# Patient Record
Sex: MALE | Race: WHITE | NOT HISPANIC OR LATINO | Employment: OTHER | URBAN - METROPOLITAN AREA
[De-identification: names, ages, dates, MRNs, and addresses within clinical notes are randomized per-mention and may not be internally consistent; named-entity substitution may affect disease eponyms.]

---

## 2016-12-13 LAB
ALT SERPL-CCNC: 34 U/L (ref 16–63)
AST SERPL-CCNC: 23 U/L (ref 15–37)
CREAT SERPL-MCNC: 1 MG/DL (ref 0.55–1.3)
GFR SERPL CREATININE-BSD FRML MDRD: 74 ML/MIN/1.73M2
GLUCOSE SERPL-MCNC: 120 MG/DL (ref 70–99)
POTASSIUM SERPL-SCNC: 4 MMOL/L (ref 3.5–5.1)

## 2017-01-06 LAB
ALT SERPL-CCNC: 28 U/L (ref 16–63)
AST SERPL-CCNC: 18 U/L (ref 15–37)
CHOLEST SERPL-MCNC: 158 MG/DL (ref 0–200)
CREAT SERPL-MCNC: 0.93 MG/DL (ref 0.55–1.3)
GFR SERPL CREATININE-BSD FRML MDRD: 98 ML/MIN/1.73M2
GLUCOSE SERPL-MCNC: 99 MG/DL (ref 70–99)
HDLC SERPL-MCNC: 52 MG/DL (ref 40–60)
LDLC SERPL CALC-MCNC: 66 MG/DL
POTASSIUM SERPL-SCNC: 4.4 MMOL/L (ref 3.5–5.1)
TRIGL SERPL-MCNC: 202 MG/DL (ref 30–200)

## 2017-01-27 ENCOUNTER — TRANSFERRED RECORDS (OUTPATIENT)
Dept: HEALTH INFORMATION MANAGEMENT | Facility: CLINIC | Age: 67
End: 2017-01-27

## 2017-01-27 LAB — INR PPP: 1.7 (ref 0.83–1.19)

## 2018-03-27 ENCOUNTER — TRANSFERRED RECORDS (OUTPATIENT)
Dept: HEALTH INFORMATION MANAGEMENT | Facility: CLINIC | Age: 68
End: 2018-03-27

## 2018-04-02 ENCOUNTER — TRANSFERRED RECORDS (OUTPATIENT)
Dept: HEALTH INFORMATION MANAGEMENT | Facility: CLINIC | Age: 68
End: 2018-04-02

## 2018-04-18 ENCOUNTER — TRANSFERRED RECORDS (OUTPATIENT)
Dept: HEALTH INFORMATION MANAGEMENT | Facility: CLINIC | Age: 68
End: 2018-04-18

## 2018-04-23 ENCOUNTER — TRANSFERRED RECORDS (OUTPATIENT)
Dept: HEALTH INFORMATION MANAGEMENT | Facility: CLINIC | Age: 68
End: 2018-04-23

## 2018-05-10 ENCOUNTER — TRANSFERRED RECORDS (OUTPATIENT)
Dept: HEALTH INFORMATION MANAGEMENT | Facility: CLINIC | Age: 68
End: 2018-05-10

## 2018-08-09 ENCOUNTER — TRANSFERRED RECORDS (OUTPATIENT)
Dept: HEALTH INFORMATION MANAGEMENT | Facility: CLINIC | Age: 68
End: 2018-08-09

## 2018-08-31 DIAGNOSIS — D68.51 FACTOR V LEIDEN MUTATION (H): Primary | ICD-10-CM

## 2018-09-10 ENCOUNTER — ALLIED HEALTH/NURSE VISIT (OUTPATIENT)
Dept: FAMILY MEDICINE | Facility: CLINIC | Age: 68
End: 2018-09-10
Payer: COMMERCIAL

## 2018-09-10 VITALS — HEART RATE: 80 BPM | RESPIRATION RATE: 18 BRPM | SYSTOLIC BLOOD PRESSURE: 138 MMHG | DIASTOLIC BLOOD PRESSURE: 70 MMHG

## 2018-09-10 DIAGNOSIS — Z01.30 BP CHECK: Primary | ICD-10-CM

## 2018-09-10 DIAGNOSIS — D68.51 FACTOR V LEIDEN MUTATION (H): ICD-10-CM

## 2018-09-10 LAB — INR PPP: 2.37 (ref 0.86–1.14)

## 2018-09-10 PROCEDURE — 85610 PROTHROMBIN TIME: CPT | Performed by: FAMILY MEDICINE

## 2018-09-10 PROCEDURE — 36415 COLL VENOUS BLD VENIPUNCTURE: CPT | Performed by: FAMILY MEDICINE

## 2018-09-10 PROCEDURE — 99207 ZZC NO CHARGE NURSE ONLY: CPT

## 2018-09-10 NOTE — PROGRESS NOTES
S-(situation): patient is new to our clinic and is requesting a BP check.  He is from [place].    B-(background): does not check BP at home.  Has BP checked when gets INR done.    States takes simvasts DVT after arm surgery    A-(assessment): BP at goal    R-(recommendations): advised to continue to monitor BP as needed.  Octavia Ashley RN

## 2018-09-10 NOTE — MR AVS SNAPSHOT
"              After Visit Summary   9/10/2018    Jose M Rea    MRN: [id number]           Patient Information     Date Of Birth          1950        Visit Information        Provider Department      9/10/2018 9:15 AM [place] [facility]        Today's Diagnoses     BP check    -  1       Follow-ups after your visit        Your next 10 appointments already scheduled     Sep 10, 2018  9:15 AM CDT   [facility] with [place] [facility]    [address] [place] [address] [phone]              Who to contact     If you have questions or need follow up information about today's clinic visit or your schedule please contact [facility] directly at [phone].  Normal or non-critical lab and imaging results will be communicated to you by MyChart, letter or phone within 4 business days after the clinic has received the results. If you do not hear from us within 7 days, please contact the clinic through MyChart or phone. If you have a critical or abnormal lab result, we will notify you by phone as soon as possible.  Submit refill requests through [employer] or call your pharmacy and they will forward the refill request to us. Please allow 3 business days for your refill to be completed.          Additional Information About Your Visit        MyChart Information     [employer] lets you send messages to your doctor, view your test results, renew your prescriptions, schedule appointments and more. To sign up, go to [email or web address] . Click on \"Log in\" on the left side of the screen, which will take you to the Welcome page. Then click on \"Sign up Now\" on the right side of the page.     You will be asked to enter the access code listed below, as well as some personal information. Please follow the directions to create your username and password.     Your access code is: [email or web address]  Expires: 12/9/2018  9:11 AM   "   Your access code will  in 90 days. If you need help or a new code, please call your [place] clinic or [phone].        Care EveryWhere ID     This is your Care EveryWhere ID. This could be used by other organizations to access your [place] medical records  [id number]         Blood Pressure from Last 3 Encounters:   No data found for BP    Weight from Last 3 Encounters:   No data found for Wt              Today, you had the following     No orders found for display       Primary Care Provider Fax #    Physician No Ref-Primary [phone]       No address on file        Equal Access to Services     [facility]: Hadii aad ku hadasho Soomaali, waaxda luqadaha, qaybta kaalmada adeegyacali, brian lainez . So [facility] [phone].    ATENCIÓN: Si habla español, tiene a loyd disposición servicios gratuitos de asistencia lingüística. Llame al [phone].    We comply with applicable federal civil rights laws and [place] laws. We do not discriminate on the basis of race, color, national origin, age, disability, sex, sexual orientation, or gender identity.            Thank you!     Thank you for choosing [facility]  for your care. Our goal is always to provide you with excellent care. Hearing back from our patients is one way we can continue to improve our services. Please take a few minutes to complete the written survey that you may receive in the mail after your visit with us. Thank you!             Your Updated Medication List - Protect others around you: Learn how to safely use, store and throw away your medicines at www.disposemymeds.org.      Notice  As of 9/10/2018  9:11 AM    You have not been prescribed any medications.

## 2018-09-12 ENCOUNTER — OFFICE VISIT (OUTPATIENT)
Dept: FAMILY MEDICINE | Facility: CLINIC | Age: 68
End: 2018-09-12
Payer: COMMERCIAL

## 2018-09-12 VITALS
SYSTOLIC BLOOD PRESSURE: 122 MMHG | TEMPERATURE: 98.5 F | RESPIRATION RATE: 20 BRPM | DIASTOLIC BLOOD PRESSURE: 68 MMHG | HEART RATE: 84 BPM | WEIGHT: 194.8 LBS

## 2018-09-12 DIAGNOSIS — J01.90 ACUTE SINUSITIS WITH SYMPTOMS > 10 DAYS: Primary | ICD-10-CM

## 2018-09-12 PROCEDURE — 99213 OFFICE O/P EST LOW 20 MIN: CPT | Performed by: NURSE PRACTITIONER

## 2018-09-12 RX ORDER — WARFARIN SODIUM 5 MG/1
5 TABLET ORAL DAILY
COMMUNITY
Start: 2018-07-19 | End: 2018-09-24

## 2018-09-12 RX ORDER — KETOCONAZOLE 20 MG/G
CREAM TOPICAL PRN
COMMUNITY
Start: 2018-05-18 | End: 2021-02-26

## 2018-09-12 RX ORDER — BETAMETHASONE DIPROPIONATE 0.5 MG/G
CREAM TOPICAL PRN
COMMUNITY
Start: 2018-08-16 | End: 2019-07-11

## 2018-09-12 RX ORDER — AMOXICILLIN 500 MG/1
500 CAPSULE ORAL 2 TIMES DAILY
Qty: 20 CAPSULE | Refills: 0 | Status: CANCELLED | OUTPATIENT
Start: 2018-09-12 | End: 2018-09-22

## 2018-09-12 RX ORDER — SIMVASTATIN 20 MG
20 TABLET ORAL DAILY
COMMUNITY
Start: 2018-08-12 | End: 2019-02-14

## 2018-09-12 RX ORDER — AMOXICILLIN 875 MG
875 TABLET ORAL 2 TIMES DAILY
Qty: 20 TABLET | Refills: 0 | Status: SHIPPED | OUTPATIENT
Start: 2018-09-12 | End: 2018-09-24

## 2018-09-12 RX ORDER — CLOTRIMAZOLE 1 %
CREAM (GRAM) TOPICAL PRN
COMMUNITY
Start: 2018-03-26 | End: 2024-04-30

## 2018-09-12 RX ORDER — FLUTICASONE PROPIONATE 50 MCG
1-2 SPRAY, SUSPENSION (ML) NASAL DAILY
Qty: 16 G | Refills: 0 | Status: SHIPPED | OUTPATIENT
Start: 2018-09-12 | End: 2019-02-14

## 2018-09-12 NOTE — MR AVS SNAPSHOT
After Visit Summary   9/12/2018    Jose M Rea    MRN: [id number]           Patient Information     Date Of Birth          1950        Visit Information        Provider Department      9/12/2018 1:40 PM Dottie Palma APRN [facility]        Today's Diagnoses     Acute sinusitis with symptoms > 10 days    -  1      Care Instructions    Medications as directed   Symptom Relief: Afdrin do not use greater then 3 days  Intranasal corticosteroid   Flonase has  been prescribed.     Tylenol or Ibuprofen if able to take for fevers and discomfort.  Do not exceed 4 grams of Tylenol in a 24 hour period.  Take Ibuprofen with food.  Hydrate with fluids, rest, cool humidifier.  May use acetaminophen, ibuprofen prn.    For your Cough   The Buckwheat Honey Dose: Given   hour Prior to Bedtime  For children age under 1 year -Do not use due to botulism risk     2-5 years -  tsp (2.5 mL)    6-11 years -1 tsp (5 mL)    12-18 years -2 tsp (10 mL)     Guaifenesin     Adult dose -Not to exceed 2.4 g (2400mg) per day    Child age 6-12 years -100 mg every 4 hr, not to exceed 1.2 g (1200mg) per day     Pediatric, 2-6 years -50 mg every 4 hr as needed, not to exceed 600 mg per day    Cough medications is not recommended in children under 2 years.  With use of cough medications have combination medications be aware of products in the cough medication you are using to avoid overdose    Follow up with PCP in 2 weeks  .    Go to Emergency Room if sx worsen or change, Shortness of breath, chest pain, persistent fevers, or painful breathing occur.     Patient voiced understanding of instructions given.      Follow-up with your primary care provider next week and as needed.    Indications for emergent return to emergency department discussed with patient, who verbalized good understanding and agreement.  Patient understands the limitations of today's evaluation.         Sinusitis (Antibiotic  Treatment)    The sinuses are air-filled spaces within the bones of the face. They connect to the inside of the nose. Sinusitis is an inflammation of the tissue that lines the sinuses. Sinusitis can occur during a cold. It can also happen due to allergies to pollens and other particles in the air. Sinusitis can cause symptoms of sinus congestion and a feeling of fullness. A sinus infection causes fever, headache, and facial pain. There is often green or yellow fluid draining from the nose or into the back of the throat (post-nasal drip). You have been given antibiotics to treat this condition.  Home care    Take the full course of antibiotics as instructed. Do not stop taking them, even when you feel better.    Drink plenty of water, hot tea, and other liquids. This may help thin nasal mucus. It also may help your sinuses drain fluids.    Heat may help soothe painful areas of your face. Use a towel soaked in hot water. Or,  the shower and direct the warm spray onto your face. Using a vaporizer along with a menthol rub at night may also help soothe symptoms.     An expectorant with guaifenesin may help thin nasal mucus and help your sinuses drain fluids.    You can use an over-the-counter decongestant, unless a similar medicine was prescribed to you. Nasal sprays work the fastest. Use one that contains phenylephrine or oxymetazoline. First blow your nose gently. Then use the spray. Do not use these medicines more often than directed on the label. If you do, your symptoms may get worse. You may also take pills that contain pseudoephedrine. Don t use products that combine multiple medicines. This is because side effects may be increased. Read labels. You can also ask the pharmacist for help. (People with high blood pressure should not use decongestants. They can raise blood pressure.)    Over-the-counter antihistamines may help if allergies contributed to your sinusitis.      Do not use nasal rinses or  irrigation during an acute sinus infection, unless your healthcare provider tells you to. Rinsing may spread the infection to other areas in your sinuses.    Use acetaminophen or ibuprofen to control pain, unless another pain medicine was prescribed to you. If you have chronic liver or kidney disease or ever had a stomach ulcer, talk with your healthcare provider before using these medicines. (Aspirin should never be taken by anyone under age 18 who is ill with a fever. It may cause severe liver damage.)    Don't smoke. This can make symptoms worse.  Follow-up care  Follow up with your healthcare provider or our staff if you are better in 1 week.  When to seek medical advice  Call your healthcare provider if any of these occur:    Facial pain or headache that gets worse    Stiff neck    Unusual drowsiness or confusion    Swelling of your forehead or eyelids    Vision problems, such as blurred or double vision    Fever of 100.4 F (38 C) or higher, or as directed by your healthcare provider    Seizure    Breathing problems    Symptoms don't go away in 10 days  Prevention  Here are steps you can take to help prevent an infection:    Keep good hand washing habits.    Don t have close contact with people who have sore throats, colds, or other upper respiratory infections.    Don t smoke, and stay away from secondhand smoke.    Stay up to date with of your vaccines.  Date Last Reviewed: 11/1/2017 2000-2017 [employer]. [address] [place] [address], [place] [address]. All rights reserved. This information is not intended as a substitute for professional medical care. Always follow your healthcare professional's instructions.                Follow-ups after your visit        Who to contact     If you have questions or need follow up information about today's clinic visit or your schedule please contact [facility] directly at [phone].  Normal or non-critical lab and imaging results will be  "communicated to you by [employer], letter or phone within 4 business days after the clinic has received the results. If you do not hear from us within 7 days, please contact the clinic through EQ[employer] or phone. If you have a critical or abnormal lab result, we will notify you by phone as soon as possible.  Submit refill requests through [employer] or call your pharmacy and they will forward the refill request to us. Please allow 3 business days for your refill to be completed.          Additional Information About Your Visit        EQO Information     EQO lets you send messages to your doctor, view your test results, renew your prescriptions, schedule appointments and more. To sign up, go to [email or web address] . Click on \"Log in\" on the left side of the screen, which will take you to the Welcome page. Then click on \"Sign up Now\" on the right side of the page.     You will be asked to enter the access code listed below, as well as some personal information. Please follow the directions to create your username and password.     Your access code is: [email or web address]  Expires: 2018  9:11 AM     Your access code will  in 90 days. If you need help or a new code, please call your [place] clinic or [phone].        Care EveryWhere ID     This is your Care EveryWhere ID. This could be used by other organizations to access your [place] medical records  [id number]        Your Vitals Were     Pulse Temperature Respirations             84 98.5  F (36.9  C) (Tympanic) 20          Blood Pressure from Last 3 Encounters:   18 122/68   09/10/18 138/70    Weight from Last 3 Encounters:   18 194 lb 12.8 oz (88.4 kg)              We Performed the Following     Colonoscopy - HIM Scan          Today's Medication Changes          These changes are accurate as of 18  2:14 PM.  If you have any questions, ask your nurse or doctor.               Start taking these medicines.        Dose/Directions    " amoxicillin 875 MG tablet   Commonly known as:  AMOXIL   Used for:  Acute sinusitis with symptoms > 10 days   Started by:  Dottie Palma APRN CNP        Dose:  875 mg   Take 1 tablet (875 mg) by mouth 2 times daily   Quantity:  20 tablet   Refills:  0       fluticasone 50 MCG/ACT spray   Commonly known as:  FLONASE   Used for:  Acute sinusitis with symptoms > 10 days   Started by:  Dottie Palma APRN CNP        Dose:  1-2 spray   Spray 1-2 sprays into both nostrils daily   Quantity:  16 g   Refills:  0            Where to get your medicines      These medications were sent to [id number] - [facility] [address]  [address] [address]    Hours:  Closed 10-16-08 business to [facility] Phone:  [phone]     amoxicillin 875 MG tablet    fluticasone 50 MCG/ACT spray                Primary Care Provider Office Phone # Fax #    Pj Quijano [phone] [address] [facility] [address]        Equal Access to Services     [facility]: Hadii aad ku hadasho Socarter, waaxda luqadaha, qaybta kaalmada adesandra, brian lainez . So [facility] [phone].    ATENCIÓN: Si habla español, tiene a loyd disposición servicios gratuitos de asistencia lingüística. [facility] [phone].    We comply with applicable federal civil rights laws and [place] laws. We do not discriminate on the basis of race, color, national origin, age, disability, sex, sexual orientation, or gender identity.            Thank you!     Thank you for choosing [facility]  for your care. Our goal is always to provide you with excellent care. Hearing back from our patients is one way we can continue to improve our services. Please take a few minutes to complete the written survey that you may receive in the mail after your visit with us. Thank you!             Your Updated Medication List - Protect others around you: Learn how to  safely use, store and throw away your medicines at www.disposemymeds.org.          This list is accurate as of 9/12/18  2:14 PM.  Always use your most recent med list.                   Brand Name Dispense Instructions for use Diagnosis    ACETAMINOPHEN PO      Take 650 mg by mouth        amoxicillin 875 MG tablet    AMOXIL    20 tablet    Take 1 tablet (875 mg) by mouth 2 times daily    Acute sinusitis with symptoms > 10 days       betamethasone dipropionate 0.05 % cream    DIPROSONE     daily        clotrimazole 1 % cream    LOTRIMIN     daily        fluticasone 50 MCG/ACT spray    FLONASE    16 g    Spray 1-2 sprays into both nostrils daily    Acute sinusitis with symptoms > 10 days       ketoconazole 2 % cream    NIZORAL     daily        MENS ONE DAILY PO           simvastatin 20 MG tablet    ZOCOR     20 mg daily        warfarin 5 MG tablet    COUMADIN     5 mg daily

## 2018-09-12 NOTE — PROGRESS NOTES
SUBJECTIVE:   Jose M Rea is a 68 year old male who presents to clinic today for the following health issues:    ENT Symptoms             Symptoms: cc Present Absent Comment   Fever/Chills       Fatigue  x     Muscle Aches       Eye Irritation       Sneezing       Nasal Adriano/Drg  x  Congestion, not much drainage.    Sinus Pressure/Pain       Loss of smell       Dental pain       Sore Throat       Swollen Glands       Ear Pain/Fullness       Cough  x  Non productive unless works at it then can be    Wheeze       Chest Pain       Shortness of breath       Rash       Other         Symptom duration:  6 days    Symptom severity:  same    Treatments tried:  Dymatap and Advil alternating    Contacts:  none          Problem list and histories reviewed & adjusted, as indicated.  Additional history: as documented    There is no problem list on file for this patient.    History reviewed. No pertinent surgical history.    Social History   Substance Use Topics     Smoking status: Current Every Day Smoker     Smokeless tobacco: Never Used     Alcohol use No     History reviewed. No pertinent family history.      Current Outpatient Prescriptions   Medication Sig Dispense Refill     ACETAMINOPHEN PO Take 650 mg by mouth       betamethasone dipropionate (DIPROSONE) 0.05 % cream daily       clotrimazole (LOTRIMIN) 1 % cream daily       ketoconazole (NIZORAL) 2 % cream daily       Multiple Vitamins-Minerals (MENS ONE DAILY PO)        simvastatin (ZOCOR) 20 MG tablet 20 mg daily       warfarin (COUMADIN) 5 MG tablet 5 mg daily       Allergies   Allergen Reactions     Cephalexin Rash     Doxycycline Rash     Metal [Staples] Rash and Blisters       Reviewed and updated as needed this visit by clinical staff       Reviewed and updated as needed this visit by Provider         ROS:  Constitutional, HEENT, cardiovascular, pulmonary, gi and gu systems are negative, except as otherwise noted.    OBJECTIVE:     /68 (BP Location:  Right arm, Cuff Size: Adult Large)  Pulse 84  Temp 98.5  F (36.9  C) (Tympanic)  Resp 20  Wt 194 lb 12.8 oz (88.4 kg)  There is no height or weight on file to calculate BMI.   GENERAL: healthy, alert and no distress  EYES: Eyes grossly normal to inspection, PERRL and conjunctivae and sclerae normal  HENT: ear canals and TM's normal, nose and mouth without ulcers or lesions  HENT: Maxillary sinus tenderness, bilateral turbinates inflamed and edematous    NECK: no adenopathy, no asymmetry, masses, or scars and thyroid normal to palpation  RESP: lungs clear to auscultation - no rales, rhonchi or wheezes  CV: regular rate and rhythm, normal S1 S2, no S3 or S4, no murmur, click or rub, no peripheral edema and peripheral pulses strong  MS: no gross musculoskeletal defects noted, no edema  SKIN: no suspicious lesions or rashes  NEURO: Normal strength and tone, mentation intact and speech normal  PSYCH: mentation appears normal, affect normal/bright        ASSESSMENT:       ICD-10-CM    1. Acute sinusitis with symptoms > 10 days J01.90 fluticasone (FLONASE) 50 MCG/ACT spray     amoxicillin (AMOXIL) 875 MG tablet         PLAN:     Patient Instructions   Medications as directed   Symptom Relief: Afdrin do not use greater then 3 days  Intranasal corticosteroid   Flonase has  been prescribed.     Tylenol or Ibuprofen if able to take for fevers and discomfort.  Do not exceed 4 grams of Tylenol in a 24 hour period.  Take Ibuprofen with food.  Hydrate with fluids, rest, cool humidifier.  May use acetaminophen, ibuprofen prn.    For your Cough   The Buckwheat Honey Dose: Given   hour Prior to Bedtime  For children age under 1 year -Do not use due to botulism risk     2-5 years -  tsp (2.5 mL)    6-11 years -1 tsp (5 mL)    12-18 years -2 tsp (10 mL)     Guaifenesin     Adult dose -Not to exceed 2.4 g (2400mg) per day    Child age 6-12 years -100 mg every 4 hr, not to exceed 1.2 g (1200mg) per day     Pediatric, 2-6 years -50 mg  every 4 hr as needed, not to exceed 600 mg per day    Cough medications is not recommended in children under 2 years.  With use of cough medications have combination medications be aware of products in the cough medication you are using to avoid overdose    Follow up with PCP in 2 weeks  .    Go to Emergency Room if sx worsen or change, Shortness of breath, chest pain, persistent fevers, or painful breathing occur.     Patient voiced understanding of instructions given.      Follow-up with your primary care provider next week and as needed.    Indications for emergent return to emergency department discussed with patient, who verbalized good understanding and agreement.  Patient understands the limitations of today's evaluation.         Sinusitis (Antibiotic Treatment)    The sinuses are air-filled spaces within the bones of the face. They connect to the inside of the nose. Sinusitis is an inflammation of the tissue that lines the sinuses. Sinusitis can occur during a cold. It can also happen due to allergies to pollens and other particles in the air. Sinusitis can cause symptoms of sinus congestion and a feeling of fullness. A sinus infection causes fever, headache, and facial pain. There is often green or yellow fluid draining from the nose or into the back of the throat (post-nasal drip). You have been given antibiotics to treat this condition.  Home care    Take the full course of antibiotics as instructed. Do not stop taking them, even when you feel better.    Drink plenty of water, hot tea, and other liquids. This may help thin nasal mucus. It also may help your sinuses drain fluids.    Heat may help soothe painful areas of your face. Use a towel soaked in hot water. Or,  the shower and direct the warm spray onto your face. Using a vaporizer along with a menthol rub at night may also help soothe symptoms.     An expectorant with guaifenesin may help thin nasal mucus and help your sinuses drain  fluids.    You can use an over-the-counter decongestant, unless a similar medicine was prescribed to you. Nasal sprays work the fastest. Use one that contains phenylephrine or oxymetazoline. First blow your nose gently. Then use the spray. Do not use these medicines more often than directed on the label. If you do, your symptoms may get worse. You may also take pills that contain pseudoephedrine. Don t use products that combine multiple medicines. This is because side effects may be increased. Read labels. You can also ask the pharmacist for help. (People with high blood pressure should not use decongestants. They can raise blood pressure.)    Over-the-counter antihistamines may help if allergies contributed to your sinusitis.      Do not use nasal rinses or irrigation during an acute sinus infection, unless your healthcare provider tells you to. Rinsing may spread the infection to other areas in your sinuses.    Use acetaminophen or ibuprofen to control pain, unless another pain medicine was prescribed to you. If you have chronic liver or kidney disease or ever had a stomach ulcer, talk with your healthcare provider before using these medicines. (Aspirin should never be taken by anyone under age 18 who is ill with a fever. It may cause severe liver damage.)    Don't smoke. This can make symptoms worse.  Follow-up care  Follow up with your healthcare provider or our staff if you are better in 1 week.  When to seek medical advice  Call your healthcare provider if any of these occur:    Facial pain or headache that gets worse    Stiff neck    Unusual drowsiness or confusion    Swelling of your forehead or eyelids    Vision problems, such as blurred or double vision    Fever of 100.4 F (38 C) or higher, or as directed by your healthcare provider    Seizure    Breathing problems    Symptoms don't go away in 10 days  Prevention  Here are steps you can take to help prevent an infection:    Keep good hand washing  habits.    Don t have close contact with people who have sore throats, colds, or other upper respiratory infections.    Don t smoke, and stay away from secondhand smoke.    Stay up to date with of your vaccines.  Date Last Reviewed: 11/1/2017 2000-2017 [employer]. [address] [place] [address]. All rights reserved. This information is not intended as a substitute for professional medical care. Always follow your healthcare professional's instructions.              NICHELLE Wilder [facility]

## 2018-09-12 NOTE — PATIENT INSTRUCTIONS
Medications as directed   Symptom Relief: Afdrin do not use greater then 3 days  Intranasal corticosteroid   Flonase has  been prescribed.     Tylenol or Ibuprofen if able to take for fevers and discomfort.  Do not exceed 4 grams of Tylenol in a 24 hour period.  Take Ibuprofen with food.  Hydrate with fluids, rest, cool humidifier.  May use acetaminophen, ibuprofen prn.    For your Cough   The Buckwheat Honey Dose: Given   hour Prior to Bedtime  For children age under 1 year -Do not use due to botulism risk     2-5 years -  tsp (2.5 mL)    6-11 years -1 tsp (5 mL)    12-18 years -2 tsp (10 mL)     Guaifenesin     Adult dose -Not to exceed 2.4 g (2400mg) per day    Child age 6-12 years -100 mg every 4 hr, not to exceed 1.2 g (1200mg) per day     Pediatric, 2-6 years -50 mg every 4 hr as needed, not to exceed 600 mg per day    Cough medications is not recommended in children under 2 years.  With use of cough medications have combination medications be aware of products in the cough medication you are using to avoid overdose    Follow up with PCP in 2 weeks  .    Go to Emergency Room if sx worsen or change, Shortness of breath, chest pain, persistent fevers, or painful breathing occur.     Patient voiced understanding of instructions given.      Follow-up with your primary care provider next week and as needed.    Indications for emergent return to emergency department discussed with patient, who verbalized good understanding and agreement.  Patient understands the limitations of today's evaluation.         Sinusitis (Antibiotic Treatment)    The sinuses are air-filled spaces within the bones of the face. They connect to the inside of the nose. Sinusitis is an inflammation of the tissue that lines the sinuses. Sinusitis can occur during a cold. It can also happen due to allergies to pollens and other particles in the air. Sinusitis can cause symptoms of sinus congestion and a feeling of fullness. A sinus infection  causes fever, headache, and facial pain. There is often green or yellow fluid draining from the nose or into the back of the throat (post-nasal drip). You have been given antibiotics to treat this condition.  Home care    Take the full course of antibiotics as instructed. Do not stop taking them, even when you feel better.    Drink plenty of water, hot tea, and other liquids. This may help thin nasal mucus. It also may help your sinuses drain fluids.    Heat may help soothe painful areas of your face. Use a towel soaked in hot water. Or,  the shower and direct the warm spray onto your face. Using a vaporizer along with a menthol rub at night may also help soothe symptoms.     An expectorant with guaifenesin may help thin nasal mucus and help your sinuses drain fluids.    You can use an over-the-counter decongestant, unless a similar medicine was prescribed to you. Nasal sprays work the fastest. Use one that contains phenylephrine or oxymetazoline. First blow your nose gently. Then use the spray. Do not use these medicines more often than directed on the label. If you do, your symptoms may get worse. You may also take pills that contain pseudoephedrine. Don t use products that combine multiple medicines. This is because side effects may be increased. Read labels. You can also ask the pharmacist for help. (People with high blood pressure should not use decongestants. They can raise blood pressure.)    Over-the-counter antihistamines may help if allergies contributed to your sinusitis.      Do not use nasal rinses or irrigation during an acute sinus infection, unless your healthcare provider tells you to. Rinsing may spread the infection to other areas in your sinuses.    Use acetaminophen or ibuprofen to control pain, unless another pain medicine was prescribed to you. If you have chronic liver or kidney disease or ever had a stomach ulcer, talk with your healthcare provider before using these medicines.  (Aspirin should never be taken by anyone under age 18 who is ill with a fever. It may cause severe liver damage.)    Don't smoke. This can make symptoms worse.  Follow-up care  Follow up with your healthcare provider or our staff if you are better in 1 week.  When to seek medical advice  Call your healthcare provider if any of these occur:    Facial pain or headache that gets worse    Stiff neck    Unusual drowsiness or confusion    Swelling of your forehead or eyelids    Vision problems, such as blurred or double vision    Fever of 100.4 F (38 C) or higher, or as directed by your healthcare provider    Seizure    Breathing problems    Symptoms don't go away in 10 days  Prevention  Here are steps you can take to help prevent an infection:    Keep good hand washing habits.    Don t have close contact with people who have sore throats, colds, or other upper respiratory infections.    Don t smoke, and stay away from secondhand smoke.    Stay up to date with of your vaccines.  Date Last Reviewed: 11/1/2017 2000-2017 [employer]. [address] [place] [address], [place] [address]. All rights reserved. This information is not intended as a substitute for professional medical care. Always follow your healthcare professional's instructions.

## 2018-09-12 NOTE — NURSING NOTE
Chief Complaint   Patient presents with     Nose Problem       Initial /68 (BP Location: Right arm, Cuff Size: Adult Large)  Pulse 84  Temp 98.5  F (36.9  C) (Tympanic)  Resp 20  Wt 194 lb 12.8 oz (88.4 kg) There is no height or weight on file to calculate BMI.    Patient presents to the clinic using No DME    Health Maintenance that is potentially due pending provider review:  NONE    n/a    Is there anyone who you would like to be able to receive your results? Not Applicable  If yes have patient fill out JUSTIN  Juan Miguel Stephenson M.A.

## 2018-09-17 ENCOUNTER — ANTICOAGULATION THERAPY VISIT (OUTPATIENT)
Dept: ANTICOAGULATION | Facility: CLINIC | Age: 68
End: 2018-09-17
Payer: COMMERCIAL

## 2018-09-17 DIAGNOSIS — I82.4Y2 ACUTE DEEP VEIN THROMBOSIS (DVT) OF PROXIMAL VEIN OF LEFT LOWER EXTREMITY (H): ICD-10-CM

## 2018-09-17 DIAGNOSIS — Z79.01 LONG-TERM (CURRENT) USE OF ANTICOAGULANTS: ICD-10-CM

## 2018-09-17 DIAGNOSIS — Z79.01 CHRONIC ANTICOAGULATION: ICD-10-CM

## 2018-09-17 DIAGNOSIS — D68.51 FACTOR V LEIDEN MUTATION (H): ICD-10-CM

## 2018-09-17 LAB — INR PPP: 2.31 (ref 0.86–1.14)

## 2018-09-17 PROCEDURE — 36415 COLL VENOUS BLD VENIPUNCTURE: CPT | Performed by: FAMILY MEDICINE

## 2018-09-17 PROCEDURE — 99207 ZZC NO CHARGE NURSE ONLY: CPT

## 2018-09-17 PROCEDURE — 85610 PROTHROMBIN TIME: CPT | Performed by: FAMILY MEDICINE

## 2018-09-17 NOTE — MR AVS SNAPSHOT
Jose M Rea   9/17/2018 9:15 AM   Anticoagulation Therapy Visit    Description:  68 year old male   Provider:  NB ANTI COAG   Department:  [facility]

## 2018-09-24 ENCOUNTER — OFFICE VISIT (OUTPATIENT)
Dept: FAMILY MEDICINE | Facility: CLINIC | Age: 68
End: 2018-09-24
Payer: COMMERCIAL

## 2018-09-24 VITALS
HEIGHT: 70 IN | RESPIRATION RATE: 16 BRPM | BODY MASS INDEX: 28.35 KG/M2 | WEIGHT: 198 LBS | TEMPERATURE: 98.4 F | DIASTOLIC BLOOD PRESSURE: 72 MMHG | HEART RATE: 88 BPM | SYSTOLIC BLOOD PRESSURE: 126 MMHG

## 2018-09-24 DIAGNOSIS — E78.5 HYPERLIPIDEMIA LDL GOAL <100: ICD-10-CM

## 2018-09-24 DIAGNOSIS — Z79.01 CHRONIC ANTICOAGULATION: Primary | ICD-10-CM

## 2018-09-24 DIAGNOSIS — I82.4Y2 ACUTE DEEP VEIN THROMBOSIS (DVT) OF PROXIMAL VEIN OF LEFT LOWER EXTREMITY (H): ICD-10-CM

## 2018-09-24 PROBLEM — L30.9 ECZEMA: Status: ACTIVE | Noted: 2018-09-24

## 2018-09-24 PROBLEM — I82.4Y9 ACUTE DEEP VEIN THROMBOSIS (DVT) OF PROXIMAL VEIN OF LOWER EXTREMITY (H): Status: ACTIVE | Noted: 2018-09-24

## 2018-09-24 PROCEDURE — 99213 OFFICE O/P EST LOW 20 MIN: CPT | Performed by: FAMILY MEDICINE

## 2018-09-24 RX ORDER — WARFARIN SODIUM 5 MG/1
7.5 TABLET ORAL DAILY
Qty: 135 TABLET | Refills: 3 | Status: SHIPPED | OUTPATIENT
Start: 2018-09-24 | End: 2019-05-30

## 2018-09-24 NOTE — PROGRESS NOTES
SUBJECTIVE:   Jose M Rea is a 68 year old male who presents to clinic today for the following health issues:  Chief Complaint   Patient presents with     Establish Care     [facility].      Moved here a month ago from [place].  From this area originally.     Medication Followup of Warfarin    Taking Medication as prescribed: yes    Side Effects:  None    Medication Helping Symptoms:  Yes     Pt. Has been taking this medication for a little over 2 years     Needing to establish care here.     Chronically on warfarin.  He has had DVT left leg.  He was off the warfarin for 30 days then had a phone call recommending he continue on the warfarin.   Shoulder surgery May, 2016.  DVT first noted about a month after his surgery.  He had not been immobilized.   Current dose has been 7.5mg daily.  Last INR within the past 2 weeks.  He has been checking q 6 weeks.     Problem 2: He has hyperlipidemia.  Takes simvastatin.    Last labs in February for annual physical.     He has had skin creams for Seborrheic dermatitis on face, antifungal for penis infection and steroid for eczema.     Patient Active Problem List   Diagnosis     Hyperlipidemia LDL goal <100     Acute deep vein thrombosis (DVT) of proximal vein of lower extremity (H)     Eczema      He has had low back pain with radiculopathy within the past year.  Saw spine surgeon. Treated conservatively.  Still some intermittent numbness in leg.    Has rotator cuff tear left arm treated conservatively.     ROS:General: POSITIVE for:, weight loss, when back causing pain.  Some decreased energy.   Resp: No coughing, wheezing or shortness of breath  CV: No chest pains or palpitations  GI: No nausea, vomiting,  heartburn, abdominal pain, diarrhea, constipation or change in bowel habits  : No urinary frequency or dysuria, bladder or kidney problems  Psychiatric: POSITIVE for:, anxiety, in the past year with back problems.  Feels he has been doing OK.    OBJECTIVE:Blood  "pressure 126/72, pulse 88, temperature 98.4  F (36.9  C), temperature source Tympanic, resp. rate 16, height 5' 9.75\" (1.772 m), weight 198 lb (89.8 kg). BMI=Body mass index is 28.61 kg/(m^2).  GENERAL APPEARANCE ADULT: Alert, no acute distress  NECK: No adenopathy,masses or thyromegaly  RESP: lungs clear to auscultation   CV: normal rate, regular rhythm, no murmur or gallop  ABDOMEN: soft, no organomegaly, masses or tenderness  MS: extremities normal, no peripheral edema     ASSESSMENT:   (Z79.01) Chronic anticoagulation  (primary encounter diagnosis)  Comment:   Plan: warfarin (COUMADIN) 5 MG tablet, INR CLINIC         REFERRAL          Schedule an appointment with anticoagulation clinic for the next time you are due for a reading.     (I82.4Y2) Acute deep vein thrombosis (DVT) of proximal vein of left lower extremity (H)  Comment:   Plan: INR CLINIC REFERRAL          (E78.5) Hyperlipidemia LDL goal <100  Comment:   Plan: We can refill simvastatin when needed.      "

## 2018-09-24 NOTE — MR AVS SNAPSHOT
After Visit Summary   9/24/2018    Jose M Rea    MRN: [id number]           Patient Information     Date Of Birth          1950        Visit Information        Provider Department      9/24/2018 9:00 AM Pj Quijano MD [facility]        Today's Diagnoses     Chronic anticoagulation    -  1    Acute deep vein thrombosis (DVT) of proximal vein of left lower extremity (H)        Hyperlipidemia LDL goal <100          Care Instructions    ASSESSMENT:   (Z79.01) Chronic anticoagulation  (primary encounter diagnosis)  Comment:   Plan: warfarin (COUMADIN) 5 MG tablet, INR CLINIC         REFERRAL          Schedule an appointment with anticoagulation clinic for the next time you are due for a reading.     (I82.4Y2) Acute deep vein thrombosis (DVT) of proximal vein of left lower extremity (H)  Comment:   Plan: INR CLINIC REFERRAL          (E78.5) Hyperlipidemia LDL goal <100  Comment:   Plan: We can refill simvastatin when needed.           Follow-ups after your visit        Additional Services     INR CLINIC REFERRAL       Your provider has referred you to INR Services.    Please be aware that coverage of these services is subject to the terms and limitations of your health insurance plan.  Call member services at your health plan with any benefit or coverage questions.    Indication for Anticoagulation: Other: DVT-he has been advised long term anticoagulation.  If nonstandard INR is desired, indicate goal range and explanation:   Expected Duration of Therapy: Lifetime                  Who to contact     If you have questions or need follow up information about today's clinic visit or your schedule please contact [facility] directly at [phone].  Normal or non-critical lab and imaging results will be communicated to you by MyChart, letter or phone within 4 business days after the clinic has received the results. If you do not hear from us within 7 days,  "please contact the clinic through [employer] or phone. If you have a critical or abnormal lab result, we will notify you by phone as soon as possible.  Submit refill requests through [employer] or call your pharmacy and they will forward the refill request to us. Please allow 3 business days for your refill to be completed.          Additional Information About Your Visit        [employer] Information     [employer] lets you send messages to your doctor, view your test results, renew your prescriptions, schedule appointments and more. To sign up, go to [email or web address] . Click on \"Log in\" on the left side of the screen, which will take you to the Welcome page. Then click on \"Sign up Now\" on the right side of the page.     You will be asked to enter the access code listed below, as well as some personal information. Please follow the directions to create your username and password.     Your access code is: [email or web address]  Expires: 2018  9:11 AM     Your access code will  in 90 days. If you need help or a new code, please call your [place] clinic or [phone].        Care EveryWhere ID     This is your Care EveryWhere ID. This could be used by other organizations to access your [place] medical records  [id number]        Your Vitals Were     Pulse Temperature Respirations Height BMI (Body Mass Index)       88 98.4  F (36.9  C) (Tympanic) 16 5' 9.75\" (1.772 m) 28.61 kg/m2        Blood Pressure from Last 3 Encounters:   18 126/72   18 122/68   09/10/18 138/70    Weight from Last 3 Encounters:   18 198 lb (89.8 kg)   18 194 lb 12.8 oz (88.4 kg)              We Performed the Following     INR CLINIC REFERRAL          Today's Medication Changes          These changes are accurate as of 18  9:48 AM.  If you have any questions, ask your nurse or doctor.               These medicines have changed or have updated prescriptions.        Dose/Directions    warfarin 5 MG tablet   Commonly " known as:  COUMADIN   This may have changed:    - how much to take  - how to take this   Used for:  Chronic anticoagulation   Changed by:  Pj Quijano MD        Dose:  7.5 mg   Take 1.5 tablets (7.5 mg) by mouth daily   Quantity:  135 tablet   Refills:  3            Where to get your medicines      These medications were sent to [id number] - [place], [place] - [address]  [address] [address]    Hours:  Closed 10-16-08 business to [facility] Phone:  [phone]     warfarin 5 MG tablet                Primary Care Provider Office Phone # Fax #    Pj Quijano [phone] [facility] [address]        Equal Access to Services     SAMMY STEVE : Hadii aad ku hadasho Soomaali, waaxda luqadaha, qaybta kaalmada adeegyada, brian lainez . So [facility] [phone].    ATENCIÓN: Si habla español, tiene a loyd disposición servicios gratuitos de asistencia lingüística. [facility] [phone].    We comply with applicable federal civil rights laws and [place] laws. We do not discriminate on the basis of race, color, national origin, age, disability, sex, sexual orientation, or gender identity.            Thank you!     Thank you for choosing [facility]  for your care. Our goal is always to provide you with excellent care. Hearing back from our patients is one way we can continue to improve our services. Please take a few minutes to complete the written survey that you may receive in the mail after your visit with us. Thank you!             Your Updated Medication List - Protect others around you: Learn how to safely use, store and throw away your medicines at www.disposemymeds.org.          This list is accurate as of 9/24/18  9:48 AM.  Always use your most recent med list.                   Brand Name Dispense Instructions for use Diagnosis    ACETAMINOPHEN PO      Take 650 mg by mouth         betamethasone dipropionate 0.05 % cream    DIPROSONE     daily        clotrimazole 1 % cream    LOTRIMIN     daily        fluticasone 50 MCG/ACT spray    FLONASE    16 g    Spray 1-2 sprays into both nostrils daily    Acute sinusitis with symptoms > 10 days       ketoconazole 2 % cream    NIZORAL     daily        MENS ONE DAILY PO           simvastatin 20 MG tablet    ZOCOR     20 mg daily        warfarin 5 MG tablet    COUMADIN    135 tablet    Take 1.5 tablets (7.5 mg) by mouth daily    Chronic anticoagulation

## 2018-09-24 NOTE — PATIENT INSTRUCTIONS
ASSESSMENT:   (Z79.01) Chronic anticoagulation  (primary encounter diagnosis)  Comment:   Plan: warfarin (COUMADIN) 5 MG tablet, INR CLINIC         REFERRAL          Schedule an appointment with anticoagulation clinic for the next time you are due for a reading.     (I82.4Y2) Acute deep vein thrombosis (DVT) of proximal vein of left lower extremity (H)  Comment:   Plan: INR CLINIC REFERRAL          (E78.5) Hyperlipidemia LDL goal <100  Comment:   Plan: We can refill simvastatin when needed.

## 2018-09-24 NOTE — NURSING NOTE
"Chief Complaint   Patient presents with     Establish Care       Initial /72 (BP Location: Right arm, Patient Position: Chair, Cuff Size: Adult Large)  Pulse 88  Temp 98.4  F (36.9  C) (Tympanic)  Resp 16  Ht 5' 9.75\" (1.772 m)  Wt 198 lb (89.8 kg)  BMI 28.61 kg/m2 Estimated body mass index is 28.61 kg/(m^2) as calculated from the following:    Height as of this encounter: 5' 9.75\" (1.772 m).    Weight as of this encounter: 198 lb (89.8 kg).    Patient presents to the clinic using No DME    Health Maintenance that is potentially due pending provider review:  NONE    n/a    Josefina Robledo MA Student    "

## 2018-09-24 NOTE — PROGRESS NOTES
ANTICOAGULATION FOLLOW-UP CLINIC VISIT    Patient Name:  Jose M Rea  Date:  9/24/2018  Contact Type:  Chart Review    SUBJECTIVE:     Patient Findings     Comments Chronically on warfarin.  He has had DVT left leg.  He was off the warfarin for 30 days then had a phone call recommending he continue on the warfarin.   Shoulder surgery May, 2016.  DVT first noted about a month after his surgery.  He had not been immobilized.   Current dose has been 7.5mg daily.  Last INR within the past 2 weeks.  He has been checking q 6 weeks.   Schedule an appointment with anticoagulation clinic for the next time you are due for a reading.            OBJECTIVE    INR   Date Value Ref Range Status   09/17/2018 2.31 (H) 0.86 - 1.14 Final       ASSESSMENT / PLAN  No question data found.  Anticoagulation Summary as of 9/17/2018     INR goal 2.0-3.0   Today's INR 2.31   Warfarin maintenance plan 7.5 mg (5 mg x 1.5) every day   Full warfarin instructions 7.5 mg every day   Weekly warfarin total 52.5 mg   Plan last modified Kb Braun RN (9/24/2018)   Next INR check 10/29/2018   Target end date Indefinite    Indications   Acute deep vein thrombosis (DVT) of proximal vein of lower extremity (H) [I82.4Y9]  Chronic anticoagulation [Z79.01]  Long-term (current) use of anticoagulants [Z79.01] [Z79.01]         Anticoagulation Episode Summary     INR check location     Preferred lab     Send INR reminders to [facility]    Comments * Tx from [place]      Anticoagulation Care Providers     Provider Role Specialty Phone number    Pj Quijano MD  Family Practice [phone]            See the Encounter Report to view Anticoagulation Flowsheet and Dosing Calendar (Go to Encounters tab in chart review, and find the Anticoagulation Therapy Visit)        Kb Braun RN

## 2018-10-03 ENCOUNTER — OFFICE VISIT (OUTPATIENT)
Dept: FAMILY MEDICINE | Facility: CLINIC | Age: 68
End: 2018-10-03
Payer: COMMERCIAL

## 2018-10-03 VITALS
RESPIRATION RATE: 16 BRPM | HEART RATE: 95 BPM | SYSTOLIC BLOOD PRESSURE: 128 MMHG | DIASTOLIC BLOOD PRESSURE: 78 MMHG | BODY MASS INDEX: 28.76 KG/M2 | WEIGHT: 199 LBS | OXYGEN SATURATION: 98 % | TEMPERATURE: 98.4 F

## 2018-10-03 DIAGNOSIS — M54.41 ACUTE LEFT-SIDED LOW BACK PAIN WITH RIGHT-SIDED SCIATICA: Primary | ICD-10-CM

## 2018-10-03 PROCEDURE — 99213 OFFICE O/P EST LOW 20 MIN: CPT | Performed by: FAMILY MEDICINE

## 2018-10-03 NOTE — PROGRESS NOTES
SUBJECTIVE:   Jose M Rea is a 68 year old male who presents to clinic today for the following health issues:      Musculoskeletal problem/pain this man was evaluated by the people at the [facility] last year when he had an acute episode of low back pain that radiated down into his left leg and made his left leg weak.  At that time he had an MRI done that showed significant extruded disc at L2 and L3.  He had what the physical therapy saw spine physician got much better and his decision for surgery was abandoned.  Since then he has been doing quite well until recently when the pain came back and is now currently in the same location.  He denies any bowel or bladder problems but he thinks that the left leg is somewhat weaker than the right.      Duration: Jan 13th    Description  Location: left leg    Intensity:  moderate    Accompanying signs and symptoms: tingling and numbness    History  Previous similar problem: YES- had pinched nerve in lower back  Previous evaluation:  PT    Precipitating or alleviating factors:  Trauma or overuse: YES- in Jan - lower back  Aggravating factors include: climbing stairs    Therapies tried and outcome: Dr. Arguello and acettariaphin          Problem list and histories reviewed & adjusted, as indicated.  Additional history: as documented    Patient Active Problem List   Diagnosis     Hyperlipidemia LDL goal <100     Acute deep vein thrombosis (DVT) of proximal vein of lower extremity (H)     Eczema     Chronic anticoagulation     Long-term (current) use of anticoagulants [Z79.01]     History reviewed. No pertinent surgical history.    Social History   Substance Use Topics     Smoking status: Current Every Day Smoker     Packs/day: 0.50     Years: 50.00     Smokeless tobacco: Never Used      Comment: started at age 18     Alcohol use No     Family History   Problem Relation Age of Onset     Sleep Apnea Son      Thrombosis Son          Current Outpatient Prescriptions    Medication Sig Dispense Refill     ACETAMINOPHEN PO Take 650 mg by mouth       betamethasone dipropionate (DIPROSONE) 0.05 % cream daily       clotrimazole (LOTRIMIN) 1 % cream daily       ketoconazole (NIZORAL) 2 % cream daily       Multiple Vitamins-Minerals (MENS ONE DAILY PO)        simvastatin (ZOCOR) 20 MG tablet 20 mg daily       warfarin (COUMADIN) 5 MG tablet Take 1.5 tablets (7.5 mg) by mouth daily 135 tablet 3     fluticasone (FLONASE) 50 MCG/ACT spray Spray 1-2 sprays into both nostrils daily (Patient not taking: Reported on 10/3/2018) 16 g 0     Allergies   Allergen Reactions     Clindamycin      Cephalexin Rash     Doxycycline Rash     Metal [Staples] Rash and Blisters       Reviewed and updated as needed this visit by clinical staff  Tobacco  Allergies  Meds  Med Hx  Surg Hx  Fam Hx  Soc Hx      Reviewed and updated as needed this visit by Provider         ROS:  CONSTITUTIONAL: NEGATIVE for fever, chills, change in weight  ENT/MOUTH: NEGATIVE for ear, mouth and throat problems  RESP: NEGATIVE for significant cough or SOB  CV: NEGATIVE for chest pain, palpitations or peripheral edema    OBJECTIVE:     /78 (BP Location: Right arm)  Pulse 95  Temp 98.4  F (36.9  C) (Tympanic)  Resp 16  Wt 199 lb (90.3 kg)  SpO2 98%  BMI 28.76 kg/m2  Body mass index is 28.76 kg/(m^2).  GENERAL: healthy, alert and no distress  NECK: no adenopathy, no asymmetry, masses, or scars and thyroid normal to palpation  RESP: lungs clear to auscultation - no rales, rhonchi or wheezes  CV: regular rate and rhythm, normal S1 S2, no S3 or S4, no murmur, click or rub, no peripheral edema and peripheral pulses strong  ABDOMEN: soft, nontender, no hepatosplenomegaly, no masses and bowel sounds normal  MS: no gross musculoskeletal defects noted, no edema        ASSESSMENT/PLAN:             1. Acute left-sided low back pain with right-sided sciatica  Extruded disc at L2-L3  - PHYSICAL THERAPY REFERRAL;  Future    ASSESSMENT/PLAN:      ICD-10-CM    1. Acute left-sided low back pain with right-sided sciatica M54.41 PHYSICAL THERAPY REFERRAL       Patient Instructions   1. You should go ahead and start the PT.    2. Come back in  Month or before worse.     3. Call if any new symptoms           Pj Jackson MD  [facility]

## 2018-10-03 NOTE — PATIENT INSTRUCTIONS
1. You should go ahead and start the PT.    2. Come back in  Month or before worse.     3. Call if any new symptoms

## 2018-10-03 NOTE — MR AVS SNAPSHOT
After Visit Summary   10/3/2018    Jose M Rea    MRN: [id number]           Patient Information     Date Of Birth          1950        Visit Information        Provider Department      10/3/2018 12:40 PM Pj Jackson MD [facility]        Today's Diagnoses     Acute left-sided low back pain with right-sided sciatica    -  1      Care Instructions    1. You should go ahead and start the PT.    2. Come back in  Month or before worse.     3. Call if any new symptoms           Follow-ups after your visit        Additional Services     PHYSICAL THERAPY REFERRAL       If you have not heard from the scheduling office within 2 business days, please call [phone] for all locations, with the exception of [place], please call [phone] and [facility] [place], please call [phone].    Please be aware that coverage of these services is subject to the terms and limitations of your health insurance plan.  Call member services at your health plan with any benefit or coverage questions.                  Future tests that were ordered for you today     Open Future Orders        Priority Expected Expires Ordered    PHYSICAL THERAPY REFERRAL Routine  10/3/2019 10/3/2018            Who to contact     If you have questions or need follow up information about today's clinic visit or your schedule please contact [facility] directly at [phone].  Normal or non-critical lab and imaging results will be communicated to you by MyChart, letter or phone within 4 business days after the clinic has received the results. If you do not hear from us within 7 days, please contact the clinic through MyChart or phone. If you have a critical or abnormal lab result, we will notify you by phone as soon as possible.  Submit refill requests through [email or web address] or call your pharmacy and they will forward the refill request to us. Please allow 3 business days for your refill to be  "completed.          Additional Information About Your Visit        [employer] Information     [employer] lets you send messages to your doctor, view your test results, renew your prescriptions, schedule appointments and more. To sign up, go to [email or web address] . Click on \"Log in\" on the left side of the screen, which will take you to the Welcome page. Then click on \"Sign up Now\" on the right side of the page.     You will be asked to enter the access code listed below, as well as some personal information. Please follow the directions to create your username and password.     Your access code is: [email or web address]  Expires: 2018  9:11 AM     Your access code will  in 90 days. If you need help or a new code, please call your [facility] clinic or [phone].        Care EveryWhere ID     This is your Care EveryWhere ID. This could be used by other organizations to access your [facility] medical records  [id number]        Your Vitals Were     Pulse Temperature Respirations Pulse Oximetry BMI (Body Mass Index)       95 98.4  F (36.9  C) (Tympanic) 16 98% 28.76 kg/m2        Blood Pressure from Last 3 Encounters:   10/03/18 128/78   18 126/72   18 122/68    Weight from Last 3 Encounters:   10/03/18 199 lb (90.3 kg)   18 198 lb (89.8 kg)   18 194 lb 12.8 oz (88.4 kg)               Primary Care Provider Office Phone # Fax #    Pj Quijano [phone] [address] [place] [address]        Equal Access to Services     SAMMY STEVE : Haddeborah Rios, mikael hull, brian encarnacion. So [facility] [phone].    ATENCIÓN: Si habla español, tiene a loyd disposición servicios gratuitos de asistencia lingüística. Llame al [phone].    We comply with applicable federal civil rights laws and [place] laws. We do not discriminate on the basis of race, color, national origin, age, disability, sex, sexual " orientation, or gender identity.            Thank you!     Thank you for choosing [facility]  for your care. Our goal is always to provide you with excellent care. Hearing back from our patients is one way we can continue to improve our services. Please take a few minutes to complete the written survey that you may receive in the mail after your visit with us. Thank you!             Your Updated Medication List - Protect others around you: Learn how to safely use, store and throw away your medicines at www.disposemymeds.org.          This list is accurate as of 10/3/18  1:04 PM.  Always use your most recent med list.                   Brand Name Dispense Instructions for use Diagnosis    ACETAMINOPHEN PO      Take 650 mg by mouth        betamethasone dipropionate 0.05 % cream    DIPROSONE     daily        clotrimazole 1 % cream    LOTRIMIN     daily        fluticasone 50 MCG/ACT spray    FLONASE    16 g    Spray 1-2 sprays into both nostrils daily    Acute sinusitis with symptoms > 10 days       ketoconazole 2 % cream    NIZORAL     daily        MENS ONE DAILY PO           simvastatin 20 MG tablet    ZOCOR     20 mg daily        warfarin 5 MG tablet    COUMADIN    135 tablet    Take 1.5 tablets (7.5 mg) by mouth daily    Chronic anticoagulation

## 2018-10-04 ENCOUNTER — HOSPITAL ENCOUNTER (OUTPATIENT)
Dept: PHYSICAL THERAPY | Facility: CLINIC | Age: 68
Setting detail: THERAPIES SERIES
End: 2018-10-04
Attending: FAMILY MEDICINE
Payer: MEDICARE

## 2018-10-04 DIAGNOSIS — M54.41 ACUTE LEFT-SIDED LOW BACK PAIN WITH RIGHT-SIDED SCIATICA: ICD-10-CM

## 2018-10-04 PROCEDURE — 40000718 ZZHC STATISTIC PT DEPARTMENT ORTHO VISIT: Performed by: PHYSICAL THERAPIST

## 2018-10-04 PROCEDURE — 97161 PT EVAL LOW COMPLEX 20 MIN: CPT | Mod: GP | Performed by: PHYSICAL THERAPIST

## 2018-10-04 PROCEDURE — G8979 MOBILITY GOAL STATUS: HCPCS | Mod: GP,CI | Performed by: PHYSICAL THERAPIST

## 2018-10-04 PROCEDURE — 97110 THERAPEUTIC EXERCISES: CPT | Mod: GP | Performed by: PHYSICAL THERAPIST

## 2018-10-04 PROCEDURE — G8978 MOBILITY CURRENT STATUS: HCPCS | Mod: GP,CI | Performed by: PHYSICAL THERAPIST

## 2018-10-04 NOTE — PROGRESS NOTES
10/04/18 0700   General Information   Type of Visit Initial OP Ortho PT Evaluation   Start of Care Date 10/04/18   Referring Physician Renetta   Patient/Family Goals Statement stairs, home program   Orders Evaluate and Treat   Date of Order 10/03/18   Insurance Type Medicare;Blue Cross   Insurance Comments/Visits Authorized $599 remaining in  cap   Medical Diagnosis acute left side LBP with R side sciatica   Surgical/Medical history reviewed Yes   Precautions/Limitations no known precautions/limitations   General Information Comments Presents with wife Viri   Body Part(s)   Body Part(s) Lumbar Spine/SI   Presentation and Etiology   Pertinent history of current problem (include personal factors and/or comorbidities that impact the POC) Pt reports January 12 got up in the night to go to the bathroom and left leg gave out and was numb. Had MRI done noting 2 disc protrusions per patient report. Had PT to regain strength and mobility in leg. Has continued to faithfully perform exercises. Notes that the past week in new house has stairs that needs to perform and notes that needs strength still in left leg.    Impairments F. Decreased strength and endurance   Functional Limitations perform activities of daily living;perform desired leisure / sports activities   Symptom Location L leg, anterior thigh   How/Where did it occur From insidious onset   Onset date of current episode/exacerbation 09/27/18   Chronicity Recurrent   Pain rating (0-10 point scale) (2)   Pain quality C. Aching   Frequency of pain/symptoms C. With activity   Pain/symptoms are: The same all the time   Pain/symptoms exacerbated by M. Other   Pain exacerbation comment Stairs   Progression of symptoms since onset: Unchanged   Current Level of Function   Patient role/employment history F. Retired   Living environment House/townhome   Home/community accessibility Stairs to basement- all living areas on the main level   Fall Risk Screen   Have you  "fallen 2 or more times in the past year? Yes   Have you fallen and had an injury in the past year? No   Timed Up and Go score (seconds) 9   Is patient a fall risk? No   System Outcome Measures   Outcome Measures Low Back Pain (see Oswestry and Alyce)   Lumbar Spine/SI Objective Findings   Balance/Proprioception (Single Leg Stance) B 10 seconds   Flexion ROM Hands to inferior pole patella   Extension ROM extension limited 25%   Right Side Bending ROM 6\" from patella   Left Side Bending ROM 6\" from patella, mild discomfort   Hip Screen hip ROM wnl; - scour   Hip Flexion (L2) Strength L 4/5, R 4+/5   Hip Abduction Strength 3/5 L , 4/5 R   Knee Extension (L3) Strength 4/5 L, 4+/5 R   Ankle Dorsiflexion (L4) Strength 4-/5 L, 4+/5 R   Ankle Plantar Flexion (S1) Strength able toe walk B   Hamstring Flexibility Limited 30* in 90/90 L, lImited 20* in 90/90 R   SLR L 60, R 70   Slump Test B tight hamstings, mild + neural tension L    Palpation no tenderness to palpation   Planned Therapy Interventions   Planned Therapy Interventions balance training;gait training;joint mobilization;manual therapy;neuromuscular re-education;ROM;strengthening;stretching   Clinical Impression   Criteria for Skilled Therapeutic Interventions Met yes, treatment indicated   PT Diagnosis LLE weakness secondary to lumbar radiculopathy   Influenced by the following impairments pain, decreased ROM, decreased strength   Functional limitations due to impairments difficulty standing, stairs, lifting, carrying   Clinical Presentation Stable/Uncomplicated   Clinical Presentation Rationale motivated patient, compliant and eager to participate in PT, history LBP   Clinical Decision Making (Complexity) Low complexity   Therapy Frequency 1 time/week   Predicted Duration of Therapy Intervention (days/wks) 6 weeks   Risk & Benefits of therapy have been explained Yes   Patient, Family & other staff in agreement with plan of care Yes   Education Assessment "   Preferred Learning Style Listening;Reading;Demonstration;Pictures/video   Barriers to Learning No barriers   ORTHO GOALS   PT Ortho Eval Goals 1;2;3   Ortho Goal 1   Goal Identifier 1   Goal Description Patient will be able to ascend and descend stairs reciprical pattern without increased pain   Target Date 11/15/18   Ortho Goal 2   Goal Identifier 2   Goal Description Patient will be independant with HEP For long term self management   Target Date 11/15/18   Ortho Goal 3   Goal Identifier 3   Goal Description Patient will demonstrate 4+/5 hip abductor strength B for support lumbar spine in frontal plane   Target Date 11/15/18   Total Evaluation Time   Total Evaluation Time 25   Therapy Certification   Certification date from 10/04/18   Certification date to 11/15/18   Medical Diagnosis acute L sided low back pain with R sided sciatica

## 2018-10-04 NOTE — PROGRESS NOTES
[facility]          OUTPATIENT PHYSICAL THERAPY ORTHOPEDIC EVALUATION  PLAN OF TREATMENT FOR OUTPATIENT REHABILITATION  (COMPLETE FOR INITIAL CLAIMS ONLY)  Patient's Last Name, First Name, M.I.  YOB: 1950  Jose M Rea    Provider s Name:  [facility]   Medical Record No.  [id number]   Start of Care Date:  10/04/18   Onset Date:  09/27/18   Type:     _X__PT   ___OT   ___SLP Medical Diagnosis:  acute L sided low back pain with R sided sciatica     PT Diagnosis:  LLE weakness secondary to lumbar radiculopathy   Visits from SOC:  1      _________________________________________________________________________________  Plan of Treatment/Functional Goals:  balance training, gait training, joint mobilization, manual therapy, neuromuscular re-education, ROM, strengthening, stretching           Goals  Goal Identifier: 1  Goal Description: Patient will be able to ascend and descend stairs reciprical pattern without increased pain  Target Date: 11/15/18    Goal Identifier: 2  Goal Description: Patient will be independant with HEP For long term self management  Target Date: 11/15/18    Goal Identifier: 3  Goal Description: Patient will demonstrate 4+/5 hip abductor strength B for support lumbar spine in frontal plane  Target Date: 11/15/18           Therapy Frequency:  1 time/week  Predicted Duration of Therapy Intervention:  6 weeks    Patti Ruano PT                 I CERTIFY THE NEED FOR THESE SERVICES FURNISHED UNDER        THIS PLAN OF TREATMENT AND WHILE UNDER MY CARE     (Physician co-signature of this document indicates review and certification of the therapy plan).                       Certification Date From:  10/04/18   Certification Date To:  11/15/18    Referring Provider:  Renetta    Initial Assessment        See Epic Evaluation Start of Care Date: 10/04/18

## 2018-10-12 ENCOUNTER — HOSPITAL ENCOUNTER (OUTPATIENT)
Dept: PHYSICAL THERAPY | Facility: CLINIC | Age: 68
Setting detail: THERAPIES SERIES
End: 2018-10-12
Attending: FAMILY MEDICINE
Payer: MEDICARE

## 2018-10-12 PROCEDURE — 40000718 ZZHC STATISTIC PT DEPARTMENT ORTHO VISIT: Performed by: PHYSICAL THERAPIST

## 2018-10-12 PROCEDURE — 97110 THERAPEUTIC EXERCISES: CPT | Mod: GP | Performed by: PHYSICAL THERAPIST

## 2018-10-18 ENCOUNTER — HOSPITAL ENCOUNTER (OUTPATIENT)
Dept: PHYSICAL THERAPY | Facility: CLINIC | Age: 68
Setting detail: THERAPIES SERIES
End: 2018-10-18
Attending: FAMILY MEDICINE
Payer: MEDICARE

## 2018-10-18 PROCEDURE — 40000718 ZZHC STATISTIC PT DEPARTMENT ORTHO VISIT: Performed by: PHYSICAL THERAPIST

## 2018-10-18 PROCEDURE — 97110 THERAPEUTIC EXERCISES: CPT | Mod: GP | Performed by: PHYSICAL THERAPIST

## 2018-10-25 ENCOUNTER — HOSPITAL ENCOUNTER (OUTPATIENT)
Dept: PHYSICAL THERAPY | Facility: CLINIC | Age: 68
Setting detail: THERAPIES SERIES
End: 2018-10-25
Attending: FAMILY MEDICINE
Payer: MEDICARE

## 2018-10-25 PROCEDURE — 40000718 ZZHC STATISTIC PT DEPARTMENT ORTHO VISIT: Performed by: PHYSICAL THERAPIST

## 2018-10-25 PROCEDURE — 97110 THERAPEUTIC EXERCISES: CPT | Mod: GP | Performed by: PHYSICAL THERAPIST

## 2018-10-31 ENCOUNTER — ALLIED HEALTH/NURSE VISIT (OUTPATIENT)
Dept: FAMILY MEDICINE | Facility: CLINIC | Age: 68
End: 2018-10-31
Payer: COMMERCIAL

## 2018-10-31 DIAGNOSIS — Z23 NEED FOR PROPHYLACTIC VACCINATION AND INOCULATION AGAINST INFLUENZA: Primary | ICD-10-CM

## 2018-10-31 PROCEDURE — G0008 ADMIN INFLUENZA VIRUS VAC: HCPCS

## 2018-10-31 PROCEDURE — 90662 IIV NO PRSV INCREASED AG IM: CPT

## 2018-10-31 NOTE — PROGRESS NOTES

## 2018-10-31 NOTE — MR AVS SNAPSHOT
After Visit Summary   10/31/2018    Jose M Rea    MRN: [id number]           Patient Information     Date Of Birth          1950        Visit Information        Provider Department      10/31/2018 9:30 AM FL BRYCE SCHMITZ/LPN [facility]        Today's Diagnoses     Need for prophylactic vaccination and inoculation against influenza    -  1       Follow-ups after your visit        Who to contact     If you have questions or need follow up information about today's clinic visit or your schedule please contact [facility] directly at [phone].  Normal or non-critical lab and imaging results will be communicated to you by MyChart, letter or phone within 4 business days after the clinic has received the results. If you do not hear from us within 7 days, please contact the clinic through MyChart or phone. If you have a critical or abnormal lab result, we will notify you by phone as soon as possible.  Submit refill requests through [facility] [employer] or call your pharmacy and they will forward the refill request to us. Please allow 3 business days for your refill to be completed.          Additional Information About Your Visit        Care EveryWhere ID     This is your Care EveryWhere ID. This could be used by other organizations to access your [facility] medical records  [id number]         Blood Pressure from Last 3 Encounters:   10/03/18 128/78   09/24/18 126/72   09/12/18 122/68    Weight from Last 3 Encounters:   10/03/18 199 lb (90.3 kg)   09/24/18 198 lb (89.8 kg)   09/12/18 194 lb 12.8 oz (88.4 kg)              We Performed the Following     ADMIN INFLUENZA (For MEDICARE Patients ONLY) []     FLU VACCINE, INCREASED ANTIGEN, PRESV FREE, AGE 65+ [49286]        Primary Care Provider Office Phone # Fax #    Pj Quijano [phone] [address] [place] [address]        Equal Access to Services     OUMAR STEVE : Rut lynn  Gabriel, mikael kangricelha, ivan kasergey yoo, brian erikin hayaan maximoregis ramontoni laFelixjim thania. So [facility] [phone].    ATENCIÓN: Si parris yu, tiene a loyd disposición servicios gratuitos de asistencia lingüística. Emmie al [phone].    We comply with applicable federal civil rights laws and [place] laws. We do not discriminate on the basis of race, color, national origin, age, disability, sex, sexual orientation, or gender identity.            Thank you!     Thank you for choosing [facility]  for your care. Our goal is always to provide you with excellent care. Hearing back from our patients is one way we can continue to improve our services. Please take a few minutes to complete the written survey that you may receive in the mail after your visit with us. Thank you!             Your Updated Medication List - Protect others around you: Learn how to safely use, store and throw away your medicines at www.disposemymeds.org.          This list is accurate as of 10/31/18  9:56 AM.  Always use your most recent med list.                   Brand Name Dispense Instructions for use Diagnosis    ACETAMINOPHEN PO      Take 650 mg by mouth        betamethasone dipropionate 0.05 % cream    DIPROSONE     daily        clotrimazole 1 % cream    LOTRIMIN     daily        fluticasone 50 MCG/ACT spray    FLONASE    16 g    Spray 1-2 sprays into both nostrils daily    Acute sinusitis with symptoms > 10 days       ketoconazole 2 % cream    NIZORAL     daily        MENS ONE DAILY PO           simvastatin 20 MG tablet    ZOCOR     20 mg daily        warfarin 5 MG tablet    COUMADIN    135 tablet    Take 1.5 tablets (7.5 mg) by mouth daily    Chronic anticoagulation

## 2018-10-31 NOTE — NURSING NOTE
Prior to injection verified patient identity using patient's name and date of birth.  Due to injection administration, patient instructed to remain in clinic for 15 minutes  afterwards, and to report any adverse reaction to me immediately.

## 2018-11-08 ENCOUNTER — ANTICOAGULATION THERAPY VISIT (OUTPATIENT)
Dept: ANTICOAGULATION | Facility: CLINIC | Age: 68
End: 2018-11-08
Payer: COMMERCIAL

## 2018-11-08 VITALS — DIASTOLIC BLOOD PRESSURE: 78 MMHG | SYSTOLIC BLOOD PRESSURE: 150 MMHG | HEART RATE: 73 BPM

## 2018-11-08 DIAGNOSIS — Z79.01 CHRONIC ANTICOAGULATION: ICD-10-CM

## 2018-11-08 LAB — INR POINT OF CARE: 2.7 (ref 0.86–1.14)

## 2018-11-08 PROCEDURE — 36416 COLLJ CAPILLARY BLOOD SPEC: CPT

## 2018-11-08 PROCEDURE — 99207 ZZC NO CHARGE NURSE ONLY: CPT

## 2018-11-08 PROCEDURE — 85610 PROTHROMBIN TIME: CPT | Mod: QW

## 2018-11-08 NOTE — PROGRESS NOTES
ANTICOAGULATION FOLLOW-UP CLINIC VISIT    Patient Name:  Jose M Rea  Date:  11/8/2018  Contact Type:  Face to Face    SUBJECTIVE:     Patient Findings     Positives No Problem Findings    Comments No changes in diet, activity level, medications (including over the counter), or health. No missed doses of warfarin. Patient took dosing as prescribed. No signs of clots or bleeding concerns. Patient will continue maintenance warfarin dosing.             OBJECTIVE    INR Protime   Date Value Ref Range Status   11/08/2018 2.7 (A) 0.86 - 1.14 Final       ASSESSMENT / PLAN  INR assessment THER    Recheck INR In: 6 WEEKS    INR Location Clinic      Anticoagulation Summary as of 11/8/2018     INR goal 2.0-3.0   Today's INR 2.7   Warfarin maintenance plan 7.5 mg (5 mg x 1.5) every day   Full warfarin instructions 7.5 mg every day   Weekly warfarin total 52.5 mg   No change documented Joanie Yin, RN   Plan last modified Kb Braun RN (9/24/2018)   Next INR check 12/20/2018   Target end date Indefinite    Indications   Acute deep vein thrombosis (DVT) of proximal vein of lower extremity (H) [I82.4Y9]  Chronic anticoagulation [Z79.01]  Long-term (current) use of anticoagulants [Z79.01] [Z79.01]         Anticoagulation Episode Summary     INR check location     Preferred lab     Send INR reminders to [facility]    Comments * Tx from [place]      Anticoagulation Care Providers     Provider Role Specialty Phone number    Pj Quijano MD  Family Practice [phone]            See the Encounter Report to view Anticoagulation Flowsheet and Dosing Calendar (Go to Encounters tab in chart review, and find the Anticoagulation Therapy Visit)        Joanie Yin RN

## 2018-11-08 NOTE — MR AVS SNAPSHOT
Jose M KALPESH Rea   11/8/2018 1:45 PM   Anticoagulation Therapy Visit    Description:  68 year old male   Provider:  NB ANTI COAG   Department:  Nb Anticoag           INR as of 11/8/2018     Today's INR 2.7      Anticoagulation Summary as of 11/8/2018     INR goal 2.0-3.0   Today's INR 2.7   Full warfarin instructions 7.5 mg every day   Next INR check 12/20/2018    Indications   Acute deep vein thrombosis (DVT) of proximal vein of lower extremity (H) [I82.4Y9]  Chronic anticoagulation [Z79.01]  Long-term (current) use of anticoagulants [Z79.01] [Z79.01]         Your next Anticoagulation Clinic appointment(s)     Dec 20, 2018  8:45 AM CST   Anticoagulation Visit with NB ANTI COAG   [facility] ([facility])    [address] [place] [address] [phone]              Contact Numbers     Please call [phone] with any problems or questions regarding your therapy.    If you need to cancel and/or reschedule your appointment please call one of the following numbers:  [facility] [phone]  [facility] [phone]  [facility] [phone]  [facility] [phone]  [place] - [phone]            November 2018 Details    Sun Mon Tue Wed Thu Fri Sat         1               2               3                 4               5               6               7               8      7.5 mg   See details      9      7.5 mg         10      7.5 mg           11      7.5 mg         12      7.5 mg         13      7.5 mg         14      7.5 mg         15      7.5 mg         16      7.5 mg         17      7.5 mg           18      7.5 mg         19      7.5 mg         20      7.5 mg         21      7.5 mg         22      7.5 mg         23      7.5 mg         24      7.5 mg           25      7.5 mg         26      7.5 mg         27      7.5 mg         28      7.5 mg         29      7.5 mg         30      7.5 mg           Date Details   11/08 This INR check               How  to take your warfarin dose     To take:  7.5 mg Take 1.5 of the 5 mg tablets.           December 2018 Details    Sun Mon Tue Wed Thu Fri Sat           1      7.5 mg           2      7.5 mg         3      7.5 mg         4      7.5 mg         5      7.5 mg         6      7.5 mg         7      7.5 mg         8      7.5 mg           9      7.5 mg         10      7.5 mg         11      7.5 mg         12      7.5 mg         13      7.5 mg         14      7.5 mg         15      7.5 mg           16      7.5 mg         17      7.5 mg         18      7.5 mg         19      7.5 mg         20            21               22                 23               24               25               26               27               28               29                 30               31                     Date Details   No additional details    Date of next INR:  12/20/2018         How to take your warfarin dose     To take:  7.5 mg Take 1.5 of the 5 mg tablets.

## 2018-11-29 NOTE — PROGRESS NOTES
Outpatient Physical Therapy Discharge Note     Patient: Jose M Rea  : 1950    Beginning/End Dates of Reporting Period:  10/4/18 to 2018    Referring Provider: Dr Jackson    Therapy Diagnosis: LLE weakness secondary to lumbar radiculopathy     Client Self Report: Pt reports doing very well at home, consistant with performance of exercises and feels that LLE is getting stronger.     Objective Measurements:  Objective Measure: LE strength MMT  Details: Hip abduction L 4/5, R 4+/5; knee extensionB 4+/5. Knee flexion 5/5. dorsiflexion 4+/5 L, 5/5 R        Goals:  Goal Identifier 1   Goal Description Patient will be able to ascend and descend stairs reciprical pattern without increased pain   Target Date 11/15/18   Date Met  10/25/18   Progress:     Goal Identifier 2   Goal Description Patient will be independant with HEP For long term self management   Target Date 11/15/18   Date Met  10/25/18   Progress:     Goal Identifier 3   Goal Description Patient will demonstrate 4+/5 hip abductor strength B for support lumbar spine in frontal plane   Target Date 11/15/18   Date Met      Progress:         Progress Toward Goals:   Progress this reporting period: Pt completed 4 PT sessions to address LLE weakness, demonstrating good gains in strength. Pt meeting 2/3 goals and anticipate with HEP compliance will achieve final goal.       Plan:  Discharge from therapy.    Discharge:    Reason for Discharge: Patient chooses to discontinue therapy. Met 2/3 goals     Equipment Issued: na    Discharge Plan: Patient to continue home program.

## 2018-12-07 ENCOUNTER — OFFICE VISIT (OUTPATIENT)
Dept: FAMILY MEDICINE | Facility: CLINIC | Age: 68
End: 2018-12-07
Payer: COMMERCIAL

## 2018-12-07 VITALS
DIASTOLIC BLOOD PRESSURE: 68 MMHG | SYSTOLIC BLOOD PRESSURE: 124 MMHG | TEMPERATURE: 98.5 F | WEIGHT: 201.4 LBS | HEART RATE: 72 BPM | BODY MASS INDEX: 29.11 KG/M2

## 2018-12-07 DIAGNOSIS — L21.9 SEBORRHEIC DERMATITIS: ICD-10-CM

## 2018-12-07 DIAGNOSIS — M79.662 PAIN OF LEFT LOWER LEG: ICD-10-CM

## 2018-12-07 DIAGNOSIS — M79.671 RIGHT FOOT PAIN: ICD-10-CM

## 2018-12-07 DIAGNOSIS — L30.9 ECZEMA, UNSPECIFIED TYPE: Primary | ICD-10-CM

## 2018-12-07 PROCEDURE — 99214 OFFICE O/P EST MOD 30 MIN: CPT | Performed by: FAMILY MEDICINE

## 2018-12-07 RX ORDER — TRIAMCINOLONE ACETONIDE 1 MG/G
CREAM TOPICAL 2 TIMES DAILY
Qty: 60 G | Refills: 1 | Status: SHIPPED | OUTPATIENT
Start: 2018-12-07 | End: 2020-02-18

## 2018-12-07 ASSESSMENT — PAIN SCALES - GENERAL: PAINLEVEL: NO PAIN (1)

## 2018-12-07 NOTE — MR AVS SNAPSHOT
After Visit Summary   12/7/2018    Jose M Rea    MRN: [id number]           Patient Information     Date Of Birth          1950        Visit Information        Provider Department      12/7/2018 12:40 PM Pj Quijano MD [facility]        Today's Diagnoses     Eczema, unspecified type    -  1    Right foot pain        Pain of left lower leg        Seborrheic dermatitis          Care Instructions    ASSESSMENT:   (L30.9) Eczema, unspecified type  (primary encounter diagnosis)  Comment: You have been using betamethasone cream.  You could try triamcinolone 0.1% cream which is not as strong but should be very inexpensive.    Plan: triamcinolone (KENALOG) 0.1 % external cream        Apply twice daily to eczema areas.     (M79.671) Right foot pain  Comment: I think this is from soft tissues-muscles tendons and ligaments in foot.   Plan: Try over the counter arch supports.  IF not helpful, we can do podiatry referral .    (M79.662) Pain of left lower leg  Comment: This is related to nerve injury from back problems.  It may still improve.    Plan: If not improving, there are medications we can use for nerve related pain.     (L21.9) Seborrheic dermatitis  Comment: I think you have Seborrheic dermatitis and not rosacea.    Plan: Continue the ketoconazole as you have been twice daily as needed.       OK for clotrimazole for the penis rash.  It is inexpensive and over the counter.  You can use twice daily.          Follow-ups after your visit        Your next 10 appointments already scheduled     Dec 20, 2018  8:45 AM CST   Anticoagulation Visit with [facility] AN[facility] COAG   [facility]    [address] [place] [id number] [phone]            Jan 03, 2019  2:45 PM CST   New Visit with Rupa Arenas PA-C   [facility]    [address] [facility] [address] [phone]               Who to contact     If you have questions or need follow up information about today's clinic visit or your schedule please contact [facility] directly at [phone].  Normal or non-critical lab and imaging results will be communicated to you by MyChart, letter or phone within 4 business days after the clinic has received the results. If you do not hear from us within 7 days, please contact the clinic through MyChart or phone. If you have a critical or abnormal lab result, we will notify you by phone as soon as possible.  Submit refill requests through [employer] or call your pharmacy and they will forward the refill request to us. Please allow 3 business days for your refill to be completed.          Additional Information About Your Visit        Care EveryWhere ID     This is your Care EveryWhere ID. This could be used by other organizations to access your [facility] medical records  [id number]        Your Vitals Were     Pulse Temperature BMI (Body Mass Index)             72 98.5  F (36.9  C) (Tympanic) 29.11 kg/m2          Blood Pressure from Last 3 Encounters:   12/07/18 124/68   11/08/18 150/78   10/03/18 128/78    Weight from Last 3 Encounters:   12/07/18 201 lb 6.4 oz (91.4 kg)   10/03/18 199 lb (90.3 kg)   09/24/18 198 lb (89.8 kg)              Today, you had the following     No orders found for display         Today's Medication Changes          These changes are accurate as of 12/7/18  1:34 PM.  If you have any questions, ask your nurse or doctor.               Start taking these medicines.        Dose/Directions    triamcinolone 0.1 % external cream   Commonly known as:  KENALOG   Used for:  Eczema, unspecified type   Started by:  Pj Quijano MD        Apply topically 2 times daily For eczema   Quantity:  60 g   Refills:  1            Where to get your medicines      Some of these will need a paper prescription and others can be bought over the counter.  Ask your  nurse if you have questions.     Bring a paper prescription for each of these medications     triamcinolone 0.1 % external cream                Primary Care Provider Office Phone # Fax #    Pj Quijano [phone] [address] [place] [id number]        Equal Access to Services     OUMAR STEVE : Rut florentino hadasho Soomaali, waaxda luqadaha, qaybta kaalmada adeegyada, waxjd navarrozendeandra monteiro. So [facility] [phone].    ATENCIÓN: Si habla español, tiene a loyd disposición servicios gratuitos de asistencia lingüística. Llame al [phone].    We comply with applicable federal civil rights laws and [place] laws. We do not discriminate on the basis of race, color, national origin, age, disability, sex, sexual orientation, or gender identity.            Thank you!     Thank you for choosing [facility]  for your care. Our goal is always to provide you with excellent care. Hearing back from our patients is one way we can continue to improve our services. Please take a few minutes to complete the written survey that you may receive in the mail after your visit with us. Thank you!             Your Updated Medication List - Protect others around you: Learn how to safely use, store and throw away your medicines at www.disposemymeds.org.          This list is accurate as of 12/7/18  1:34 PM.  Always use your most recent med list.                   Brand Name Dispense Instructions for use Diagnosis    ACETAMINOPHEN PO      Take 650 mg by mouth        betamethasone dipropionate 0.05 % external cream    DIPROSONE     daily        clotrimazole 1 % external cream    LOTRIMIN     daily        fluticasone 50 MCG/ACT nasal spray    FLONASE    16 g    Spray 1-2 sprays into both nostrils daily    Acute sinusitis with symptoms > 10 days       ketoconazole 2 % external cream    NIZORAL     daily        MENS ONE DAILY PO           simvastatin 20 MG tablet    ZOCOR      20 mg daily        triamcinolone 0.1 % external cream    KENALOG    60 g    Apply topically 2 times daily For eczema    Eczema, unspecified type       warfarin 5 MG tablet    COUMADIN    135 tablet    Take 1.5 tablets (7.5 mg) by mouth daily    Chronic anticoagulation

## 2018-12-07 NOTE — PATIENT INSTRUCTIONS
ASSESSMENT:   (L30.9) Eczema, unspecified type  (primary encounter diagnosis)  Comment: You have been using betamethasone cream.  You could try triamcinolone 0.1% cream which is not as strong but should be very inexpensive.    Plan: triamcinolone (KENALOG) 0.1 % external cream        Apply twice daily to eczema areas.     (M79.671) Right foot pain  Comment: I think this is from soft tissues-muscles tendons and ligaments in foot.   Plan: Try over the counter arch supports.  IF not helpful, we can do podiatry referral .    (M79.662) Pain of left lower leg  Comment: This is related to nerve injury from back problems.  It may still improve.    Plan: If not improving, there are medications we can use for nerve related pain.     (L21.9) Seborrheic dermatitis  Comment: I think you have Seborrheic dermatitis and not rosacea.    Plan: Continue the ketoconazole as you have been twice daily as needed.       OK for clotrimazole for the penis rash.  It is inexpensive and over the counter.  You can use twice daily.

## 2018-12-07 NOTE — PROGRESS NOTES
"  SUBJECTIVE:   Jose M Rea is a 68 year old male who presents to clinic today for the following health issues:  Chief Complaint   Patient presents with     Discuss medication      Medication Discussion  Betamethsone-wonders if there is something that works as well that might cost less.  Uses for Eczema mostly on legs.  Does use \"Gold Bond\" a lot.      Ketoconazole- Says that he has a friend that uses Metronidazole for basically the same issues so wonders if that is worth the change.  He uses that for rosacea.  No pimples.  He has scaly skin.  He has been using prn for 2-3 days.      Clotrimazole-Says that refills require authorization. Basically looking for refills.  Uses for foreskin rash.    Feet      Duration: 3-5 months     Description (location/character/radiation): Patient states that they seem cold often. Also thinks he may have an arch issue in the right foot     Intensity:  moderate    Accompanying signs and symptoms: none    History (similar episodes/previous evaluation): None    Precipitating or alleviating factors: None    Therapies tried and outcome: None     Often painful right medial arch.  He has tried shoe inserts.  Has shoes with \"memory foam\".  Hurts once in a while.  Left leg feels cold at times.  Not numb.  Seems to be the best when walking.   Some recent knee and hip pain.  That has improved.   He has a history of pinched nerve in back.  He had sciatica a year ago.  Treated with physical therapy. Still doing exercises at home.  Minimal weakness in left leg.  No numbness.     Patient Active Problem List   Diagnosis     Hyperlipidemia LDL goal <100     Acute deep vein thrombosis (DVT) of proximal vein of lower extremity (H)     Eczema     Chronic anticoagulation     Long-term (current) use of anticoagulants [Z79.01]      OBJECTIVE:Blood pressure 124/68, pulse 72, temperature 98.5  F (36.9  C), temperature source Tympanic, weight 201 lb 6.4 oz (91.4 kg). BMI=Body mass index is 29.11 kg/(m^2). "  GENERAL APPEARANCE ADULT: Alert, no acute distress  MS: He has normal foot posture and appearance.  Non-tender right foot today.   Normal strength in both legs and feet.    Monofilament normal in feet BID.   He can heel and toe walk.   SKIN: No facial rash today.      ASSESSMENT:   (L30.9) Eczema, unspecified type  (primary encounter diagnosis)  Comment: You have been using betamethasone cream.  You could try triamcinolone 0.1% cream which is not as strong but should be very inexpensive.    Plan: triamcinolone (KENALOG) 0.1 % external cream        Apply twice daily to eczema areas.     (M79.671) Right foot pain  Comment: I think this is from soft tissues-muscles tendons and ligaments in foot.   Plan: Try over the counter arch supports.  IF not helpful, we can do podiatry referral .    (M79.662) Pain of left lower leg  Comment: This is related to nerve injury from back problems.  It may still improve.    Plan: If not improving, there are medications we can use for nerve related pain.     (L21.9) Seborrheic dermatitis  Comment: I think you have Seborrheic dermatitis and not rosacea.    Plan: Continue the ketoconazole as you have been twice daily as needed.       OK for clotrimazole for the penis rash.  It is inexpensive and over the counter.  You can use twice daily.

## 2018-12-07 NOTE — NURSING NOTE
"Chief Complaint   Patient presents with     Discuss medication       Initial /68 (BP Location: Right arm, Patient Position: Sitting, Cuff Size: Adult Large)  Pulse 72  Temp 98.5  F (36.9  C) (Tympanic)  Wt 201 lb 6.4 oz (91.4 kg)  BMI 29.11 kg/m2 Estimated body mass index is 29.11 kg/(m^2) as calculated from the following:    Height as of 9/24/18: 5' 9.75\" (1.772 m).    Weight as of this encounter: 201 lb 6.4 oz (91.4 kg).    Patient presents to the clinic using No DME    Health Maintenance that is potentially due pending provider review:  NONE    n/a    Is there anyone who you would like to be able to receive your results? No  If yes have patient fill out JUSTIN    Patti Crowley CMA    "

## 2018-12-20 ENCOUNTER — ANTICOAGULATION THERAPY VISIT (OUTPATIENT)
Dept: ANTICOAGULATION | Facility: CLINIC | Age: 68
End: 2018-12-20
Payer: COMMERCIAL

## 2018-12-20 DIAGNOSIS — Z79.01 CHRONIC ANTICOAGULATION: ICD-10-CM

## 2018-12-20 LAB — INR POINT OF CARE: 1.8 (ref 0.86–1.14)

## 2018-12-20 PROCEDURE — 36416 COLLJ CAPILLARY BLOOD SPEC: CPT

## 2018-12-20 PROCEDURE — 99207 ZZC NO CHARGE NURSE ONLY: CPT

## 2018-12-20 PROCEDURE — 85610 PROTHROMBIN TIME: CPT | Mod: QW

## 2018-12-20 NOTE — PROGRESS NOTES
ANTICOAGULATION FOLLOW-UP CLINIC VISIT    Patient Name:  Jose M Rea  Date:  2018  Contact Type:  Face to Face    SUBJECTIVE:     Patient Findings     Positives:   Other complaints (nasal congestion), OTC meds (taking nyquil and dayquil for a cold), Unexplained INR or factor level change    Comments:   The patient denies missing any warfarin doses. He has been taking warfarin as directed. No other med changes besides taking dayquil/nyquil. No activity changes. Patient is congested with a cold right now. He has not had an increase in vit K in his diet. It is unclear why INR is sub therapeutic today. Patient denies any clot signs. He will recheck INR in 2 weeks at [facility] prior to derm appointment. Writer will advise a slight bump in today's warfarin dose to 10 mg from 7.5 mg.           OBJECTIVE    INR Protime   Date Value Ref Range Status   2018 1.8 (A) 0.86 - 1.14 Final       ASSESSMENT / PLAN  INR assessment SUB    Recheck INR In: 2 WEEKS    INR Location Clinic      Anticoagulation Summary  As of 2018    INR goal:   2.0-3.0   TTR:   90.1 % (3.1 mo)   INR used for dosin.8! (2018)   Warfarin maintenance plan:   7.5 mg (5 mg x 1.5) every day   Full warfarin instructions:   : 10 mg; Otherwise 7.5 mg every day   Weekly warfarin total:   52.5 mg   Plan last modified:   Kb Braun RN (2018)   Next INR check:   1/3/2019   Target end date:   Indefinite    Indications    Acute deep vein thrombosis (DVT) of proximal vein of lower extremity (H) [I82.4Y9]  Chronic anticoagulation [Z79.01]  Long-term (current) use of anticoagulants [Z79.01] [Z79.01]             Anticoagulation Episode Summary     INR check location:       Preferred lab:       Send INR reminders to:   [facility]    Comments:   * Tx from [place]      Anticoagulation Care Providers     Provider Role Specialty Phone number    Pj Quijano MD  Family Practice [phone]            See the  Encounter Report to view Anticoagulation Flowsheet and Dosing Calendar (Go to Encounters tab in chart review, and find the Anticoagulation Therapy Visit)        Joanie Yin RN

## 2018-12-23 ENCOUNTER — OFFICE VISIT (OUTPATIENT)
Dept: URGENT CARE | Facility: URGENT CARE | Age: 68
End: 2018-12-23
Payer: COMMERCIAL

## 2018-12-23 VITALS
DIASTOLIC BLOOD PRESSURE: 76 MMHG | HEART RATE: 80 BPM | SYSTOLIC BLOOD PRESSURE: 152 MMHG | RESPIRATION RATE: 20 BRPM | BODY MASS INDEX: 28.61 KG/M2 | TEMPERATURE: 97 F | WEIGHT: 198 LBS | OXYGEN SATURATION: 97 %

## 2018-12-23 DIAGNOSIS — Z79.01 LONG TERM CURRENT USE OF ANTICOAGULANT THERAPY: ICD-10-CM

## 2018-12-23 DIAGNOSIS — J01.00 ACUTE MAXILLARY SINUSITIS, RECURRENCE NOT SPECIFIED: Primary | ICD-10-CM

## 2018-12-23 PROCEDURE — 99214 OFFICE O/P EST MOD 30 MIN: CPT | Performed by: PHYSICIAN ASSISTANT

## 2018-12-23 RX ORDER — AMOXICILLIN 875 MG
875 TABLET ORAL 2 TIMES DAILY
Qty: 20 TABLET | Refills: 0 | Status: SHIPPED | OUTPATIENT
Start: 2018-12-23 | End: 2019-01-03

## 2018-12-23 ASSESSMENT — ENCOUNTER SYMPTOMS
EYE ITCHING: 0
WHEEZING: 1
SORE THROAT: 0
RHINORRHEA: 0
EYE DISCHARGE: 0
NAUSEA: 0
MYALGIAS: 0
PALPITATIONS: 0
CONSTIPATION: 0
FEVER: 0
ABDOMINAL PAIN: 0
SINUS PAIN: 0
COUGH: 1
ARTHRALGIAS: 0
CHILLS: 0
SINUS PRESSURE: 0
UNEXPECTED WEIGHT CHANGE: 0
DIARRHEA: 0
FATIGUE: 0
VOMITING: 0
SHORTNESS OF BREATH: 0
EYE REDNESS: 0
EYE PAIN: 0

## 2018-12-23 NOTE — PROGRESS NOTES
SUBJECTIVE:   Jose M Rea is a 68 year old male presenting with a chief complaint of   Chief Complaint   Patient presents with     Sinus Problem     1 week, congestion, wheezing, coughing, sinus pressure, no headaches, occasional ear pain, no throat pain       He is an established patient of [facility].    UR Adult    Onset of symptoms was 1 week(s) ago.  Course of illness is same.    Severity moderate  Current and Associated symptoms: cough - non-productive, wheezing and facial pain/pressure  Treatment measures tried include Tylenol/Ibuprofen.  Predisposing factors include None.      Review of Systems   Constitutional: Negative for chills, fatigue, fever and unexpected weight change.   HENT: Positive for congestion. Negative for ear pain, rhinorrhea, sinus pressure, sinus pain and sore throat.    Eyes: Negative for pain, discharge, redness and itching.   Respiratory: Positive for cough and wheezing. Negative for shortness of breath.    Cardiovascular: Negative for chest pain, palpitations and leg swelling.   Gastrointestinal: Negative for abdominal pain, constipation, diarrhea, nausea and vomiting.   Musculoskeletal: Negative for arthralgias and myalgias.   Skin: Negative for rash.       History reviewed. No pertinent past medical history.  Family History   Problem Relation Age of Onset     Sleep Apnea Son      Thrombosis Son      Current Outpatient Medications   Medication Sig Dispense Refill     ACETAMINOPHEN PO Take 650 mg by mouth       amoxicillin (AMOXIL) 875 MG tablet Take 1 tablet (875 mg) by mouth 2 times daily for 10 days 20 tablet 0     betamethasone dipropionate (DIPROSONE) 0.05 % cream as needed        clotrimazole (LOTRIMIN) 1 % cream as needed        ketoconazole (NIZORAL) 2 % cream as needed        Multiple Vitamins-Minerals (MENS ONE DAILY PO)        simvastatin (ZOCOR) 20 MG tablet 20 mg daily       triamcinolone (KENALOG) 0.1 % external cream Apply topically 2 times daily For eczema 60 g 1      warfarin (COUMADIN) 5 MG tablet Take 1.5 tablets (7.5 mg) by mouth daily 135 tablet 3     fluticasone (FLONASE) 50 MCG/ACT spray Spray 1-2 sprays into both nostrils daily (Patient not taking: Reported on 12/23/2018) 16 g 0     Social History     Tobacco Use     Smoking status: Current Every Day Smoker     Packs/day: 0.50     Years: 50.00     Pack years: 25.00     Smokeless tobacco: Never Used     Tobacco comment: started at age 18   Substance Use Topics     Alcohol use: No       OBJECTIVE  /76   Pulse 80   Temp 97  F (36.1  C) (Tympanic)   Resp 20   Wt 89.8 kg (198 lb)   SpO2 97%   BMI 28.61 kg/m      Physical Exam   Constitutional: He appears well-developed and well-nourished. No distress.   HENT:   Head: Normocephalic and atraumatic.   Right Ear: Tympanic membrane and ear canal normal.   Left Ear: Tympanic membrane and ear canal normal.   Nose: Mucosal edema present.   Mouth/Throat: Posterior oropharyngeal erythema present. No oropharyngeal exudate.   Eyes: Conjunctivae are normal. Pupils are equal, round, and reactive to light.   Cardiovascular: Normal rate and regular rhythm.   Pulmonary/Chest: Effort normal and breath sounds normal.   Skin: Skin is warm and dry. No rash noted.   Psychiatric: He has a normal mood and affect. His behavior is normal.       Labs:  No results found for this or any previous visit (from the past 24 hour(s)).    X-Ray was not done.    ASSESSMENT:      ICD-10-CM    1. Acute maxillary sinusitis, recurrence not specified J01.00 amoxicillin (AMOXIL) 875 MG tablet   2. Long term current use of anticoagulant therapy Z79.01         Medical Decision Making:    Differential Diagnosis:  URI Adult/Peds:  Allergic rhinitis, Sinusitis, Viral syndrome and Viral upper respiratory illness    Serious Comorbid Conditions:  Adult:  Anticoagulation    PLAN:    URI Adult:  Likely still viral. Continue with supportive care,Tylenol, Fluids, Rest and OTC cough suppressant/expectorant. Gave written  Rx for amoxicillin x 10 days that he can fill if sinus pain/pressure worsens.     Anticoagulation: antibiotic can increase INR, would recommend he recheck INR in 3-4 days if he starts antibiotic.     Followup:    If not improving or if condition worsens, follow up with your Primary Care Provider    Patient Instructions   Continue to monitor symptoms for a few more days    If worsening facial pain/pressure or if teeth hurt then start amoxicillin twice daily x 10 days    If you start amoxicillin then you need to check INR in 3-4 days

## 2018-12-23 NOTE — PATIENT INSTRUCTIONS
Continue to monitor symptoms for a few more days    If worsening facial pain/pressure or if teeth hurt then start amoxicillin twice daily x 10 days    If you start amoxicillin then you need to check INR in 3-4 days

## 2019-01-03 ENCOUNTER — OFFICE VISIT (OUTPATIENT)
Dept: DERMATOLOGY | Facility: CLINIC | Age: 69
End: 2019-01-03
Payer: MEDICARE

## 2019-01-03 ENCOUNTER — ANTICOAGULATION THERAPY VISIT (OUTPATIENT)
Dept: ANTICOAGULATION | Facility: CLINIC | Age: 69
End: 2019-01-03
Payer: MEDICARE

## 2019-01-03 VITALS — SYSTOLIC BLOOD PRESSURE: 158 MMHG | OXYGEN SATURATION: 98 % | HEART RATE: 82 BPM | DIASTOLIC BLOOD PRESSURE: 79 MMHG

## 2019-01-03 DIAGNOSIS — L20.89 OTHER ATOPIC DERMATITIS: ICD-10-CM

## 2019-01-03 DIAGNOSIS — Z79.01 LONG TERM CURRENT USE OF ANTICOAGULANT THERAPY: ICD-10-CM

## 2019-01-03 DIAGNOSIS — L82.1 SEBORRHEIC KERATOSIS: ICD-10-CM

## 2019-01-03 DIAGNOSIS — L21.9 DERMATITIS, SEBORRHEIC: Primary | ICD-10-CM

## 2019-01-03 DIAGNOSIS — I82.4Y9 ACUTE DEEP VEIN THROMBOSIS (DVT) OF PROXIMAL VEIN OF LOWER EXTREMITY (H): ICD-10-CM

## 2019-01-03 DIAGNOSIS — Z79.01 CHRONIC ANTICOAGULATION: ICD-10-CM

## 2019-01-03 LAB — INR POINT OF CARE: 2.5 (ref 0.86–1.14)

## 2019-01-03 PROCEDURE — 99207 ZZC NO CHARGE NURSE ONLY: CPT

## 2019-01-03 PROCEDURE — 99214 OFFICE O/P EST MOD 30 MIN: CPT | Performed by: PHYSICIAN ASSISTANT

## 2019-01-03 PROCEDURE — 36416 COLLJ CAPILLARY BLOOD SPEC: CPT

## 2019-01-03 PROCEDURE — 85610 PROTHROMBIN TIME: CPT | Mod: QW

## 2019-01-03 NOTE — PROGRESS NOTES
ANTICOAGULATION FOLLOW-UP CLINIC VISIT    Patient Name:  Jose M Rea  Date:  1/3/2019  Contact Type:  Face to Face    SUBJECTIVE:     Patient Findings     Positives:   No Problem Findings    Comments:   No changes in medication, activity, or diet. Patient reports has taken warfarin as instructed, no missed doses. Patient reports no abnormal bruising or bleeding.   Will continue maintenance dose and recheck INR in 6 weeks. Patient to call [facility] with any changes or concerns. Patient verbalized understanding of all instructions, denies questions or concerns at this time.                OBJECTIVE    INR Protime   Date Value Ref Range Status   2019 2.5 (A) 0.86 - 1.14 Final       ASSESSMENT / PLAN  INR assessment THER    Recheck INR In: 6 WEEKS    INR Location Clinic      Anticoagulation Summary  As of 1/3/2019    INR goal:   2.0-3.0   TTR:   87.7 % (3.6 mo)   INR used for dosin.5 (1/3/2019)   Warfarin maintenance plan:   7.5 mg (5 mg x 1.5) every day   Full warfarin instructions:   7.5 mg every day   Weekly warfarin total:   52.5 mg   No change documented:   Justa Vaca RN   Plan last modified:   Kb Braun RN (2018)   Next INR check:   2019   Target end date:   Indefinite    Indications    Acute deep vein thrombosis (DVT) of proximal vein of lower extremity (H) [I82.4Y9]  Chronic anticoagulation [Z79.01]  Long term current use of anticoagulant therapy [Z79.01]             Anticoagulation Episode Summary     INR check location:       Preferred lab:       Send INR reminders to:   [facility]    Comments:   * Tx from [place]      Anticoagulation Care Providers     Provider Role Specialty Phone number    Pj Quijano MD  Family Practice [phone]            See the Encounter Report to view Anticoagulation Flowsheet and Dosing Calendar (Go to Encounters tab in chart review, and find the Anticoagulation Therapy Visit)        Justa Vaca RN

## 2019-01-03 NOTE — PROGRESS NOTES
HPI:   Jose M Rea is a 68 year old male who presents for evaluation of multiple areas on his face and upper body. Forehead and ala will get red flaky patches intermittently.   Additional Concern:  Patient reports eczema on the legs - using betamethasone for this which helps.    Condition present for:  On and off for years  Previous treatments include: ketoconazole cream with betamethasone and TAC    --moved from [facility] in [place]    Review Of Systems  Eyes: negative  Ears/Nose/Throat: negative  Respiratory: No shortness of breath, dyspnea on exertion, cough, or hemoptysis  Cardiovascular: negative  Gastrointestinal: negative  Genitourinary: negative  Musculoskeletal: negative  Neurologic: negative  Psychiatric: negative    This document serves as a record of the services and decisions personally performed and made by Rupa Arenas PA-C. It was created on her behalf by Oma Vasquez, a trained medical scribe. The creation of this document is based the provider's statements to the medical scribe.  Oma Vasquez January 3, 2019 2:45 PM    PHYSICAL EXAM:    /88   Pulse 82   SpO2 98%   Skin exam performed as follows: Type 2 skin. Mood appropriate  Alert and Oriented X 3. Well developed, well nourished in no distress.  General appearance: Normal  Head including face: Normal  Eyes: conjunctiva and lids: Normal  Mouth: Lips, teeth, gums: Normal  Neck: Normal  Chest-breast/axillae: Normal  Back: Normal  Spleen and liver: Normal  Cardiovascular: Exam of peripheral vascular system by observation for swelling, varicosities, edema: Normal  Genitalia: groin, buttocks: Normal  Extremities: digits/nails (clubbing): Normal  Eccrine and Apocrine glands: Normal  Right upper extremity: Normal  Left upper extremity: Normal  Right lower extremity: Normal  Left lower extremity: Normal  Skin: Scalp and body hair: See below    1. Flaking and erythema on the forehead and bilateral ala  2. Xerosis and  eczematous dermatitis on the face  3. Keratotic plaque on back      ASSESSMENT/PLAN:     1. Seborrheic dermatitis - advised on chronic, recurrent condition. Discussed that it is a reaction to the normal yeast on the skin. Has tried ketoconazole cream in the past.   --Continue ketoconazole cream daily as needed   --Mometasone BID x 1 week for stubborn patches    2. Atopic dermatitis - advised. Discussed diagnosis and chronic condition at length. Has tried betamethasone in the past.  Switching to TAC due to cost of betamethasone. Using Dove to bathe and Gold bond moisturizer after. Gentle skin care discussed in detail.    --Continue current regimen.  If it changes, return to clinic for re-evaluation.    3. Seborrheic keratosis on back. Advised benign, no treatment needed.  4. Patient to follow up with Primary Care provider regarding elevated blood pressure.          Follow-up: yearly if doing well/PRN sooner   CC:   Scribed By: Oma Vasquez, Medical Scribe    The information in this document, created by the medical scribe for me, accurately reflects the services I personally performed and the decisions made by me. I have reviewed and approved this document for accuracy prior to leaving the patient care area.  Rupa Arenas PA-C January 3, 2019 2:56 PM

## 2019-01-03 NOTE — NURSING NOTE
Chief Complaint   Patient presents with     Skin Check       Vitals:    01/03/19 1436   BP: 153/88   Pulse: 82   SpO2: 98%     Wt Readings from Last 1 Encounters:   12/23/18 89.8 kg (198 lb)       Ana Iqbal LPN.................1/3/2019

## 2019-01-03 NOTE — LETTER
1/3/2019         RE: Jose M Rea  [id number] [facility] [address]        Dear Colleague,    Thank you for referring your patient, Jose M Rea, to the [facility]. Please see a copy of my visit note below.    HPI:   Jose M Rea is a 68 year old male who presents for evaluation of multiple areas on his face and upper body. Forehead and ala will get red flaky patches intermittently.   Additional Concern:  Patient reports eczema on the legs - using betamethasone for this which helps.    Condition present for:  On and off for years  Previous treatments include: ketoconazole cream with betamethasone and TAC    --moved from [facility] in [place]    Review Of Systems  Eyes: negative  Ears/Nose/Throat: negative  Respiratory: No shortness of breath, dyspnea on exertion, cough, or hemoptysis  Cardiovascular: negative  Gastrointestinal: negative  Genitourinary: negative  Musculoskeletal: negative  Neurologic: negative  Psychiatric: negative    This document serves as a record of the services and decisions personally performed and made by Rupa Arenas PA-C. It was created on her behalf by Oma Vasquez, a trained medical scribe. The creation of this document is based the provider's statements to the medical scribe.  Oma Vasquez January 3, 2019 2:45 PM    PHYSICAL EXAM:    /88   Pulse 82   SpO2 98%   Skin exam performed as follows: Type 2 skin. Mood appropriate  Alert and Oriented X 3. Well developed, well nourished in no distress.  General appearance: Normal  Head including face: Normal  Eyes: conjunctiva and lids: Normal  Mouth: Lips, teeth, gums: Normal  Neck: Normal  Chest-breast/axillae: Normal  Back: Normal  Spleen and liver: Normal  Cardiovascular: Exam of peripheral vascular system by observation for swelling, varicosities, edema: Normal  Genitalia: groin, buttocks: Normal  Extremities: digits/nails (clubbing): Normal  Eccrine and Apocrine glands:  Normal  Right upper extremity: Normal  Left upper extremity: Normal  Right lower extremity: Normal  Left lower extremity: Normal  Skin: Scalp and body hair: See below    1. Flaking and erythema on the forehead and bilateral ala  2. Xerosis and eczematous dermatitis on the face  3. Keratotic plaque on back      ASSESSMENT/PLAN:     1. Seborrheic dermatitis - advised on chronic, recurrent condition. Discussed that it is a reaction to the normal yeast on the skin. Has tried ketoconazole cream in the past.   --Continue ketoconazole cream daily as needed   --Mometasone BID x 1 week for stubborn patches    2. Atopic dermatitis - advised. Discussed diagnosis and chronic condition at length. Has tried betamethasone in the past.  Switching to TAC due to cost of betamethasone. Using Dove to bathe and Gold bond moisturizer after. Gentle skin care discussed in detail.    --Continue current regimen.  If it changes, return to clinic for re-evaluation.    3. Seborrheic keratosis on back. Advised benign, no treatment needed.  4. Patient to follow up with Primary Care provider regarding elevated blood pressure.          Follow-up: yearly if doing well/PRN sooner   CC:   Scribed By: Oma Vasquez, Medical Scribe    The information in this document, created by the medical scribe for me, accurately reflects the services I personally performed and the decisions made by me. I have reviewed and approved this document for accuracy prior to leaving the patient care area.  Rupa Arenas PA-C January 3, 2019 2:56 PM     Again, thank you for allowing me to participate in the care of your patient.        Sincerely,        Rupa Arenas PA-C

## 2019-01-28 ENCOUNTER — TELEPHONE (OUTPATIENT)
Dept: FAMILY MEDICINE | Facility: CLINIC | Age: 69
End: 2019-01-28

## 2019-01-28 DIAGNOSIS — E78.5 HYPERLIPIDEMIA LDL GOAL <100: Primary | ICD-10-CM

## 2019-01-28 DIAGNOSIS — Z11.59 NEED FOR HEPATITIS C SCREENING TEST: ICD-10-CM

## 2019-01-28 NOTE — TELEPHONE ENCOUNTER
Reason for Call:  Other     Detailed comments: Jose M has Annual Wellness scheduled with Dr Quijano for 2/14. He has schedule lab appointment for 2/12. Please put in orders. He will be fasting. No need to call him back.     Phone Number Patient can be reached at: Home number on file [phone] (home)    Best Time: anytime    Can we leave a detailed message on this number? YES    Call taken on 1/28/2019 at 10:16 AM by Rubi Luz

## 2019-02-12 DIAGNOSIS — E78.5 HYPERLIPIDEMIA LDL GOAL <100: ICD-10-CM

## 2019-02-12 DIAGNOSIS — Z11.59 NEED FOR HEPATITIS C SCREENING TEST: ICD-10-CM

## 2019-02-12 LAB
CHOLEST SERPL-MCNC: 173 MG/DL
HCV AB SERPL QL IA: NONREACTIVE
HDLC SERPL-MCNC: 52 MG/DL
LDLC SERPL CALC-MCNC: 86 MG/DL
NONHDLC SERPL-MCNC: 121 MG/DL
TRIGL SERPL-MCNC: 174 MG/DL

## 2019-02-12 PROCEDURE — 36415 COLL VENOUS BLD VENIPUNCTURE: CPT | Performed by: FAMILY MEDICINE

## 2019-02-12 PROCEDURE — 80061 LIPID PANEL: CPT | Performed by: FAMILY MEDICINE

## 2019-02-12 PROCEDURE — G0472 HEP C SCREEN HIGH RISK/OTHER: HCPCS | Performed by: FAMILY MEDICINE

## 2019-02-14 ENCOUNTER — OFFICE VISIT (OUTPATIENT)
Dept: FAMILY MEDICINE | Facility: CLINIC | Age: 69
End: 2019-02-14
Payer: MEDICARE

## 2019-02-14 ENCOUNTER — TELEPHONE (OUTPATIENT)
Dept: FAMILY MEDICINE | Facility: CLINIC | Age: 69
End: 2019-02-14

## 2019-02-14 ENCOUNTER — ANTICOAGULATION THERAPY VISIT (OUTPATIENT)
Dept: ANTICOAGULATION | Facility: CLINIC | Age: 69
End: 2019-02-14
Payer: MEDICARE

## 2019-02-14 VITALS
DIASTOLIC BLOOD PRESSURE: 70 MMHG | SYSTOLIC BLOOD PRESSURE: 148 MMHG | BODY MASS INDEX: 27.77 KG/M2 | TEMPERATURE: 97.2 F | HEART RATE: 72 BPM | RESPIRATION RATE: 20 BRPM | WEIGHT: 194 LBS | HEIGHT: 70 IN

## 2019-02-14 DIAGNOSIS — Z79.01 CHRONIC ANTICOAGULATION: ICD-10-CM

## 2019-02-14 DIAGNOSIS — Z13.6 SCREENING FOR AAA (ABDOMINAL AORTIC ANEURYSM): ICD-10-CM

## 2019-02-14 DIAGNOSIS — Z72.0 TOBACCO ABUSE: ICD-10-CM

## 2019-02-14 DIAGNOSIS — M54.50 ACUTE RIGHT-SIDED LOW BACK PAIN WITHOUT SCIATICA: ICD-10-CM

## 2019-02-14 DIAGNOSIS — E78.5 HYPERLIPIDEMIA LDL GOAL <100: ICD-10-CM

## 2019-02-14 DIAGNOSIS — Z79.01 LONG TERM CURRENT USE OF ANTICOAGULANT THERAPY: ICD-10-CM

## 2019-02-14 DIAGNOSIS — Z00.00 MEDICARE ANNUAL WELLNESS VISIT, SUBSEQUENT: Primary | ICD-10-CM

## 2019-02-14 DIAGNOSIS — R29.898 WEAKNESS OF LEFT FOOT: ICD-10-CM

## 2019-02-14 LAB — INR POINT OF CARE: 2.3 (ref 0.86–1.14)

## 2019-02-14 PROCEDURE — G0438 PPPS, INITIAL VISIT: HCPCS | Performed by: FAMILY MEDICINE

## 2019-02-14 PROCEDURE — 85610 PROTHROMBIN TIME: CPT | Mod: QW

## 2019-02-14 PROCEDURE — 99207 ZZC NO CHARGE NURSE ONLY: CPT

## 2019-02-14 PROCEDURE — 36416 COLLJ CAPILLARY BLOOD SPEC: CPT

## 2019-02-14 RX ORDER — SIMVASTATIN 20 MG
20 TABLET ORAL DAILY
Qty: 90 TABLET | Refills: 3 | Status: SHIPPED | OUTPATIENT
Start: 2019-02-14 | End: 2020-02-18

## 2019-02-14 ASSESSMENT — ENCOUNTER SYMPTOMS
CHILLS: 1
PALPITATIONS: 0
HEADACHES: 0
DIARRHEA: 0
CONSTIPATION: 0
DIZZINESS: 0
PARESTHESIAS: 0
HEMATURIA: 0
FREQUENCY: 0
HEARTBURN: 0
ABDOMINAL PAIN: 1
ARTHRALGIAS: 1
NAUSEA: 0
COUGH: 0
HEMATOCHEZIA: 0
MYALGIAS: 0
JOINT SWELLING: 0
SORE THROAT: 0
DYSURIA: 0
WEAKNESS: 0
EYE PAIN: 0
SHORTNESS OF BREATH: 0

## 2019-02-14 ASSESSMENT — ANXIETY QUESTIONNAIRES
3. WORRYING TOO MUCH ABOUT DIFFERENT THINGS: NOT AT ALL
5. BEING SO RESTLESS THAT IT IS HARD TO SIT STILL: NOT AT ALL
7. FEELING AFRAID AS IF SOMETHING AWFUL MIGHT HAPPEN: SEVERAL DAYS
1. FEELING NERVOUS, ANXIOUS, OR ON EDGE: SEVERAL DAYS
GAD7 TOTAL SCORE: 4
2. NOT BEING ABLE TO STOP OR CONTROL WORRYING: SEVERAL DAYS
6. BECOMING EASILY ANNOYED OR IRRITABLE: SEVERAL DAYS

## 2019-02-14 ASSESSMENT — PATIENT HEALTH QUESTIONNAIRE - PHQ9
SUM OF ALL RESPONSES TO PHQ QUESTIONS 1-9: 4
5. POOR APPETITE OR OVEREATING: NOT AT ALL

## 2019-02-14 ASSESSMENT — ACTIVITIES OF DAILY LIVING (ADL): CURRENT_FUNCTION: NO ASSISTANCE NEEDED

## 2019-02-14 ASSESSMENT — MIFFLIN-ST. JEOR: SCORE: 1642.26

## 2019-02-14 NOTE — PATIENT INSTRUCTIONS
ASSESSMENT/PLAN:                                                    Lifestyle recommendations:stop smoking/tobacco use  regular exercise, at least 150 minutes per week (average 30 minutes 5 times a week)  weight loss recommended (ideal BMI-body mass index is <25)  The following exams/tests were recommended and discussed for health maintenance:  Colon cancer screening recommended 2-4  Prostate cancer screening is not recommended for older men, those age 70 and older or with anticipated lifespan <10 years.  The expert group USPSTF rcommmends against any PSA prostate cancer screening.      (Z00.00) Medicare annual wellness visit, subsequent  (primary encounter diagnosis)    (M54.5) Acute right-sided low back pain without sciatica  Comment:   Plan: Seeing chiropractor.   Continue your back exercises.    Heat may help.    Let me know if back problems are not improving over the next month or so.     (M21.42) Weakness of left foot  Comment: with pain.  I think this is residual from sciatica from last year.  There is pain and weakness.   Plan: ORTHO  REFERRAL        Schedule an appointment with podiatrist.     (E78.5) Hyperlipidemia LDL goal <100  Comment: Has been doing well.  Plan: simvastatin (ZOCOR) 20 MG tablet        Refills.     (Z79.01) Chronic anticoagulation  Comment:   Plan: No change in current treatment plan.     (Z13.6) Screening for AAA (abdominal aortic aneurysm)  Comment:   Plan: US Aorta Medicare AAA Screening        Schedule an appointment with diagnostics for abdominal aortic aneurysm screening ultrasound.   Call [phone] to schedule.     (Z72.0) Tobacco abuse  Comment:   Plan: I recommended quitting smoking.  Reviewed various medications available to help quit smoking including Chantix, Zyban or nicotine replacement products.  Offered assistance in stopping smoking when needed..     If increasing anxiety and feel you need some help, let me know.     Acetaminophen (Tylenol) may be taken as  needed for pain and fever.  The maximum doses are listed below, around 3000mg a day.  Regular strength pills are 325mg.  The maximum daily dose is two pills (650mg) every 4 to 6 hours up to 3250mg a day.   Extra strength pills are 500mg each.  The maximum dose is two pills (1000mg) every 6 hours as needed up to 3000mg a day.   Extended release pills are 650mg each.  The maximum dose is two pills (1300mg) every 8 hours as needed up to 3900mg a day.     Watch out for acetaminophen in other over the counter or prescription medications.      Too much acetaminophen can lead to serious liver damage.

## 2019-02-14 NOTE — TELEPHONE ENCOUNTER
Pt has  questions on:  1) Ortho - Weakness Left Foot- Pt was wondering what this Referral was for. Explained to pt it was regarding his foot. If the  staff did not contact him he has the number to call them.     2) Last Colonoscopy This is entered in Epic along with this Lab results. Pt said he received a letter Due to high volume of Polyps to  repeat 3 yrs. This is due in 2021 Pt can set this up at one of Dr. Quijano's next future appt.    3) Transferred to to schedule his US Aorta screening.    Evette [place]n Station Sec

## 2019-02-14 NOTE — PROGRESS NOTES
ANTICOAGULATION FOLLOW-UP CLINIC VISIT    Patient Name:  Jose M Rea  Date:  2019  Contact Type:  Face to Face    SUBJECTIVE:     Patient Findings     Positives:   No Problem Findings    Comments:   No changes in diet, activity level, medications (including over the counter), or health. No missed doses of warfarin. Patient took dosing as prescribed. No signs of clots or bleeding concerns. Patient will continue maintenance warfarin dosing.             OBJECTIVE    INR Protime   Date Value Ref Range Status   2019 2.3 (A) 0.86 - 1.14 Final       ASSESSMENT / PLAN  INR assessment THER    Recheck INR In: 6 WEEKS    INR Location Clinic      Anticoagulation Summary  As of 2019    INR goal:   2.0-3.0   TTR:   91.1 % (5 mo)   INR used for dosin.3 (2019)   Warfarin maintenance plan:   7.5 mg (5 mg x 1.5) every day   Full warfarin instructions:   7.5 mg every day   Weekly warfarin total:   52.5 mg   No change documented:   Joanie Yin RN   Plan last modified:   Kb Braun RN (2018)   Next INR check:   3/28/2019   Target end date:   Indefinite    Indications    Acute deep vein thrombosis (DVT) of proximal vein of lower extremity (H) [I82.4Y9]  Chronic anticoagulation [Z79.01]  Long term current use of anticoagulant therapy [Z79.01]             Anticoagulation Episode Summary     INR check location:       Preferred lab:       Send INR reminders to:   [facility]    Comments:   * Tx from [place]      Anticoagulation Care Providers     Provider Role Specialty Phone number    Pj Quijano MD  Family Practice [phone]            See the Encounter Report to view Anticoagulation Flowsheet and Dosing Calendar (Go to Encounters tab in chart review, and find the Anticoagulation Therapy Visit)        Joanie Yin RN

## 2019-02-14 NOTE — PROGRESS NOTES
"SUBJECTIVE:   Jose M Rea is a 68 year old male who presents for Preventive Visit.  Chief Complaint   Patient presents with     Medicare Visit      Some pain right hip.    He has seen chiropractor.   Onset of symptoms: 3 weeks.  No known injury.  Past history of similar problems: no but low back pain right side last year.   Location: right lower back.  Aggravating factors: getting up in the AM.    Alleviating factors: Has tried ice.     Sometimes pain left foot \"arch problem\".  He had pinched nerve in back 13 months ago. Was told it might not return to normal.  He has seen spine specialist.  He was in physical therapy which did help.  No current numbness or weakness.  More of a discomfort.  Present most of t he time.  Quantity/severity: 2-3/10.  Dull quality.  Location: arch.     Are you in the first 12 months of your Medicare coverage?  No    Annual Wellness Visit     In general, how would you rate your overall health?  Good    Frequency of exercise:  None    Do you usually eat at least 4 servings of fruit and vegetables a day, include whole grains    & fiber and avoid regularly eating high fat or \"junk\" foods?  No    Taking medications regularly:  Yes    Medication side effects:  None    Ability to successfully perform activities of daily living:  No assistance needed    Home Safety:  No safety concerns identified    Hearing Impairment:  Difficulty understanding soft or whispered speech    In the past 6 months, have you been bothered by leaking of urine?  No    In general, how would you rate your overall mental or emotional health?  Good    PHQ-2 Total Score: 1    Additional concerns today:  Yes  Hearing not much of an issue at this time.     Do you feel safe in your environment? yes    Do you have a Health Care Directive? Has at his home    Fall risk    Cognitive Screening   1) Repeat 3 items (Leader, Season, Table)    2) Clock draw: ABNORMAL   3) 3 item recall: Recalls 3 objects  Results: ABNORMAL clock, 1-2 " items recalled: PROBABLE COGNITIVE IMPAIRMENT, **INFORM PROVIDER**    Mini-CogTM Copyright GIANLUCA Salter. Licensed by the author for use in [facility]; reprinted with permission (ally@[facility]). All rights reserved.    He has not had any memory problems by his history.     Do you have sleep apnea, excessive snoring or daytime drowsiness?: no    Social History     Tobacco Use     Smoking status: Current Every Day Smoker     Packs/day: 0.50     Years: 50.00     Pack years: 25.00     Smokeless tobacco: Never Used     Tobacco comment: started at age 18   Substance Use Topics     Alcohol use: No   Smoking < 1/2 ppd. Thinks about quitting.  Anxiety and some boredom keep him from quitting.  Thinking about back a lot.   Has not been interfering much with his activities at this time. He did not play golf last summer due to his back.     He has not been treated for anxiety or depression in the past.     Alcohol Use 2/14/2019   If you drink alcohol do you typically have greater than 3 drinks per day OR greater than 7 drinks per week? No       Current providers sharing in care for this patient include:   Patient Care Team:  Pj Quijano MD as PCP - General (Family Practice)  Pj Quijano MD as PCP - Assigned PCP   No specialists.   Does plan to have eye exam.  Has cataracts.     The following health maintenance items are reviewed in Epic and correct as of today:  Health Maintenance   Topic Date Due     ADVANCE DIRECTIVE PLANNING Q5 YRS  03/19/2003     ZOSTER IMMUNIZATION (2 of 3) 08/21/2008     AORTIC ANEURYSM SCREENING (SYSTEM ASSIGNED)  03/19/2013     FALL RISK ASSESSMENT  09/12/2019     PHQ-2 Q1 YR  09/12/2019     LIPID SCREEN Q5 YR MALE (SYSTEM ASSIGNED)  02/12/2024     DTAP/TDAP/TD IMMUNIZATION (2 - Td) 02/13/2028     COLON CANCER SCREEN (SYSTEM ASSIGNED)  07/01/2028     INFLUENZA VACCINE  Completed     HEPATITIS C SCREENING  Completed     IPV IMMUNIZATION  Aged Out     MENINGITIS IMMUNIZATION  Aged Out  "    Patient Active Problem List    Diagnosis Date Noted     Hyperlipidemia LDL goal <100 2018     Priority: Medium     Acute deep vein thrombosis (DVT) of proximal vein of lower extremity (H) 2018     Priority: Medium     DVT May 2016.  He has been recommended permanent anticoagulation.        Eczema 2018     Priority: Medium     Chronic anticoagulation 2018     Priority: Medium     Long term current use of anticoagulant therapy 2018     Priority: Medium        Family history:  CV disease: PGF  Prostate cancer: no  Colon cancer: no    Multivitamin or Vit D use: MVI    Vaccines:current     Past Colon cancer screenin-due in 3-5 years.       Review of Systems   Constitutional: Positive for chills.   HENT: Positive for congestion and ear pain. Negative for hearing loss and sore throat.    Eyes: Positive for visual disturbance. Negative for pain.   Respiratory: Negative for cough and shortness of breath.    Cardiovascular: Negative for chest pain, palpitations and peripheral edema.   Gastrointestinal: Positive for abdominal pain. Negative for constipation, diarrhea, heartburn, hematochezia and nausea.   Genitourinary: Negative for discharge, dysuria, frequency, genital sores, hematuria, impotence and urgency.   Musculoskeletal: Positive for arthralgias. Negative for joint swelling and myalgias.   Skin: Negative for rash.   Neurological: Negative for dizziness, weakness, headaches and paresthesias.   Psychiatric/Behavioral: Positive for mood changes.   Not checking blood pressure at home.     PHQ9 score today= 4  generalized anxiety disorder scale score today= 4  Physical Exam    OBJECTIVE:                                                    OBJECTIVE:Blood pressure 148/70, pulse 72, temperature 97.2  F (36.2  C), temperature source Tympanic, resp. rate 20, height 1.772 m (5' 9.75\"), weight 88 kg (194 lb). BMI=Body mass index is 28.04 kg/m .  GENERAL APPEARANCE ADULT: Alert, no acute " distress  EYES: PERRL, EOM normal, conjunctiva and lids normal  HENT: Ears and TMs normal, oral mucosa and posterior oropharynx normal  NECK: No adenopathy,masses or thyromegaly  RESP: lungs clear to auscultation   CV: normal rate, regular rhythm, no murmur or gallop  ABDOMEN: soft, no organomegaly, masses or tenderness  MS: extremities normal, no peripheral edema  back exam: normal posture, shoulders, inferior scapular borders and hips even and symmetrical, moves about the exam room comfortably, ROM mildly limited in flexion and extension but not painful, tenderness to palpitation right SI joint area mildly.  reflexes at knees normal, reflexes at ankles normal, heel and toe walk Weakness left foot dorsiflexion.     ASSESSMENT/PLAN:                                                    Lifestyle recommendations:stop smoking/tobacco use  regular exercise, at least 150 minutes per week (average 30 minutes 5 times a week)  weight loss recommended (ideal BMI-body mass index is <25)  The following exams/tests were recommended and discussed for health maintenance:  Colon cancer screening recommended 2-4  Prostate cancer screening is not recommended for older men, those age 70 and older or with anticipated lifespan <10 years.  The expert group USPSTF rcommmends against any PSA prostate cancer screening.      (Z00.00) Medicare annual wellness visit, subsequent  (primary encounter diagnosis)    (M54.5) Acute right-sided low back pain without sciatica  Comment:   Plan: Seeing chiropractor.   Continue your back exercises.    Heat may help.    Let me know if back problems are not improving over the next month or so.     (M21.42) Weakness of left foot  Comment: with pain.  I think this is residual from sciatica from last year.  There is pain and weakness.   Plan: ORTHO  REFERRAL        Schedule an appointment with podiatrist.     (E78.5) Hyperlipidemia LDL goal <100  Comment: Has been doing well.  Plan: simvastatin (ZOCOR)  "20 MG tablet        Refills.     (Z79.01) Chronic anticoagulation  Comment:   Plan: No change in current treatment plan.     (Z13.6) Screening for AAA (abdominal aortic aneurysm)  Comment:   Plan: US Aorta Medicare AAA Screening        Schedule an appointment with diagnostics for abdominal aortic aneurysm screening ultrasound.   Call [phone] to schedule.     (Z72.0) Tobacco abuse  Comment:   Plan: I recommended quitting smoking.  Reviewed various medications available to help quit smoking including Chantix, Zyban or nicotine replacement products.  Offered assistance in stopping smoking when needed..     If increasing anxiety and feel you need some help, let me know.      End of Life Planning:  Patient currently has an advanced directive: Yes.  Practitioner is supportive of decision.    COUNSELING:  Reviewed preventive health counseling, as reflected in patient instructions  Special attention given to:       Consider AAA screening for ages 65-75 and smoking history    BP Readings from Last 1 Encounters:   02/14/19 148/70     Estimated body mass index is 28.04 kg/m  as calculated from the following:    Height as of this encounter: 1.772 m (5' 9.75\").    Weight as of this encounter: 88 kg (194 lb).    BP Screening:   Last 3 BP Readings:    BP Readings from Last 3 Encounters:   02/14/19 148/70   01/03/19 158/79   12/23/18 152/76       The following was recommended to the patient:         reports that he has been smoking.  He has a 25.00 pack-year smoking history. he has never used smokeless tobacco.      Appropriate preventive services were discussed with this patient, including applicable screening as appropriate for cardiovascular disease, diabetes, osteopenia/osteoporosis, and glaucoma.  As appropriate for age/gender, discussed screening for colorectal cancer, prostate cancer, breast cancer, and cervical cancer. Checklist reviewing preventive services available has been given to the patient.    Reviewed patients " plan of care and provided an AVS. The Basic Care Plan (routine screening as documented in Health Maintenance) for Jose M meets the Care Plan requirement. This Care Plan has been established and reviewed with the Patient.    Counseling Resources:  ATP IV Guidelines  Pooled Cohorts Equation Calculator  Breast Cancer Risk Calculator  FRAX Risk Assessment  ICSI Preventive Guidelines  Dietary Guidelines for Americans, 2010  [employer] MyPlate  ASA Prophylaxis  Lung CA Screening    Pj Quijano MD  [facility]

## 2019-02-15 ASSESSMENT — ANXIETY QUESTIONNAIRES: GAD7 TOTAL SCORE: 4

## 2019-02-20 ENCOUNTER — HOSPITAL ENCOUNTER (OUTPATIENT)
Dept: ULTRASOUND IMAGING | Facility: CLINIC | Age: 69
Discharge: HOME | End: 2019-02-20
Attending: FAMILY MEDICINE | Admitting: FAMILY MEDICINE
Payer: MEDICARE

## 2019-02-20 DIAGNOSIS — Z13.6 SCREENING FOR AAA (ABDOMINAL AORTIC ANEURYSM): ICD-10-CM

## 2019-02-20 PROCEDURE — 76706 US ABDL AORTA SCREEN AAA: CPT

## 2019-02-20 NOTE — RESULT ENCOUNTER NOTE
Please call.  Your ultrasound looked good without any sign of an abdominal aortic aneurysm.  This is a one time screening test recommended for males age 65-75 who smoked at least 100 cigarettes in their lifetime.  If normal, it does not have to be repeated.

## 2019-02-21 ENCOUNTER — OFFICE VISIT (OUTPATIENT)
Dept: PODIATRY | Facility: CLINIC | Age: 69
End: 2019-02-21
Payer: MEDICARE

## 2019-02-21 VITALS
SYSTOLIC BLOOD PRESSURE: 157 MMHG | HEART RATE: 76 BPM | DIASTOLIC BLOOD PRESSURE: 87 MMHG | BODY MASS INDEX: 27.77 KG/M2 | WEIGHT: 194 LBS | HEIGHT: 70 IN

## 2019-02-21 DIAGNOSIS — M72.2 PLANTAR FASCIITIS, RIGHT: Primary | ICD-10-CM

## 2019-02-21 PROCEDURE — 99203 OFFICE O/P NEW LOW 30 MIN: CPT | Performed by: PODIATRIST

## 2019-02-21 ASSESSMENT — MIFFLIN-ST. JEOR: SCORE: 1642.26

## 2019-02-21 NOTE — PROGRESS NOTES
PATIENT HISTORY:  Jose M Rea is a 68 year old male who presents to clinic for a painful right foot .  The patient describes the pain as sharp stabbing.  The patient relates the pain level is moderate.  The patient relates pain is located on the bottom of the right heel.  The patient relates the pain has been present for the past several weeks.  The patient relates pain with ambulation.  The patient has tried different shoes with little relief.       REVIEW OF SYSTEMS:  Constitutional, HEENT, cardiovascular, pulmonary, GI, , musculoskeletal, neuro, skin, endocrine and psych systems are negative, except as otherwise noted.     PAST MEDICAL HISTORY: History reviewed. No pertinent past medical history.     PAST SURGICAL HISTORY: History reviewed. No pertinent surgical history.     MEDICATIONS:   Current Outpatient Medications:      ACETAMINOPHEN PO, Take 650 mg by mouth, Disp: , Rfl:      betamethasone dipropionate (DIPROSONE) 0.05 % cream, as needed , Disp: , Rfl:      clotrimazole (LOTRIMIN) 1 % cream, as needed , Disp: , Rfl:      ketoconazole (NIZORAL) 2 % cream, as needed , Disp: , Rfl:      Multiple Vitamins-Minerals (MENS ONE DAILY PO), , Disp: , Rfl:      simvastatin (ZOCOR) 20 MG tablet, Take 1 tablet (20 mg) by mouth daily, Disp: 90 tablet, Rfl: 3     triamcinolone (KENALOG) 0.1 % external cream, Apply topically 2 times daily For eczema, Disp: 60 g, Rfl: 1     warfarin (COUMADIN) 5 MG tablet, Take 1.5 tablets (7.5 mg) by mouth daily, Disp: 135 tablet, Rfl: 3     ALLERGIES:    Allergies   Allergen Reactions     Clindamycin      Cephalexin Rash     Doxycycline Rash     Metal [Staples] Rash and Blisters        SOCIAL HISTORY:   Social History     Socioeconomic History     Marital status:      Spouse name: Not on file     Number of children: Not on file     Years of education: Not on file     Highest education level: Not on file   Occupational History     Not on file   Social Needs     Financial  "resource strain: Not on file     Food insecurity:     Worry: Not on file     Inability: Not on file     Transportation needs:     Medical: Not on file     Non-medical: Not on file   Tobacco Use     Smoking status: Current Every Day Smoker     Packs/day: 0.50     Years: 50.00     Pack years: 25.00     Smokeless tobacco: Never Used     Tobacco comment: started at age 18   Substance and Sexual Activity     Alcohol use: No     Drug use: No     Sexual activity: Yes     Partners: Female   Lifestyle     Physical activity:     Days per week: Not on file     Minutes per session: Not on file     Stress: Not on file   Relationships     Social connections:     Talks on phone: Not on file     Gets together: Not on file     Attends Denominational service: Not on file     Active member of club or organization: Not on file     Attends meetings of clubs or organizations: Not on file     Relationship status: Not on file     Intimate partner violence:     Fear of current or ex partner: Not on file     Emotionally abused: Not on file     Physically abused: Not on file     Forced sexual activity: Not on file   Other Topics Concern     Parent/sibling w/ CABG, MI or angioplasty before 65F 55M? Not Asked   Social History Narrative     Not on file        FAMILY HISTORY:   Family History   Problem Relation Age of Onset     Coronary Artery Disease Paternal Grandfather         MI in late 50s or early 60s     Sleep Apnea Son      Thrombosis Son      Prostate Cancer No family hx of      Colon Cancer No family hx of         EXAM:Vitals: /87 (BP Location: Left arm, Patient Position: Sitting, Cuff Size: Adult Regular)   Pulse 76   Ht 1.772 m (5' 9.75\")   Wt 88 kg (194 lb)   BMI 28.04 kg/m    BMI= Body mass index is 28.04 kg/m .        General appearance: Patient is alert and fully cooperative with history & exam.  No sign of distress is noted during the visit.     Psychiatric: Affect is pleasant & appropriate.  Patient appears motivated to " improve health.     Respiratory: Breathing is regular & unlabored while sitting.     HEENT: Hearing is intact to spoken word.  Speech is clear.  No gross evidence of visual impairment that would impact ambulation.     Dermatologic: Skin is intact to both lower extremities without significant lesions, rash or abrasion.  No paronychia or evidence of soft tissue infection is noted.     Vascular: DP & PT pulses are intact & regular bilaterally.  No significant edema or varicosities noted.  CFT and skin temperature is normal to both lower extremities.     Neurologic: Lower extremity sensation is intact to light touch.  No evidence of weakness or contracture in the lower extremities.  No evidence of neuropathy.     Musculoskeletal: Patient is ambulatory without assistive device or brace.  No gross ankle deformity noted.  No foot or ankle joint effusion is noted.  Noted pain on palpation of the plantar aspect of the right heel at the insertion point of the plantar fascia.  No surrounding erythema noted.  One notes a tight gastroc complex on the right lower extremity.     .    ASSESSMENT / PLAN:     ICD-10-CM    1. Plantar fasciitis, right M72.2 ORTHOTICS REFERRAL       I have explained to Jose M  about the conditions.  We discussed the nature of the condition as well as the treatment plan and expected length of recovery.  At this time, the patient was instructed on icing, stretching, tissue massage and support.  The patient was referred to [facility] for custom orthotics that will aid in offloading the tension forces to the soft tissues and prevent further inflammation.    The patient will return in four weeks for reevaluation if the symptoms do not resolve.          Disclaimer: This note consists of symbols derived from keyboarding, dictation and/or voice recognition software. As a result, there may be errors in the script that have gone undetected. Please consider this when interpreting information  found in this chart.       KAREEN Pace D.P.M., NIGHAT.F.LILIAS.

## 2019-02-21 NOTE — LETTER
2/21/2019         RE: Jose M Rea  [address] [facility] [address]        Dear Colleague,    Thank you for referring your patient, Jose M Rea, to the [facility]. Please see a copy of my visit note below.    PATIENT HISTORY:  Jose M Rea is a 68 year old male who presents to clinic for a painful right foot .  The patient describes the pain as sharp stabbing.  The patient relates the pain level is moderate.  The patient relates pain is located on the bottom of the right heel.  The patient relates the pain has been present for the past several weeks.  The patient relates pain with ambulation.  The patient has tried different shoes with little relief.       REVIEW OF SYSTEMS:  Constitutional, HEENT, cardiovascular, pulmonary, GI, , musculoskeletal, neuro, skin, endocrine and psych systems are negative, except as otherwise noted.     PAST MEDICAL HISTORY: History reviewed. No pertinent past medical history.     PAST SURGICAL HISTORY: History reviewed. No pertinent surgical history.     MEDICATIONS:   Current Outpatient Medications:      ACETAMINOPHEN PO, Take 650 mg by mouth, Disp: , Rfl:      betamethasone dipropionate (DIPROSONE) 0.05 % cream, as needed , Disp: , Rfl:      clotrimazole (LOTRIMIN) 1 % cream, as needed , Disp: , Rfl:      ketoconazole (NIZORAL) 2 % cream, as needed , Disp: , Rfl:      Multiple Vitamins-Minerals (MENS ONE DAILY PO), , Disp: , Rfl:      simvastatin (ZOCOR) 20 MG tablet, Take 1 tablet (20 mg) by mouth daily, Disp: 90 tablet, Rfl: 3     triamcinolone (KENALOG) 0.1 % external cream, Apply topically 2 times daily For eczema, Disp: 60 g, Rfl: 1     warfarin (COUMADIN) 5 MG tablet, Take 1.5 tablets (7.5 mg) by mouth daily, Disp: 135 tablet, Rfl: 3     ALLERGIES:    Allergies   Allergen Reactions     Clindamycin      Cephalexin Rash     Doxycycline Rash     Metal [Staples] Rash and Blisters        SOCIAL HISTORY:   Social History  "    Socioeconomic History     Marital status:      Spouse name: Not on file     Number of children: Not on file     Years of education: Not on file     Highest education level: Not on file   Occupational History     Not on file   Social Needs     Financial resource strain: Not on file     Food insecurity:     Worry: Not on file     Inability: Not on file     Transportation needs:     Medical: Not on file     Non-medical: Not on file   Tobacco Use     Smoking status: Current Every Day Smoker     Packs/day: 0.50     Years: 50.00     Pack years: 25.00     Smokeless tobacco: Never Used     Tobacco comment: started at age 18   Substance and Sexual Activity     Alcohol use: No     Drug use: No     Sexual activity: Yes     Partners: Female   Lifestyle     Physical activity:     Days per week: Not on file     Minutes per session: Not on file     Stress: Not on file   Relationships     Social connections:     Talks on phone: Not on file     Gets together: Not on file     Attends Mosque service: Not on file     Active member of club or organization: Not on file     Attends meetings of clubs or organizations: Not on file     Relationship status: Not on file     Intimate partner violence:     Fear of current or ex partner: Not on file     Emotionally abused: Not on file     Physically abused: Not on file     Forced sexual activity: Not on file   Other Topics Concern     Parent/sibling w/ CABG, MI or angioplasty before 65F 55M? Not Asked   Social History Narrative     Not on file        FAMILY HISTORY:   Family History   Problem Relation Age of Onset     Coronary Artery Disease Paternal Grandfather         MI in late 50s or early 60s     Sleep Apnea Son      Thrombosis Son      Prostate Cancer No family hx of      Colon Cancer No family hx of         EXAM:Vitals: /87 (BP Location: Left arm, Patient Position: Sitting, Cuff Size: Adult Regular)   Pulse 76   Ht 1.772 m (5' 9.75\")   Wt 88 kg (194 lb)   BMI 28.04 " kg/m     BMI= Body mass index is 28.04 kg/m .        General appearance: Patient is alert and fully cooperative with history & exam.  No sign of distress is noted during the visit.     Psychiatric: Affect is pleasant & appropriate.  Patient appears motivated to improve health.     Respiratory: Breathing is regular & unlabored while sitting.     HEENT: Hearing is intact to spoken word.  Speech is clear.  No gross evidence of visual impairment that would impact ambulation.     Dermatologic: Skin is intact to both lower extremities without significant lesions, rash or abrasion.  No paronychia or evidence of soft tissue infection is noted.     Vascular: DP & PT pulses are intact & regular bilaterally.  No significant edema or varicosities noted.  CFT and skin temperature is normal to both lower extremities.     Neurologic: Lower extremity sensation is intact to light touch.  No evidence of weakness or contracture in the lower extremities.  No evidence of neuropathy.     Musculoskeletal: Patient is ambulatory without assistive device or brace.  No gross ankle deformity noted.  No foot or ankle joint effusion is noted.  Noted pain on palpation of the plantar aspect of the right heel at the insertion point of the plantar fascia.  No surrounding erythema noted.  One notes a tight gastroc complex on the right lower extremity.     .    ASSESSMENT / PLAN:     ICD-10-CM    1. Plantar fasciitis, right M72.2 ORTHOTICS REFERRAL       I have explained to Jose M  about the conditions.  We discussed the nature of the condition as well as the treatment plan and expected length of recovery.  At this time, the patient was instructed on icing, stretching, tissue massage and support.  The patient was referred to [facility] for custom orthotics that will aid in offloading the tension forces to the soft tissues and prevent further inflammation.    The patient will return in four weeks for reevaluation if the symptoms  do not resolve.          Disclaimer: This note consists of symbols derived from keyboarding, dictation and/or voice recognition software. As a result, there may be errors in the script that have gone undetected. Please consider this when interpreting information found in this chart.       KAREEN Pace D.P.M., F.MCKENNA.C.F.A.S.        Again, thank you for allowing me to participate in the care of your patient.        Sincerely,        Asad Pace DPM

## 2019-02-21 NOTE — PATIENT INSTRUCTIONS
Initial musculoskeletal treatment recommendation:    1.  Wear supportive foot wear (stiff soles) and/or arch supports (rigid not cushion).  2.  Stretch the calf muscles as instructed once an hour.  3.  Massage the soft tissues around the injured area in the morning to loosen the tissue with an over the counter product like Icy Hot with Lidocaine  4.  Ice the injured area in the evening; 20 min on/off.  5. Take antiinflammatory medication as indicated.    If no improvement in symptoms within four to six weeks, return to clinic for reevaluation.      Jose M to follow up with Primary Care provider regarding elevated blood pressure.  SMOKING CESSATION  What's in cigarette smoke? - Cigarette smoke contains over 4,000 chemicals. Nicotine is one of the main ingredients which is an insecticide/herbicide. It is poisonous to our nervous system, increases blood clotting risk, and decreases the body's defenses to fight off infection. Another chemical is Carbon Monoxide is an asphyxiating gas that permanently binds to blood cells and blocks the transport of oxygen. This leads to tissue death and decreases your metabolism. Tar is a chemical that coats your lungs and trachea which impairs new oxygen coming in and carbon dioxide getting out of your body.   How does smoking impact surgery? - Smoking is particularly hazardous with regards to surgery. Surgery puts stress on the body and a smoker's body is already under strain from these chemicals. Putting the two together, especially for an elective surgery, could be a recipe for disaster. Smoking before and after surgery increases your risk of heart problems, slow wound healing, delayed bone healing, blood clots, wound infection and anesthesia complications.   What are the benefits of quitting? - In 20 minutes your blood pressure will drop back down to normal. In 8 hours the carbon monoxide (a toxic gas) levels in your blood stream will drop by half, and oxygen levels will return to  normal. In 48 hours your chance of having a heart attack will have decreased. All nicotine will have left your body. Your sense of taste and smell will return to a normal level. In 72 hours your bronchial tubes will relax, and your energy levels will increase. In 2 weeks your circulation will increase, and it will continue to improve for the next 10 weeks.    Recommendations for elective surgery - Ideally, patients should quit smoking 8 weeks before and at least 2 weeks after elective surgery in order to avoid complications. Simply cutting back on the amount of cigarettes smoked per day does not offer any benefit or decrease the risk of poor wound healing, heart problems, and infection. Smokers should also start taking Vitamin C and B for two weeks before surgery and two weeks after surgery.    Ways to Stop Smokin. Nicotine patches, lozenges, or gum  2. Support Groups  3. Medications (see below)    List of Medications:  1. Varenicline Tartrate (CHANTIX)   2. Bupropion HCL (WELLBUTRIN, ZYBAN) - note: make sure Wellbutrin is for smoking cessation and not other issues   3. Nicotine Patch (NICODERM)   4. Nicotine Inhaler (NICOTROL)   5. Nicotine Gum Nicotine Polacrilex   6. Nicotine Lozenge: Nicotine Polacrilex (COMMIT)   * [place] offers a smoking support group as well!  Please visit: [email or web address]  If you are interested in these, ask about getting a prescription or talk to your primary care doctor about what may be the best way for you to quit.

## 2019-02-21 NOTE — RESULT ENCOUNTER NOTE
Patient called back.  Advised of results and recommendations.  Patient understood.  Cherri SANCHEZ MA

## 2019-03-20 NOTE — H&P
[facility]  [facility]  [address]  [facility] [address]  [phone]  Dept: [phone]    OPHTHALMOLOGY PRE-OPERATIVE  HISTORY AND PHYSICAL    DATE OF H/P:  2019    DATE OF SURGERY:  April 3, 2019  PROCEDURE:  Procedure(s):  Cataract Removal with Implant, Left Eye  LENS IMPLANT:  [id number] +17.5  REFRACTIVE GOAL:  PL Sph  SURGEON:  Cristofer Price MD    ANESTHESIA:  TOPICAL / MAC    OR CASE REQUIREMENTS:    DEMOGRAPHICS:  Demographic Information on Jose M Rea:    Jose M Rea  Gender: male  : 1950  [address] [address] [phone] (home)     Medical Record: [id number]  Social Security Number: [id number]  Pharmacy:    [id number] [place], [place] - [address] [facility] [address]  Primary Care Provider: Pj Quijano    Parent's names are: Data Unavailable (mother) and Data Unavailable (father).    Insurance: Payor: MEDICARE / Plan: MEDICARE / Product Type: Medicare /     OCULAR HISTORY:  Cataracts, each eye.    HISTORIES:  H/p blood clot - on coumadin.  Elevated cholesterol.    No past medical history on file.    No past surgical history on file.    Family History   Problem Relation Age of Onset     Coronary Artery Disease Paternal Grandfather         MI in late 50s or early 60s     Sleep Apnea Son      Thrombosis Son      Prostate Cancer No family hx of      Colon Cancer No family hx of        Social History     Tobacco Use     Smoking status: Current Every Day Smoker     Packs/day: 0.50     Years: 50.00     Pack years: 25.00     Smokeless tobacco: Never Used     Tobacco comment: started at age 18   Substance Use Topics     Alcohol use: No       MEDICATIONS:  No current facility-administered medications for this encounter.      Current Outpatient Medications   Medication Sig     ACETAMINOPHEN PO Take 650 mg by mouth     betamethasone dipropionate (DIPROSONE) 0.05 % cream  as needed      clotrimazole (LOTRIMIN) 1 % cream as needed      ketoconazole (NIZORAL) 2 % cream as needed      Multiple Vitamins-Minerals (MENS ONE DAILY PO)      simvastatin (ZOCOR) 20 MG tablet Take 1 tablet (20 mg) by mouth daily     triamcinolone (KENALOG) 0.1 % external cream Apply topically 2 times daily For eczema     warfarin (COUMADIN) 5 MG tablet Take 1.5 tablets (7.5 mg) by mouth daily       ALLERGIES:     Allergies   Allergen Reactions     Clindamycin      Cephalexin Rash     Doxycycline Rash     Metal [Staples] Rash and Blisters       PERTINENT SYSTEMS REVIEW:    1. No - Do you have a history of heart attack, stroke, stent, bypass or surgery on an artery in the head, neck, heart or legs?  2. No - Do you ever have any pain or discomfort in your chest?  3. No - Do you have a history of  Heart Failure?  4. No - Are you troubled by shortness of breath when walking: On the level, up a slight hill or at night?  5. No - Do you currently have a cold, bronchitis or other respiratory infection?  6. No - Do you have a cough, shortness of breath or wheezing?  7. No - Do you sometimes get pains in the calves of your legs when you walk?  8. Yes: on coumadin for blood clot - Do you or anyone in your family have previous history of blood clots?  9. No - Do you or does anyone in your family have a serious bleeding problem such as prolonged bleeding following surgeries or cuts?  10. No - Have you ever had problems with anemia or been told to take iron pills?  11. No - Have you had any abnormal blood loss such as black, tarry or bloody stools, or abnormal vaginal bleeding?  12. No - Have you ever had a blood transfusion?  13. No - Have you or any of your relatives ever had problems with anesthesia?  14. No - Do you have sleep apnea, excessive snoring or daytime drowsiness?  15. No - Do you have any prosthetic heart valves?  16. No - Do you have prosthetic joints?    EXAMINATION:  Vitals were reviewed                      Vison:  Va, right - 20/25, left - 20/40;   BAT, right - 20/70, left - 20/80;  HEENT:  Cataract, otherwise unremarkable.  LUNGS:  Clear  CV:  Regular rate and rhythm without murmur  ABD:  Soft and nontender  NEURO:  Alert and nonfocal    IMPRESSION:  Patient cleared for ophthalmic surgery.  Low risk with monitored, light sedation.  I have assessed the patient's DVT risk, and no additional orders necessary.    PLAN:  Procedure(s):  Cataract Removal with Implant, Left Eye      Cristofer Price MD

## 2019-03-28 ENCOUNTER — ANTICOAGULATION THERAPY VISIT (OUTPATIENT)
Dept: ANTICOAGULATION | Facility: CLINIC | Age: 69
End: 2019-03-28
Payer: MEDICARE

## 2019-03-28 DIAGNOSIS — Z79.01 CHRONIC ANTICOAGULATION: ICD-10-CM

## 2019-03-28 DIAGNOSIS — Z79.01 LONG TERM CURRENT USE OF ANTICOAGULANT THERAPY: ICD-10-CM

## 2019-03-28 LAB — INR POINT OF CARE: 2.2 (ref 0.86–1.14)

## 2019-03-28 PROCEDURE — 85610 PROTHROMBIN TIME: CPT | Mod: QW

## 2019-03-28 PROCEDURE — 99207 ZZC NO CHARGE NURSE ONLY: CPT

## 2019-03-28 PROCEDURE — 36416 COLLJ CAPILLARY BLOOD SPEC: CPT

## 2019-03-28 NOTE — PROGRESS NOTES
ANTICOAGULATION FOLLOW-UP CLINIC VISIT    Patient Name:  Jose M Rea  Date:  3/28/2019  Contact Type:  Face to Face    SUBJECTIVE:     Patient Findings     Comments:   No changes in diet, activity level, medications (including over the counter), or health. No missed doses of warfarin. Patient took dosing as prescribed. No signs of clots or bleeding concerns. Patient will continue maintenance warfarin dosing.             OBJECTIVE    INR Protime   Date Value Ref Range Status   2019 2.2 (A) 0.86 - 1.14 Final       ASSESSMENT / PLAN  INR assessment THER    Recheck INR In: 6 WEEKS    INR Location Clinic      Anticoagulation Summary  As of 3/28/2019    INR goal:   2.0-3.0   TTR:   93.1 % (6.4 mo)   INR used for dosin.2 (3/28/2019)   Warfarin maintenance plan:   7.5 mg (5 mg x 1.5) every day   Full warfarin instructions:   7.5 mg every day   Weekly warfarin total:   52.5 mg   No change documented:   Joanie Yin RN   Plan last modified:   Kb Braun RN (2018)   Next INR check:   2019   Target end date:   Indefinite    Indications    Acute deep vein thrombosis (DVT) of proximal vein of lower extremity (H) [I82.4Y9]  Chronic anticoagulation [Z79.01]  Long term current use of anticoagulant therapy [Z79.01]             Anticoagulation Episode Summary     INR check location:       Preferred lab:       Send INR reminders to:   [facility]    Comments:   * Tx from [place]      Anticoagulation Care Providers     Provider Role Specialty Phone number    Pj Quijano MD  Family Practice [phone]            See the Encounter Report to view Anticoagulation Flowsheet and Dosing Calendar (Go to Encounters tab in chart review, and find the Anticoagulation Therapy Visit)        Joanie Yin RN

## 2019-03-29 RX ORDER — OFLOXACIN 3 MG/ML
SOLUTION/ DROPS OPHTHALMIC
Refills: 1 | COMMUNITY
Start: 2019-03-24 | End: 2019-07-11

## 2019-03-29 RX ORDER — PREDNISOLONE ACETATE 10 MG/ML
SUSPENSION/ DROPS OPHTHALMIC
Refills: 1 | COMMUNITY
Start: 2019-03-24 | End: 2019-07-11

## 2019-03-29 RX ORDER — KETOROLAC TROMETHAMINE 4 MG/ML
SOLUTION/ DROPS OPHTHALMIC
Refills: 1 | COMMUNITY
Start: 2019-03-24 | End: 2019-07-11

## 2019-04-01 ENCOUNTER — ANESTHESIA EVENT (OUTPATIENT)
Dept: SURGERY | Facility: CLINIC | Age: 69
End: 2019-04-01
Payer: MEDICARE

## 2019-04-01 ASSESSMENT — LIFESTYLE VARIABLES: TOBACCO_USE: 1

## 2019-04-01 NOTE — ANESTHESIA PREPROCEDURE EVALUATION
"Anesthesia Pre-Procedure Evaluation    Patient: Jose M Rea   MRN: [id number] : 1950          Preoperative Diagnosis: cataract    Procedure(s):  Cataract Removal with Implant    No past medical history on file.  History reviewed. No pertinent surgical history.    Anesthesia Evaluation     .             ROS/MED HX    ENT/Pulmonary:     (+)tobacco use, Current use , . .    Neurologic:       Cardiovascular:     (+) Dyslipidemia, ----. Taking blood thinners : . . . :. .       METS/Exercise Tolerance:     Hematologic:     (+) History of blood clots -      Musculoskeletal:         GI/Hepatic:         Renal/Genitourinary:         Endo:         Psychiatric:         Infectious Disease:         Malignancy:         Other:                                 Lab Results   Component Value Date    INR 2.2 (A) 2019       Preop Vitals  BP Readings from Last 3 Encounters:   19 157/87   19 148/70   19 158/79    Pulse Readings from Last 3 Encounters:   19 76   19 72   19 82      Resp Readings from Last 3 Encounters:   19 20   18 20   10/03/18 16    SpO2 Readings from Last 3 Encounters:   19 98%   18 97%   10/03/18 98%      Temp Readings from Last 1 Encounters:   19 36.2  C (97.2  F) (Tympanic)    Ht Readings from Last 1 Encounters:   19 1.772 m (5' 9.75\")      Wt Readings from Last 1 Encounters:   19 88 kg (194 lb)    Estimated body mass index is 28.04 kg/m  as calculated from the following:    Height as of 19: 1.772 m (5' 9.75\").    Weight as of 19: 88 kg (194 lb).       Anesthesia Plan      History & Physical Review  History and physical reviewed and following examination; no interval change.    ASA Status:  2 .    NPO Status:  > 6 hours    Plan for MAC Reason for MAC:  Procedure to face, neck, head or breast  PONV prophylaxis:  Ondansetron (or other 5HT-3) and Dexamethasone or Solumedrol       Postoperative Care  Postoperative pain " management:  Multi-modal analgesia.      Consents  Anesthetic plan, risks, benefits and alternatives discussed with:  Patient..                 NICHELLE Almeida CRNA

## 2019-04-03 ENCOUNTER — ANESTHESIA (OUTPATIENT)
Dept: SURGERY | Facility: CLINIC | Age: 69
End: 2019-04-03
Payer: MEDICARE

## 2019-04-03 ENCOUNTER — HOSPITAL ENCOUNTER (OUTPATIENT)
Facility: CLINIC | Age: 69
Discharge: HOME | End: 2019-04-03
Attending: OPHTHALMOLOGY | Admitting: OPHTHALMOLOGY
Payer: MEDICARE

## 2019-04-03 VITALS
RESPIRATION RATE: 16 BRPM | DIASTOLIC BLOOD PRESSURE: 73 MMHG | SYSTOLIC BLOOD PRESSURE: 126 MMHG | HEIGHT: 71 IN | BODY MASS INDEX: 28 KG/M2 | HEART RATE: 69 BPM | TEMPERATURE: 98.6 F | WEIGHT: 200 LBS | OXYGEN SATURATION: 97 %

## 2019-04-03 PROCEDURE — 36000025 ZZH CATARACT SURGICAL PACKAGE: Performed by: OPHTHALMOLOGY

## 2019-04-03 PROCEDURE — 25000128 H RX IP 250 OP 636: Performed by: OPHTHALMOLOGY

## 2019-04-03 PROCEDURE — 71000022 ZZH RECOVERY CATRACT PACKAGE: Performed by: OPHTHALMOLOGY

## 2019-04-03 PROCEDURE — 25000125 ZZHC RX 250: Performed by: NURSE ANESTHETIST, CERTIFIED REGISTERED

## 2019-04-03 PROCEDURE — 25800030 ZZH RX IP 258 OP 636: Performed by: NURSE ANESTHETIST, CERTIFIED REGISTERED

## 2019-04-03 PROCEDURE — 25000125 ZZHC RX 250: Performed by: OPHTHALMOLOGY

## 2019-04-03 PROCEDURE — V2632 POST CHMBR INTRAOCULAR LENS: HCPCS | Performed by: OPHTHALMOLOGY

## 2019-04-03 PROCEDURE — 25000128 H RX IP 250 OP 636: Performed by: NURSE ANESTHETIST, CERTIFIED REGISTERED

## 2019-04-03 PROCEDURE — 37000012 ZZH ANESTHESIA CATARACT PACKAGE: Performed by: OPHTHALMOLOGY

## 2019-04-03 DEVICE — EYE IMP IOL AMO PCL TECNIS ZCB00 17.5: Type: IMPLANTABLE DEVICE | Site: EYE | Status: FUNCTIONAL

## 2019-04-03 RX ORDER — PHENYLEPHRINE HYDROCHLORIDE 25 MG/ML
1 SOLUTION/ DROPS OPHTHALMIC
Status: COMPLETED | OUTPATIENT
Start: 2019-04-03 | End: 2019-04-03

## 2019-04-03 RX ORDER — TROPICAMIDE 10 MG/ML
1 SOLUTION/ DROPS OPHTHALMIC
Status: COMPLETED | OUTPATIENT
Start: 2019-04-03 | End: 2019-04-03

## 2019-04-03 RX ORDER — BALANCED SALT SOLUTION 6.4; .75; .48; .3; 3.9; 1.7 MG/ML; MG/ML; MG/ML; MG/ML; MG/ML; MG/ML
SOLUTION OPHTHALMIC PRN
Status: DISCONTINUED | OUTPATIENT
Start: 2019-04-03 | End: 2019-04-03 | Stop reason: HOSPADM

## 2019-04-03 RX ORDER — SODIUM CHLORIDE, SODIUM LACTATE, POTASSIUM CHLORIDE, CALCIUM CHLORIDE 600; 310; 30; 20 MG/100ML; MG/100ML; MG/100ML; MG/100ML
INJECTION, SOLUTION INTRAVENOUS CONTINUOUS
Status: DISCONTINUED | OUTPATIENT
Start: 2019-04-03 | End: 2019-04-03 | Stop reason: HOSPADM

## 2019-04-03 RX ORDER — TETRACAINE HYDROCHLORIDE 5 MG/ML
SOLUTION OPHTHALMIC PRN
Status: DISCONTINUED | OUTPATIENT
Start: 2019-04-03 | End: 2019-04-03 | Stop reason: HOSPADM

## 2019-04-03 RX ORDER — LIDOCAINE 40 MG/G
CREAM TOPICAL
Status: DISCONTINUED | OUTPATIENT
Start: 2019-04-03 | End: 2019-04-03 | Stop reason: HOSPADM

## 2019-04-03 RX ORDER — CYCLOPENTOLATE HYDROCHLORIDE 10 MG/ML
1 SOLUTION/ DROPS OPHTHALMIC
Status: COMPLETED | OUTPATIENT
Start: 2019-04-03 | End: 2019-04-03

## 2019-04-03 RX ADMIN — PHENYLEPHRINE HYDROCHLORIDE 1 DROP: 25 SOLUTION/ DROPS OPHTHALMIC at 10:52

## 2019-04-03 RX ADMIN — PHENYLEPHRINE HYDROCHLORIDE 1 DROP: 25 SOLUTION/ DROPS OPHTHALMIC at 11:06

## 2019-04-03 RX ADMIN — TROPICAMIDE 1 DROP: 10 SOLUTION/ DROPS OPHTHALMIC at 10:58

## 2019-04-03 RX ADMIN — CYCLOPENTOLATE HYDROCHLORIDE 1 DROP: 10 SOLUTION/ DROPS OPHTHALMIC at 10:58

## 2019-04-03 RX ADMIN — PHENYLEPHRINE HYDROCHLORIDE 1 DROP: 25 SOLUTION/ DROPS OPHTHALMIC at 10:58

## 2019-04-03 RX ADMIN — TROPICAMIDE 1 DROP: 10 SOLUTION/ DROPS OPHTHALMIC at 11:07

## 2019-04-03 RX ADMIN — LIDOCAINE HYDROCHLORIDE 1 ML: 10 INJECTION, SOLUTION EPIDURAL; INFILTRATION; INTRACAUDAL; PERINEURAL at 10:54

## 2019-04-03 RX ADMIN — SODIUM CHLORIDE, POTASSIUM CHLORIDE, SODIUM LACTATE AND CALCIUM CHLORIDE 1000 ML: 600; 310; 30; 20 INJECTION, SOLUTION INTRAVENOUS at 10:54

## 2019-04-03 RX ADMIN — CYCLOPENTOLATE HYDROCHLORIDE 1 DROP: 10 SOLUTION/ DROPS OPHTHALMIC at 10:52

## 2019-04-03 RX ADMIN — MIDAZOLAM 2 MG: 1 INJECTION INTRAMUSCULAR; INTRAVENOUS at 11:52

## 2019-04-03 RX ADMIN — TROPICAMIDE 1 DROP: 10 SOLUTION/ DROPS OPHTHALMIC at 10:52

## 2019-04-03 RX ADMIN — CYCLOPENTOLATE HYDROCHLORIDE 1 DROP: 10 SOLUTION/ DROPS OPHTHALMIC at 11:07

## 2019-04-03 ASSESSMENT — MIFFLIN-ST. JEOR: SCORE: 1684.32

## 2019-04-03 NOTE — OP NOTE
CATARACT OPERATIVE NOTE    PATIENT: Jose M Rea  DATE OF SURGERY: 4/3/2019  PREOPERATIVE DIAGNOSIS:  Senile Nuclear Cataract, Left eye  POSTOPERATIVE DIAGNOSIS:  Senile Nuclear Cataract, Left eye  OPERATIVE PROCEDURE:  Phacoemulsification and placement of intraocular lens  SURGEON:  Cristofer Price MD  ANESTHESIA:  Topical / MAC  EBL:  None  SPECIMENS:  None  COMPLICATIONS:  None    PROCEDURE:  The patient was brought to the operating room at [facility].  The left eye was prepped and draped in the usual fashion for cataract surgery.  A wire lid speculum was inserted.  A super sharp blade was used to make a paracentesis at the 5 O'clock position.  The super sharp blade was used to make a partial thickness temporal groove, which was 3 mm in length.  0.8 mL of non-preserved epi-Shugarcaine was injected into the anterior chamber.  Viscoelastic was used to inflate the anterior chamber through a cannula.  A 2.5 mm microkeratome was used to make a temporal clear corneal incision in a two-plane fashion.  A cystotome needle and forceps were used to make a capsulorrhexis.  Hydrodissection and hydrodelineation were performed with Balance Salt Solution.  The lens was then phacoemulsified and removed without complications.  The cortical material was removed with bimanual irrigation and aspiration.  The capsular bag was filled with viscoelastic.  A posterior chamber intraocular lens, preselected and recorded, was folded and inserted into the capsular bag.  The viscoelastic was removed with the irrigation and aspiration tip.  Balanced Salt Solution with Vigamox, 150mg/0.1mL, was used to refill the anterior chamber.  The wounds were checked for water tightness and required no suture.  The wire lid speculum was removed.  The patient's left eye was cleaned and a drop of each post-operative drop was placed, followed by a cary shield.  The patient tolerated the procedure well, and there were no  complications.      Cristofer Price MD

## 2019-04-03 NOTE — ANESTHESIA POSTPROCEDURE EVALUATION
Patient: Jose M Rea    Procedure(s):  Cataract Removal with Implant    Diagnosis:cataract  Diagnosis Additional Information: No value filed.    Anesthesia Type:  MAC    Note:  Anesthesia Post Evaluation    Patient location during evaluation: Phase 2 and Bedside  Patient participation: Able to fully participate in evaluation  Level of consciousness: awake and alert  Pain management: adequate  Airway patency: patent  Cardiovascular status: hemodynamically stable and acceptable  Respiratory status: acceptable and room air  Hydration status: acceptable  PONV: none     Anesthetic complications: None          Last vitals:  Vitals:    04/03/19 1031   BP: 140/78   Pulse: 68   Resp: 20   Temp: 37  C (98.6  F)   SpO2: 98%         Electronically Signed By: NICHELLE Paula CRNA  April 3, 2019  12:12 PM

## 2019-04-03 NOTE — ANESTHESIA CARE TRANSFER NOTE
Patient: Jose M Rea    Procedure(s):  Cataract Removal with Implant    Diagnosis: cataract  Diagnosis Additional Information: No value filed.    Anesthesia Type:   MAC     Note:  Airway :Room Air  Patient transferred to:Phase II  Handoff Report: Identifed the Patient, Identified the Reponsible Provider, Reviewed the pertinent medical history, Discussed the surgical course, Reviewed Intra-OP anesthesia mangement and issues during anesthesia, Set expectations for post-procedure period and Allowed opportunity for questions and acknowledgement of understanding      Vitals: (Last set prior to Anesthesia Care Transfer)    CRNA VITALS  4/3/2019 1141 - 4/3/2019 1212      4/3/2019             NIBP:  127/71    Pulse:  65    NIBP Mean:  90    SpO2:  96 %                Electronically Signed By: NICHELLE Paula CRNA  April 3, 2019  12:12 PM

## 2019-04-04 ENCOUNTER — OFFICE VISIT (OUTPATIENT)
Dept: FAMILY MEDICINE | Facility: CLINIC | Age: 69
End: 2019-04-04
Payer: MEDICARE

## 2019-04-04 ENCOUNTER — TELEPHONE (OUTPATIENT)
Dept: FAMILY MEDICINE | Facility: CLINIC | Age: 69
End: 2019-04-04

## 2019-04-04 VITALS
HEIGHT: 71 IN | DIASTOLIC BLOOD PRESSURE: 70 MMHG | RESPIRATION RATE: 16 BRPM | TEMPERATURE: 97.4 F | WEIGHT: 197 LBS | BODY MASS INDEX: 27.58 KG/M2 | SYSTOLIC BLOOD PRESSURE: 130 MMHG | HEART RATE: 72 BPM

## 2019-04-04 DIAGNOSIS — N52.9 ERECTILE DYSFUNCTION, UNSPECIFIED ERECTILE DYSFUNCTION TYPE: ICD-10-CM

## 2019-04-04 DIAGNOSIS — N52.9 ERECTILE DYSFUNCTION, UNSPECIFIED ERECTILE DYSFUNCTION TYPE: Primary | ICD-10-CM

## 2019-04-04 PROCEDURE — 99213 OFFICE O/P EST LOW 20 MIN: CPT | Performed by: FAMILY MEDICINE

## 2019-04-04 RX ORDER — SILDENAFIL 50 MG/1
50 TABLET, FILM COATED ORAL DAILY PRN
Qty: 6 TABLET | Refills: 11 | Status: SHIPPED | OUTPATIENT
Start: 2019-04-04 | End: 2019-04-04

## 2019-04-04 RX ORDER — SILDENAFIL CITRATE 20 MG/1
TABLET ORAL
Qty: 30 TABLET | Refills: 11 | Status: SHIPPED | OUTPATIENT
Start: 2019-04-04 | End: 2020-02-18

## 2019-04-04 ASSESSMENT — MIFFLIN-ST. JEOR: SCORE: 1670.72

## 2019-04-04 NOTE — NURSING NOTE
"Chief Complaint   Patient presents with     Erectile Dysfunction       Initial /70   Pulse 72   Temp 97.4  F (36.3  C) (Tympanic)   Resp 16   Ht 1.803 m (5' 11\")   Wt 89.4 kg (197 lb)   BMI 27.48 kg/m   Estimated body mass index is 27.48 kg/m  as calculated from the following:    Height as of this encounter: 1.803 m (5' 11\").    Weight as of this encounter: 89.4 kg (197 lb).    Patient presents to the clinic using No DME    Health Maintenance that is potentially due pending provider review:  NONE        Is there anyone who you would like to be able to receive your results?   If yes have patient fill out JUSTIN    "

## 2019-04-04 NOTE — PROGRESS NOTES
"  SUBJECTIVE:   Jose M Rea is a 69 year old male who presents to clinic today for the following health issues:  Chief Complaint   Patient presents with     Erectile Dysfunction      Erectile dysfunction      Duration: months    History (similar episodes/previous evaluation): None    Precipitating or alleviating factors: None    Therapies tried and outcome: None     Onset of symptoms: gradual.  Was getting erections but not very strong.   Past few weeks, he recalls one erection very weak.   Erections not firm enough or lasting long enough.     Libido OK.     Cataract surgery yesterday.   No other recent health or medication changes.     Patient Active Problem List   Diagnosis     Hyperlipidemia LDL goal <100     Acute deep vein thrombosis (DVT) of proximal vein of lower extremity (H)     Eczema     Chronic anticoagulation     Long term current use of anticoagulant therapy      ROS:General: No change in weight, sleep or appetite.  Normal energy.  No fever or chills  Resp: No coughing, wheezing or shortness of breath  CV: No chest pains or palpitations  GI: No nausea, vomiting,  heartburn, abdominal pain, diarrhea, constipation or change in bowel habits  : No urinary frequency or dysuria, bladder or kidney problems  Psychiatric: No problems with anxiety, depression or mental health    OBJECTIVE:Blood pressure 130/70, pulse 72, temperature 97.4  F (36.3  C), temperature source Tympanic, resp. rate 16, height 1.803 m (5' 11\"), weight 89.4 kg (197 lb). BMI=Body mass index is 27.48 kg/m .  GENERAL APPEARANCE ADULT: Alert, no acute distress  NECK: No adenopathy,masses or thyromegaly  CV: normal rate, regular rhythm, no murmur or gallop  ABDOMEN: soft, no organomegaly, masses or tenderness, femoral pulses: normal    (male): penis, scrotum, testes normal-some atrophy, no hernias     ASSESSMENT:   (N52.9) Erectile dysfunction, unspecified erectile dysfunction type  (primary encounter diagnosis)  Comment: new " problem  Plan: sildenafil (VIAGRA) 50 MG tablet          Try Viagra for erectile dysfunction.  There are several different strengths of the Viaga pills.  Take Viagra 50mg pills 30min to 4 hours before planned intercourse and food may delay absorption.  You could try 1/2 dose if side effects on the 50mg or try 100 mg (2 pills) if needed.  Taking more than 100mg will not help.  Potential side effects including flushing, headache, and visual symptoms.  There have been rare reported cases of possibly serious vision changes including loss of vision.  Also reviewed drug interactions including nitrates-do not use with nitro medications.        Sidenafil (Viagra) comes in 20mg strength pills. Take as many pills as needed up to maximum of 5 pills at a time prior to intercourse.  These are generic, not covered by insurance but may be less expensive.     There is a longer acting medication tadalafil (Cialis) which is another option that is taken daily.

## 2019-04-04 NOTE — TELEPHONE ENCOUNTER
Jose M saw Dr Quijano this morning and he sent in Rx for Viagra.  Dr Quijano told him if it was too expensive to call him and he would change it to the 20 mg Sildenafil pills.  Pt says he would like this changed. Please order this with the same quantity of #6 with 11 refills. He says insurance doesn't pay so is aware it is self pay.     sildenafil (VIAGRA) 50 MG tablet 6 tablet 11 4/4/2019  --   Sig - Route: Take 1 tablet (50 mg) by mouth daily as needed - Oral   Sent to pharmacy as: sildenafil (VIAGRA) 50 MG tablet   Class: E-Prescribe   Notes to Pharmacy: Let me know if too expensive and I can send prescription for the 20mg sildenafil pills.   Order: [id number]   E-Prescribing Status: Receipt confirmed by pharmacy (4/4/2019  8:32 AM CDT)

## 2019-04-04 NOTE — PATIENT INSTRUCTIONS
ASSESSMENT:   (N52.9) Erectile dysfunction, unspecified erectile dysfunction type  (primary encounter diagnosis)  Comment: new problem  Plan: sildenafil (VIAGRA) 50 MG tablet          Try Viagra for erectile dysfunction.  There are several different strengths of the Viaga pills.  Take Viagra 50mg pills 30min to 4 hours before planned intercourse and food may delay absorption.  You could try 1/2 dose if side effects on the 50mg or try 100 mg (2 pills) if needed.  Taking more than 100mg will not help.  Potential side effects including flushing, headache, and visual symptoms.  There have been rare reported cases of possibly serious vision changes including loss of vision.  Also reviewed drug interactions including nitrates-do not use with nitro medications.        Sidenafil (Viagra) comes in 20mg strength pills. Take as many pills as needed up to maximum of 5 pills at a time prior to intercourse.  These are generic, not covered by insurance but may be less expensive.     There is a longer acting medication tadalafil (Cialis) which is another option that is taken daily.

## 2019-04-18 NOTE — H&P
[facility]  [facility]  [address] [place]  [facility] [address]  [phone]  Dept: [phone]    OPHTHALMOLOGY PRE-OPERATIVE  HISTORY AND PHYSICAL    DATE OF H/P:  2019    DATE OF SURGERY:  2019  PROCEDURE:  Procedure(s):  Cataract Removal with Implant, Right Eye  LENS IMPLANT:  ZCB00 +17.5  REFRACTIVE GOAL:  PL Sph  SURGEON:  Cristofer Price MD    ANESTHESIA:  TOPICAL / MAC    OR CASE REQUIREMENTS:    DEMOGRAPHICS:  Demographic Information on Jose M Rea:    Jose M Rea  Gender: male  : 1950  [address] [facility] [id number] [phone] (home)     Medical Record: [id number]  Social Security Number: [id number]  Pharmacy:    [id number] [place], [place] - [address] [facility] [address]  Primary Care Provider: Pj Quijano    Parent's names are: Data Unavailable (mother) and Data Unavailable (father).    Insurance: Payor: MEDICARE / Plan: MEDICARE / Product Type: Medicare /     OCULAR HISTORY:  Cataracts, s/p IOL left eye.    HISTORIES:  H/o blood clot - on coumadin.  Elevated cholesterol.    No past medical history on file.    Past Surgical History:   Procedure Laterality Date     PHACOEMULSIFICATION WITH STANDARD INTRAOCULAR LENS IMPLANT Left 4/3/2019    Procedure: Cataract Removal with Implant;  Surgeon: Cristofer Price MD;  Location: [facility] OR       Family History   Problem Relation Age of Onset     Coronary Artery Disease Paternal Grandfather         MI in late 50s or early 60s     Sleep Apnea Son      Thrombosis Son      Prostate Cancer No family hx of      Colon Cancer No family hx of        Social History     Tobacco Use     Smoking status: Current Every Day Smoker     Packs/day: 0.50     Years: 50.00     Pack years: 25.00     Smokeless tobacco: Never Used     Tobacco comment: started at age 18   Substance Use Topics     Alcohol use: No       MEDICATIONS:  No  current facility-administered medications for this encounter.      Current Outpatient Medications   Medication Sig     ACETAMINOPHEN PO Take 650 mg by mouth     betamethasone dipropionate (DIPROSONE) 0.05 % cream as needed      clotrimazole (LOTRIMIN) 1 % cream as needed      ketoconazole (NIZORAL) 2 % cream as needed      ketorolac tromethamine (ACULAR-LS) 0.4 % SOLN ophthalmic solution      Multiple Vitamins-Minerals (MENS ONE DAILY PO)      ofloxacin (OCUFLOX) 0.3 % ophthalmic solution      prednisoLONE acetate (PRED FORTE) 1 % ophthalmic suspension      sildenafil (REVATIO) 20 MG tablet Sidenafil (Viagra) comes in 20mg strength pills. Take as many pills as needed up to maximum of 5 pills at a time prior to intercourse.     simvastatin (ZOCOR) 20 MG tablet Take 1 tablet (20 mg) by mouth daily     triamcinolone (KENALOG) 0.1 % external cream Apply topically 2 times daily For eczema     warfarin (COUMADIN) 5 MG tablet Take 1.5 tablets (7.5 mg) by mouth daily       ALLERGIES:     Allergies   Allergen Reactions     Clindamycin      Cephalexin Rash     Doxycycline Rash     Metal [Staples] Rash and Blisters       PERTINENT SYSTEMS REVIEW:    1. No - Do you have a history of heart attack, stroke, stent, bypass or surgery on an artery in the head, neck, heart or legs?  2. No - Do you ever have any pain or discomfort in your chest?  3. No - Do you have a history of  Heart Failure?  4. No - Are you troubled by shortness of breath when walking: On the level, up a slight hill or at night?  5. No - Do you currently have a cold, bronchitis or other respiratory infection?  6. No - Do you have a cough, shortness of breath or wheezing?  7. No - Do you sometimes get pains in the calves of your legs when you walk?  8. No - Do you or anyone in your family have previous history of blood clots?  9. No - Do you or does anyone in your family have a serious bleeding problem such as prolonged bleeding following surgeries or cuts?  10. No  - Have you ever had problems with anemia or been told to take iron pills?  11. No - Have you had any abnormal blood loss such as black, tarry or bloody stools, or abnormal vaginal bleeding?  12. No - Have you ever had a blood transfusion?  13. No - Have you or any of your relatives ever had problems with anesthesia?  14. No - Do you have sleep apnea, excessive snoring or daytime drowsiness?  15. No - Do you have any prosthetic heart valves?  16. No - Do you have prosthetic joints?    EXAMINATION:  Vitals were reviewed                     Vison:  Va, right - 20/25;   BAT, right - 20/70;  HEENT:  Cataract, otherwise unremarkable.  LUNGS:  Clear  CV:  Regular rate and rhythm without murmur  ABD:  Soft and nontender  NEURO:  Alert and nonfocal    IMPRESSION:  Patient cleared for ophthalmic surgery.  Low risk with monitored, light sedation.  I have assessed the patient's DVT risk, and no additional orders necessary.    PLAN:  Procedure(s):  Cataract Removal with Implant, Right Eye      Cristofer Price MD

## 2019-04-23 ENCOUNTER — ANESTHESIA EVENT (OUTPATIENT)
Dept: SURGERY | Facility: CLINIC | Age: 69
End: 2019-04-23
Payer: MEDICARE

## 2019-04-23 ASSESSMENT — LIFESTYLE VARIABLES: TOBACCO_USE: 1

## 2019-04-23 NOTE — ANESTHESIA PREPROCEDURE EVALUATION
Anesthesia Pre-Procedure Evaluation    Patient: Jose M Rea   MRN: [id number] : 1950          Preoperative Diagnosis: cataract    Procedure(s):  Cataract Removal with Implant    No past medical history on file.  Past Surgical History:   Procedure Laterality Date     PHACOEMULSIFICATION WITH STANDARD INTRAOCULAR LENS IMPLANT Left 4/3/2019    Procedure: Cataract Removal with Implant;  Surgeon: Cristofer Price MD;  Location: [facility] OR       Anesthesia Evaluation     . Pt has had prior anesthetic. Type: MAC    No history of anesthetic complications          ROS/MED HX    ENT/Pulmonary:     (+)tobacco use, Current use 0.5 packs/day  , . .    Neurologic:  - neg neurologic ROS     Cardiovascular:     (+) Dyslipidemia, ----. Taking blood thinners (last had this am) : . . . :. .       METS/Exercise Tolerance:     Hematologic:     (+) History of blood clots -      Musculoskeletal:  - neg musculoskeletal ROS       GI/Hepatic:  - neg GI/hepatic ROS       Renal/Genitourinary:  - ROS Renal section negative       Endo:  - neg endo ROS       Psychiatric:  - neg psychiatric ROS       Infectious Disease:  - neg infectious disease ROS       Malignancy:      - no malignancy   Other:                            Physical Exam  Normal systems: cardiovascular, pulmonary and dental    Airway   Mallampati: II  TM distance: >3 FB  Neck ROM: full    Dental     Cardiovascular       Pulmonary             Lab Results   Component Value Date    INR 2.2 (A) 2019       Preop Vitals  BP Readings from Last 3 Encounters:   19 130/70   19 126/73   19 157/87    Pulse Readings from Last 3 Encounters:   19 72   19 69   19 76      Resp Readings from Last 3 Encounters:   19 16   19 16   19 20    SpO2 Readings from Last 3 Encounters:   19 97%   19 98%   18 97%      Temp Readings from Last 1 Encounters:   19 36.3  C (97.4  F) (Tympanic)    Ht Readings from Last 1  "Encounters:   04/04/19 1.803 m (5' 11\")      Wt Readings from Last 1 Encounters:   04/04/19 89.4 kg (197 lb)    Estimated body mass index is 27.48 kg/m  as calculated from the following:    Height as of 4/4/19: 1.803 m (5' 11\").    Weight as of 4/4/19: 89.4 kg (197 lb).       Anesthesia Plan      History & Physical Review  History and physical reviewed and following examination; no interval change.    ASA Status:  2 .    NPO Status:  > 8 hours    Plan for MAC Reason for MAC:  Procedure to face, neck, head or breast         Postoperative Care      Consents  Anesthetic plan, risks, benefits and alternatives discussed with:  Patient..                   NICHELLE Ramirez CRNA  "

## 2019-04-24 ENCOUNTER — ANESTHESIA (OUTPATIENT)
Dept: SURGERY | Facility: CLINIC | Age: 69
End: 2019-04-24
Payer: MEDICARE

## 2019-04-24 ENCOUNTER — HOSPITAL ENCOUNTER (OUTPATIENT)
Facility: CLINIC | Age: 69
Discharge: HOME | End: 2019-04-24
Attending: OPHTHALMOLOGY | Admitting: OPHTHALMOLOGY
Payer: MEDICARE

## 2019-04-24 VITALS
HEIGHT: 71 IN | WEIGHT: 197 LBS | OXYGEN SATURATION: 97 % | TEMPERATURE: 97.4 F | HEART RATE: 69 BPM | SYSTOLIC BLOOD PRESSURE: 134 MMHG | DIASTOLIC BLOOD PRESSURE: 80 MMHG | BODY MASS INDEX: 27.58 KG/M2 | RESPIRATION RATE: 16 BRPM

## 2019-04-24 PROCEDURE — 25000128 H RX IP 250 OP 636: Performed by: NURSE ANESTHETIST, CERTIFIED REGISTERED

## 2019-04-24 PROCEDURE — 37000012 ZZH ANESTHESIA CATARACT PACKAGE: Performed by: OPHTHALMOLOGY

## 2019-04-24 PROCEDURE — 36000025 ZZH CATARACT SURGICAL PACKAGE: Performed by: OPHTHALMOLOGY

## 2019-04-24 PROCEDURE — 25000128 H RX IP 250 OP 636: Performed by: OPHTHALMOLOGY

## 2019-04-24 PROCEDURE — V2632 POST CHMBR INTRAOCULAR LENS: HCPCS | Performed by: OPHTHALMOLOGY

## 2019-04-24 PROCEDURE — 25000125 ZZHC RX 250: Performed by: NURSE ANESTHETIST, CERTIFIED REGISTERED

## 2019-04-24 PROCEDURE — 25800030 ZZH RX IP 258 OP 636: Performed by: NURSE ANESTHETIST, CERTIFIED REGISTERED

## 2019-04-24 PROCEDURE — 25000125 ZZHC RX 250: Performed by: OPHTHALMOLOGY

## 2019-04-24 PROCEDURE — 71000022 ZZH RECOVERY CATRACT PACKAGE: Performed by: OPHTHALMOLOGY

## 2019-04-24 DEVICE — EYE IMP IOL AMO PCL TECNIS ZCB00 17.5: Type: IMPLANTABLE DEVICE | Site: EYE | Status: FUNCTIONAL

## 2019-04-24 RX ORDER — TETRACAINE HYDROCHLORIDE 5 MG/ML
SOLUTION OPHTHALMIC PRN
Status: DISCONTINUED | OUTPATIENT
Start: 2019-04-24 | End: 2019-04-24 | Stop reason: HOSPADM

## 2019-04-24 RX ORDER — SODIUM CHLORIDE, SODIUM LACTATE, POTASSIUM CHLORIDE, CALCIUM CHLORIDE 600; 310; 30; 20 MG/100ML; MG/100ML; MG/100ML; MG/100ML
INJECTION, SOLUTION INTRAVENOUS CONTINUOUS
Status: DISCONTINUED | OUTPATIENT
Start: 2019-04-24 | End: 2019-04-24 | Stop reason: HOSPADM

## 2019-04-24 RX ORDER — TROPICAMIDE 10 MG/ML
1 SOLUTION/ DROPS OPHTHALMIC
Status: COMPLETED | OUTPATIENT
Start: 2019-04-24 | End: 2019-04-24

## 2019-04-24 RX ORDER — PHENYLEPHRINE HYDROCHLORIDE 25 MG/ML
1 SOLUTION/ DROPS OPHTHALMIC
Status: COMPLETED | OUTPATIENT
Start: 2019-04-24 | End: 2019-04-24

## 2019-04-24 RX ORDER — LIDOCAINE 40 MG/G
CREAM TOPICAL
Status: DISCONTINUED | OUTPATIENT
Start: 2019-04-24 | End: 2019-04-24 | Stop reason: HOSPADM

## 2019-04-24 RX ORDER — BALANCED SALT SOLUTION 6.4; .75; .48; .3; 3.9; 1.7 MG/ML; MG/ML; MG/ML; MG/ML; MG/ML; MG/ML
SOLUTION OPHTHALMIC PRN
Status: DISCONTINUED | OUTPATIENT
Start: 2019-04-24 | End: 2019-04-24 | Stop reason: HOSPADM

## 2019-04-24 RX ORDER — CYCLOPENTOLATE HYDROCHLORIDE 10 MG/ML
1 SOLUTION/ DROPS OPHTHALMIC
Status: COMPLETED | OUTPATIENT
Start: 2019-04-24 | End: 2019-04-24

## 2019-04-24 RX ADMIN — MIDAZOLAM 2 MG: 1 INJECTION INTRAMUSCULAR; INTRAVENOUS at 09:10

## 2019-04-24 RX ADMIN — LIDOCAINE HYDROCHLORIDE 1 ML: 10 INJECTION, SOLUTION EPIDURAL; INFILTRATION; INTRACAUDAL; PERINEURAL at 07:50

## 2019-04-24 RX ADMIN — TROPICAMIDE 1 DROP: 10 SOLUTION/ DROPS OPHTHALMIC at 07:55

## 2019-04-24 RX ADMIN — PHENYLEPHRINE HYDROCHLORIDE 1 DROP: 25 SOLUTION/ DROPS OPHTHALMIC at 07:48

## 2019-04-24 RX ADMIN — PHENYLEPHRINE HYDROCHLORIDE 1 DROP: 25 SOLUTION/ DROPS OPHTHALMIC at 08:10

## 2019-04-24 RX ADMIN — CYCLOPENTOLATE HYDROCHLORIDE 1 DROP: 10 SOLUTION/ DROPS OPHTHALMIC at 07:48

## 2019-04-24 RX ADMIN — CYCLOPENTOLATE HYDROCHLORIDE 1 DROP: 10 SOLUTION/ DROPS OPHTHALMIC at 07:55

## 2019-04-24 RX ADMIN — CYCLOPENTOLATE HYDROCHLORIDE 1 DROP: 10 SOLUTION/ DROPS OPHTHALMIC at 08:10

## 2019-04-24 RX ADMIN — SODIUM CHLORIDE, POTASSIUM CHLORIDE, SODIUM LACTATE AND CALCIUM CHLORIDE: 600; 310; 30; 20 INJECTION, SOLUTION INTRAVENOUS at 07:49

## 2019-04-24 RX ADMIN — TROPICAMIDE 1 DROP: 10 SOLUTION/ DROPS OPHTHALMIC at 08:10

## 2019-04-24 RX ADMIN — TROPICAMIDE 1 DROP: 10 SOLUTION/ DROPS OPHTHALMIC at 07:48

## 2019-04-24 RX ADMIN — PHENYLEPHRINE HYDROCHLORIDE 1 DROP: 25 SOLUTION/ DROPS OPHTHALMIC at 07:54

## 2019-04-24 ASSESSMENT — MIFFLIN-ST. JEOR: SCORE: 1670.72

## 2019-04-24 NOTE — ANESTHESIA CARE TRANSFER NOTE
Patient: Jose M Rea    Procedure(s):  Cataract Removal with Implant    Diagnosis: cataract  Diagnosis Additional Information: No value filed.    Anesthesia Type:   MAC     Note:  Airway :Room Air  Patient transferred to:Phase II  Comments: Patient's VSS. Spontaneous respirations. Patient awake and oriented. IV patent. Report to RN.Handoff Report: Identifed the Patient, Identified the Reponsible Provider, Reviewed the pertinent medical history, Discussed the surgical course, Reviewed Intra-OP anesthesia mangement and issues during anesthesia, Set expectations for post-procedure period and Allowed opportunity for questions and acknowledgement of understanding      Vitals: (Last set prior to Anesthesia Care Transfer)    CRNA VITALS  4/24/2019 0858 - 4/24/2019 0929      4/24/2019             Pulse:  72    SpO2:  98 %                Electronically Signed By: NICHELLE Shah CRNA  April 24, 2019  9:29 AM

## 2019-04-24 NOTE — ANESTHESIA POSTPROCEDURE EVALUATION
Patient: Jose M Rea    Procedure(s):  Cataract Removal with Implant    Diagnosis:cataract  Diagnosis Additional Information: No value filed.    Anesthesia Type:  MAC    Note:  Anesthesia Post Evaluation    Patient location during evaluation: Phase 2 and Bedside  Patient participation: Able to fully participate in evaluation  Level of consciousness: awake and alert  Pain management: adequate  Airway patency: patent  Cardiovascular status: acceptable  Respiratory status: acceptable  Hydration status: acceptable  PONV: none     Anesthetic complications: None          Last vitals:  Vitals:    04/24/19 0729 04/24/19 0932 04/24/19 0933   BP: 129/77 136/82    Pulse: 70 71    Resp: 16     Temp: 36.3  C (97.4  F)     SpO2: 98%  97%         Electronically Signed By: NICHELLE Shah CRNA  April 24, 2019  9:41 AM

## 2019-04-24 NOTE — OP NOTE
OPHTHALMOLOGY OPERATIVE NOTE    PATIENT: Jose M Rea  DATE OF SURGERY: 4/24/2019  PREOPERATIVE DIAGNOSIS:  Senile Nuclear Cataract, Right eye  POSTOPERATIVE DIAGNOSIS:  Senile Nuclear Cataract, Right eye  OPERATIVE PROCEDURE:  Phacoemulsification with placement of intraocular lens  SURGEON:  Cristofer Price MD  ANESTHESIA:  Topical / MAC  EBL:  None  SPECIMENS:  None  COMPLICATIONS:  None    PROCEDURE:  The patient was brought to the operating room at [facility].  The right eye was prepped and draped in the usual fashion for cataract surgery.  A wire lid speculum was inserted.  A super sharp blade was used to make a paracentesis at the 11 O'clock position.  The super sharp blade was used to make a partial thickness temporal groove, which was 3 mm in length.  0.8 mL of non-preserved epi-Shugarcaine was injected into the anterior chamber.  Viscoelastic was used to inflate the anterior chamber through a cannula.  A 2.5 mm microkeratome was used to make a temporal clear corneal incision in a two-plane fashion.  A cystotome needle and forceps were used to make a capsulorrhexis.  Hydrodissection and hydrodelineation were performed with Balance Salt Solution.  The lens was then phacoemulsified and removed without complications.  The cortical material was removed with bimanual irrigation and aspiration.  The capsular bag was filled with viscoelastic.  A posterior chamber intraocular lens, preselected and recorded, was folded and inserted into the capsular bag.  The viscoelastic was removed with the irrigation and aspiration tip.  Balanced Salt Solution with Vigamox, 150mg/0.1mL, was used to refill the anterior chamber.  The wounds were checked for water tightness and required no suture.  The wire lid speculum was removed.  The patient's right eye was cleaned and a drop of each post-operative drop was placed, followed by a cary shield.  The patient tolerated the procedure well, and there  were no complications.      Cristofer Price MD

## 2019-05-09 ENCOUNTER — ANTICOAGULATION THERAPY VISIT (OUTPATIENT)
Dept: ANTICOAGULATION | Facility: CLINIC | Age: 69
End: 2019-05-09
Payer: MEDICARE

## 2019-05-09 DIAGNOSIS — Z79.01 LONG TERM CURRENT USE OF ANTICOAGULANT THERAPY: ICD-10-CM

## 2019-05-09 DIAGNOSIS — Z79.01 CHRONIC ANTICOAGULATION: ICD-10-CM

## 2019-05-09 LAB — INR POINT OF CARE: 1.8 (ref 0.86–1.14)

## 2019-05-09 PROCEDURE — 99207 ZZC NO CHARGE NURSE ONLY: CPT

## 2019-05-09 PROCEDURE — 85610 PROTHROMBIN TIME: CPT | Mod: QW

## 2019-05-09 PROCEDURE — 36416 COLLJ CAPILLARY BLOOD SPEC: CPT

## 2019-05-09 NOTE — PROGRESS NOTES
ANTICOAGULATION FOLLOW-UP CLINIC VISIT    Patient Name:  Jose M Rea  Date:  2019  Contact Type:  Face to Face    SUBJECTIVE:  Patient Findings     Comments:   No changes in diet, activity level, medications (including over the counter), or health. Patient does not drink v8 or protein/weight loss shakes. No missed doses of warfarin. Patient took dosing as prescribed. No signs of clots or bleeding concerns. Patient denies any identifiable changes that caused the sub therapeutic INR. Will advise an extra 2.5 mg one time, then resume usual dosing and recheck INR in 2-3 weeks. If still low at that time, may need to consider increasing maintenance dose.            Clinical Outcomes     Negatives:   Major bleeding event, Thromboembolic event, Anticoagulation-related hospital admission, Anticoagulation-related ED visit, Anticoagulation-related fatality    Comments:   No changes in diet, activity level, medications (including over the counter), or health. Patient does not drink v8 or protein/weight loss shakes. No missed doses of warfarin. Patient took dosing as prescribed. No signs of clots or bleeding concerns. Patient denies any identifiable changes that caused the sub therapeutic INR. Will advise an extra 2.5 mg one time, then resume usual dosing and recheck INR in 2-3 weeks. If still low at that time, may need to consider increasing maintenance dose.               OBJECTIVE    INR Protime   Date Value Ref Range Status   2019 1.8 (A) 0.86 - 1.14 Final       ASSESSMENT / PLAN  INR assessment SUB    Recheck INR In: 3 WEEKS    INR Location Clinic      Anticoagulation Summary  As of 2019    INR goal:   2.0-3.0   TTR:   85.3 % (7.8 mo)   INR used for dosin.8! (2019)   Warfarin maintenance plan:   7.5 mg (5 mg x 1.5) every day   Full warfarin instructions:   : 10 mg; Otherwise 7.5 mg every day   Weekly warfarin total:   52.5 mg   Plan last modified:   Kb Braun RN (2018)   Next  INR check:   5/30/2019   Target end date:   Indefinite    Indications    Acute deep vein thrombosis (DVT) of proximal vein of lower extremity (H) [I82.4Y9]  Chronic anticoagulation [Z79.01]  Long term current use of anticoagulant therapy [Z79.01]             Anticoagulation Episode Summary     INR check location:       Preferred lab:       Send INR reminders to:   [facility]    Comments:   * Tx from [place]      Anticoagulation Care Providers     Provider Role Specialty Phone number    Pj Quijano MD  [facility] [phone]            See the Encounter Report to view Anticoagulation Flowsheet and Dosing Calendar (Go to Encounters tab in chart review, and find the Anticoagulation Therapy Visit)        Joanie Yin RN

## 2019-05-30 ENCOUNTER — ANTICOAGULATION THERAPY VISIT (OUTPATIENT)
Dept: ANTICOAGULATION | Facility: CLINIC | Age: 69
End: 2019-05-30
Payer: MEDICARE

## 2019-05-30 DIAGNOSIS — Z79.01 LONG TERM CURRENT USE OF ANTICOAGULANT THERAPY: ICD-10-CM

## 2019-05-30 DIAGNOSIS — Z79.01 CHRONIC ANTICOAGULATION: ICD-10-CM

## 2019-05-30 LAB — INR POINT OF CARE: 1.9 (ref 0.86–1.14)

## 2019-05-30 PROCEDURE — 99207 ZZC NO CHARGE NURSE ONLY: CPT

## 2019-05-30 PROCEDURE — 85610 PROTHROMBIN TIME: CPT | Mod: QW

## 2019-05-30 PROCEDURE — 36416 COLLJ CAPILLARY BLOOD SPEC: CPT

## 2019-05-30 RX ORDER — WARFARIN SODIUM 5 MG/1
7.5 TABLET ORAL DAILY
Qty: 135 TABLET | Refills: 3
Start: 2019-05-30 | End: 2019-06-13

## 2019-05-30 NOTE — PROGRESS NOTES
ANTICOAGULATION FOLLOW-UP CLINIC VISIT    Patient Name:  Jose M Rea  Date:  2019  Contact Type:  Face to Face    SUBJECTIVE:  Patient Findings     Comments:   No changes in diet, activity level, medications (including over the counter), or health. No missed doses of warfarin. Patient took dosing as prescribed. No signs of clots or bleeding concerns.   This is the second INR slightly below goal range. Will advise increasing weekly dose by 9.5% and recheck INR in 2 weeks.          Clinical Outcomes     Negatives:   Major bleeding event, Thromboembolic event, Anticoagulation-related hospital admission, Anticoagulation-related ED visit, Anticoagulation-related fatality    Comments:   No changes in diet, activity level, medications (including over the counter), or health. No missed doses of warfarin. Patient took dosing as prescribed. No signs of clots or bleeding concerns.   This is the second INR slightly below goal range. Will advise increasing weekly dose by 9.5% and recheck INR in 2 weeks.             OBJECTIVE    INR Protime   Date Value Ref Range Status   2019 1.9 (A) 0.86 - 1.14 Final       ASSESSMENT / PLAN  INR assessment THER    Recheck INR In: 2 WEEKS    INR Location Clinic      Anticoagulation Summary  As of 2019    INR goal:   2.0-3.0   TTR:   78.3 % (8.5 mo)   INR used for dosin.9! (2019)   Warfarin maintenance plan:   10 mg (5 mg x 2) every Mon, Thu; 7.5 mg (5 mg x 1.5) all other days   Full warfarin instructions:   10 mg every Mon, Thu; 7.5 mg all other days   Weekly warfarin total:   57.5 mg   Plan last modified:   Joanie Yin RN (2019)   Next INR check:   2019   Target end date:   Indefinite    Indications    Acute deep vein thrombosis (DVT) of proximal vein of lower extremity (H) [I82.4Y9]  Chronic anticoagulation [Z79.01]  Long term current use of anticoagulant therapy [Z79.01]             Anticoagulation Episode Summary     INR check location:        Preferred lab:       Send INR reminders to:   [facility]    Comments:   * Tx from [place]      Anticoagulation Care Providers     Provider Role Specialty Phone number    Pj Quijano MD  Family Practice [phone]            See the Encounter Report to view Anticoagulation Flowsheet and Dosing Calendar (Go to Encounters tab in chart review, and find the Anticoagulation Therapy Visit)        Joanie Yin RN

## 2019-06-03 ENCOUNTER — ALLIED HEALTH/NURSE VISIT (OUTPATIENT)
Dept: FAMILY MEDICINE | Facility: CLINIC | Age: 69
End: 2019-06-03
Payer: MEDICARE

## 2019-06-03 VITALS
DIASTOLIC BLOOD PRESSURE: 70 MMHG | WEIGHT: 198.4 LBS | SYSTOLIC BLOOD PRESSURE: 116 MMHG | BODY MASS INDEX: 27.67 KG/M2 | HEART RATE: 72 BPM

## 2019-06-03 DIAGNOSIS — Z01.30 BP CHECK: Primary | ICD-10-CM

## 2019-06-03 PROCEDURE — 99207 ZZC NO CHARGE NURSE ONLY: CPT

## 2019-06-13 ENCOUNTER — ANTICOAGULATION THERAPY VISIT (OUTPATIENT)
Dept: ANTICOAGULATION | Facility: CLINIC | Age: 69
End: 2019-06-13
Payer: MEDICARE

## 2019-06-13 DIAGNOSIS — Z79.01 LONG TERM CURRENT USE OF ANTICOAGULANT THERAPY: ICD-10-CM

## 2019-06-13 DIAGNOSIS — Z79.01 CHRONIC ANTICOAGULATION: ICD-10-CM

## 2019-06-13 DIAGNOSIS — I82.4Y9 ACUTE DEEP VEIN THROMBOSIS (DVT) OF PROXIMAL VEIN OF LOWER EXTREMITY (H): ICD-10-CM

## 2019-06-13 LAB — INR POINT OF CARE: 2.2 (ref 0.86–1.14)

## 2019-06-13 PROCEDURE — 36416 COLLJ CAPILLARY BLOOD SPEC: CPT

## 2019-06-13 PROCEDURE — 85610 PROTHROMBIN TIME: CPT | Mod: QW

## 2019-06-13 PROCEDURE — 99207 ZZC NO CHARGE NURSE ONLY: CPT

## 2019-06-13 RX ORDER — WARFARIN SODIUM 5 MG/1
TABLET ORAL
Qty: 135 TABLET | Refills: 3 | COMMUNITY
Start: 2019-06-13 | End: 2019-09-20

## 2019-06-13 NOTE — PROGRESS NOTES
ANTICOAGULATION FOLLOW-UP CLINIC VISIT    Patient Name:  Jose M Rea  Date:  2019  Contact Type:  Face to Face    SUBJECTIVE:  Patient Findings     Comments:   No changes in medications, activity, health, or diet noted. No bleeding or increased bruising noted. Took warfarin as prescribed.  Patient will continue weekly maintenance dose. INR is therapeutic.   Recheck in 6 weeks.   Patient verbalizes understanding and agrees to plan. No further questions or concerns.          Clinical Outcomes     Negatives:   Major bleeding event, Thromboembolic event, Anticoagulation-related hospital admission, Anticoagulation-related ED visit, Anticoagulation-related fatality    Comments:   No changes in medications, activity, health, or diet noted. No bleeding or increased bruising noted. Took warfarin as prescribed.  Patient will continue weekly maintenance dose. INR is therapeutic.   Recheck in 6 weeks.   Patient verbalizes understanding and agrees to plan. No further questions or concerns.             OBJECTIVE    INR Protime   Date Value Ref Range Status   2019 2.2 (A) 0.86 - 1.14 Final       ASSESSMENT / PLAN  INR assessment THER    Recheck INR In: 6 WEEKS    INR Location Clinic      Anticoagulation Summary  As of 2019    INR goal:   2.0-3.0   TTR:   77.7 % (9 mo)   INR used for dosin.2 (2019)   Warfarin maintenance plan:   10 mg (5 mg x 2) every Mon, Thu; 7.5 mg (5 mg x 1.5) all other days   Full warfarin instructions:   10 mg every Mon, Thu; 7.5 mg all other days   Weekly warfarin total:   57.5 mg   No change documented:   Kb Braun RN   Plan last modified:   Joanie Yin RN (2019)   Next INR check:   2019   Target end date:   Indefinite    Indications    Acute deep vein thrombosis (DVT) of proximal vein of lower extremity (H) [I82.4Y9]  Chronic anticoagulation [Z79.01]  Long term current use of anticoagulant therapy [Z79.01]             Anticoagulation Episode Summary      INR check location:       Preferred lab:       Send INR reminders to:   [facility]    Comments:   * Tx from [place]      Anticoagulation Care Providers     Provider Role Specialty Phone number    Pj Quijano MD  Family Practice [phone]            See the Encounter Report to view Anticoagulation Flowsheet and Dosing Calendar (Go to Encounters tab in chart review, and find the Anticoagulation Therapy Visit)        Kb Braun RN

## 2019-07-11 ENCOUNTER — OFFICE VISIT (OUTPATIENT)
Dept: FAMILY MEDICINE | Facility: CLINIC | Age: 69
End: 2019-07-11
Payer: MEDICARE

## 2019-07-11 ENCOUNTER — ANTICOAGULATION THERAPY VISIT (OUTPATIENT)
Dept: ANTICOAGULATION | Facility: CLINIC | Age: 69
End: 2019-07-11
Payer: MEDICARE

## 2019-07-11 VITALS
BODY MASS INDEX: 27.86 KG/M2 | HEART RATE: 82 BPM | SYSTOLIC BLOOD PRESSURE: 112 MMHG | RESPIRATION RATE: 16 BRPM | TEMPERATURE: 98 F | DIASTOLIC BLOOD PRESSURE: 78 MMHG | HEIGHT: 71 IN | WEIGHT: 199 LBS | OXYGEN SATURATION: 97 %

## 2019-07-11 DIAGNOSIS — L03.211 CELLULITIS OF FACE: Primary | ICD-10-CM

## 2019-07-11 DIAGNOSIS — Z79.01 LONG TERM CURRENT USE OF ANTICOAGULANT THERAPY: ICD-10-CM

## 2019-07-11 DIAGNOSIS — Z79.01 CHRONIC ANTICOAGULATION: ICD-10-CM

## 2019-07-11 LAB — INR POINT OF CARE: 2.6 (ref 0.86–1.14)

## 2019-07-11 PROCEDURE — 99213 OFFICE O/P EST LOW 20 MIN: CPT | Performed by: NURSE PRACTITIONER

## 2019-07-11 PROCEDURE — 36416 COLLJ CAPILLARY BLOOD SPEC: CPT

## 2019-07-11 PROCEDURE — 99207 ZZC NO CHARGE NURSE ONLY: CPT

## 2019-07-11 PROCEDURE — 85610 PROTHROMBIN TIME: CPT | Mod: QW

## 2019-07-11 RX ORDER — SULFAMETHOXAZOLE/TRIMETHOPRIM 800-160 MG
1 TABLET ORAL 2 TIMES DAILY
Qty: 14 TABLET | Refills: 0 | Status: SHIPPED | OUTPATIENT
Start: 2019-07-11 | End: 2019-12-05

## 2019-07-11 ASSESSMENT — MIFFLIN-ST. JEOR: SCORE: 1679.79

## 2019-07-11 NOTE — PROGRESS NOTES
Jose M Rea is a 69 year old male who presents to clinic today for the following health issues:    HPI   Derm Issue       Duration: 1+ week     Description (location/character/radiation): right side of face along jawline    Intensity:  Moderate, worsening    Accompanying signs and symptoms: itchy, red, swollen    History (similar episodes/previous evaluation): None    Precipitating or alleviating factors: None    Therapies tried and outcome: hydrocortisone cream helps with the itching     Patient Active Problem List   Diagnosis     Hyperlipidemia LDL goal <100     Acute deep vein thrombosis (DVT) of proximal vein of lower extremity (H)     Eczema     Chronic anticoagulation     Long term current use of anticoagulant therapy     Past Surgical History:   Procedure Laterality Date     PHACOEMULSIFICATION WITH STANDARD INTRAOCULAR LENS IMPLANT Left 4/3/2019    Procedure: Cataract Removal with Implant;  Surgeon: Cristofer Price MD;  Location: [facility] OR     PHACOEMULSIFICATION WITH STANDARD INTRAOCULAR LENS IMPLANT Right 4/24/2019    Procedure: Cataract Removal with Implant;  Surgeon: Cristofer Price MD;  Location: [facility] OR       Social History     Tobacco Use     Smoking status: Current Every Day Smoker     Packs/day: 0.50     Years: 50.00     Pack years: 25.00     Smokeless tobacco: Never Used     Tobacco comment: started at age 18   Substance Use Topics     Alcohol use: No     Family History   Problem Relation Age of Onset     Coronary Artery Disease Paternal Grandfather         MI in late 50s or early 60s     Sleep Apnea Son      Thrombosis Son      Prostate Cancer No family hx of      Colon Cancer No family hx of          Current Outpatient Medications   Medication Sig Dispense Refill     ACETAMINOPHEN PO Take 650 mg by mouth       clotrimazole (LOTRIMIN) 1 % cream as needed        ketoconazole (NIZORAL) 2 % cream as needed        Multiple Vitamins-Minerals (MENS ONE DAILY PO)        sildenafil  "(REVATIO) 20 MG tablet Sidenafil (Viagra) comes in 20mg strength pills. Take as many pills as needed up to maximum of 5 pills at a time prior to intercourse. 30 tablet 11     simvastatin (ZOCOR) 20 MG tablet Take 1 tablet (20 mg) by mouth daily 90 tablet 3     sulfamethoxazole-trimethoprim (BACTRIM DS/SEPTRA DS) 800-160 MG tablet Take 1 tablet by mouth 2 times daily for 7 days 14 tablet 0     triamcinolone (KENALOG) 0.1 % external cream Apply topically 2 times daily For eczema 60 g 1     warfarin (COUMADIN) 5 MG tablet 10 mg Mon/Thurs; 7.5 mg all other days or as directed by anticoagulation clinic 135 tablet 3     Allergies   Allergen Reactions     Clindamycin      Cephalexin Rash     Doxycycline Rash     Metal [Staples] Rash and Blisters     Reviewed and updated as needed this visit by Provider  Tobacco  Allergies  Meds  Problems  Med Hx  Surg Hx  Fam Hx         Review of Systems   ROS COMP: CONSTITUTIONAL: NEGATIVE for fever, chills, change in weight  INTEGUMENTARY/SKIN: POSITIVE for bug bite on right lower jaw that is warm, red and swollen with itching which hydrocortisone is helping with  RESP: NEGATIVE for significant cough or SOB  CV: NEGATIVE for chest pain, palpitations or peripheral edema  PSYCHIATRIC: NEGATIVE for changes in mood or affect  ROS otherwise negative      Objective    /78   Pulse 82   Temp 98  F (36.7  C) (Tympanic)   Resp 16   Ht 1.803 m (5' 11\")   Wt 90.3 kg (199 lb)   SpO2 97%   BMI 27.75 kg/m    Body mass index is 27.75 kg/m .  Physical Exam   GENERAL: healthy, alert and no distress  RESP: lungs clear to auscultation - no rales, rhonchi or wheezes  CV: regular rate and rhythm, normal S1 S2, no S3 or S4, no murmur, click or rub, no peripheral edema and peripheral pulses strong  SKIN: Patient has quarter sized induration with redness and warmth on right lower jaw, no tenderness with palpation, no inner cheek lesions noted on the right side  PSYCH: mentation appears " normal, affect normal/bright    Diagnostic Test Results:  Labs reviewed in Epic        Assessment & Plan     1. Cellulitis of face  Most likely infection at insect bug bite site.  Starting patient on antibiotic treatment for one week with Bactrim DS.  Due to coumadin use, INR collected by coumadin clinic today.  Will start antibiotic tomorrow and follow-up in coumadin clinic on Monday for re-evaluation of INR on this medication due to blood thinning increase on this antibiotic.  Recommend follow-up in clinic if any persistent symptoms or sooner if worsening with the infection.  - sulfamethoxazole-trimethoprim (BACTRIM DS/SEPTRA DS) 800-160 MG tablet; Take 1 tablet by mouth 2 times daily for 7 days  Dispense: 14 tablet; Refill: 0    See Patient Instructions    Return in about 1 week (around 7/18/2019), or if symptoms worsen or fail to improve.    Mireya Dominguez NP  [facility]

## 2019-07-11 NOTE — PATIENT INSTRUCTIONS
Start the antibiotic tomorrow. Continue usual dose of warfarin. Recheck INR on 7-15-19. Wait to take warfarin on MOnday until after INR checked.

## 2019-07-11 NOTE — PATIENT INSTRUCTIONS
1.  Check INR today  2.  Start antibiotic tomorrow morning.  3.  Follow-up in Coumadin Clinic on Monday for recheck INR.  4.  Follow-up in clinic if any persistent or worsening symptoms of infection despite treatment.

## 2019-07-11 NOTE — PROGRESS NOTES
ANTICOAGULATION FOLLOW-UP CLINIC VISIT    Patient Name:  Jose M Rea  Date:  2019  Contact Type:  Face to Face    SUBJECTIVE:  Patient Findings     Positives:   Change in medications (starting bactrim  for jaw infection)    Comments:   Patient saw same day provider at [facility] today. He is being started on bactrim as of 19 for one week for jaw cellulitis. No missed doses of warfarin. Patient took dosing as prescribed. No signs of clots or bleeding concerns. Patient will continue maintenance warfarin dosing. He will recheck INR on 7-15-19 to see if abx affect INR.          Clinical Outcomes     Comments:   Patient saw same day provider at [facility] today. He is being started on bactrim as of 19 for one week for jaw cellulitis. No missed doses of warfarin. Patient took dosing as prescribed. No signs of clots or bleeding concerns. Patient will continue maintenance warfarin dosing. He will recheck INR on 7-15-19 to see if abx affect INR.             OBJECTIVE    INR Protime   Date Value Ref Range Status   2019 2.6 (A) 0.86 - 1.14 Final       ASSESSMENT / PLAN  INR assessment THER    Recheck INR In: 4 DAYS    INR Location Clinic      Anticoagulation Summary  As of 2019    INR goal:   2.0-3.0   TTR:   79.8 % (9.9 mo)   INR used for dosin.6 (2019)   Warfarin maintenance plan:   10 mg (5 mg x 2) every Mon, Thu; 7.5 mg (5 mg x 1.5) all other days   Full warfarin instructions:   10 mg every Mon, Thu; 7.5 mg all other days   Weekly warfarin total:   57.5 mg   No change documented:   Joanie Yin RN   Plan last modified:   Joanie Yin RN (2019)   Next INR check:   7/15/2019   Target end date:   Indefinite    Indications    Acute deep vein thrombosis (DVT) of proximal vein of lower extremity (H) [I82.4Y9]  Chronic anticoagulation [Z79.01]  Long term current use of anticoagulant therapy [Z79.01]             Anticoagulation Episode Summary     INR check location:       Preferred lab:        Send INR reminders to:   [facility]    Comments:   * Tx from [place]      Anticoagulation Care Providers     Provider Role Specialty Phone number    Pj Quijano MD  Family Practice [phone]            See the Encounter Report to view Anticoagulation Flowsheet and Dosing Calendar (Go to Encounters tab in chart review, and find the Anticoagulation Therapy Visit)        Joanie Yin RN

## 2019-07-12 ENCOUNTER — OFFICE VISIT (OUTPATIENT)
Dept: FAMILY MEDICINE | Facility: CLINIC | Age: 69
End: 2019-07-12
Payer: MEDICARE

## 2019-07-12 VITALS
DIASTOLIC BLOOD PRESSURE: 76 MMHG | WEIGHT: 199 LBS | RESPIRATION RATE: 16 BRPM | HEART RATE: 79 BPM | BODY MASS INDEX: 27.75 KG/M2 | SYSTOLIC BLOOD PRESSURE: 138 MMHG | OXYGEN SATURATION: 97 % | TEMPERATURE: 99 F

## 2019-07-12 DIAGNOSIS — S00.86XD: Primary | ICD-10-CM
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
## 2019-07-12 PROCEDURE — 99213 OFFICE O/P EST LOW 20 MIN: CPT | Performed by: FAMILY MEDICINE

## 2019-07-12 NOTE — PROGRESS NOTES
Subjective     Jose M Rea is a 69 year old male who presents to clinic today for the following health issues:    HPI   Lump on jawline      Duration: 1+ week     Description (location/character/radiation): right side of face along jawline    Intensity:  Moderate, worsening    Accompanying signs and symptoms: itchy, red, swollen    History (similar episodes/previous evaluation): Seen yesterday, getting worse    Precipitating or alleviating factors: None    Therapies tried and outcome: hydrocortisone, Bactrim DS started yesterday     Not sure but just feels like it is not getting better   No pain no itching swelling is same and the area feels hard  No fever warmth to the area or tenderness to touch     He has no systemic symptoms     He is following with INR clinic                  Reviewed and updated as needed this visit by Provider  Tobacco  Allergies  Meds  Problems  Med Hx  Surg Hx  Fam Hx         Review of Systems   ROS COMP: Constitutional, HEENT, cardiovascular, pulmonary, gi and gu systems are negative, except as otherwise noted.      Objective    /76 (BP Location: Right arm, Patient Position: Chair, Cuff Size: Adult Large)   Pulse 79   Temp 99  F (37.2  C) (Tympanic)   Resp 16   Wt 90.3 kg (199 lb)   SpO2 97%   BMI 27.75 kg/m    Body mass index is 27.75 kg/m .  Physical Exam   GENERAL APPEARANCE: healthy, alert and no distress  SKIN: rightjaw line mildly erythematous area no warmth non tender indurated  PSYCH: mentation appears normal and affect normal/bright    Diagnostic Test Results:  Labs reviewed in Epic        Assessment & Plan     1. Insect bite of other part of head, subsequent encounter  Mild secondary infection     Continue antibiotic   Add heat          Tobacco Cessation:   reports that he has been smoking.  He has a 25.00 pack-year smoking history. He has never used smokeless tobacco.        BMI:   Estimated body mass index is 27.75 kg/m  as calculated from the  "following:    Height as of 7/11/19: 1.803 m (5' 11\").    Weight as of this encounter: 90.3 kg (199 lb).           Patient Instructions   Complete the bactrim     Use heat on the area three times a day for 20 -30 min for the next 3-4 days     If fever or more swelling to the ER this weekend       Return in about 1 week (around 7/19/2019), or if symptoms worsen or fail to improve.      Risks, benefits, side effects and rationale for treatment plan fully discussed with the patient and understanding expressed.   Nara Del Real MD  [facility]      "

## 2019-07-12 NOTE — NURSING NOTE
"No chief complaint on file.      Initial /76 (BP Location: Right arm, Patient Position: Chair, Cuff Size: Adult Large)   Pulse 79   Temp 99  F (37.2  C) (Tympanic)   Resp 16   Wt 90.3 kg (199 lb)   SpO2 97%   BMI 27.75 kg/m   Estimated body mass index is 27.75 kg/m  as calculated from the following:    Height as of 7/11/19: 1.803 m (5' 11\").    Weight as of this encounter: 90.3 kg (199 lb).    Patient presents to the clinic using No DME    Health Maintenance that is potentially due pending provider review:  NONE    n/a    Is there anyone who you would like to be able to receive your results? No  If yes have patient fill out JUSTIN      "

## 2019-07-12 NOTE — PATIENT INSTRUCTIONS
Complete the bactrim     Use heat on the area three times a day for 20 -30 min for the next 3-4 days     If fever or more swelling to the ER this weekend

## 2019-07-15 ENCOUNTER — ANTICOAGULATION THERAPY VISIT (OUTPATIENT)
Dept: ANTICOAGULATION | Facility: CLINIC | Age: 69
End: 2019-07-15
Payer: MEDICARE

## 2019-07-15 DIAGNOSIS — I82.4Y9 ACUTE DEEP VEIN THROMBOSIS (DVT) OF PROXIMAL VEIN OF LOWER EXTREMITY (H): ICD-10-CM

## 2019-07-15 DIAGNOSIS — Z79.01 LONG TERM CURRENT USE OF ANTICOAGULANT THERAPY: ICD-10-CM

## 2019-07-15 DIAGNOSIS — Z79.01 CHRONIC ANTICOAGULATION: ICD-10-CM

## 2019-07-15 LAB — INR POINT OF CARE: 2.8 (ref 0.86–1.14)

## 2019-07-15 PROCEDURE — 36416 COLLJ CAPILLARY BLOOD SPEC: CPT

## 2019-07-15 PROCEDURE — 99207 ZZC NO CHARGE NURSE ONLY: CPT

## 2019-07-15 PROCEDURE — 85610 PROTHROMBIN TIME: CPT | Mod: QW

## 2019-07-15 NOTE — PROGRESS NOTES
ANTICOAGULATION FOLLOW-UP CLINIC VISIT    Patient Name:  Jose M Rea  Date:  7/15/2019  Contact Type:  Face to Face    SUBJECTIVE:  Patient Findings     Comments:   No changes in medications, activity, or diet noted. No concerns with clotting, bleeding, or increased bruising noted. Took warfarin as prescribed.  Patient is to continue maintenance warfarin plan, and check INR in one week. Pt requested for Dr. Del Real to look at his jaw, spoke with care team who will check to see if she is able to. Pt handed off to care team.  Patient verbalizes understanding and agrees to plan. No further questions or concerns.        Clinical Outcomes     Negatives:   Major bleeding event, Thromboembolic event, Anticoagulation-related hospital admission, Anticoagulation-related ED visit, Anticoagulation-related fatality    Comments:   No changes in medications, activity, or diet noted. No concerns with clotting, bleeding, or increased bruising noted. Took warfarin as prescribed.  Patient is to continue maintenance warfarin plan, and check INR in one week. Pt requested for Dr. Del Real to look at his jaw, spoke with care team who will check to see if she is able to. Pt handed off to care team.  Patient verbalizes understanding and agrees to plan. No further questions or concerns.           OBJECTIVE    INR Protime   Date Value Ref Range Status   07/15/2019 2.8 (A) 0.86 - 1.14 Final       ASSESSMENT / PLAN  INR assessment THER    Recheck INR In: 1 WEEK    INR Location Clinic      Anticoagulation Summary  As of 7/15/2019    INR goal:   2.0-3.0   TTR:   80.1 % (10 mo)   INR used for dosin.8 (7/15/2019)   Warfarin maintenance plan:   10 mg (5 mg x 2) every Mon, Thu; 7.5 mg (5 mg x 1.5) all other days   Full warfarin instructions:   10 mg every Mon, Thu; 7.5 mg all other days   Weekly warfarin total:   57.5 mg   No change documented:   Salma Diane, RN   Plan last modified:   Joanie Yin RN (2019)   Next INR check:    7/22/2019   Target end date:   Indefinite    Indications    Acute deep vein thrombosis (DVT) of proximal vein of lower extremity (H) [I82.4Y9]  Chronic anticoagulation [Z79.01]  Long term current use of anticoagulant therapy [Z79.01]             Anticoagulation Episode Summary     INR check location:       Preferred lab:       Send INR reminders to:   [facility]    Comments:   * Tx from [place]      Anticoagulation Care Providers     Provider Role Specialty Phone number    Pj Quijano MD [facility] [phone]            See the Encounter Report to view Anticoagulation Flowsheet and Dosing Calendar (Go to Encounters tab in chart review, and find the Anticoagulation Therapy Visit)        Salma Diane RN

## 2019-07-22 ENCOUNTER — ANTICOAGULATION THERAPY VISIT (OUTPATIENT)
Dept: ANTICOAGULATION | Facility: CLINIC | Age: 69
End: 2019-07-22
Payer: MEDICARE

## 2019-07-22 DIAGNOSIS — Z79.01 LONG TERM CURRENT USE OF ANTICOAGULANT THERAPY: ICD-10-CM

## 2019-07-22 DIAGNOSIS — Z79.01 CHRONIC ANTICOAGULATION: ICD-10-CM

## 2019-07-22 DIAGNOSIS — I82.4Y9 ACUTE DEEP VEIN THROMBOSIS (DVT) OF PROXIMAL VEIN OF LOWER EXTREMITY (H): ICD-10-CM

## 2019-07-22 LAB — INR POINT OF CARE: 2.8 (ref 0.86–1.14)

## 2019-07-22 PROCEDURE — 36416 COLLJ CAPILLARY BLOOD SPEC: CPT

## 2019-07-22 PROCEDURE — 99207 ZZC NO CHARGE NURSE ONLY: CPT

## 2019-07-22 PROCEDURE — 85610 PROTHROMBIN TIME: CPT | Mod: QW

## 2019-07-22 NOTE — PROGRESS NOTES
ANTICOAGULATION FOLLOW-UP CLINIC VISIT    Patient Name:  Jose M Rea  Date:  2019  Contact Type:  Face to Face    SUBJECTIVE:  Patient Findings     Positives:   Change in medications (Bactrim completed)    Comments:   No changes in medications, activity, health, or diet noted. No bleeding or increased bruising noted. Took warfarin as prescribed.  Patient will continue weekly maintenance dose. INR is therapeutic.   Recheck in 5 weeks.   Patient verbalizes understanding and agrees to plan. No further questions or concerns.          Clinical Outcomes     Comments:   No changes in medications, activity, health, or diet noted. No bleeding or increased bruising noted. Took warfarin as prescribed.  Patient will continue weekly maintenance dose. INR is therapeutic.   Recheck in 5 weeks.   Patient verbalizes understanding and agrees to plan. No further questions or concerns.             OBJECTIVE    INR Protime   Date Value Ref Range Status   2019 2.8 (A) 0.86 - 1.14 Final       ASSESSMENT / PLAN  INR assessment THER    Recheck INR In: 5 WEEKS    INR Location Clinic      Anticoagulation Summary  As of 2019    INR goal:   2.0-3.0   TTR:   80.5 % (10.3 mo)   INR used for dosin.8 (2019)   Warfarin maintenance plan:   10 mg (5 mg x 2) every Mon, Thu; 7.5 mg (5 mg x 1.5) all other days   Full warfarin instructions:   10 mg every Mon, Thu; 7.5 mg all other days   Weekly warfarin total:   57.5 mg   No change documented:   Kb Braun RN   Plan last modified:   Joanie Yin RN (2019)   Next INR check:   2019   Target end date:   Indefinite    Indications    Acute deep vein thrombosis (DVT) of proximal vein of lower extremity (H) [I82.4Y9]  Chronic anticoagulation [Z79.01]  Long term current use of anticoagulant therapy [Z79.01]             Anticoagulation Episode Summary     INR check location:       Preferred lab:       Send INR reminders to:   [facility]     Comments:   * Tx from [place]      Anticoagulation Care Providers     Provider Role Specialty Phone number    Pj Quijano MD [facility] [phone]            See the Encounter Report to view Anticoagulation Flowsheet and Dosing Calendar (Go to Encounters tab in chart review, and find the Anticoagulation Therapy Visit)    Dosage adjustment made based on physician directed care plan.  Kb Braun RN

## 2019-08-26 ENCOUNTER — ANTICOAGULATION THERAPY VISIT (OUTPATIENT)
Dept: ANTICOAGULATION | Facility: CLINIC | Age: 69
End: 2019-08-26
Payer: MEDICARE

## 2019-08-26 DIAGNOSIS — I82.4Y9 ACUTE DEEP VEIN THROMBOSIS (DVT) OF PROXIMAL VEIN OF LOWER EXTREMITY (H): ICD-10-CM

## 2019-08-26 DIAGNOSIS — Z79.01 CHRONIC ANTICOAGULATION: ICD-10-CM

## 2019-08-26 DIAGNOSIS — Z79.01 LONG TERM CURRENT USE OF ANTICOAGULANT THERAPY: ICD-10-CM

## 2019-08-26 LAB — INR POINT OF CARE: 2.3 (ref 0.86–1.14)

## 2019-08-26 PROCEDURE — 36416 COLLJ CAPILLARY BLOOD SPEC: CPT

## 2019-08-26 PROCEDURE — 99207 ZZC NO CHARGE NURSE ONLY: CPT

## 2019-08-26 PROCEDURE — 85610 PROTHROMBIN TIME: CPT | Mod: QW

## 2019-08-26 NOTE — PROGRESS NOTES
ANTICOAGULATION FOLLOW-UP CLINIC VISIT    Patient Name:  Jose M Rea  Date:  2019  Contact Type:  Face to Face    SUBJECTIVE:  Patient Findings     Comments:   No changes in diet, activity level, medications (including over the counter), or health. No missed doses of warfarin. Patient took dosing as prescribed. No signs of clots or bleeding concerns. Patient will continue maintenance warfarin dosing.          Clinical Outcomes     Negatives:   Major bleeding event, Thromboembolic event, Anticoagulation-related hospital admission, Anticoagulation-related ED visit, Anticoagulation-related fatality    Comments:   No changes in diet, activity level, medications (including over the counter), or health. No missed doses of warfarin. Patient took dosing as prescribed. No signs of clots or bleeding concerns. Patient will continue maintenance warfarin dosing.             OBJECTIVE    INR Protime   Date Value Ref Range Status   2019 2.3 (A) 0.86 - 1.14 Final       ASSESSMENT / PLAN  INR assessment THER    Recheck INR In: 6 WEEKS    INR Location Clinic      Anticoagulation Summary  As of 2019    INR goal:   2.0-3.0   TTR:   82.5 % (11.4 mo)   INR used for dosin.3 (2019)   Warfarin maintenance plan:   10 mg (5 mg x 2) every Mon, Thu; 7.5 mg (5 mg x 1.5) all other days   Full warfarin instructions:   10 mg every Mon, Thu; 7.5 mg all other days   Weekly warfarin total:   57.5 mg   No change documented:   Joanie Yin RN   Plan last modified:   Joanie Yin RN (2019)   Next INR check:   10/7/2019   Target end date:   Indefinite    Indications    Acute deep vein thrombosis (DVT) of proximal vein of lower extremity (H) [I82.4Y9]  Chronic anticoagulation [Z79.01]  Long term current use of anticoagulant therapy [Z79.01]             Anticoagulation Episode Summary     INR check location:       Preferred lab:       Send INR reminders to:   [facility]    Comments:   * Tx from [place]       Anticoagulation Care Providers     Provider Role Specialty Phone number    Pj Quijano MD [facility] [phone]            See the Encounter Report to view Anticoagulation Flowsheet and Dosing Calendar (Go to Encounters tab in chart review, and find the Anticoagulation Therapy Visit)        Joanie Yin RN

## 2019-10-07 ENCOUNTER — ANTICOAGULATION THERAPY VISIT (OUTPATIENT)
Dept: ANTICOAGULATION | Facility: CLINIC | Age: 69
End: 2019-10-07
Payer: MEDICARE

## 2019-10-07 DIAGNOSIS — Z79.01 LONG TERM CURRENT USE OF ANTICOAGULANT THERAPY: ICD-10-CM

## 2019-10-07 DIAGNOSIS — I82.4Y9 ACUTE DEEP VEIN THROMBOSIS (DVT) OF PROXIMAL VEIN OF LOWER EXTREMITY (H): ICD-10-CM

## 2019-10-07 DIAGNOSIS — Z79.01 CHRONIC ANTICOAGULATION: ICD-10-CM

## 2019-10-07 LAB — INR POINT OF CARE: 2.5 (ref 0.86–1.14)

## 2019-10-07 PROCEDURE — 36416 COLLJ CAPILLARY BLOOD SPEC: CPT

## 2019-10-07 PROCEDURE — 99207 ZZC NO CHARGE NURSE ONLY: CPT

## 2019-10-07 PROCEDURE — 85610 PROTHROMBIN TIME: CPT | Mod: QW

## 2019-10-07 NOTE — PROGRESS NOTES
ANTICOAGULATION FOLLOW-UP CLINIC VISIT    Patient Name:  Jose M Rea  Date:  10/7/2019  Contact Type:  Face to Face    SUBJECTIVE:  Patient Findings     Comments:   No changes in medications, activity, health, or diet noted. No bleeding or increased bruising noted. Took warfarin as prescribed.  Patient will continue weekly maintenance dose. INR is therapeutic.   Recheck in 6 weeks.   Patient verbalizes understanding and agrees to plan. No further questions or concerns.          Clinical Outcomes     Negatives:   Major bleeding event, Thromboembolic event, Anticoagulation-related hospital admission, Anticoagulation-related ED visit, Anticoagulation-related fatality    Comments:   No changes in medications, activity, health, or diet noted. No bleeding or increased bruising noted. Took warfarin as prescribed.  Patient will continue weekly maintenance dose. INR is therapeutic.   Recheck in 6 weeks.   Patient verbalizes understanding and agrees to plan. No further questions or concerns.             OBJECTIVE    INR Protime   Date Value Ref Range Status   10/07/2019 2.5 (A) 0.86 - 1.14 Final       ASSESSMENT / PLAN  INR assessment THER    Recheck INR In: 6 WEEKS    INR Location Clinic      Anticoagulation Summary  As of 10/7/2019    INR goal:   2.0-3.0   TTR:   84.4 % (1.1 y)   INR used for dosin.5 (10/7/2019)   Warfarin maintenance plan:   10 mg (5 mg x 2) every Mon, Thu; 7.5 mg (5 mg x 1.5) all other days   Full warfarin instructions:   10 mg every Mon, Thu; 7.5 mg all other days   Weekly warfarin total:   57.5 mg   No change documented:   Kb Braun RN   Plan last modified:   Joanie Yin RN (2019)   Next INR check:   2019   Target end date:   Indefinite    Indications    Acute deep vein thrombosis (DVT) of proximal vein of lower extremity (H) [I82.4Y9]  Chronic anticoagulation [Z79.01]  Long term current use of anticoagulant therapy [Z79.01]             Anticoagulation Episode  Summary     INR check location:       Preferred lab:       Send INR reminders to:   [facility]    Comments:   * Tx from [place]      Anticoagulation Care Providers     Provider Role Specialty Phone number    Pj Quijano MD [facility] [phone]            See the Encounter Report to view Anticoagulation Flowsheet and Dosing Calendar (Go to Encounters tab in chart review, and find the Anticoagulation Therapy Visit)        Kb Braun RN

## 2019-11-21 ENCOUNTER — ANTICOAGULATION THERAPY VISIT (OUTPATIENT)
Dept: ANTICOAGULATION | Facility: CLINIC | Age: 69
End: 2019-11-21
Payer: MEDICARE

## 2019-11-21 DIAGNOSIS — Z79.01 CHRONIC ANTICOAGULATION: ICD-10-CM

## 2019-11-21 DIAGNOSIS — I82.4Y9 ACUTE DEEP VEIN THROMBOSIS (DVT) OF PROXIMAL VEIN OF LOWER EXTREMITY (H): ICD-10-CM

## 2019-11-21 DIAGNOSIS — Z79.01 LONG TERM CURRENT USE OF ANTICOAGULANT THERAPY: ICD-10-CM

## 2019-11-21 LAB — INR POINT OF CARE: 2.3 (ref 0.86–1.14)

## 2019-11-21 PROCEDURE — 85610 PROTHROMBIN TIME: CPT | Mod: QW

## 2019-11-21 PROCEDURE — 36416 COLLJ CAPILLARY BLOOD SPEC: CPT

## 2019-11-21 PROCEDURE — 99207 ZZC NO CHARGE NURSE ONLY: CPT

## 2019-11-21 NOTE — PROGRESS NOTES
ANTICOAGULATION FOLLOW-UP CLINIC VISIT    Patient Name:  Jose M Rea  Date:  2019  Contact Type:  Face to Face    SUBJECTIVE:  Patient Findings     Comments:   No changes in diet, activity level, medications (including over the counter), or health. No missed doses of warfarin. Patient took dosing as prescribed. No signs of clots or bleeding concerns. Patient will continue maintenance warfarin dosing.          Clinical Outcomes     Negatives:   Major bleeding event, Thromboembolic event, Anticoagulation-related hospital admission, Anticoagulation-related ED visit, Anticoagulation-related fatality    Comments:   No changes in diet, activity level, medications (including over the counter), or health. No missed doses of warfarin. Patient took dosing as prescribed. No signs of clots or bleeding concerns. Patient will continue maintenance warfarin dosing.             OBJECTIVE    INR Protime   Date Value Ref Range Status   2019 2.3 (A) 0.86 - 1.14 Final       ASSESSMENT / PLAN  INR assessment THER    Recheck INR In: 6 WEEKS    INR Location Clinic      Anticoagulation Summary  As of 2019    INR goal:   2.0-3.0   TTR:   86.0 % (1.2 y)   INR used for dosin.3 (2019)   Warfarin maintenance plan:   10 mg (5 mg x 2) every Mon, Thu; 7.5 mg (5 mg x 1.5) all other days   Full warfarin instructions:   10 mg every Mon, Thu; 7.5 mg all other days   Weekly warfarin total:   57.5 mg   No change documented:   Joanie Yin RN   Plan last modified:   Joanie Yin RN (2019)   Next INR check:   2020   Target end date:   Indefinite    Indications    Acute deep vein thrombosis (DVT) of proximal vein of lower extremity (H) [I82.4Y9]  Chronic anticoagulation [Z79.01]  Long term current use of anticoagulant therapy [Z79.01]             Anticoagulation Episode Summary     INR check location:       Preferred lab:       Send INR reminders to:   [facility]    Comments:   * Tx from [place]       Anticoagulation Care Providers     Provider Role Specialty Phone number    Pj Quijano MD [facility] [phone]            See the Encounter Report to view Anticoagulation Flowsheet and Dosing Calendar (Go to Encounters tab in chart review, and find the Anticoagulation Therapy Visit)        Joanie Yin RN

## 2019-11-25 ENCOUNTER — IMMUNIZATION (OUTPATIENT)
Dept: FAMILY MEDICINE | Facility: CLINIC | Age: 69
End: 2019-11-25
Payer: MEDICARE

## 2019-11-25 DIAGNOSIS — Z23 NEED FOR PROPHYLACTIC VACCINATION AND INOCULATION AGAINST INFLUENZA: Primary | ICD-10-CM

## 2019-11-25 PROCEDURE — 90662 IIV NO PRSV INCREASED AG IM: CPT

## 2019-11-25 PROCEDURE — 99207 ZZC NO CHARGE NURSE ONLY: CPT

## 2019-11-25 PROCEDURE — G0008 ADMIN INFLUENZA VIRUS VAC: HCPCS

## 2019-12-05 ENCOUNTER — ALLIED HEALTH/NURSE VISIT (OUTPATIENT)
Dept: FAMILY MEDICINE | Facility: CLINIC | Age: 69
End: 2019-12-05
Payer: MEDICARE

## 2019-12-05 VITALS — RESPIRATION RATE: 16 BRPM | DIASTOLIC BLOOD PRESSURE: 76 MMHG | SYSTOLIC BLOOD PRESSURE: 122 MMHG | HEART RATE: 74 BPM

## 2019-12-05 DIAGNOSIS — Z01.30 BP CHECK: Primary | ICD-10-CM

## 2019-12-05 PROCEDURE — 99207 ZZC NO CHARGE NURSE ONLY: CPT

## 2020-01-02 ENCOUNTER — ANTICOAGULATION THERAPY VISIT (OUTPATIENT)
Dept: ANTICOAGULATION | Facility: CLINIC | Age: 70
End: 2020-01-02
Payer: MEDICARE

## 2020-01-02 DIAGNOSIS — Z79.01 CHRONIC ANTICOAGULATION: ICD-10-CM

## 2020-01-02 DIAGNOSIS — Z79.01 LONG TERM CURRENT USE OF ANTICOAGULANT THERAPY: ICD-10-CM

## 2020-01-02 DIAGNOSIS — I82.4Y9 ACUTE DEEP VEIN THROMBOSIS (DVT) OF PROXIMAL VEIN OF LOWER EXTREMITY (H): ICD-10-CM

## 2020-01-02 LAB — INR POINT OF CARE: 2.4 (ref 0.86–1.14)

## 2020-01-02 PROCEDURE — 85610 PROTHROMBIN TIME: CPT | Mod: QW

## 2020-01-02 PROCEDURE — 36416 COLLJ CAPILLARY BLOOD SPEC: CPT

## 2020-01-02 PROCEDURE — 99207 ZZC NO CHARGE NURSE ONLY: CPT

## 2020-01-02 NOTE — PROGRESS NOTES
ANTICOAGULATION FOLLOW-UP CLINIC VISIT    Patient Name:  Jose M Rea  Date:  2020  Contact Type:  Face to Face    SUBJECTIVE:  Patient Findings     Comments:   No changes in diet, activity level, medications (including over the counter), or health. No missed doses of warfarin. Patient took dosing as prescribed. No signs of clots or bleeding concerns. Patient will continue maintenance warfarin dosing.          Clinical Outcomes     Negatives:   Major bleeding event, Thromboembolic event, Anticoagulation-related hospital admission, Anticoagulation-related ED visit, Anticoagulation-related fatality    Comments:   No changes in diet, activity level, medications (including over the counter), or health. No missed doses of warfarin. Patient took dosing as prescribed. No signs of clots or bleeding concerns. Patient will continue maintenance warfarin dosing.             OBJECTIVE    INR Protime   Date Value Ref Range Status   2020 2.4 (A) 0.86 - 1.14 Final       ASSESSMENT / PLAN  INR assessment THER    Recheck INR In: 6 WEEKS    INR Location Clinic      Anticoagulation Summary  As of 2020    INR goal:   2.0-3.0   TTR:   87.2 % (1 y)   INR used for dosin.4 (2020)   Warfarin maintenance plan:   10 mg (5 mg x 2) every Mon, Thu; 7.5 mg (5 mg x 1.5) all other days   Full warfarin instructions:   10 mg every Mon, Thu; 7.5 mg all other days   Weekly warfarin total:   57.5 mg   No change documented:   Joanie Yin RN   Plan last modified:   Joanie Yin RN (2019)   Next INR check:   2020   Target end date:   Indefinite    Indications    Acute deep vein thrombosis (DVT) of proximal vein of lower extremity (H) [I82.4Y9]  Chronic anticoagulation [Z79.01]  Long term current use of anticoagulant therapy [Z79.01]             Anticoagulation Episode Summary     INR check location:       Preferred lab:       Send INR reminders to:   [facility]    Comments:   * Tx from [place]       Anticoagulation Care Providers     Provider Role Specialty Phone number    Pj Quijano MD [facility] [phone]            See the Encounter Report to view Anticoagulation Flowsheet and Dosing Calendar (Go to Encounters tab in chart review, and find the Anticoagulation Therapy Visit)        Joanie Yin RN

## 2020-01-24 ENCOUNTER — OFFICE VISIT (OUTPATIENT)
Dept: DERMATOLOGY | Facility: CLINIC | Age: 70
End: 2020-01-24
Payer: MEDICARE

## 2020-01-24 VITALS
TEMPERATURE: 97.3 F | DIASTOLIC BLOOD PRESSURE: 82 MMHG | SYSTOLIC BLOOD PRESSURE: 137 MMHG | RESPIRATION RATE: 16 BRPM | HEART RATE: 77 BPM

## 2020-01-24 DIAGNOSIS — L20.89 OTHER ATOPIC DERMATITIS: Primary | ICD-10-CM

## 2020-01-24 PROCEDURE — 99213 OFFICE O/P EST LOW 20 MIN: CPT | Performed by: PHYSICIAN ASSISTANT

## 2020-01-24 RX ORDER — BETAMETHASONE DIPROPIONATE 0.5 MG/G
CREAM TOPICAL
Qty: 45 G | Refills: 1 | Status: SHIPPED | OUTPATIENT
Start: 2020-01-24 | End: 2020-04-01

## 2020-01-24 NOTE — LETTER
1/24/2020         RE: Jose M Rea  [address] [facility] [id number]        Dear Colleague,    Thank you for referring your patient, Jose M Rea, to the [facility]. Please see a copy of my visit note below.    Jose M Rea is a 69 year old year old male patient here today for atopic dermatitis flare. He reports that he has been using triamcinolone but this hasn't been helping. He notes more improvements with betamethasone in the past.  Patient has no other skin complaints today.  Remainder of the HPI, Meds, PMH, Allergies, FH, and SH was reviewed in chart.    Pertinent Hx:   Atopic Dermatitis   History reviewed. No pertinent past medical history.    Past Surgical History:   Procedure Laterality Date     PHACOEMULSIFICATION WITH STANDARD INTRAOCULAR LENS IMPLANT Left 4/3/2019    Procedure: Cataract Removal with Implant;  Surgeon: Cristofer Price MD;  Location: [facility] OR     PHACOEMULSIFICATION WITH STANDARD INTRAOCULAR LENS IMPLANT Right 4/24/2019    Procedure: Cataract Removal with Implant;  Surgeon: Cristofer Price MD;  Location: [facility] OR        Family History   Problem Relation Age of Onset     Coronary Artery Disease Paternal Grandfather         MI in late 50s or early 60s     Sleep Apnea Son      Thrombosis Son      Prostate Cancer No family hx of      Colon Cancer No family hx of        Social History     Socioeconomic History     Marital status:      Spouse name: Not on file     Number of children: Not on file     Years of education: Not on file     Highest education level: Not on file   Occupational History     Not on file   Social Needs     Financial resource strain: Not on file     Food insecurity:     Worry: Not on file     Inability: Not on file     Transportation needs:     Medical: Not on file     Non-medical: Not on file   Tobacco Use     Smoking status: Current Every Day Smoker     Packs/day: 0.50     Years: 50.00     Pack years: 25.00      Smokeless tobacco: Never Used     Tobacco comment: started at age 18   Substance and Sexual Activity     Alcohol use: No     Drug use: No     Sexual activity: Yes     Partners: Female   Lifestyle     Physical activity:     Days per week: Not on file     Minutes per session: Not on file     Stress: Not on file   Relationships     Social connections:     Talks on phone: Not on file     Gets together: Not on file     Attends Rastafari service: Not on file     Active member of club or organization: Not on file     Attends meetings of clubs or organizations: Not on file     Relationship status: Not on file     Intimate partner violence:     Fear of current or ex partner: Not on file     Emotionally abused: Not on file     Physically abused: Not on file     Forced sexual activity: Not on file   Other Topics Concern     Parent/sibling w/ CABG, MI or angioplasty before 65F 55M? Not Asked   Social History Narrative     Not on file       Outpatient Encounter Medications as of 1/24/2020   Medication Sig Dispense Refill     ACETAMINOPHEN PO Take 650 mg by mouth       betamethasone dipropionate (DIPROSONE) 0.05 % external cream Apply twice daily as needed to affected area on leg. 45 g 1     clotrimazole (LOTRIMIN) 1 % cream as needed        Multiple Vitamins-Minerals (MENS ONE DAILY PO)        sildenafil (REVATIO) 20 MG tablet Sidenafil (Viagra) comes in 20mg strength pills. Take as many pills as needed up to maximum of 5 pills at a time prior to intercourse. 30 tablet 11     simvastatin (ZOCOR) 20 MG tablet Take 1 tablet (20 mg) by mouth daily 90 tablet 3     triamcinolone (KENALOG) 0.1 % external cream Apply topically 2 times daily For eczema 60 g 1     warfarin ANTICOAGULANT (COUMADIN) 5 MG tablet Take 10 mg every Mon, Thur; 7.5 mg all other days or as directed by the Anticoagulation Clinic 140 tablet 0     ketoconazole (NIZORAL) 2 % cream as needed        No facility-administered encounter medications on file as of  1/24/2020.              Review Of Systems  Skin: As above  Eyes: negative  Ears/Nose/Throat: negative  Respiratory: No shortness of breath, dyspnea on exertion, cough, or hemoptysis  Cardiovascular: negative  Gastrointestinal: negative  Genitourinary: negative  Musculoskeletal: negative  Neurologic: negative  Psychiatric: negative  Hematologic/Lymphatic/Immunologic: negative  Endocrine: negative      O:   NAD, WDWN, Alert & Oriented, Mood & Affect wnl, Vitals stable   Here today alone   /82 (BP Location: Right arm, Patient Position: Chair, Cuff Size: Adult Regular)   Pulse 77   Temp 97.3  F (36.3  C) (Tympanic)   Resp 16    General appearance normal   Vitals stable   Alert, oriented and in no acute distress     Eczematous papules and plaques on ankles, dorsal feet     Eyes: Conjunctivae/lids:Normal     ENT: Lips: normal    MSK:Normal    Pulm: Breathing Normal     Neuro/Psych: Orientation:Alert and Orientedx3 ; Mood/Affect:normal   A/P:  1. Atopic dermatitis on ankles   Apply betamethasone cream twice daily as needed to affected area of skin.   Can consider dupixent if not well controlled with topicals  Skin care regimen reviewed with patient: Eliminate harsh soaps, i.e. Dial, zest, irsih spring; Mild soaps such as Cetaphil or Dove sensitive skin, avoid hot or cold showers, aggressive use of emollients including vanicream, cetaphil or cerave discussed with patient.    Return to clinic 1-2 months.         Again, thank you for allowing me to participate in the care of your patient.        Sincerely,        Marifer Hayes PA-C

## 2020-01-24 NOTE — NURSING NOTE
"Chief Complaint   Patient presents with     Rash       Initial /82 (BP Location: Right arm, Patient Position: Chair, Cuff Size: Adult Regular)   Pulse 77   Temp 97.3  F (36.3  C) (Tympanic)   Resp 16  Estimated body mass index is 27.75 kg/m  as calculated from the following:    Height as of 7/11/19: 1.803 m (5' 11\").    Weight as of 7/12/19: 90.3 kg (199 lb).  Medications and allergies reviewed.    Claire ODONNELL CMA (AAMA)    "

## 2020-01-27 NOTE — PROGRESS NOTES
Jose M Rea is a 69 year old year old male patient here today for atopic dermatitis flare. He reports that he has been using triamcinolone but this hasn't been helping. He notes more improvements with betamethasone in the past.  Patient has no other skin complaints today.  Remainder of the HPI, Meds, PMH, Allergies, FH, and SH was reviewed in chart.    Pertinent Hx:   Atopic Dermatitis   History reviewed. No pertinent past medical history.    Past Surgical History:   Procedure Laterality Date     PHACOEMULSIFICATION WITH STANDARD INTRAOCULAR LENS IMPLANT Left 4/3/2019    Procedure: Cataract Removal with Implant;  Surgeon: Cristofer Price MD;  Location: [facility] OR     PHACOEMULSIFICATION WITH STANDARD INTRAOCULAR LENS IMPLANT Right 4/24/2019    Procedure: Cataract Removal with Implant;  Surgeon: Cristofer Price MD;  Location: [facility] OR        Family History   Problem Relation Age of Onset     Coronary Artery Disease Paternal Grandfather         MI in late 50s or early 60s     Sleep Apnea Son      Thrombosis Son      Prostate Cancer No family hx of      Colon Cancer No family hx of        Social History     Socioeconomic History     Marital status:      Spouse name: Not on file     Number of children: Not on file     Years of education: Not on file     Highest education level: Not on file   Occupational History     Not on file   Social Needs     Financial resource strain: Not on file     Food insecurity:     Worry: Not on file     Inability: Not on file     Transportation needs:     Medical: Not on file     Non-medical: Not on file   Tobacco Use     Smoking status: Current Every Day Smoker     Packs/day: 0.50     Years: 50.00     Pack years: 25.00     Smokeless tobacco: Never Used     Tobacco comment: started at age 18   Substance and Sexual Activity     Alcohol use: No     Drug use: No     Sexual activity: Yes     Partners: Female   Lifestyle     Physical activity:     Days per week: Not on file      Minutes per session: Not on file     Stress: Not on file   Relationships     Social connections:     Talks on phone: Not on file     Gets together: Not on file     Attends Baptism service: Not on file     Active member of club or organization: Not on file     Attends meetings of clubs or organizations: Not on file     Relationship status: Not on file     Intimate partner violence:     Fear of current or ex partner: Not on file     Emotionally abused: Not on file     Physically abused: Not on file     Forced sexual activity: Not on file   Other Topics Concern     Parent/sibling w/ CABG, MI or angioplasty before 65F 55M? Not Asked   Social History Narrative     Not on file       Outpatient Encounter Medications as of 1/24/2020   Medication Sig Dispense Refill     ACETAMINOPHEN PO Take 650 mg by mouth       betamethasone dipropionate (DIPROSONE) 0.05 % external cream Apply twice daily as needed to affected area on leg. 45 g 1     clotrimazole (LOTRIMIN) 1 % cream as needed        Multiple Vitamins-Minerals (MENS ONE DAILY PO)        sildenafil (REVATIO) 20 MG tablet Sidenafil (Viagra) comes in 20mg strength pills. Take as many pills as needed up to maximum of 5 pills at a time prior to intercourse. 30 tablet 11     simvastatin (ZOCOR) 20 MG tablet Take 1 tablet (20 mg) by mouth daily 90 tablet 3     triamcinolone (KENALOG) 0.1 % external cream Apply topically 2 times daily For eczema 60 g 1     warfarin ANTICOAGULANT (COUMADIN) 5 MG tablet Take 10 mg every Mon, Thur; 7.5 mg all other days or as directed by the Anticoagulation Clinic 140 tablet 0     ketoconazole (NIZORAL) 2 % cream as needed        No facility-administered encounter medications on file as of 1/24/2020.              Review Of Systems  Skin: As above  Eyes: negative  Ears/Nose/Throat: negative  Respiratory: No shortness of breath, dyspnea on exertion, cough, or hemoptysis  Cardiovascular: negative  Gastrointestinal: negative  Genitourinary:  negative  Musculoskeletal: negative  Neurologic: negative  Psychiatric: negative  Hematologic/Lymphatic/Immunologic: negative  Endocrine: negative      O:   NAD, WDWN, Alert & Oriented, Mood & Affect wnl, Vitals stable   Here today alone   /82 (BP Location: Right arm, Patient Position: Chair, Cuff Size: Adult Regular)   Pulse 77   Temp 97.3  F (36.3  C) (Tympanic)   Resp 16    General appearance normal   Vitals stable   Alert, oriented and in no acute distress     Eczematous papules and plaques on ankles, dorsal feet     Eyes: Conjunctivae/lids:Normal     ENT: Lips: normal    MSK:Normal    Pulm: Breathing Normal     Neuro/Psych: Orientation:Alert and Orientedx3 ; Mood/Affect:normal   A/P:  1. Atopic dermatitis on ankles   Apply betamethasone cream twice daily as needed to affected area of skin.   Can consider dupixent if not well controlled with topicals  Skin care regimen reviewed with patient: Eliminate harsh soaps, i.e. Dial, zest, irsih spring; Mild soaps such as Cetaphil or Dove sensitive skin, avoid hot or cold showers, aggressive use of emollients including vanicream, cetaphil or cerave discussed with patient.    Return to clinic 1-2 months.

## 2020-02-13 ENCOUNTER — ANTICOAGULATION THERAPY VISIT (OUTPATIENT)
Dept: ANTICOAGULATION | Facility: CLINIC | Age: 70
End: 2020-02-13
Payer: MEDICARE

## 2020-02-13 DIAGNOSIS — Z79.01 CHRONIC ANTICOAGULATION: ICD-10-CM

## 2020-02-13 DIAGNOSIS — I82.4Y9 ACUTE DEEP VEIN THROMBOSIS (DVT) OF PROXIMAL VEIN OF LOWER EXTREMITY (H): ICD-10-CM

## 2020-02-13 DIAGNOSIS — Z79.01 LONG TERM CURRENT USE OF ANTICOAGULANT THERAPY: ICD-10-CM

## 2020-02-13 LAB — INR POINT OF CARE: 2.3 (ref 0.86–1.14)

## 2020-02-13 PROCEDURE — 85610 PROTHROMBIN TIME: CPT | Mod: QW

## 2020-02-13 PROCEDURE — 36416 COLLJ CAPILLARY BLOOD SPEC: CPT

## 2020-02-13 PROCEDURE — 99207 ZZC NO CHARGE NURSE ONLY: CPT

## 2020-02-13 NOTE — PROGRESS NOTES
ANTICOAGULATION FOLLOW-UP CLINIC VISIT    Patient Name:  Jose M Rea  Date:  2020  Contact Type:  Face to Face    SUBJECTIVE:  Patient Findings     Comments:   No changes in diet, activity level, medications (including over the counter), or health. No missed doses of warfarin. Patient took dosing as prescribed. No signs of clots or bleeding concerns. Patient will continue maintenance warfarin dosing.          Clinical Outcomes     Negatives:   Major bleeding event, Thromboembolic event, Anticoagulation-related hospital admission, Anticoagulation-related ED visit, Anticoagulation-related fatality    Comments:   No changes in diet, activity level, medications (including over the counter), or health. No missed doses of warfarin. Patient took dosing as prescribed. No signs of clots or bleeding concerns. Patient will continue maintenance warfarin dosing.             OBJECTIVE    INR Protime   Date Value Ref Range Status   2020 2.3 (A) 0.86 - 1.14 Final       ASSESSMENT / PLAN  INR assessment THER    Recheck INR In: 6 WEEKS    INR Location Clinic      Anticoagulation Summary  As of 2020    INR goal:   2.0-3.0   TTR:   87.2 % (1 y)   INR used for dosin.3 (2020)   Warfarin maintenance plan:   10 mg (5 mg x 2) every Mon, Thu; 7.5 mg (5 mg x 1.5) all other days   Full warfarin instructions:   10 mg every Mon, Thu; 7.5 mg all other days   Weekly warfarin total:   57.5 mg   No change documented:   Joanie Yin RN   Plan last modified:   Joanie Yin RN (2019)   Next INR check:   3/26/2020   Priority:   Maintenance   Target end date:   Indefinite    Indications    Acute deep vein thrombosis (DVT) of proximal vein of lower extremity (H) [I82.4Y9]  Chronic anticoagulation [Z79.01]  Long term current use of anticoagulant therapy [Z79.01]             Anticoagulation Episode Summary     INR check location:       Preferred lab:       Send INR reminders to:   [facility]     Comments:   * Tx from [place]      Anticoagulation Care Providers     Provider Role Specialty Phone number    Pj Quijano MD [facility] [phone]            See the Encounter Report to view Anticoagulation Flowsheet and Dosing Calendar (Go to Encounters tab in chart review, and find the Anticoagulation Therapy Visit)        Joanie Yin RN

## 2020-02-18 ENCOUNTER — OFFICE VISIT (OUTPATIENT)
Dept: FAMILY MEDICINE | Facility: CLINIC | Age: 70
End: 2020-02-18
Payer: MEDICARE

## 2020-02-18 VITALS
HEART RATE: 74 BPM | HEIGHT: 70 IN | WEIGHT: 197 LBS | OXYGEN SATURATION: 98 % | TEMPERATURE: 97.8 F | SYSTOLIC BLOOD PRESSURE: 126 MMHG | DIASTOLIC BLOOD PRESSURE: 70 MMHG | BODY MASS INDEX: 28.2 KG/M2

## 2020-02-18 DIAGNOSIS — N52.9 ERECTILE DYSFUNCTION, UNSPECIFIED ERECTILE DYSFUNCTION TYPE: ICD-10-CM

## 2020-02-18 DIAGNOSIS — Z86.718 H/O DEEP VENOUS THROMBOSIS: ICD-10-CM

## 2020-02-18 DIAGNOSIS — Z12.5 SCREENING FOR PROSTATE CANCER: ICD-10-CM

## 2020-02-18 DIAGNOSIS — E78.5 HYPERLIPIDEMIA LDL GOAL <100: ICD-10-CM

## 2020-02-18 DIAGNOSIS — Z00.00 ENCOUNTER FOR MEDICARE ANNUAL WELLNESS EXAM: Primary | ICD-10-CM

## 2020-02-18 DIAGNOSIS — Z72.0 TOBACCO ABUSE: ICD-10-CM

## 2020-02-18 DIAGNOSIS — Z13.1 SCREENING FOR DIABETES MELLITUS: ICD-10-CM

## 2020-02-18 DIAGNOSIS — Z79.01 LONG TERM CURRENT USE OF ANTICOAGULANT THERAPY: ICD-10-CM

## 2020-02-18 LAB
CHOLEST SERPL-MCNC: 167 MG/DL
GLUCOSE SERPL-MCNC: 87 MG/DL (ref 70–99)
HDLC SERPL-MCNC: 50 MG/DL
LDLC SERPL CALC-MCNC: 78 MG/DL
NONHDLC SERPL-MCNC: 117 MG/DL
PSA SERPL-ACNC: 0.61 UG/L (ref 0–4)
TRIGL SERPL-MCNC: 196 MG/DL

## 2020-02-18 PROCEDURE — 80061 LIPID PANEL: CPT | Performed by: FAMILY MEDICINE

## 2020-02-18 PROCEDURE — G0439 PPPS, SUBSEQ VISIT: HCPCS | Performed by: FAMILY MEDICINE

## 2020-02-18 PROCEDURE — 82947 ASSAY GLUCOSE BLOOD QUANT: CPT | Performed by: FAMILY MEDICINE

## 2020-02-18 PROCEDURE — G0103 PSA SCREENING: HCPCS | Performed by: FAMILY MEDICINE

## 2020-02-18 PROCEDURE — 99214 OFFICE O/P EST MOD 30 MIN: CPT | Mod: 25 | Performed by: FAMILY MEDICINE

## 2020-02-18 PROCEDURE — 36415 COLL VENOUS BLD VENIPUNCTURE: CPT | Performed by: FAMILY MEDICINE

## 2020-02-18 RX ORDER — SILDENAFIL CITRATE 20 MG/1
TABLET ORAL
Qty: 30 TABLET | Refills: 11 | Status: SHIPPED | OUTPATIENT
Start: 2020-02-18 | End: 2021-02-26

## 2020-02-18 RX ORDER — SIMVASTATIN 20 MG
20 TABLET ORAL DAILY
Qty: 90 TABLET | Refills: 3 | Status: SHIPPED | OUTPATIENT
Start: 2020-02-18 | End: 2021-02-18

## 2020-02-18 ASSESSMENT — MIFFLIN-ST. JEOR: SCORE: 1658.81

## 2020-02-18 NOTE — PATIENT INSTRUCTIONS
Please go to lab.    I am screening for diabetes and screening for prostate cancer.    I am checking your cholesterol to see how your therapy is doing regarding your simvastatin medication.    I refilled your medications.  The generic viagra is held on file at the pharmacy.  Call the pharmacy when you want a refill, they will issue the new prescription with a new number for the year.    Please be aware that there will be an additional charge during your preventative visit due to either a new diagnosis and/or chronic disease management.    Preventative visits screen for diseases prior to they occur.  They do not cover for any new diagnosis or chronic disease management which would include medication refills, labs etc.    If you have questions regarding your coverage please check with your insurance provider.  At [facility] we need to code correctly to be in compliance with all insurance companies.          Thank you for choosing [facility].  You may be receiving an email and/or telephone survey request from [facility] Customer Experience regarding your visit today.  Please take a few minutes to respond to the survey to let us know how we are doing.      If you have questions or concerns, please contact us via [employer] or you can contact your care team at [phone].    Our Clinic hours are:  Monday 6:40 am  to 7:00 pm  Tuesday -Friday 6:40 am to 5:00 pm    The [place] outpatient lab hours are:  Monday - Friday 6:10 am to 4:45 pm  Saturdays 7:00 am to 11:00 am  Appointments are required, call [phone]    If you have clinical questions after hours or would like to schedule an appointment,  call the clinic at [phone].    Patient Education   Personalized Prevention Plan  You are due for the preventive services outlined below.  Your care team is available to assist you in scheduling these services.  If you have already completed any of these items, please share that information with your care team to update  in your medical record.  Health Maintenance Due   Topic Date Due     Discuss Advance Care Planning  1950     Zoster (Shingles) Vaccine (2 of 3) 08/21/2008     FALL RISK ASSESSMENT  09/12/2019     PHQ-2  01/01/2020     Annual Wellness Visit  02/14/2020        Patient Education   Personalized Prevention Plan  You are due for the preventive services outlined below.  Your care team is available to assist you in scheduling these services.  If you have already completed any of these items, please share that information with your care team to update in your medical record.  Health Maintenance Due   Topic Date Due     Discuss Advance Care Planning  1950     Zoster (Shingles) Vaccine (2 of 3) 08/21/2008     FALL RISK ASSESSMENT  09/12/2019     PHQ-2  01/01/2020     Annual Wellness Visit  02/14/2020

## 2020-02-18 NOTE — PROGRESS NOTES
"SUBJECTIVE:   Jose M Rea is a 69 year old male who presents for Preventive Visit.    Are you in the first 12 months of your Medicare Part B coverage?  No    Physical Health:    In general, how would you rate your overall physical health? good    Outside of work, how many days during the week do you exercise? Not much during winter months, active in the summer    Outside of work, approximately how many minutes a day do you exercise?    If you drink alcohol do you typically have >3 drinks per day or >7 drinks per week? No    Do you usually eat at least 4 servings of fruit and vegetables a day, include whole grains & fiber and avoid regularly eating high fat or \"junk\" foods? NO    Do you have any problems taking medications regularly?  No    Do you have any side effects from medications? none    Needs assistance for the following daily activities: no assistance needed    Which of the following safety concerns are present in your home?  none identified     Hearing impairment: No    In the past 6 months, have you been bothered by leaking of urine? no    Mental Health:    In general, how would you rate your overall mental or emotional health? good  PHQ-2 Score:  0    Do you feel safe in your environment? Yes    Have you ever done Advance Care Planning? (For example, a Health Directive, POLST, or a discussion with a medical provider or your loved ones about your wishes): Yes, patient states has an Advance Care Planning document and will bring a copy to the clinic. He brought completed copy in today.    Additional concerns to address?      Fall risk:  Fallen 2 or more times in the past year?: No  Any fall with injury in the past year?: No    Cognitive Screenin) Repeat 3 items (Leader, Season, Table)    2) Clock draw: did 11:05, but realized it afterward  3) 3 item recall: Recalls 3 objects  Results: 3 items recalled: COGNITIVE IMPAIRMENT LESS LIKELY    Mini-CogTM Copyright S Joie. Licensed by the author for " use in [facility]; reprinted with permission (ally@.[facility]). All rights reserved.    Colonoscopy done on this date: 4/18/2018 (approximately), by this group: [facility], results were Positive for tubular adenoma. Repeat in 3 years            Reviewed and updated as needed this visit by clinical staff  Tobacco  Allergies  Meds  Med Hx  Surg Hx  Fam Hx  Soc Hx        Reviewed and updated as needed this visit by Provider        Social History     Tobacco Use     Smoking status: Current Every Day Smoker     Packs/day: 0.50     Years: 50.00     Pack years: 25.00     Types: Cigarettes     Smokeless tobacco: Never Used     Tobacco comment: started at age 18   Substance Use Topics     Alcohol use: No                           Current providers sharing in care for this patient include:   Patient Care Team:  Pj Quijano MD as PCP - General (Family Practice)  Pj Quijano MD as Assigned PCP    The following health maintenance items are reviewed in Epic and correct as of today:  Health Maintenance   Topic Date Due     ADVANCE CARE PLANNING  1950     ZOSTER IMMUNIZATION (2 of 3) 08/21/2008     FALL RISK ASSESSMENT  09/12/2019     PHQ-2  01/01/2020     MEDICARE ANNUAL WELLNESS VISIT  02/14/2020     COLONOSCOPY  07/01/2021     LIPID  02/12/2024     DTAP/TDAP/TD IMMUNIZATION (2 - Td) 02/13/2028     HEPATITIS C SCREENING  Completed     INFLUENZA VACCINE  Completed     PNEUMOCOCCAL IMMUNIZATION 65+ LOW/MEDIUM RISK  Completed     AORTIC ANEURYSM SCREENING (SYSTEM ASSIGNED)  Completed     IPV IMMUNIZATION  Aged Out     MENINGITIS IMMUNIZATION  Aged Out           ROS:  Review Of Systems  Skin: has flare ups of eczema  Eyes: negative  Ears/Nose/Throat: negative  Respiratory: mild shortness of breath at times.    Cardiovascular: negative  Gastrointestinal: negative  Genitourinary: ED needs refill  Musculoskeletal: negative  Neurologic: negative  Psychiatric:  "negative  Hematologic/Lymphatic/Immunologic: is on warfain.  Discussed that he was told he had a DVT due to his shoulder surgery.  He was put on the warfarin and never rechecked.  Mentions that he had a one month break for unclear reason why.  Son had PEs after he was told to take it easy for a back flare up.  He was also having some MENESES at that time either prior or after the diagnosis. Does not know if he had a work up or not. Son's episodes occurred in his mid 30s.    Endocrine: negative      OBJECTIVE:   /70 (Cuff Size: Adult Large)   Pulse 74   Temp 97.8  F (36.6  C) (Tympanic)   Ht 1.784 m (5' 10.25\")   Wt 89.4 kg (197 lb)   SpO2 98%   BMI 28.07 kg/m   Estimated body mass index is 28.07 kg/m  as calculated from the following:    Height as of this encounter: 1.784 m (5' 10.25\").    Weight as of this encounter: 89.4 kg (197 lb).  EXAM:   GENERAL: healthy, alert and no distress  EYES: Eyes grossly normal to inspection, PERRL and conjunctivae and sclerae normal  HENT: ear canals and TM's normal, nose and mouth without ulcers or lesions  NECK: no adenopathy, no asymmetry, masses, or scars and thyroid normal to palpation  RESP: lungs clear to auscultation - no rales, rhonchi or wheezes  CV: regular rate and rhythm, normal S1 S2, no S3 or S4, no murmur, click or rub, no peripheral edema and peripheral pulses strong  ABDOMEN: soft, nontender, no hepatosplenomegaly, no masses and bowel sounds normal   (male): normal male genitalia without lesions or urethral discharge, no hernia  MS: no gross musculoskeletal defects noted, no edema  SKIN: no suspicious lesions or rashes  NEURO: Normal strength and tone, mentation intact and speech normal  PSYCH: mentation appears normal, affect normal/bright  LYMPH: anterior cervical: no adenopathy  posterior cervical: no adenopathy        ASSESSMENT / PLAN:   (Z00.00) Encounter for Medicare annual wellness exam  (primary encounter diagnosis)  Comment: Discussed healthy " "lifestyle and preventative cares.    Plan:     (N52.9) Erectile dysfunction, unspecified erectile dysfunction type  Comment: refilled med  Plan: sildenafil (REVATIO) 20 MG tablet, OFFICE/OUTPT        VISIT,EST,LEVL III            (Z86.718) H/O deep venous thrombosis  Comment: need to get hematology consult  Plan: Oncology/Hematology Adult Referral,         OFFICE/OUTPT VISIT,EST,LEVL III            (E78.5) Hyperlipidemia LDL goal <100  Comment: check control  Plan: Lipid panel reflex to direct LDL Fasting,         simvastatin (ZOCOR) 20 MG tablet, OFFICE/OUTPT         VISIT,EST,LEVL III            (Z79.01) Long term current use of anticoagulant therapy  Comment: as above  Plan: Oncology/Hematology Adult Referral,         OFFICE/OUTPT VISIT,EST,LEVL III            (Z72.0) Tobacco abuse  Comment: recommend to quit, he will think about it  Plan:     (Z12.5) Screening for prostate cancer  Comment:   Plan: Prostate spec antigen screen            (Z13.1) Screening for diabetes mellitus  Comment:   Plan: Glucose              COUNSELING:  Reviewed preventive health counseling, as reflected in patient instructions       Regular exercise       Healthy diet/nutrition       Vision screening       Hearing screening       Dental care       Colon cancer screening       Prostate cancer screening    Estimated body mass index is 28.07 kg/m  as calculated from the following:    Height as of this encounter: 1.784 m (5' 10.25\").    Weight as of this encounter: 89.4 kg (197 lb).         reports that he has been smoking cigarettes. He has a 25.00 pack-year smoking history. He has never used smokeless tobacco.  Tobacco Cessation Action Plan: Information offered: Patient not interested at this time    Appropriate preventive services were discussed with this patient, including applicable screening as appropriate for cardiovascular disease, diabetes, osteopenia/osteoporosis, and glaucoma.  As appropriate for age/gender, discussed screening " for colorectal cancer, prostate cancer, breast cancer, and cervical cancer. Checklist reviewing preventive services available has been given to the patient.    Reviewed patients plan of care and provided an AVS. The Basic Care Plan (routine screening as documented in Health Maintenance) for Jose M meets the Care Plan requirement. This Care Plan has been established and reviewed with the Patient.    Counseling Resources:  ATP IV Guidelines  Pooled Cohorts Equation Calculator  Breast Cancer Risk Calculator  FRAX Risk Assessment  ICSI Preventive Guidelines  Dietary Guidelines for Americans, 2010  USDA's MyPlate  ASA Prophylaxis  Lung CA Screening    Rigoberto Hernandez MD  [facility]

## 2020-02-19 NOTE — TELEPHONE ENCOUNTER
ONCOLOGY INTAKE: Records Information      APPT INFORMATION:  Referring provider:  Dr. Rigoberto Hernandez MD  Referring provider s clinic:  [facility]  Reason for visit/diagnosis:  Long term current use of anticoagulant therapy [Z79.01];H/O deep venous thrombosis [Z86.718]  Has patient been notified of appointment date and time?: Yes    RECORDS INFORMATION:  Were the records received with the referral (via [employer])? No,Internal Referral      Has patient been seen for any external appt for this diagnosis? No    If yes, where? NA    ADDITIONAL INFORMATION:  Patient is on the wait list.

## 2020-02-20 NOTE — TELEPHONE ENCOUNTER
RECORDS STATUS - ALL OTHER DIAGNOSIS      RECORDS RECEIVED FROM: Epic/CE   DATE RECEIVED: 3/11/2020   NOTES STATUS DETAILS   OFFICE NOTE from referring provider Complete  Dr. Rigoberto Hernandez MD   OFFICE NOTE from medical oncologist N/A    DISCHARGE SUMMARY from hospital N/A    DISCHARGE REPORT from the ER     OPERATIVE REPORT N/A    MEDICATION LIST Complete [facility]   CLINICAL TRIAL TREATMENTS TO DATE N/A    LABS     PATHOLOGY REPORTS N/A    ANYTHING RELATED TO DIAGNOSIS Complete Labs last updated on 2/18/2020    GENONOMIC TESTING     TYPE:     IMAGING (NEED IMAGES & REPORT)     CT SCANS     MRI     MAMMO     ULTRASOUND N/A- per pt    PET       Action    Action Taken 2/21/2020 10:17am   I called pt Jose M to Check on outside IMG. Per pt- no IMG.

## 2020-02-27 ENCOUNTER — DOCUMENTATION ONLY (OUTPATIENT)
Dept: OTHER | Facility: CLINIC | Age: 70
End: 2020-02-27

## 2020-03-11 ENCOUNTER — PRE VISIT (OUTPATIENT)
Dept: ONCOLOGY | Facility: CLINIC | Age: 70
End: 2020-03-11

## 2020-03-11 ENCOUNTER — ONCOLOGY VISIT (OUTPATIENT)
Dept: ONCOLOGY | Facility: CLINIC | Age: 70
End: 2020-03-11
Attending: FAMILY MEDICINE
Payer: MEDICARE

## 2020-03-11 ENCOUNTER — HOSPITAL ENCOUNTER (OUTPATIENT)
Dept: LAB | Facility: CLINIC | Age: 70
End: 2020-03-11
Attending: INTERNAL MEDICINE
Payer: MEDICARE

## 2020-03-11 ENCOUNTER — HOSPITAL ENCOUNTER (OUTPATIENT)
Dept: ULTRASOUND IMAGING | Facility: CLINIC | Age: 70
End: 2020-03-11
Attending: INTERNAL MEDICINE
Payer: MEDICARE

## 2020-03-11 VITALS
SYSTOLIC BLOOD PRESSURE: 149 MMHG | WEIGHT: 200.2 LBS | BODY MASS INDEX: 28.66 KG/M2 | TEMPERATURE: 97.2 F | HEIGHT: 70 IN | OXYGEN SATURATION: 97 % | DIASTOLIC BLOOD PRESSURE: 75 MMHG | HEART RATE: 75 BPM | RESPIRATION RATE: 18 BRPM

## 2020-03-11 DIAGNOSIS — Z86.718 H/O DEEP VENOUS THROMBOSIS: ICD-10-CM

## 2020-03-11 DIAGNOSIS — E78.5 HYPERLIPIDEMIA LDL GOAL <100: ICD-10-CM

## 2020-03-11 DIAGNOSIS — I82.4Y9 ACUTE DEEP VEIN THROMBOSIS (DVT) OF PROXIMAL VEIN OF LOWER EXTREMITY, UNSPECIFIED LATERALITY (H): Primary | ICD-10-CM

## 2020-03-11 DIAGNOSIS — Z79.01 LONG TERM CURRENT USE OF ANTICOAGULANT THERAPY: ICD-10-CM

## 2020-03-11 LAB — D DIMER PPP FEU-MCNC: 0.3 UG/ML FEU (ref 0–0.5)

## 2020-03-11 PROCEDURE — 86147 CARDIOLIPIN ANTIBODY EA IG: CPT | Performed by: INTERNAL MEDICINE

## 2020-03-11 PROCEDURE — 81240 F2 GENE: CPT | Performed by: INTERNAL MEDICINE

## 2020-03-11 PROCEDURE — 93971 EXTREMITY STUDY: CPT | Mod: LT

## 2020-03-11 PROCEDURE — G0463 HOSPITAL OUTPT CLINIC VISIT: HCPCS

## 2020-03-11 PROCEDURE — 99205 OFFICE O/P NEW HI 60 MIN: CPT | Performed by: INTERNAL MEDICINE

## 2020-03-11 PROCEDURE — 36415 COLL VENOUS BLD VENIPUNCTURE: CPT | Performed by: INTERNAL MEDICINE

## 2020-03-11 PROCEDURE — 85379 FIBRIN DEGRADATION QUANT: CPT | Performed by: INTERNAL MEDICINE

## 2020-03-11 PROCEDURE — 81241 F5 GENE: CPT | Performed by: INTERNAL MEDICINE

## 2020-03-11 ASSESSMENT — MIFFLIN-ST. JEOR: SCORE: 1669.35

## 2020-03-11 ASSESSMENT — PAIN SCALES - GENERAL: PAINLEVEL: NO PAIN (0)

## 2020-03-11 NOTE — PROGRESS NOTES
"Oncology Rooming Note    March 11, 2020 1:13 PM   Jose M Rea is a 69 year old male who presents for:    Chief Complaint   Patient presents with     Hematology     New, Deep vein thrombosis.      Initial Vitals: BP (!) 149/75 (BP Location: Right arm, Patient Position: Sitting, Cuff Size: Adult Large)   Pulse 75   Temp 97.2  F (36.2  C) (Tympanic)   Resp 18   Ht 1.778 m (5' 10\")   Wt 90.8 kg (200 lb 3.2 oz)   SpO2 97%   BMI 28.73 kg/m   Estimated body mass index is 28.73 kg/m  as calculated from the following:    Height as of this encounter: 1.778 m (5' 10\").    Weight as of this encounter: 90.8 kg (200 lb 3.2 oz). Body surface area is 2.12 meters squared.  No Pain (0) Comment: Data Unavailable   No LMP for male patient.  Allergies reviewed: Yes  Medications reviewed: Yes    Medications: Medication refills not needed today.  Pharmacy name entered into [facility]:    [id number] - [place], [place] - [address] [facility] [id number] - [place], [place] - [address]    Clinical concerns: New, Deep vein thrombosis, left leg.       Demetra Arriaga, [facility]            "

## 2020-03-11 NOTE — LETTER
"    3/11/2020         RE: Jose M Rea  [address] [facility] [address]        Dear Colleague,    Thank you for referring your patient, Jose M Rea, to the [facility]. Please see a copy of my visit note below.    Oncology Rooming Note    March 11, 2020 1:13 PM   Jose M Rea is a 69 year old male who presents for:    Chief Complaint   Patient presents with     Hematology     New, Deep vein thrombosis.      Initial Vitals: BP (!) 149/75 (BP Location: Right arm, Patient Position: Sitting, Cuff Size: Adult Large)   Pulse 75   Temp 97.2  F (36.2  C) (Tympanic)   Resp 18   Ht 1.778 m (5' 10\")   Wt 90.8 kg (200 lb 3.2 oz)   SpO2 97%   BMI 28.73 kg/m   Estimated body mass index is 28.73 kg/m  as calculated from the following:    Height as of this encounter: 1.778 m (5' 10\").    Weight as of this encounter: 90.8 kg (200 lb 3.2 oz). Body surface area is 2.12 meters squared.  No Pain (0) Comment: Data Unavailable   No LMP for male patient.  Allergies reviewed: Yes  Medications reviewed: Yes    Medications: Medication refills not needed today.  Pharmacy name entered into [employer]:    [id number] - [place], [place] - [address] [id number] - [place], [place] - [address]    Clinical concerns: New, Deep vein thrombosis, left leg.       Demetra Arriaga, NADIR              DATE OF VISIT: Mar 11, 2020    REASON FOR REFERRAL: Management of deep venous thrombosis.    CHIEF COMPLAINT:   Chief Complaint   Patient presents with     Hematology     New, Deep vein thrombosis.        HISTORY OF PRESENT ILLNESS:     Jose M Rea is a 69-year-old male patient with past medical history of deep venous thrombosis following surgery about 3 years ago.  At that time he was started on Lovenox and switch to warfarin and kept on warfarin for 6 months.  Because the patient's son was diagnosed with pulmonary embolism and hypercoagulability work-up showed factor V Leiden the " patient was tested for factor V Leiden which came positive.  The patient continues on anticoagulation with warfarin.  Patient is here today to discuss duration of anticoagulation.       REVIEW OF SYSTEMS:   Constitutional: Negative for fever, chills, and night sweats.  Skin: negative.  Eyes: negative.  Ears/Nose/Throat: negative.  Respiratory: No shortness of breath, dyspnea on exertion, cough, or hemoptysis.  Cardiovascular: negative.  Gastrointestinal: negative.  Genitourinary: negative.  Musculoskeletal: negative.  Neurologic: negative.  Psychiatric: negative.  Hematologic/Lymphatic/Immunologic: negative.  Endocrine: negative.    PAST MEDICAL HISTORY:   Past Medical History:   Diagnosis Date     ED (erectile dysfunction)      Hyperlipidemia        PAST SURGICAL HISTORY:   Past Surgical History:   Procedure Laterality Date     PHACOEMULSIFICATION WITH STANDARD INTRAOCULAR LENS IMPLANT Left 4/3/2019    Procedure: Cataract Removal with Implant;  Surgeon: Cristofer Price MD;  Location: [facility]     PHACOEMULSIFICATION WITH STANDARD INTRAOCULAR LENS IMPLANT Right 4/24/2019    Procedure: Cataract Removal with Implant;  Surgeon: Cristofer Price MD;  Location: [facility]     SURGICAL HISTORY OF -       appendectomy in 1956     SURGICAL HISTORY OF -       right shoulder surgery in 2016       ALLERGIES:   Allergies as of 03/11/2020 - Reviewed 03/11/2020   Allergen Reaction Noted     Clindamycin  09/24/2018     Cephalexin Rash 09/12/2018     Doxycycline Rash 09/12/2018     Metal [staples] Rash and Blisters 09/12/2018       MEDICATIONS:   Current Outpatient Medications   Medication Sig Dispense Refill     betamethasone dipropionate (DIPROSONE) 0.05 % external cream Apply twice daily as needed to affected area on leg. 45 g 1     Multiple Vitamins-Minerals (MENS ONE DAILY PO)        sildenafil (REVATIO) 20 MG tablet Sidenafil (Viagra) comes in 20mg strength pills. Take as many pills as needed up to maximum of 5 pills  at a time prior to intercourse.  Hold on file until needed. 30 tablet 11     simvastatin (ZOCOR) 20 MG tablet Take 1 tablet (20 mg) by mouth daily 90 tablet 3     warfarin ANTICOAGULANT (COUMADIN) 5 MG tablet Take 10 mg every Mon, Thur; 7.5 mg all other days or as directed by the Anticoagulation Clinic 140 tablet 0     ACETAMINOPHEN PO Take 650 mg by mouth       clotrimazole (LOTRIMIN) 1 % cream as needed        ketoconazole (NIZORAL) 2 % cream as needed           FAMILY HISTORY:   Family History   Problem Relation Age of Onset     Coronary Artery Disease Paternal Grandfather         MI in late 50s or early 60s     Sleep Apnea Son      Thrombosis Son         PEs in his mid 30s     Prostate Cancer No family hx of      Colon Cancer No family hx of       Patient son diagnosed with pulmonary embolism and heterozygous for factor V Leiden.    SOCIAL HISTORY:   Social History     Socioeconomic History     Marital status:      Spouse name: None     Number of children: None     Years of education: None     Highest education level: None   Occupational History     None   Social Needs     Financial resource strain: None     Food insecurity     Worry: None     Inability: None     Transportation needs     Medical: None     Non-medical: None   Tobacco Use     Smoking status: Current Every Day Smoker     Packs/day: 0.50     Years: 50.00     Pack years: 25.00     Types: Cigarettes     Smokeless tobacco: Never Used     Tobacco comment: started at age 18, 1/2 pack daily   Substance and Sexual Activity     Alcohol use: No     Drug use: No     Sexual activity: Yes     Partners: Female   Lifestyle     Physical activity     Days per week: None     Minutes per session: None     Stress: None   Relationships     Social connections     Talks on phone: None     Gets together: None     Attends [employer] service: None     Active member of club or organization: None     Attends meetings of clubs or organizations: None     Relationship  "status: None     Intimate partner violence     Fear of current or ex partner: None     Emotionally abused: None     Physically abused: None     Forced sexual activity: None   Other Topics Concern     Parent/sibling w/ CABG, MI or angioplasty before 65F 55M? Not Asked   Social History Narrative     None       PHYSICAL EXAMINATION:   BP (!) 149/75 (BP Location: Right arm, Patient Position: Sitting, Cuff Size: Adult Large)   Pulse 75   Temp 97.2  F (36.2  C) (Tympanic)   Resp 18   Ht 1.778 m (5' 10\")   Wt 90.8 kg (200 lb 3.2 oz)   SpO2 97%   BMI 28.73 kg/m    Wt Readings from Last 10 Encounters:   03/11/20 90.8 kg (200 lb 3.2 oz)   02/18/20 89.4 kg (197 lb)   07/12/19 90.3 kg (199 lb)   07/11/19 90.3 kg (199 lb)   06/03/19 90 kg (198 lb 6.4 oz)   04/24/19 89.4 kg (197 lb)   04/04/19 89.4 kg (197 lb)   04/03/19 90.7 kg (200 lb)   02/21/19 88 kg (194 lb)   02/14/19 88 kg (194 lb)      GENERAL APPEARANCE: Healthy, alert and in no acute distress.  HEENT: Sclerae anicteric. PERRLA. Oropharynx without ulcers, lesions, or thrush.  NECK: Supple. No asymmetry or masses.  LYMPHATICS: No palpable cervical, supraclavicular, axillary, or inguinal lymphadenopathy.  RESP: Lungs clear to auscultation bilaterally without rales, rhonchi or wheezes.  CARDIOVASCULAR: Regular rate and rhythm. Normal S1, S2; no S3 or S4. No murmur, gallop, or rub.  ABDOMEN: Soft, nontender. Bowel sounds normal. No palpable organomegaly or masses.  MUSCULOSKELETAL: Extremities without gross deformities noted. No edema of bilateral lower extremities.  SKIN: No suspicious lesions or rashes.  NEURO: Alert and oriented x 3. Cranial nerves II-XII grossly intact.  PSYCHIATRIC: Mentation and affect appear normal.    LABORATORY RESULTS:  Office Visit on 02/18/2020   Component Date Value Ref Range Status     Cholesterol 02/18/2020 167  <200 mg/dL Final     Triglycerides 02/18/2020 196* <150 mg/dL Final    Comment: Borderline high:  150-199 mg/dl  High:         "     200-499 mg/dl  Very high:       >499 mg/dl       HDL Cholesterol 02/18/2020 50  >39 mg/dL Final     LDL Cholesterol Calculated 02/18/2020 78  <100 mg/dL Final    Desirable:       <100 mg/dl     Non HDL Cholesterol 02/18/2020 117  <130 mg/dL Final     Glucose 02/18/2020 87  70 - 99 mg/dL Final     PSA 02/18/2020 0.61  0 - 4 ug/L Final    Assay Method:  Chemiluminescence using Siemens Vista analyzer       IMAGING RESULTS:  Recent Results (from the past 744 hour(s))   US Lower Extremity Venous Duplex Left    Narrative    VENOUS ULTRASOUND LEFT LEG  3/11/2020 4:23 PM     HISTORY: Patient has a history of a DVT in the left lower extremity.    COMPARISON: None.    FINDINGS:  Examination of the deep veins with graded compression and  color flow Doppler with spectral wave form analysis was performed.   There is nonocclusive DVT in the distal left superficial femoral vein  extending into the popliteal vein. This may be chronic. Remaining deep  veins of the left lower extremity are patent without thrombus.      Impression    IMPRESSION: Nonocclusive chronic appearing thrombus in the distal left  popliteal vein extending into the superficial femoral vein.  Correlate  with old films if available.    SARMAD DECKER MD       ASSESSMENT AND PLAN:    (I82.4Y9) Acute deep vein thrombosis (DVT) of proximal vein of lower extremity, unspecified laterality (H)  (primary encounter diagnosis)  (Z79.01) Long term current use of anticoagulant therapy  This is a 69-year-old male patient with known history of deep venous thrombosis and factor V Leiden mutation.  Patient has been on warfarin for the last 3 years without complications.  I would recommend to proceed with a full hypercoagulability work-up.  We will also arrange for Doppler ultrasound of the left lower extremity.  We will meet with the patient to discuss the risk of recurrence of thromboembolic disease should he stop anticoagulation.  I will see the patient again when the  results of hypercoagulability work-up is available to discuss further management plan.  While the patient will continue on anticoagulation with warfarin.    (E78.5) Hyperlipidemia LDL goal <100  Patient currently on simvastatin 20 mg orally daily.      The patient is ready to learn, no apparent learning barriers were identified, Diagnosis and treatment plans were explained to the patient. The patient expressed understanding of the content. The patient questions were answered to his satisfaction.    Ashli Hein MD    Chart documentation with Dragon Voice recognition Software. Although reviewed after completion, some words and grammatical errors may remain.      Again, thank you for allowing me to participate in the care of your patient.        Sincerely,        Ashli Hein MD

## 2020-03-11 NOTE — PATIENT INSTRUCTIONS
Laboratory tests today.  Doppler ultrasound of the left lower extremity  Obtain medical records from previous deep venous thrombosis in [place]  Follow-up with results

## 2020-03-12 PROBLEM — Z86.718 H/O DEEP VENOUS THROMBOSIS: Status: ACTIVE | Noted: 2020-03-12

## 2020-03-12 LAB
CARDIOLIPIN ANTIBODY IGG: <1.6 GPL-U/ML (ref 0–19.9)
CARDIOLIPIN ANTIBODY IGM: 6.3 MPL-U/ML (ref 0–19.9)

## 2020-03-12 NOTE — PROGRESS NOTES
DATE OF VISIT: Mar 11, 2020    REASON FOR REFERRAL: Management of deep venous thrombosis.    CHIEF COMPLAINT:   Chief Complaint   Patient presents with     Hematology     New, Deep vein thrombosis.        HISTORY OF PRESENT ILLNESS:     Jose M Rea is a 69-year-old male patient with past medical history of deep venous thrombosis following surgery about 3 years ago.  At that time he was started on Lovenox and switch to warfarin and kept on warfarin for 6 months.  Because the patient's son was diagnosed with pulmonary embolism and hypercoagulability work-up showed factor V Leiden the patient was tested for factor V Leiden which came positive.  The patient continues on anticoagulation with warfarin.  Patient is here today to discuss duration of anticoagulation.       REVIEW OF SYSTEMS:   Constitutional: Negative for fever, chills, and night sweats.  Skin: negative.  Eyes: negative.  Ears/Nose/Throat: negative.  Respiratory: No shortness of breath, dyspnea on exertion, cough, or hemoptysis.  Cardiovascular: negative.  Gastrointestinal: negative.  Genitourinary: negative.  Musculoskeletal: negative.  Neurologic: negative.  Psychiatric: negative.  Hematologic/Lymphatic/Immunologic: negative.  Endocrine: negative.    PAST MEDICAL HISTORY:   Past Medical History:   Diagnosis Date     ED (erectile dysfunction)      Hyperlipidemia        PAST SURGICAL HISTORY:   Past Surgical History:   Procedure Laterality Date     PHACOEMULSIFICATION WITH STANDARD INTRAOCULAR LENS IMPLANT Left 4/3/2019    Procedure: Cataract Removal with Implant;  Surgeon: Cristofer Price MD;  Location: [facility]     PHACOEMULSIFICATION WITH STANDARD INTRAOCULAR LENS IMPLANT Right 4/24/2019    Procedure: Cataract Removal with Implant;  Surgeon: Cristofer Price MD;  Location: [facility]     SURGICAL HISTORY OF -       appendectomy in 1956     SURGICAL HISTORY OF -       right shoulder surgery in 2016       ALLERGIES:   Allergies as of  03/11/2020 - Reviewed 03/11/2020   Allergen Reaction Noted     Clindamycin  09/24/2018     Cephalexin Rash 09/12/2018     Doxycycline Rash 09/12/2018     Metal [staples] Rash and Blisters 09/12/2018       MEDICATIONS:   Current Outpatient Medications   Medication Sig Dispense Refill     betamethasone dipropionate (DIPROSONE) 0.05 % external cream Apply twice daily as needed to affected area on leg. 45 g 1     Multiple Vitamins-Minerals (MENS ONE DAILY PO)        sildenafil (REVATIO) 20 MG tablet Sidenafil (Viagra) comes in 20mg strength pills. Take as many pills as needed up to maximum of 5 pills at a time prior to intercourse.  Hold on file until needed. 30 tablet 11     simvastatin (ZOCOR) 20 MG tablet Take 1 tablet (20 mg) by mouth daily 90 tablet 3     warfarin ANTICOAGULANT (COUMADIN) 5 MG tablet Take 10 mg every Mon, Thur; 7.5 mg all other days or as directed by the Anticoagulation Clinic 140 tablet 0     ACETAMINOPHEN PO Take 650 mg by mouth       clotrimazole (LOTRIMIN) 1 % cream as needed        ketoconazole (NIZORAL) 2 % cream as needed           FAMILY HISTORY:   Family History   Problem Relation Age of Onset     Coronary Artery Disease Paternal Grandfather         MI in late 50s or early 60s     Sleep Apnea Son      Thrombosis Son         PEs in his mid 30s     Prostate Cancer No family hx of      Colon Cancer No family hx of       Patient son diagnosed with pulmonary embolism and heterozygous for factor V Leiden.    SOCIAL HISTORY:   Social History     Socioeconomic History     Marital status:      Spouse name: None     Number of children: None     Years of education: None     Highest education level: None   Occupational History     None   Social Needs     Financial resource strain: None     Food insecurity     Worry: None     Inability: None     Transportation needs     Medical: None     Non-medical: None   Tobacco Use     Smoking status: Current Every Day Smoker     Packs/day: 0.50     Years:  "50.00     Pack years: 25.00     Types: Cigarettes     Smokeless tobacco: Never Used     Tobacco comment: started at age 18, 1/2 pack daily   Substance and Sexual Activity     Alcohol use: No     Drug use: No     Sexual activity: Yes     Partners: Female   Lifestyle     Physical activity     Days per week: None     Minutes per session: None     Stress: None   Relationships     Social connections     Talks on phone: None     Gets together: None     Attends [employer] service: None     Active member of club or organization: None     Attends meetings of clubs or organizations: None     Relationship status: None     Intimate partner violence     Fear of current or ex partner: None     Emotionally abused: None     Physically abused: None     Forced sexual activity: None   Other Topics Concern     Parent/sibling w/ CABG, MI or angioplasty before 65F 55M? Not Asked   Social History Narrative     None       PHYSICAL EXAMINATION:   BP (!) 149/75 (BP Location: Right arm, Patient Position: Sitting, Cuff Size: Adult Large)   Pulse 75   Temp 97.2  F (36.2  C) (Tympanic)   Resp 18   Ht 1.778 m (5' 10\")   Wt 90.8 kg (200 lb 3.2 oz)   SpO2 97%   BMI 28.73 kg/m    Wt Readings from Last 10 Encounters:   03/11/20 90.8 kg (200 lb 3.2 oz)   02/18/20 89.4 kg (197 lb)   07/12/19 90.3 kg (199 lb)   07/11/19 90.3 kg (199 lb)   06/03/19 90 kg (198 lb 6.4 oz)   04/24/19 89.4 kg (197 lb)   04/04/19 89.4 kg (197 lb)   04/03/19 90.7 kg (200 lb)   02/21/19 88 kg (194 lb)   02/14/19 88 kg (194 lb)      GENERAL APPEARANCE: Healthy, alert and in no acute distress.  HEENT: Sclerae anicteric. PERRLA. Oropharynx without ulcers, lesions, or thrush.  NECK: Supple. No asymmetry or masses.  LYMPHATICS: No palpable cervical, supraclavicular, axillary, or inguinal lymphadenopathy.  RESP: Lungs clear to auscultation bilaterally without rales, rhonchi or wheezes.  CARDIOVASCULAR: Regular rate and rhythm. Normal S1, S2; no S3 or S4. No murmur, gallop, or " rub.  ABDOMEN: Soft, nontender. Bowel sounds normal. No palpable organomegaly or masses.  MUSCULOSKELETAL: Extremities without gross deformities noted. No edema of bilateral lower extremities.  SKIN: No suspicious lesions or rashes.  NEURO: Alert and oriented x 3. Cranial nerves II-XII grossly intact.  PSYCHIATRIC: Mentation and affect appear normal.    LABORATORY RESULTS:  Office Visit on 02/18/2020   Component Date Value Ref Range Status     Cholesterol 02/18/2020 167  <200 mg/dL Final     Triglycerides 02/18/2020 196* <150 mg/dL Final    Comment: Borderline high:  150-199 mg/dl  High:             200-499 mg/dl  Very high:       >499 mg/dl       HDL Cholesterol 02/18/2020 50  >39 mg/dL Final     LDL Cholesterol Calculated 02/18/2020 78  <100 mg/dL Final    Desirable:       <100 mg/dl     Non HDL Cholesterol 02/18/2020 117  <130 mg/dL Final     Glucose 02/18/2020 87  70 - 99 mg/dL Final     PSA 02/18/2020 0.61  0 - 4 ug/L Final    Assay Method:  Chemiluminescence using Siemens Vista analyzer       IMAGING RESULTS:  Recent Results (from the past 744 hour(s))   US Lower Extremity Venous Duplex Left    Narrative    VENOUS ULTRASOUND LEFT LEG  3/11/2020 4:23 PM     HISTORY: Patient has a history of a DVT in the left lower extremity.    COMPARISON: None.    FINDINGS:  Examination of the deep veins with graded compression and  color flow Doppler with spectral wave form analysis was performed.   There is nonocclusive DVT in the distal left superficial femoral vein  extending into the popliteal vein. This may be chronic. Remaining deep  veins of the left lower extremity are patent without thrombus.      Impression    IMPRESSION: Nonocclusive chronic appearing thrombus in the distal left  popliteal vein extending into the superficial femoral vein.  Correlate  with old films if available.    SARMAD DECKER MD       ASSESSMENT AND PLAN:    (I82.4Y9) Acute deep vein thrombosis (DVT) of proximal vein of lower extremity,  unspecified laterality (H)  (primary encounter diagnosis)  (Z79.01) Long term current use of anticoagulant therapy  This is a 69-year-old male patient with known history of deep venous thrombosis and factor V Leiden mutation.  Patient has been on warfarin for the last 3 years without complications.  I would recommend to proceed with a full hypercoagulability work-up.  We will also arrange for Doppler ultrasound of the left lower extremity.  We will meet with the patient to discuss the risk of recurrence of thromboembolic disease should he stop anticoagulation.  I will see the patient again when the results of hypercoagulability work-up is available to discuss further management plan.  While the patient will continue on anticoagulation with warfarin.    (E78.5) Hyperlipidemia LDL goal <100  Patient currently on simvastatin 20 mg orally daily.      The patient is ready to learn, no apparent learning barriers were identified, Diagnosis and treatment plans were explained to the patient. The patient expressed understanding of the content. The patient questions were answered to his satisfaction.    Ashli Hein MD    Chart documentation with Dragon Voice recognition Software. Although reviewed after completion, some words and grammatical errors may remain.

## 2020-03-13 LAB — COPATH REPORT: NORMAL

## 2020-03-19 ENCOUNTER — TELEPHONE (OUTPATIENT)
Dept: FAMILY MEDICINE | Facility: CLINIC | Age: 70
End: 2020-03-19

## 2020-03-19 ENCOUNTER — VIRTUAL VISIT (OUTPATIENT)
Dept: FAMILY MEDICINE | Facility: CLINIC | Age: 70
End: 2020-03-19
Payer: MEDICARE

## 2020-03-19 DIAGNOSIS — D68.51 HETEROZYGOUS FACTOR V LEIDEN MUTATION (H): ICD-10-CM

## 2020-03-19 DIAGNOSIS — L08.9 LOCAL INFECTION OF SKIN AND SUBCUTANEOUS TISSUE: Primary | ICD-10-CM

## 2020-03-19 DIAGNOSIS — I82.4Y9 ACUTE DEEP VEIN THROMBOSIS (DVT) OF PROXIMAL VEIN OF LOWER EXTREMITY (H): Primary | ICD-10-CM

## 2020-03-19 PROCEDURE — G2012 BRIEF CHECK IN BY MD/QHP: HCPCS | Performed by: NURSE PRACTITIONER

## 2020-03-19 RX ORDER — MUPIROCIN 20 MG/G
OINTMENT TOPICAL 3 TIMES DAILY
Qty: 30 G | Refills: 0 | Status: SHIPPED | OUTPATIENT
Start: 2020-03-19 | End: 2020-03-30

## 2020-03-19 NOTE — TELEPHONE ENCOUNTER
Jose M is calling because he has a scratch on his arm and wants to know if it should be looked at or needs some antibiotic.  This was a phone message.    Yuliana Norris  [facility] [place]

## 2020-03-19 NOTE — PROGRESS NOTES
"Jose M Rea is a 70 year old male who is being evaluated via a billable telephone visit.      The patient has been notified of following:     \"This telephone visit will be conducted via a call between you and your physician/provider. We have found that certain health care needs can be provided without the need for a physical exam.  This service lets us provide the care you need with a short phone conversation.  If a prescription is necessary we can send it directly to your pharmacy.  If lab work is needed we can place an order for that and you can then stop by our lab to have the test done at a later time.    If during the course of the call the physician/provider feels a telephone visit is not appropriate, you will not be charged for this service.\"     Jose M Rea complains of    Chief Complaint   Patient presents with     Derm Problem     Started a few weeks ago with rash on right lower arm.  Has been worsening last few days, now more in one spot.  Has been washing with soap and water 4 times a day.  Has tried Vaseline and covering it at night.  Is itching more at night. Did a hot boric acid  compress on this morning.  Now blistery, red looking.        I have reviewed and updated the patient's Past Medical History, Social History, Family History and Medication List.    ALLERGIES  Clindamycin; Cephalexin; Doxycycline; and Metal [staples]      Additional provider notes: Jose M states that a few weeks ago he started scratching this area, that is on his forearm near his right elbow, in the middle of the night. It started to get bigger and increased redness. He and his wife have been putting vaseline on it and covering with gauze at night so he does not scratch it in his sleep. It has been getting better as the itching has decreased and the blisters have dried up and are crusty. The area is about 1 inch in diameter and red with a small scab in the center. No further drainage at this time. He has had no fever. " This is not a flare of his eczema for which he uses steroid cream.   We discussed oral antibiotic vs topical as I need to use oral antibiotics with caution as he is on coumadin for past history of blood clots since shoulder surgery in May of 2016. Currently seeing a hematologist to see if he can discontinue coumadin. He states he had something like this years ago and he put Neosporin on it and it got worse. Then went in to be seen and was given a different ointment which cleared it up in just a few days. I am going to treat him with Bactroban ointment to avoid any INR issues at this time and he will contact us if it does not help and at that time will consider an oral antibiotic. He has been on Bactrim in the past as well as amoxicillin so Augmentin may work as well. Rx sent to [facility]. His wife was in on this phone visit and with patient's consent also contributed information.    Assessment/Plan:  There are no diagnoses linked to this encounter.    Phone call duration:  15 minutes    NICHELLE Luo CNP

## 2020-03-19 NOTE — TELEPHONE ENCOUNTER
Pt called. States he was scratching his arm and then it opened. No he states it is red and oozing now. Tele visit made for today. Fiona Salvador RN

## 2020-03-20 DIAGNOSIS — I82.409 DVT (DEEP VENOUS THROMBOSIS) (H): Primary | ICD-10-CM

## 2020-03-25 ENCOUNTER — VIRTUAL VISIT (OUTPATIENT)
Dept: FAMILY MEDICINE | Facility: CLINIC | Age: 70
End: 2020-03-25
Payer: MEDICARE

## 2020-03-25 ENCOUNTER — TELEPHONE (OUTPATIENT)
Dept: FAMILY MEDICINE | Facility: CLINIC | Age: 70
End: 2020-03-25

## 2020-03-25 DIAGNOSIS — L08.9 LOCAL INFECTION OF SKIN AND SUBCUTANEOUS TISSUE: Primary | ICD-10-CM

## 2020-03-25 DIAGNOSIS — Z79.01 CHRONIC ANTICOAGULATION: ICD-10-CM

## 2020-03-25 PROCEDURE — G2012 BRIEF CHECK IN BY MD/QHP: HCPCS | Performed by: PHYSICIAN ASSISTANT

## 2020-03-25 NOTE — TELEPHONE ENCOUNTER
Reason for call:  Patient reporting a symptom    Symptom or request: Jose M says he has an infection on the R arm below the elbow and talked to Jeanna Ferreira the other day. He is asking if she could call him again because it's getting better but not gone.     Phone Number patient can be reached at:  Cell number on file:    Telephone Information:   Mobile [phone]       Best Time:  anytime    Can we leave a detailed message on this number:  YES    Call taken on 3/25/2020 at 8:21 AM by Rubi Luz

## 2020-03-27 ENCOUNTER — ANTICOAGULATION THERAPY VISIT (OUTPATIENT)
Dept: ANTICOAGULATION | Facility: CLINIC | Age: 70
End: 2020-03-27

## 2020-03-27 ENCOUNTER — ONCOLOGY VISIT (OUTPATIENT)
Dept: ONCOLOGY | Facility: CLINIC | Age: 70
End: 2020-03-27
Attending: INTERNAL MEDICINE
Payer: MEDICARE

## 2020-03-27 VITALS
RESPIRATION RATE: 18 BRPM | HEART RATE: 75 BPM | HEIGHT: 70 IN | TEMPERATURE: 97 F | BODY MASS INDEX: 28.07 KG/M2 | OXYGEN SATURATION: 99 % | WEIGHT: 196.1 LBS | DIASTOLIC BLOOD PRESSURE: 77 MMHG | SYSTOLIC BLOOD PRESSURE: 149 MMHG

## 2020-03-27 DIAGNOSIS — I82.4Y2 ACUTE DEEP VEIN THROMBOSIS (DVT) OF PROXIMAL VEIN OF LEFT LOWER EXTREMITY (H): ICD-10-CM

## 2020-03-27 DIAGNOSIS — Z79.01 LONG TERM CURRENT USE OF ANTICOAGULANT THERAPY: ICD-10-CM

## 2020-03-27 DIAGNOSIS — I82.4Y9 ACUTE DEEP VEIN THROMBOSIS (DVT) OF PROXIMAL VEIN OF LOWER EXTREMITY (H): ICD-10-CM

## 2020-03-27 DIAGNOSIS — Z79.01 CHRONIC ANTICOAGULATION: ICD-10-CM

## 2020-03-27 DIAGNOSIS — I82.409 DVT (DEEP VENOUS THROMBOSIS) (H): ICD-10-CM

## 2020-03-27 LAB
CAPILLARY BLOOD COLLECTION: NORMAL
INR PPP: 2.7 (ref 0.86–1.14)

## 2020-03-27 PROCEDURE — 36416 COLLJ CAPILLARY BLOOD SPEC: CPT | Performed by: FAMILY MEDICINE

## 2020-03-27 PROCEDURE — 99207 ZZC NO CHARGE NURSE ONLY: CPT

## 2020-03-27 PROCEDURE — G0463 HOSPITAL OUTPT CLINIC VISIT: HCPCS

## 2020-03-27 PROCEDURE — 85610 PROTHROMBIN TIME: CPT | Performed by: FAMILY MEDICINE

## 2020-03-27 PROCEDURE — 99214 OFFICE O/P EST MOD 30 MIN: CPT | Performed by: INTERNAL MEDICINE

## 2020-03-27 ASSESSMENT — PAIN SCALES - GENERAL: PAINLEVEL: NO PAIN (1)

## 2020-03-27 ASSESSMENT — MIFFLIN-ST. JEOR: SCORE: 1645.75

## 2020-03-27 NOTE — LETTER
3/27/2020         RE: Jose M Rea  [id number] [facility] [id number]        Dear Colleague,    Thank you for referring your patient, Jose M Rea, to the [facility]. Please see a copy of my visit note below.    Hematology/ Oncology Follow-up Visit:  Mar 27, 2020    Reason for Visit:   Chief Complaint   Patient presents with     Hematology     Recheck Acute deep vein thrombosis (DVT) of proximal vein of lower extremity, unspecified laterality       Oncologic History:  Acute deep vein thrombosis (DVT) of proximal vein of lower extremity (H)  Jose M Rea has past medical history of deep venous thrombosis following surgery about 3 years ago.  At that time he was started on Lovenox and switch to warfarin and kept on warfarin for 6 months.  Because the patient's son was diagnosed with pulmonary embolism and hypercoagulability work-up showed factor V Leiden the patient was tested for factor V Leiden which came positive.       Interval History:  Patient is seen today for evaluation for deep venous thrombosis and hypercoagulability work-up.  He has been on warfarin for the last 3 years.  He was found to be factor V Leiden positive.  He has been on warfarin since.  Recent Doppler ultrasound revealed residual deep venous thrombosis.    Review Of Systems:  Constitutional: Negative for fever, chills, and night sweats.  Skin: negative.  Eyes: negative.  Ears/Nose/Throat: negative.  Respiratory: No shortness of breath, dyspnea on exertion, cough, or hemoptysis.  Cardiovascular: negative.  Gastrointestinal: negative.  Genitourinary: negative.  Musculoskeletal: negative.  Neurologic: negative.  Psychiatric: negative.  Hematologic/Lymphatic/Immunologic: negative.  Endocrine: negative.    All other ROS negative unless mentioned in interval history.    Past medical, social, surgical, and family histories reviewed.    Allergies:  Allergies as of 03/27/2020 - Reviewed 03/27/2020   Allergen Reaction  "Noted     Clindamycin  09/24/2018     Bactroban [mupirocin]  03/27/2020     Cephalexin Rash 09/12/2018     Doxycycline Rash 09/12/2018     Metal [staples] Rash and Blisters 09/12/2018       Current Medications:  Current Outpatient Medications   Medication Sig Dispense Refill     amoxicillin-clavulanate (AUGMENTIN) 875-125 MG tablet Take 1 tablet by mouth 2 times daily for 7 days 14 tablet 0     betamethasone dipropionate (DIPROSONE) 0.05 % external cream Apply twice daily as needed to affected area on leg. 45 g 1     Multiple Vitamins-Minerals (MENS ONE DAILY PO)        simvastatin (ZOCOR) 20 MG tablet Take 1 tablet (20 mg) by mouth daily 90 tablet 3     warfarin ANTICOAGULANT (COUMADIN) 5 MG tablet TAKE 10MG EVERY MONDAY AND THURSDAY. TAKE 7.5MG ALL OTHER DAYS OR AS DIRECTED BY THE ANTICOAGULATION CLINIC 140 tablet 0     ACETAMINOPHEN PO Take 650 mg by mouth       clotrimazole (LOTRIMIN) 1 % cream as needed        ketoconazole (NIZORAL) 2 % cream as needed        sildenafil (REVATIO) 20 MG tablet Sidenafil (Viagra) comes in 20mg strength pills. Take as many pills as needed up to maximum of 5 pills at a time prior to intercourse.  Hold on file until needed. 30 tablet 11        Physical Exam:  BP (!) 149/77 (BP Location: Right arm, Patient Position: Sitting, Cuff Size: Adult Large)   Pulse 75   Temp 97  F (36.1  C) (Tympanic)   Resp 18   Ht 1.778 m (5' 10\")   Wt 89 kg (196 lb 1.6 oz)   SpO2 99%   BMI 28.14 kg/m    Wt Readings from Last 12 Encounters:   03/27/20 89 kg (196 lb 1.6 oz)   03/11/20 90.8 kg (200 lb 3.2 oz)   02/18/20 89.4 kg (197 lb)   07/12/19 90.3 kg (199 lb)   07/11/19 90.3 kg (199 lb)   06/03/19 90 kg (198 lb 6.4 oz)   04/24/19 89.4 kg (197 lb)   04/04/19 89.4 kg (197 lb)   04/03/19 90.7 kg (200 lb)   02/21/19 88 kg (194 lb)   02/14/19 88 kg (194 lb)   12/23/18 89.8 kg (198 lb)     GENERAL APPEARANCE: Healthy, alert and in no acute distress.  HEENT: Sclerae anicteric. PERRLA. Oropharynx without " ulcers, lesions, or thrush.  NECK: Supple. No asymmetry or masses.  LYMPHATICS: No palpable cervical, supraclavicular, axillary, or inguinal lymphadenopathy.  RESP: Lungs clear to auscultation bilaterally without rales, rhonchi or wheezes.  CARDIOVASCULAR: Regular rate and rhythm. Normal S1, S2; no S3 or S4. No murmur, gallop, or rub.  ABDOMEN: Soft, nontender. Bowel sounds normal. No palpable organomegaly or masses.  MUSCULOSKELETAL: Extremities without gross deformities noted. No edema of bilateral lower extremities.  SKIN: No suspicious lesions or rashes.  NEURO: Alert and oriented x 3. Cranial nerves II-XII grossly intact.  PSYCHIATRIC: Mentation and affect appear normal.    Laboratory/Imaging Studies:  Orders Only on 03/27/2020   Component Date Value Ref Range Status     INR 03/27/2020 2.70* 0.86 - 1.14 Corrected    Comment: This test is intended for monitoring Coumadin therapy.  Results are not   accurate in patients with prolonged INR due to factor deficiency.  CORRECTED ON 03/27 AT 0749: PREVIOUSLY REPORTED AS 2.70       Capillary Blood Collection 03/27/2020 Capillary collection performed   Final        Recent Results (from the past 744 hour(s))   US Lower Extremity Venous Duplex Left    Narrative    VENOUS ULTRASOUND LEFT LEG  3/11/2020 4:23 PM     HISTORY: Patient has a history of a DVT in the left lower extremity.    COMPARISON: None.    FINDINGS:  Examination of the deep veins with graded compression and  color flow Doppler with spectral wave form analysis was performed.   There is nonocclusive DVT in the distal left superficial femoral vein  extending into the popliteal vein. This may be chronic. Remaining deep  veins of the left lower extremity are patent without thrombus.      Impression    IMPRESSION: Nonocclusive chronic appearing thrombus in the distal left  popliteal vein extending into the superficial femoral vein.  Correlate  with old films if available.    SARMAD DECKER MD       Assessment  and plan:    (I82.4Y2) Acute deep vein thrombosis (DVT) of proximal vein of left lower extremity (H)  We had a long discussion today about management of deep venous thrombosis.  Based on most recent imaging studies with venous Doppler which shows residual deep venous thrombosis, factor V Leiden abnormality, and family history I would recommend the patient to continue on long-term anticoagulation.  We talked about the risk of recurrence of the venous thrombosis if the patient stops anticoagulation.  I have not scheduled patient for any further appointments.  I will see him in the future if there are new developments or concerns.    The patient is ready to learn, no apparent learning barriers were identified.  Diagnosis and treatment plans were explained to the patient. The patient expressed understanding of the content. The patient asked appropriate questions. The patient questions were answered to his satisfaction.    Chart documentation with Dragon Voice recognition Software. Although reviewed after completion, some words and grammatical errors may remain.    Again, thank you for allowing me to participate in the care of your patient.        Sincerely,        Ashli Hein MD

## 2020-03-27 NOTE — NURSING NOTE
"Oncology Rooming Note    March 27, 2020 10:53 AM   Jose M Rea is a 70 year old male who presents for:    Chief Complaint   Patient presents with     Hematology     Recheck Acute deep vein thrombosis (DVT) of proximal vein of lower extremity, unspecified laterality     Initial Vitals: BP (!) 149/77 (BP Location: Right arm, Patient Position: Sitting, Cuff Size: Adult Large)   Pulse 75   Temp 97  F (36.1  C) (Tympanic)   Resp 18   Ht 1.778 m (5' 10\")   Wt 89 kg (196 lb 1.6 oz)   SpO2 99%   BMI 28.14 kg/m   Estimated body mass index is 28.14 kg/m  as calculated from the following:    Height as of this encounter: 1.778 m (5' 10\").    Weight as of this encounter: 89 kg (196 lb 1.6 oz). Body surface area is 2.1 meters squared.  No Pain (1) Comment: Data Unavailable   No LMP for male patient.  Allergies reviewed: Yes  Medications reviewed: Yes    Medications: Medication refills not needed today.  Pharmacy name entered into [email or web address]:    [id number] - [place], [place] - [address] [id number] - [place], [place] - [address] [facility]    Clinical concerns: Recheck Acute deep vein thrombosis (DVT) of proximal vein of lower extremity, unspecified laterality.       Demetra Arriaga, [facility]            "

## 2020-03-27 NOTE — PROGRESS NOTES
ANTICOAGULATION FOLLOW-UP CLINIC VISIT    Patient Name:  Jose M Rea  Date:  3/27/2020  Contact Type:  Telephone/ spoke aranza Salguero on phone    SUBJECTIVE:  Patient Findings     Positives:   Change in medications (augmentin since 3-25 for cellulitis on arm)    Comments:   No changes in diet, activity level, or health. Patient does have cellulitis on his arm and started abx 2 days ago. His INR is in range and augmentin does not usually affect INR much, if at all. No missed doses of warfarin. Patient took dosing as prescribed. No signs of clots or bleeding concerns. Patient will continue maintenance warfarin dosing. He will recheck at [facility] in 4 weeks. He is meeting with hematology today to go over labs and to see if he will remain on warfarin. Patient aware to check INR sooner if abx changed or his infection worsens. It is improving as of today.          Clinical Outcomes     Comments:   No changes in diet, activity level, or health. Patient does have cellulitis on his arm and started abx 2 days ago. His INR is in range and augmentin does not usually affect INR much, if at all. No missed doses of warfarin. Patient took dosing as prescribed. No signs of clots or bleeding concerns. Patient will continue maintenance warfarin dosing. He will recheck at [facility] in 4 weeks. He is meeting with hematology today to go over labs and to see if he will remain on warfarin. Patient aware to check INR sooner if abx changed or his infection worsens. It is improving as of today.             OBJECTIVE    INR   Date Value Ref Range Status   03/27/2020 2.70 (H) 0.86 - 1.14 Corrected     Comment:     This test is intended for monitoring Coumadin therapy.  Results are not   accurate in patients with prolonged INR due to factor deficiency.  CORRECTED ON 03/27 AT 0749: PREVIOUSLY REPORTED AS 2.70         ASSESSMENT / PLAN  INR assessment THER    Recheck INR In: 4 WEEKS    INR Location Clinic      Anticoagulation Summary  As of 3/27/2020     INR goal:   2.0-3.0   TTR:   87.2 % (1 y)   INR used for dosin.70 (3/27/2020)   Warfarin maintenance plan:   10 mg (5 mg x 2) every Mon, Thu; 7.5 mg (5 mg x 1.5) all other days   Full warfarin instructions:   10 mg every Mon, Thu; 7.5 mg all other days   Weekly warfarin total:   57.5 mg   No change documented:   Joanie Yin RN   Plan last modified:   Joanie Yin RN (2019)   Next INR check:   2020   Priority:   Maintenance   Target end date:   Indefinite    Indications    Acute deep vein thrombosis (DVT) of proximal vein of lower extremity (H) [I82.4Y9]  Chronic anticoagulation [Z79.01]  Long term current use of anticoagulant therapy [Z79.01]             Anticoagulation Episode Summary     INR check location:       Preferred lab:       Send INR reminders to:   [facility]    Comments:   * Tx from [facility]      Anticoagulation Care Providers     Provider Role Specialty Phone number    Pj Quijano MD [facility] [phone]            See the Encounter Report to view Anticoagulation Flowsheet and Dosing Calendar (Go to Encounters tab in chart review, and find the Anticoagulation Therapy Visit)        Joanie Yin RN

## 2020-03-30 ENCOUNTER — VIRTUAL VISIT (OUTPATIENT)
Dept: FAMILY MEDICINE | Facility: CLINIC | Age: 70
End: 2020-03-30
Payer: MEDICARE

## 2020-03-30 DIAGNOSIS — Z79.01 CHRONIC ANTICOAGULATION: ICD-10-CM

## 2020-03-30 DIAGNOSIS — L08.9 LOCAL INFECTION OF SKIN AND SUBCUTANEOUS TISSUE: Primary | ICD-10-CM

## 2020-03-30 PROCEDURE — G2012 BRIEF CHECK IN BY MD/QHP: HCPCS | Performed by: PHYSICIAN ASSISTANT

## 2020-03-30 NOTE — PROGRESS NOTES
"Subjective     Jose M Rea is a 70 year old male who is being evaluated via a billable telephone visit.      The patient has been notified of following:     \"This telephone visit will be conducted via a call between you and your physician/provider. We have found that certain health care needs can be provided without the need for a physical exam.  This service lets us provide the care you need with a short phone conversation.  If a prescription is necessary we can send it directly to your pharmacy.  If lab work is needed we can place an order for that and you can then stop by our lab to have the test done at a later time.    If during the course of the call the physician/provider feels a telephone visit is not appropriate, you will not be charged for this service.\"     Physician has received verbal consent for a Telephone Visit from the patient? Yes    Jose M Rea complains of   Chief Complaint   Patient presents with     Derm Problem     Skin Infection        ALLERGIES  Clindamycin; Bactroban [mupirocin]; Cephalexin; Doxycycline; and Metal [staples]    Derm/Skin Infection       Duration: Couple weeks     Description (location/character/radiation): Right arm     Intensity:  mild, moderate    Accompanying signs and symptoms: Redness,blistery    History (similar episodes/previous evaluation): None    Precipitating or alleviating factors: None    Therapies tried and outcome: Augmentin    60-70% better    Patient Active Problem List   Diagnosis     Hyperlipidemia LDL goal <100     Acute deep vein thrombosis (DVT) of proximal vein of lower extremity (H)     Eczema     Chronic anticoagulation     Long term current use of anticoagulant therapy     Erectile dysfunction, unspecified erectile dysfunction type     H/O deep venous thrombosis     Past Surgical History:   Procedure Laterality Date     PHACOEMULSIFICATION WITH STANDARD INTRAOCULAR LENS IMPLANT Left 4/3/2019    Procedure: Cataract Removal with Implant;  " Surgeon: Cristofer Price MD;  Location: [facility] OR     PHACOEMULSIFICATION WITH STANDARD INTRAOCULAR LENS IMPLANT Right 4/24/2019    Procedure: Cataract Removal with Implant;  Surgeon: Cristofer Price MD;  Location: [facility] OR     SURGICAL HISTORY OF -       appendectomy in 1956     SURGICAL HISTORY OF -       right shoulder surgery in 2016       Social History     Tobacco Use     Smoking status: Current Every Day Smoker     Packs/day: 0.50     Years: 50.00     Pack years: 25.00     Types: Cigarettes     Smokeless tobacco: Never Used     Tobacco comment: started at age 18, 1/2 pack daily   Substance Use Topics     Alcohol use: No     Family History   Problem Relation Age of Onset     Coronary Artery Disease Paternal Grandfather         MI in late 50s or early 60s     Sleep Apnea Son      Thrombosis Son         PEs in his mid 30s     Prostate Cancer No family hx of      Colon Cancer No family hx of          Current Outpatient Medications   Medication Sig Dispense Refill     ACETAMINOPHEN PO Take 650 mg by mouth       amoxicillin-clavulanate (AUGMENTIN) 875-125 MG tablet Take 1 tablet by mouth 2 times daily for 7 days 14 tablet 0     betamethasone dipropionate (DIPROSONE) 0.05 % external cream Apply twice daily as needed to affected area on leg. 45 g 1     clotrimazole (LOTRIMIN) 1 % cream as needed        Multiple Vitamins-Minerals (MENS ONE DAILY PO)        sildenafil (REVATIO) 20 MG tablet Sidenafil (Viagra) comes in 20mg strength pills. Take as many pills as needed up to maximum of 5 pills at a time prior to intercourse.  Hold on file until needed. 30 tablet 11     simvastatin (ZOCOR) 20 MG tablet Take 1 tablet (20 mg) by mouth daily 90 tablet 3     warfarin ANTICOAGULANT (COUMADIN) 5 MG tablet TAKE 10MG EVERY MONDAY AND THURSDAY. TAKE 7.5MG ALL OTHER DAYS OR AS DIRECTED BY THE ANTICOAGULATION CLINIC 140 tablet 0     ketoconazole (NIZORAL) 2 % cream as needed        Allergies   Allergen Reactions      Clindamycin      Bactroban [Mupirocin]      Cephalexin Rash     Doxycycline Rash     Metal [Staples] Rash and Blisters       Reviewed and updated as needed this visit by Provider  Tobacco  Allergies  Meds  Problems  Med Hx  Surg Hx  Fam Hx         Review of Systems   ROS COMP: Constitutional, HEENT, cardiovascular, pulmonary, gi and gu systems are negative, except as otherwise noted.     Objective     Diagnostic Test Results:  Labs reviewed in Epic  INR   Date Value Ref Range Status   03/27/2020 2.70 (H) 0.86 - 1.14 Corrected     Comment:     This test is intended for monitoring Coumadin therapy.  Results are not   accurate in patients with prolonged INR due to factor deficiency.  CORRECTED ON 03/27 AT 0749: PREVIOUSLY REPORTED AS 2.70          Assessment/Plan:  1. Local infection of skin and subcutaneous tissue  Patient reports about 60 to 70% improvement after starting Augmentin.  This is day 5 now.  He denies any new symptoms.  He says his redness has much improved.  We will continue Augmentin for the next 3 days and will call him again to see how much improvement he has had.  We could just extend his antibiotic treatment if needed if his infection has not completely resolved.    2. Chronic anticoagulation  INR 2.7 on Friday after starting Augmentin.    Return in about 3 days (around 4/2/2020) for Recheck.    Phone call duration:  8 minutes    Mark Valle PA-C

## 2020-03-31 NOTE — PROGRESS NOTES
Hematology/ Oncology Follow-up Visit:  Mar 27, 2020    Reason for Visit:   Chief Complaint   Patient presents with     Hematology     Recheck Acute deep vein thrombosis (DVT) of proximal vein of lower extremity, unspecified laterality       Oncologic History:  Acute deep vein thrombosis (DVT) of proximal vein of lower extremity (H)  Jose M Rea has past medical history of deep venous thrombosis following surgery about 3 years ago.  At that time he was started on Lovenox and switch to warfarin and kept on warfarin for 6 months.  Because the patient's son was diagnosed with pulmonary embolism and hypercoagulability work-up showed factor V Leiden the patient was tested for factor V Leiden which came positive.       Interval History:  Patient is seen today for evaluation for deep venous thrombosis and hypercoagulability work-up.  He has been on warfarin for the last 3 years.  He was found to be factor V Leiden positive.  He has been on warfarin since.  Recent Doppler ultrasound revealed residual deep venous thrombosis.    Review Of Systems:  Constitutional: Negative for fever, chills, and night sweats.  Skin: negative.  Eyes: negative.  Ears/Nose/Throat: negative.  Respiratory: No shortness of breath, dyspnea on exertion, cough, or hemoptysis.  Cardiovascular: negative.  Gastrointestinal: negative.  Genitourinary: negative.  Musculoskeletal: negative.  Neurologic: negative.  Psychiatric: negative.  Hematologic/Lymphatic/Immunologic: negative.  Endocrine: negative.    All other ROS negative unless mentioned in interval history.    Past medical, social, surgical, and family histories reviewed.    Allergies:  Allergies as of 03/27/2020 - Reviewed 03/27/2020   Allergen Reaction Noted     Clindamycin  09/24/2018     Bactroban [mupirocin]  03/27/2020     Cephalexin Rash 09/12/2018     Doxycycline Rash 09/12/2018     Metal [staples] Rash and Blisters 09/12/2018       Current Medications:  Current Outpatient Medications  "  Medication Sig Dispense Refill     amoxicillin-clavulanate (AUGMENTIN) 875-125 MG tablet Take 1 tablet by mouth 2 times daily for 7 days 14 tablet 0     betamethasone dipropionate (DIPROSONE) 0.05 % external cream Apply twice daily as needed to affected area on leg. 45 g 1     Multiple Vitamins-Minerals (MENS ONE DAILY PO)        simvastatin (ZOCOR) 20 MG tablet Take 1 tablet (20 mg) by mouth daily 90 tablet 3     warfarin ANTICOAGULANT (COUMADIN) 5 MG tablet TAKE 10MG EVERY MONDAY AND THURSDAY. TAKE 7.5MG ALL OTHER DAYS OR AS DIRECTED BY THE ANTICOAGULATION CLINIC 140 tablet 0     ACETAMINOPHEN PO Take 650 mg by mouth       clotrimazole (LOTRIMIN) 1 % cream as needed        ketoconazole (NIZORAL) 2 % cream as needed        sildenafil (REVATIO) 20 MG tablet Sidenafil (Viagra) comes in 20mg strength pills. Take as many pills as needed up to maximum of 5 pills at a time prior to intercourse.  Hold on file until needed. 30 tablet 11        Physical Exam:  BP (!) 149/77 (BP Location: Right arm, Patient Position: Sitting, Cuff Size: Adult Large)   Pulse 75   Temp 97  F (36.1  C) (Tympanic)   Resp 18   Ht 1.778 m (5' 10\")   Wt 89 kg (196 lb 1.6 oz)   SpO2 99%   BMI 28.14 kg/m    Wt Readings from Last 12 Encounters:   03/27/20 89 kg (196 lb 1.6 oz)   03/11/20 90.8 kg (200 lb 3.2 oz)   02/18/20 89.4 kg (197 lb)   07/12/19 90.3 kg (199 lb)   07/11/19 90.3 kg (199 lb)   06/03/19 90 kg (198 lb 6.4 oz)   04/24/19 89.4 kg (197 lb)   04/04/19 89.4 kg (197 lb)   04/03/19 90.7 kg (200 lb)   02/21/19 88 kg (194 lb)   02/14/19 88 kg (194 lb)   12/23/18 89.8 kg (198 lb)     GENERAL APPEARANCE: Healthy, alert and in no acute distress.  HEENT: Sclerae anicteric. PERRLA. Oropharynx without ulcers, lesions, or thrush.  NECK: Supple. No asymmetry or masses.  LYMPHATICS: No palpable cervical, supraclavicular, axillary, or inguinal lymphadenopathy.  RESP: Lungs clear to auscultation bilaterally without rales, rhonchi or " wheezes.  CARDIOVASCULAR: Regular rate and rhythm. Normal S1, S2; no S3 or S4. No murmur, gallop, or rub.  ABDOMEN: Soft, nontender. Bowel sounds normal. No palpable organomegaly or masses.  MUSCULOSKELETAL: Extremities without gross deformities noted. No edema of bilateral lower extremities.  SKIN: No suspicious lesions or rashes.  NEURO: Alert and oriented x 3. Cranial nerves II-XII grossly intact.  PSYCHIATRIC: Mentation and affect appear normal.    Laboratory/Imaging Studies:  Orders Only on 03/27/2020   Component Date Value Ref Range Status     INR 03/27/2020 2.70* 0.86 - 1.14 Corrected    Comment: This test is intended for monitoring Coumadin therapy.  Results are not   accurate in patients with prolonged INR due to factor deficiency.  CORRECTED ON 03/27 AT 0749: PREVIOUSLY REPORTED AS 2.70       Capillary Blood Collection 03/27/2020 Capillary collection performed   Final        Recent Results (from the past 744 hour(s))   US Lower Extremity Venous Duplex Left    Narrative    VENOUS ULTRASOUND LEFT LEG  3/11/2020 4:23 PM     HISTORY: Patient has a history of a DVT in the left lower extremity.    COMPARISON: None.    FINDINGS:  Examination of the deep veins with graded compression and  color flow Doppler with spectral wave form analysis was performed.   There is nonocclusive DVT in the distal left superficial femoral vein  extending into the popliteal vein. This may be chronic. Remaining deep  veins of the left lower extremity are patent without thrombus.      Impression    IMPRESSION: Nonocclusive chronic appearing thrombus in the distal left  popliteal vein extending into the superficial femoral vein.  Correlate  with old films if available.    SARMAD DECKER MD       Assessment and plan:    (I82.4Y2) Acute deep vein thrombosis (DVT) of proximal vein of left lower extremity (H)  We had a long discussion today about management of deep venous thrombosis.  Based on most recent imaging studies with venous  Doppler which shows residual deep venous thrombosis, factor V Leiden abnormality, and family history I would recommend the patient to continue on long-term anticoagulation.  We talked about the risk of recurrence of the venous thrombosis if the patient stops anticoagulation.  I have not scheduled patient for any further appointments.  I will see him in the future if there are new developments or concerns.    The patient is ready to learn, no apparent learning barriers were identified.  Diagnosis and treatment plans were explained to the patient. The patient expressed understanding of the content. The patient asked appropriate questions. The patient questions were answered to his satisfaction.    Chart documentation with Dragon Voice recognition Software. Although reviewed after completion, some words and grammatical errors may remain.

## 2020-03-31 NOTE — ASSESSMENT & PLAN NOTE
Jose M Rea has past medical history of deep venous thrombosis following surgery about 3 years ago.  At that time he was started on Lovenox and switch to warfarin and kept on warfarin for 6 months.  Because the patient's son was diagnosed with pulmonary embolism and hypercoagulability work-up showed factor V Leiden the patient was tested for factor V Leiden which came positive.

## 2020-04-01 ENCOUNTER — VIRTUAL VISIT (OUTPATIENT)
Dept: FAMILY MEDICINE | Facility: CLINIC | Age: 70
End: 2020-04-01
Payer: MEDICARE

## 2020-04-01 VITALS
WEIGHT: 198.8 LBS | TEMPERATURE: 97.5 F | HEART RATE: 72 BPM | BODY MASS INDEX: 28.52 KG/M2 | DIASTOLIC BLOOD PRESSURE: 76 MMHG | SYSTOLIC BLOOD PRESSURE: 142 MMHG | RESPIRATION RATE: 14 BRPM

## 2020-04-01 DIAGNOSIS — L30.9 ECZEMA OF RIGHT UPPER EXTREMITY: Primary | ICD-10-CM

## 2020-04-01 DIAGNOSIS — L20.89 OTHER ATOPIC DERMATITIS: ICD-10-CM

## 2020-04-01 PROCEDURE — 99213 OFFICE O/P EST LOW 20 MIN: CPT | Mod: TEL | Performed by: PHYSICIAN ASSISTANT

## 2020-04-01 RX ORDER — BETAMETHASONE DIPROPIONATE 0.5 MG/G
CREAM TOPICAL
Qty: 45 G | Refills: 1 | Status: SHIPPED | OUTPATIENT
Start: 2020-04-01 | End: 2020-10-02

## 2020-04-01 NOTE — PATIENT INSTRUCTIONS
Treat with steroid cream twice daily.  Recommend avoiding drastic temperature changes, using moisturizers like Aquaphor or Cetaphil daily, and using moisturizing soaps like Dove.

## 2020-04-01 NOTE — PROGRESS NOTES
Subjective     Jose M Rea complains of   Chief Complaint   Patient presents with     Derm Problem     ALLERGIES  Clindamycin; Bactroban [mupirocin]; Cephalexin; Doxycycline; and Metal [staples]    Concern - Follow up skin infection  Onset: couple weeks     Description:   Right arm skin infection- Patient states that it is still there. That it is red and dry    Intensity: moderate    Progression of Symptoms:  improving    Accompanying Signs & Symptoms:  none    Previous history of similar problem:   non    Precipitating factors:   Worsened by: non     Alleviating factors:  Improved by: augmentin     Therapies Tried and outcome: augmentin     Patient Active Problem List   Diagnosis     Hyperlipidemia LDL goal <100     Acute deep vein thrombosis (DVT) of proximal vein of lower extremity (H)     Eczema     Chronic anticoagulation     Long term current use of anticoagulant therapy     Erectile dysfunction, unspecified erectile dysfunction type     H/O deep venous thrombosis     Past Surgical History:   Procedure Laterality Date     PHACOEMULSIFICATION WITH STANDARD INTRAOCULAR LENS IMPLANT Left 4/3/2019    Procedure: Cataract Removal with Implant;  Surgeon: Cristofer Price MD;  Location: [facility]     PHACOEMULSIFICATION WITH STANDARD INTRAOCULAR LENS IMPLANT Right 4/24/2019    Procedure: Cataract Removal with Implant;  Surgeon: Cristofer Price MD;  Location: [facility]     SURGICAL HISTORY OF -       appendectomy in 1956     SURGICAL HISTORY OF -       right shoulder surgery in 2016       Social History     Tobacco Use     Smoking status: Current Every Day Smoker     Packs/day: 0.50     Years: 50.00     Pack years: 25.00     Types: Cigarettes     Smokeless tobacco: Never Used     Tobacco comment: started at age 18, 1/2 pack daily   Substance Use Topics     Alcohol use: No     Family History   Problem Relation Age of Onset     Coronary Artery Disease Paternal Grandfather         MI in late 50s or early 60s      Sleep Apnea Son      Thrombosis Son         PEs in his mid 30s     Prostate Cancer No family hx of      Colon Cancer No family hx of          Current Outpatient Medications   Medication Sig Dispense Refill     ACETAMINOPHEN PO Take 650 mg by mouth       amoxicillin-clavulanate (AUGMENTIN) 875-125 MG tablet Take 1 tablet by mouth 2 times daily for 7 days 14 tablet 0     betamethasone dipropionate (DIPROSONE) 0.05 % external cream Apply twice daily as needed to affected area on leg. 45 g 1     clotrimazole (LOTRIMIN) 1 % cream as needed        ketoconazole (NIZORAL) 2 % cream as needed        Multiple Vitamins-Minerals (MENS ONE DAILY PO)        sildenafil (REVATIO) 20 MG tablet Sidenafil (Viagra) comes in 20mg strength pills. Take as many pills as needed up to maximum of 5 pills at a time prior to intercourse.  Hold on file until needed. 30 tablet 11     simvastatin (ZOCOR) 20 MG tablet Take 1 tablet (20 mg) by mouth daily 90 tablet 3     warfarin ANTICOAGULANT (COUMADIN) 5 MG tablet TAKE 10MG EVERY MONDAY AND THURSDAY. TAKE 7.5MG ALL OTHER DAYS OR AS DIRECTED BY THE ANTICOAGULATION CLINIC 140 tablet 0     Allergies   Allergen Reactions     Clindamycin      Bactroban [Mupirocin]      Cephalexin Rash     Doxycycline Rash     Metal [Staples] Rash and Blisters       Reviewed and updated as needed this visit by Provider  Tobacco  Allergies  Meds  Problems  Med Hx  Surg Hx  Fam Hx       Review of Systems   ROS COMP: Constitutional, skin, msk, neuro systems are negative, except as otherwise noted.       Objective   Reported vitals:  BP (!) 142/76 (BP Location: Right arm, Patient Position: Sitting)   Pulse 72   Temp 97.5  F (36.4  C) (Tympanic)   Resp 14   Wt 90.2 kg (198 lb 12.8 oz)   BMI 28.52 kg/m     Constitutional: healthy, alert, and no distress  Head: Normocephalic. Atraumatic  Eyes: No conjunctival injection, sclera anicteric  Respiratory: No resp distress.  Musculoskeletal: extremities normal- no  gross deformities noted, and normal muscle tone  Skin: Large erythematous raised plaque on the extensor surface of the R forearm. No surrounding erythema. Mild scaling and scabbing throughout. No drainage seen today.   Neurologic: Gait normal. CN 2-12 grossly intact  Psychiatric: mentation appears normal and affect normal/bright    Diagnostic Test Results:  none         Assessment/Plan:  1. Eczema of right upper extremity  History and exam consistent with eczema. Had been on abx for the last 1-2 weeks for what sounds like a secondary bacterial infection.  Exam today consistent with an eczematous rash.  Patient has betamethasone at home that he has not use on this yet.  There is a high potency cream per his chart.  Recommended to use this twice a day for the next 7 days at least but can use this up to the next 14 to 21 days if needed until rash resolves.  Return to clinic as needed for any new concerning or worsening symptoms.    Return in about 4 weeks (around 4/29/2020), or if symptoms worsen or fail to improve.    Mark Valle PA-C

## 2020-04-23 ENCOUNTER — ANTICOAGULATION THERAPY VISIT (OUTPATIENT)
Dept: ANTICOAGULATION | Facility: CLINIC | Age: 70
End: 2020-04-23

## 2020-04-23 DIAGNOSIS — I82.409 DVT (DEEP VENOUS THROMBOSIS) (H): ICD-10-CM

## 2020-04-23 DIAGNOSIS — Z79.01 LONG TERM CURRENT USE OF ANTICOAGULANT THERAPY: ICD-10-CM

## 2020-04-23 DIAGNOSIS — Z79.01 CHRONIC ANTICOAGULATION: ICD-10-CM

## 2020-04-23 DIAGNOSIS — I82.4Y9 ACUTE DEEP VEIN THROMBOSIS (DVT) OF PROXIMAL VEIN OF LOWER EXTREMITY (H): ICD-10-CM

## 2020-04-23 DIAGNOSIS — D68.9 COAGULATION DEFECT (H): ICD-10-CM

## 2020-04-23 LAB
CAPILLARY BLOOD COLLECTION: NORMAL
INR PPP: 2.6 (ref 0.86–1.14)

## 2020-04-23 PROCEDURE — 85610 PROTHROMBIN TIME: CPT | Performed by: FAMILY MEDICINE

## 2020-04-23 PROCEDURE — 36416 COLLJ CAPILLARY BLOOD SPEC: CPT | Performed by: FAMILY MEDICINE

## 2020-04-23 PROCEDURE — 99207 ZZC NO CHARGE NURSE ONLY: CPT

## 2020-04-23 NOTE — PROGRESS NOTES
ANTICOAGULATION FOLLOW-UP CLINIC VISIT    Patient Name:  Jose M Rea  Date:  2020  Contact Type:  Telephone    SUBJECTIVE:  Patient Findings     Comments:   No changes in diet, activity level, medications (including over the counter), or health. Patient finished his abx for infection 3 weeks ago. He states the infection is cleared up. No missed doses of warfarin. Patient took dosing as prescribed. No signs of clots or bleeding concerns. Patient will continue maintenance warfarin dosing.          Clinical Outcomes     Negatives:   Major bleeding event, Thromboembolic event, Anticoagulation-related hospital admission, Anticoagulation-related ED visit, Anticoagulation-related fatality    Comments:   No changes in diet, activity level, medications (including over the counter), or health. Patient finished his abx for infection 3 weeks ago. He states the infection is cleared up. No missed doses of warfarin. Patient took dosing as prescribed. No signs of clots or bleeding concerns. Patient will continue maintenance warfarin dosing.             OBJECTIVE    INR   Date Value Ref Range Status   2020 2.60 (H) 0.86 - 1.14 Final     Comment:     This test is intended for monitoring Coumadin therapy.  Results are not   accurate in patients with prolonged INR due to factor deficiency.         ASSESSMENT / PLAN  INR assessment THER    Recheck INR In: 6 WEEKS    INR Location Clinic      Anticoagulation Summary  As of 2020    INR goal:   2.0-3.0   TTR:   88.9 % (1 y)   INR used for dosin.60 (2020)   Warfarin maintenance plan:   10 mg (5 mg x 2) every Mon, Thu; 7.5 mg (5 mg x 1.5) all other days   Full warfarin instructions:   10 mg every Mon, Thu; 7.5 mg all other days   Weekly warfarin total:   57.5 mg   No change documented:   Joanie Yin, RN   Plan last modified:   Joanie Yin RN (2019)   Next INR check:   2020   Priority:   Maintenance   Target end date:   Indefinite    Indications     Coagulation defect (H) [D68.9]  Acute deep vein thrombosis (DVT) of proximal vein of lower extremity (H) [I82.4Y9]  Long term current use of anticoagulant therapy [Z79.01]             Anticoagulation Episode Summary     INR check location:       Preferred lab:       Send INR reminders to:   [facility]    Comments:   * Tx from [place]. Factor 5 Leiden heterozygous positive. Indefinite anticoagulation per Dr. Hein on 3-27-30      Anticoagulation Care Providers     Provider Role Specialty Phone number    Pj Quijano MD [facility] [phone]            See the Encounter Report to view Anticoagulation Flowsheet and Dosing Calendar (Go to Encounters tab in chart review, and find the Anticoagulation Therapy Visit)        Joanie Yin RN

## 2020-06-04 ENCOUNTER — ANTICOAGULATION THERAPY VISIT (OUTPATIENT)
Dept: FAMILY MEDICINE | Facility: CLINIC | Age: 70
End: 2020-06-04

## 2020-06-04 DIAGNOSIS — I82.4Y9 ACUTE DEEP VEIN THROMBOSIS (DVT) OF PROXIMAL VEIN OF LOWER EXTREMITY (H): ICD-10-CM

## 2020-06-04 DIAGNOSIS — D68.9 COAGULATION DEFECT (H): ICD-10-CM

## 2020-06-04 DIAGNOSIS — Z79.01 CHRONIC ANTICOAGULATION: ICD-10-CM

## 2020-06-04 DIAGNOSIS — Z79.01 LONG TERM CURRENT USE OF ANTICOAGULANT THERAPY: ICD-10-CM

## 2020-06-04 LAB
CAPILLARY BLOOD COLLECTION: NORMAL
INR PPP: 2.7 (ref 0.86–1.14)

## 2020-06-04 PROCEDURE — 36416 COLLJ CAPILLARY BLOOD SPEC: CPT | Performed by: FAMILY MEDICINE

## 2020-06-04 PROCEDURE — 99207 ZZC NO CHARGE NURSE ONLY: CPT | Performed by: FAMILY MEDICINE

## 2020-06-04 PROCEDURE — 85610 PROTHROMBIN TIME: CPT | Performed by: FAMILY MEDICINE

## 2020-06-04 NOTE — PROGRESS NOTES
ANTICOAGULATION FOLLOW-UP CLINIC VISIT    Patient Name:  Jose M Rea  Date:  2020  Contact Type:  Telephone    SUBJECTIVE:  Patient Findings         Clinical Outcomes     Negatives:   Major bleeding event, Thromboembolic event, Anticoagulation-related hospital admission, Anticoagulation-related ED visit, Anticoagulation-related fatality           OBJECTIVE    Recent labs: (last 7 days)     20  0909   INR 2.70*       ASSESSMENT / PLAN  INR assessment THER    Recheck INR In: 6 WEEKS    INR Location Clinic      Anticoagulation Summary  As of 2020    INR goal:   2.0-3.0   TTR:   100.0 % (1 y)   INR used for dosin.70 (2020)   Warfarin maintenance plan:   10 mg (5 mg x 2) every Mon, Thu; 7.5 mg (5 mg x 1.5) all other days   Full warfarin instructions:   10 mg every Mon, Thu; 7.5 mg all other days   Weekly warfarin total:   57.5 mg   No change documented:   Goldie Marie RN   Plan last modified:   Joanie Yin RN (2019)   Next INR check:   2020   Priority:   Maintenance   Target end date:   Indefinite    Indications    Coagulation defect (H) [D68.9]  Acute deep vein thrombosis (DVT) of proximal vein of lower extremity (H) [I82.4Y9]  Long term current use of anticoagulant therapy [Z79.01]             Anticoagulation Episode Summary     INR check location:       Preferred lab:       Send INR reminders to:   [facility]    Comments:   * Tx from [place]. Factor 5 Leiden heterozygous positive. Indefinite anticoagulation per Dr. Hein on 3-27-30      Anticoagulation Care Providers     Provider Role Specialty Phone number    Pj Quijano MD [facility] [phone]            See the Encounter Report to view Anticoagulation Flowsheet and Dosing Calendar (Go to Encounters tab in chart review, and find the Anticoagulation Therapy Visit)        Goldie Marie RN

## 2020-06-11 DIAGNOSIS — Z79.01 CHRONIC ANTICOAGULATION: ICD-10-CM

## 2020-06-11 RX ORDER — WARFARIN SODIUM 5 MG/1
TABLET ORAL
Qty: 140 TABLET | Refills: 0 | Status: SHIPPED | OUTPATIENT
Start: 2020-06-11 | End: 2020-08-31

## 2020-06-11 RX ORDER — WARFARIN SODIUM 5 MG/1
TABLET ORAL
Qty: 140 TABLET | Refills: 0 | OUTPATIENT
Start: 2020-06-11

## 2020-06-11 NOTE — TELEPHONE ENCOUNTER
Warfarin filled on 6-11-20 by [facility]. This is a duplicate request.    Joanie Yin RN, BSN, PHN

## 2020-07-01 ENCOUNTER — HOSPITAL ENCOUNTER (EMERGENCY)
Facility: CLINIC | Age: 70
Discharge: HOME | End: 2020-07-01
Payer: MEDICARE

## 2020-07-02 ENCOUNTER — DOCUMENTATION ONLY (OUTPATIENT)
Dept: ANTICOAGULATION | Facility: CLINIC | Age: 70
End: 2020-07-02

## 2020-07-02 NOTE — ED NOTES
Pt states that he is on coumadin and had a nose bleed tonight that lasted about 15-20 min before it stopped. He decided not to be seen because it stopped before he got here. Pt has had good INR levels for a long time now and gets it checked every 6 weeks. He has not noticed bleeding anywhere else. Discussed with pt that if it did start bleeding again he may need his INR checked. Pt has had nose bleeds in the winter and will treat it like he does with those. Pt told to return for any concerns.

## 2020-07-02 NOTE — PROGRESS NOTES
ANTICOAGULATION  MANAGEMENT: Discharge Review    Jose M Rea chart reviewed for anticoagulation continuity of care    Emergency room visit on 7/1/2020 for nose bleed.    Discharge disposition: Home    Results:    No results for input(s): INR, [id number], AXA in the last 168 hours.  Anticoagulation inpatient management:     not applicable     Anticoagulation discharge instructions:     Warfarin dosing: home regimen continued   Bridging: No   INR goal change: No      Medication changes affecting anticoagulation: No    Additional factors affecting anticoagulation: Yes: nose bleed. Pt has a Hx of nose bleeds. Pt was educated on nose bleeds and what to do if his nose starts to bleed again. It stopped before pt got to the ED so he decided not to be seen by provider.    Plan     No adjustment to anticoagulation plan needed    Patient not contacted    No adjustment to Anticoagulation Calendar was required    Salma Diane RN

## 2020-07-16 ENCOUNTER — ANTICOAGULATION THERAPY VISIT (OUTPATIENT)
Dept: FAMILY MEDICINE | Facility: CLINIC | Age: 70
End: 2020-07-16

## 2020-07-16 DIAGNOSIS — Z79.01 CHRONIC ANTICOAGULATION: ICD-10-CM

## 2020-07-16 DIAGNOSIS — Z79.01 LONG TERM CURRENT USE OF ANTICOAGULANT THERAPY: ICD-10-CM

## 2020-07-16 DIAGNOSIS — D68.9 COAGULATION DEFECT (H): ICD-10-CM

## 2020-07-16 DIAGNOSIS — I82.4Y9 ACUTE DEEP VEIN THROMBOSIS (DVT) OF PROXIMAL VEIN OF LOWER EXTREMITY (H): ICD-10-CM

## 2020-07-16 LAB — INR PPP: 2.6 (ref 0.86–1.14)

## 2020-07-16 PROCEDURE — 85610 PROTHROMBIN TIME: CPT | Performed by: FAMILY MEDICINE

## 2020-07-16 PROCEDURE — 36416 COLLJ CAPILLARY BLOOD SPEC: CPT | Performed by: FAMILY MEDICINE

## 2020-07-16 PROCEDURE — 99207 ZZC NO CHARGE NURSE ONLY: CPT | Performed by: FAMILY MEDICINE

## 2020-07-16 NOTE — PROGRESS NOTES
Anticoagulation Management    Unable to reach Jose M today.    Today's INR result of 2.6 is therapeutic (goal INR of 2.0-3.0).  Result received from: Clinic Lab    Follow up required to confirm warfarin dose taken and assess for changes    No instructions provided. Unable to leave voicemail. No VM option.    If no problem findings continue 10 mg M/Th and 7.5 mg ROW/ Recheck in 6 weeks, 8/27/20.    Anticoagulation clinic to follow up    Goldie Marie RN

## 2020-07-16 NOTE — PROGRESS NOTES
ANTICOAGULATION FOLLOW-UP CLINIC VISIT    Patient Name:  Jose M Rea  Date:  2020  Contact Type:  Telephone/ Viri, pt's wife    SUBJECTIVE:  Patient Findings     Positives:   Signs/symptoms of bleeding    Comments:   Has nose bleed. Went to ED but stopped upon arrival.        Clinical Outcomes     Negatives:   Major bleeding event, Thromboembolic event, Anticoagulation-related hospital admission, Anticoagulation-related ED visit, Anticoagulation-related fatality    Comments:   Has nose bleed. Went to ED but stopped upon arrival.           OBJECTIVE    Recent labs: (last 7 days)     20  0853   INR 2.60*       ASSESSMENT / PLAN  INR assessment THER    Recheck INR In: 6 WEEKS    INR Location Clinic      Anticoagulation Summary  As of 2020    INR goal:   2.0-3.0   TTR:   100.0 % (1 y)   INR used for dosin.60 (2020)   Warfarin maintenance plan:   10 mg (5 mg x 2) every Mon, Thu; 7.5 mg (5 mg x 1.5) all other days   Full warfarin instructions:   10 mg every Mon, Thu; 7.5 mg all other days   Weekly warfarin total:   57.5 mg   No change documented:   Goldie Marie, RN   Plan last modified:   Joanie Yin, RN (2019)   Next INR check:   2020   Priority:   Maintenance   Target end date:   Indefinite    Indications    Coagulation defect (H) [D68.9]  Acute deep vein thrombosis (DVT) of proximal vein of lower extremity (H) [I82.4Y9]  Long term current use of anticoagulant therapy [Z79.01]             Anticoagulation Episode Summary     INR check location:       Preferred lab:       Send INR reminders to:   [facility]    Comments:   * Tx from [place]. Factor 5 Leiden heterozygous positive. Indefinite anticoagulation per Dr. Hein on 3-27-30      Anticoagulation Care Providers     Provider Role Specialty Phone number    Pj Quijano MD [facility] [phone]            See the Encounter Report to view Anticoagulation Flowsheet and Dosing  Calendar (Go to Encounters tab in chart review, and find the Anticoagulation Therapy Visit)        Goldie Marie RN

## 2020-08-12 ENCOUNTER — OFFICE VISIT (OUTPATIENT)
Dept: FAMILY MEDICINE | Facility: CLINIC | Age: 70
End: 2020-08-12
Payer: MEDICARE

## 2020-08-12 VITALS
OXYGEN SATURATION: 98 % | BODY MASS INDEX: 28.15 KG/M2 | HEIGHT: 70 IN | HEART RATE: 67 BPM | DIASTOLIC BLOOD PRESSURE: 72 MMHG | SYSTOLIC BLOOD PRESSURE: 120 MMHG | WEIGHT: 196.6 LBS | TEMPERATURE: 98.8 F | RESPIRATION RATE: 16 BRPM

## 2020-08-12 DIAGNOSIS — R23.2 FACIAL FLUSHING: Primary | ICD-10-CM

## 2020-08-12 PROCEDURE — 99213 OFFICE O/P EST LOW 20 MIN: CPT | Performed by: FAMILY MEDICINE

## 2020-08-12 ASSESSMENT — MIFFLIN-ST. JEOR: SCORE: 1648.02

## 2020-08-12 NOTE — PROGRESS NOTES
Subjective     Jose M Rea is a 70 year old male who presents to clinic today for the following health issues:    HPI       Chief Complaint   Patient presents with     facial flush     Had flush feeling in his face, for the past week, intermittent, felt hot, no fever.  Has not had now for the past couple of days.  Pain left side of neck that will radiate up to the head, not sure if related.       Patient Request     patient has some questions regarding covid.       Facial flushing      Duration: 2 weeks - intermittent    Description (location/character/radiation): random episodes of flushed skin from the nose up - patient cannot quantify duration.    Intensity:  mild    Accompanying signs and symptoms: denies any other symptom associated with it    History (similar episodes/previous evaluation): None    Precipitating or alleviating factors: None    Therapies tried and outcome: None     Patient wonders if that is associated with a tooth abscess he had a few months ago. Denies tooth pain now.    Patient asked how long after one is exposed to Covid-19 would symptoms develop.  He said he and spouse do practice social distancing and other prevention measures consistently.    Patient Active Problem List   Diagnosis     Hyperlipidemia LDL goal <100     Acute deep vein thrombosis (DVT) of proximal vein of lower extremity (H)     Eczema     Chronic anticoagulation     Long term current use of anticoagulant therapy     Erectile dysfunction, unspecified erectile dysfunction type     H/O deep venous thrombosis     Coagulation defect (H)     Past Surgical History:   Procedure Laterality Date     PHACOEMULSIFICATION WITH STANDARD INTRAOCULAR LENS IMPLANT Left 4/3/2019    Procedure: Cataract Removal with Implant;  Surgeon: Cristofer Price MD;  Location: [facility]     PHACOEMULSIFICATION WITH STANDARD INTRAOCULAR LENS IMPLANT Right 4/24/2019    Procedure: Cataract Removal with Implant;  Surgeon: Cristofer Price,  MD;  Location: [facility] OR     SURGICAL HISTORY OF -       appendectomy in 1956     SURGICAL HISTORY OF -       right shoulder surgery in 2016       Social History     Tobacco Use     Smoking status: Current Every Day Smoker     Packs/day: 0.50     Years: 50.00     Pack years: 25.00     Types: Cigarettes     Smokeless tobacco: Never Used     Tobacco comment: started at age 18, 1/2 pack daily   Substance Use Topics     Alcohol use: Yes     Comment: occasional beer     Family History   Problem Relation Age of Onset     Coronary Artery Disease Paternal Grandfather         MI in late 50s or early 60s     Sleep Apnea Son      Thrombosis Son         PEs in his mid 30s     Prostate Cancer No family hx of      Colon Cancer No family hx of          Current Outpatient Medications   Medication Sig Dispense Refill     ACETAMINOPHEN PO Take 650 mg by mouth       betamethasone dipropionate (DIPROSONE) 0.05 % external cream Apply twice daily as needed to affected area on leg. 45 g 1     clotrimazole (LOTRIMIN) 1 % cream as needed        Multiple Vitamins-Minerals (MENS ONE DAILY PO)        sildenafil (REVATIO) 20 MG tablet Sidenafil (Viagra) comes in 20mg strength pills. Take as many pills as needed up to maximum of 5 pills at a time prior to intercourse.  Hold on file until needed. 30 tablet 11     simvastatin (ZOCOR) 20 MG tablet Take 1 tablet (20 mg) by mouth daily 90 tablet 3     warfarin ANTICOAGULANT (COUMADIN) 5 MG tablet TAKE 10MG EVERY MONDAY AND THURSDAY. TAKE 7.5MG ALL OTHER DAYS OR AS DIRECTED BY THE ANTICOAGULATION CLINIC 140 tablet 0     ketoconazole (NIZORAL) 2 % cream as needed        Allergies   Allergen Reactions     Clindamycin      Bactroban [Mupirocin]      Cephalexin Rash     Doxycycline Rash     Metal [Staples] Rash and Blisters       Reviewed and updated as needed this visit by Provider  Tobacco         Review of Systems   Constitutional, HEENT, cardiovascular, pulmonary, GI, , musculoskeletal, neuro, skin,  "endocrine and psych systems are negative, except as otherwise noted.      Objective    /72   Pulse 67   Temp 98.8  F (37.1  C) (Tympanic)   Resp 16   Ht 1.778 m (5' 10\")   Wt 89.2 kg (196 lb 9.6 oz)   SpO2 98%   BMI 28.21 kg/m    Body mass index is 28.21 kg/m .  Physical Exam   GENERAL:  alert and no distress  EYES: pink conjunctivae, no icterus  FACE: no swelling or flushing; no TTP along maxillary and mandibular areas  NECK: mild cervical adenopathy, nontender  HEENT: tympanic membrane intact and pearly bilaterally, nose with mild congestion, no sinus tenderness, throat mildly erythematous, no exudates, no oral ulcers  RESP: lungs clear to auscultation - no rales, no rhonchi, no wheezes  CV: regular rates and rhythm, normal S1 S2, no S3 or S4 and no murmur  SKIN:  Good turgor, no rashes, no facial erythema today    Diagnostic Test Results:  none         Assessment & Plan     Jose M was seen today for facial flush and patient request.    Diagnoses and all orders for this visit:    Facial flushing    Unclear etiology. Not present today.  Discussed observation with patient .  Discussed can measure CBC and TSH in the remote possibility it is related to any blood dyscrasia or thyroid disease.  Patient preferred to observe for now.  Return precautions discussed and given to patient.    Patient was advised current info on incubation of SARS-Cov-2.  Advised general prevention measures for the illness.    Patient Instructions   No clear sign to explain your flushing today.  Unclear cause at this time.  After today's discussion, you concurred to observe symptoms closely and return if with worsening or persistence.          Thank you for choosing [facility].  You may be receiving an email and/or telephone survey request from [facility] [employer] Customer [employer] regarding your visit today.  Please take a few minutes to respond to the survey to let us know how we are doing.      If you have questions or concerns, " "please contact us via "[employer]" or you can contact your care team at [phone].    Our Clinic hours are:  Monday 6:40 am  to 7:00 pm  Tuesday -Friday 6:40 am to 5:00 pm    The [place] outpatient lab hours are:  Monday - Friday 6:10 am to 4:45 pm  Saturdays 7:00 am to 11:00 am  Appointments are required, call [phone]    If you have clinical questions after hours or would like to schedule an appointment,  call the clinic at [phone].    Patient Education     Coronavirus Disease 2019 (COVID-19): Prevention  The best way to prevent COVID-19 is to avoid contact with the virus. There is no vaccine yet.  Cancel travel and other outings  Stay informed about COVID-19 in your area. School, sporting events and other activities may be cancelled. Follow local instructions. For example, you may need to:     Stay at least 6 feet away from others. This is called \"social distancing.\"    Stay at home and isolate yourself. You may hear terms like \"self-isolate, \"quarantine,\"  stay at home,   shelter in place,  and  lockdown.   Don t travel to areas with a COVID-19 outbreak. Don t go on a cruise. It s best to cancel any non-essential travel right now.  For the most up-to-date travel advisories, visit the CDC website at www.cdc.gov/coronavirus/2019-ncov/travelers.  When you re at home    Wash your hands often. Use soap and clean, running water for at least 20 seconds. Or, use hand  that has at least 60% alcohol.    Don't touch your eyes, nose, or mouth unless you have clean hands.    Don t kiss someone who is sick.    If you need to cough or sneeze, do it into a tissue. Then, throw the tissue into the trash. If you don't have tissues, cough or sneeze into the bend of your elbow.    Try to avoid \"high-touch\" surfaces, like doorknobs, handles, light switches, desks, printers, phones, kitchen counters, tables and bathroom surfaces. Clean these often with disinfectant (read the label for instructions). For cleaning tips, " "go to www.cdc.gov/coronavirus/2019-ncov/prepare/cleaning-disinfection.html.    Check your home supplies. Try to keep a 2-week supply of medicine, food, and household items.    Make a plan for childcare, work, and ways to stay in touch with others. Know who will help you if you get sick.    Don't be around people who are sick.    Don t share eating or drinking utensils with sick people.    Wash your hands after touching animals. Don't touch animals that may be sick.  If you leave home    Stay at least 6 feet away from all people.    Try to avoid \"high-touch\" public surfaces, like doorknobs, handles, and light switches. If you need to touch these, clean them first with a disinfecting wipe. Or, touch them using a tissue or paper towel.    Use hand  often. Make sure it has at least 60% alcohol.    Don't touch your eyes, nose, or mouth unless you have clean hands.    If you need to cough or sneeze, do it into a tissue. Then throw the tissue into the trash. If you don't have tissues, cough or sneeze into the bend of your elbow.    Avoid public gatherings.    Wear a cloth face mask in public. It should cover both your nose and mouth. You may need to make your own mask using a bandana, T-shirt, or other cloth. See the CDC s instructions on how to make a mask.  If you re at a work site    Follow all of the instructions above.    If you feel sick in any way, go home and stay home.    Tell your manager if you are well but live with someone who has COVID-19.    Wash your hands often with soap and clean, running water for at least 20 seconds. Or, use hand  with at least 60% alcohol.    Don't shake hands. Don t have in-person meetings. (Meet over phone or video.)    Don't use other people's desks, offices, phones or equipment (pens, keyboards, eating or drinking utensils, etc.), if possible.    Use office burt one person at a time. Don t share coffee, tea or food in the office. Don t have meals in " groups.    Wear a mask over your nose and mouth.    Clean work surfaces often with disinfectant. These include desks, photocopiers, printers, phones, kitchen counters, fridge door handles, bathroom surfaces, and others.  If you ve been around someone with COVID-19  In the past 14 days, if you've been around someone who has (or may have) COVID-19:    Contact your care team to ask for advice. Follow all instructions. You may need to stay home and limit your activities for up to 2 weeks.    Take your temperature every morning and evening for at least 14 days. This is to check for fever. Keep a record of the readings.    Watch for symptoms of the virus. (See the CDC's symptom .) If you have symptoms, contact your care team.  When to contact your care team  Call your care team if you think you have COVID-19 symptoms. These can include fever, cough, trouble breathing, body aches, headaches, chills or repeated shaking with chills, sore throat, loss of tasted or smell, or diarrhea (loose, watery poops).  Don t go to a clinic or hospital before speaking to your care team.  Last modified date: 5/8/2020  This information has been modified by your health care provider with permission from the publisher.      [id number] [facility], [address] [place] [address], [place] [address]. All rights reserved. This information is not intended as a substitute for professional medical care. Always follow your healthcare professional's instructions. This information has been modified by your health care provider with permission from the publisher.               Return if symptoms worsen or fail to improve.    Pillo Lazo MD  [facility]

## 2020-08-12 NOTE — PATIENT INSTRUCTIONS
"No clear sign to explain your flushing today.  Unclear cause at this time.  After today's discussion, you concurred to observe symptoms closely and return if with worsening or persistence.          Thank you for choosing [facility].  You may be receiving an email and/or telephone survey request from [facility] Customer Experience regarding your visit today.  Please take a few minutes to respond to the survey to let us know how we are doing.      If you have questions or concerns, please contact us via [employer] or you can contact your care team at [phone].    Our Clinic hours are:  Monday 6:40 am  to 7:00 pm  Tuesday -Friday 6:40 am to 5:00 pm    The [place] outpatient lab hours are:  Monday - Friday 6:10 am to 4:45 pm  Saturdays 7:00 am to 11:00 am  Appointments are required, call [phone]    If you have clinical questions after hours or would like to schedule an appointment,  call the clinic at [phone].    Patient Education     Coronavirus Disease 2019 (COVID-19): Prevention  The best way to prevent COVID-19 is to avoid contact with the virus. There is no vaccine yet.  Cancel travel and other outings  Stay informed about COVID-19 in your area. School, sporting events and other activities may be cancelled. Follow local instructions. For example, you may need to:     Stay at least 6 feet away from others. This is called \"social distancing.\"    Stay at home and isolate yourself. You may hear terms like \"self-isolate, \"quarantine,\"  stay at home,   shelter in place,  and  lockdown.   Don t travel to areas with a COVID-19 outbreak. Don t go on a cruise. It s best to cancel any non-essential travel right now.  For the most up-to-date travel advisories, visit the CDC website at www.cdc.gov/coronavirus/2019-ncov/travelers.  When you re at home    Wash your hands often. Use soap and clean, running water for at least 20 seconds. Or, use hand  that has at least 60% alcohol.    Don't touch your " "eyes, nose, or mouth unless you have clean hands.    Don t kiss someone who is sick.    If you need to cough or sneeze, do it into a tissue. Then, throw the tissue into the trash. If you don't have tissues, cough or sneeze into the bend of your elbow.    Try to avoid \"high-touch\" surfaces, like doorknobs, handles, light switches, desks, printers, phones, kitchen counters, tables and bathroom surfaces. Clean these often with disinfectant (read the label for instructions). For cleaning tips, go to www.cdc.gov/coronavirus/2019-ncov/prepare/cleaning-disinfection.html.    Check your home supplies. Try to keep a 2-week supply of medicine, food, and household items.    Make a plan for childcare, work, and ways to stay in touch with others. Know who will help you if you get sick.    Don't be around people who are sick.    Don t share eating or drinking utensils with sick people.    Wash your hands after touching animals. Don't touch animals that may be sick.  If you leave home    Stay at least 6 feet away from all people.    Try to avoid \"high-touch\" public surfaces, like doorknobs, handles, and light switches. If you need to touch these, clean them first with a disinfecting wipe. Or, touch them using a tissue or paper towel.    Use hand  often. Make sure it has at least 60% alcohol.    Don't touch your eyes, nose, or mouth unless you have clean hands.    If you need to cough or sneeze, do it into a tissue. Then throw the tissue into the trash. If you don't have tissues, cough or sneeze into the bend of your elbow.    Avoid public gatherings.    Wear a cloth face mask in public. It should cover both your nose and mouth. You may need to make your own mask using a bandana, T-shirt, or other cloth. See the CDC s instructions on how to make a mask.  If you re at a work site    Follow all of the instructions above.    If you feel sick in any way, go home and stay home.    Tell your manager if you are well but live with " someone who has COVID-19.    Wash your hands often with soap and clean, running water for at least 20 seconds. Or, use hand  with at least 60% alcohol.    Don't shake hands. Don t have in-person meetings. (Meet over phone or video.)    Don't use other people's desks, offices, phones or equipment (pens, keyboards, eating or drinking utensils, etc.), if possible.    Use office burt one person at a time. Don t share coffee, tea or food in the office. Don t have meals in groups.    Wear a mask over your nose and mouth.    Clean work surfaces often with disinfectant. These include desks, photocopiers, printers, phones, kitchen counters, fridge door handles, bathroom surfaces, and others.  If you ve been around someone with COVID-19  In the past 14 days, if you've been around someone who has (or may have) COVID-19:    Contact your care team to ask for advice. Follow all instructions. You may need to stay home and limit your activities for up to 2 weeks.    Take your temperature every morning and evening for at least 14 days. This is to check for fever. Keep a record of the readings.    Watch for symptoms of the virus. (See the CDC's symptom .) If you have symptoms, contact your care team.  When to contact your care team  Call your care team if you think you have COVID-19 symptoms. These can include fever, cough, trouble breathing, body aches, headaches, chills or repeated shaking with chills, sore throat, loss of tasted or smell, or diarrhea (loose, watery poops).  Don t go to a clinic or hospital before speaking to your care team.  Last modified date: 5/8/2020  This information has been modified by your health care provider with permission from the publisher.      [id number] [place], [address], [place] [address]. All rights reserved. This information is not intended as a substitute for professional medical care. Always follow your healthcare professional's instructions. This information  has been modified by your health care provider with permission from the publisher.

## 2020-08-27 ENCOUNTER — TELEPHONE (OUTPATIENT)
Dept: FAMILY MEDICINE | Facility: CLINIC | Age: 70
End: 2020-08-27

## 2020-08-27 ENCOUNTER — ANTICOAGULATION THERAPY VISIT (OUTPATIENT)
Dept: ANTICOAGULATION | Facility: CLINIC | Age: 70
End: 2020-08-27
Payer: MEDICARE

## 2020-08-27 DIAGNOSIS — Z79.01 CHRONIC ANTICOAGULATION: ICD-10-CM

## 2020-08-27 DIAGNOSIS — Z79.01 LONG TERM CURRENT USE OF ANTICOAGULANT THERAPY: ICD-10-CM

## 2020-08-27 DIAGNOSIS — D68.9 COAGULATION DEFECT (H): ICD-10-CM

## 2020-08-27 DIAGNOSIS — I82.4Y9 ACUTE DEEP VEIN THROMBOSIS (DVT) OF PROXIMAL VEIN OF LOWER EXTREMITY (H): ICD-10-CM

## 2020-08-27 DIAGNOSIS — I82.4Y9 ACUTE DEEP VEIN THROMBOSIS (DVT) OF PROXIMAL VEIN OF LOWER EXTREMITY, UNSPECIFIED LATERALITY (H): Primary | ICD-10-CM

## 2020-08-27 LAB
CAPILLARY BLOOD COLLECTION: NORMAL
INR PPP: 3.1 (ref 0.86–1.14)

## 2020-08-27 PROCEDURE — 85610 PROTHROMBIN TIME: CPT | Performed by: FAMILY MEDICINE

## 2020-08-27 PROCEDURE — 36416 COLLJ CAPILLARY BLOOD SPEC: CPT | Performed by: FAMILY MEDICINE

## 2020-08-27 PROCEDURE — 99207 ZZC NO CHARGE NURSE ONLY: CPT

## 2020-08-27 NOTE — TELEPHONE ENCOUNTER
ANTICOAGULATION MANAGEMENT      Jose M Rea due for annual renewal of referral to anticoagulation monitoring. Order pended for your review and signature.      ANTICOAGULATION SUMMARY      Warfarin indication(s)     DVT  factor 5 leiden    Heart valve present?  NO       Current goal range   INR: 2.0-3.0     Goal appropriate for indication? Yes, INR 2-3 appropriate for hx of DVT, PE, hypercoagulable state, Afib, LVAD, or bileaflet AVR without risk factors     Current duration of therapy Indefinite/long term therapy   Time in Therapeutic Range (TTR)  (Goal > 60%) 100 %       Office visit with referring provider's group within last year yes on 8-       Joanie Yin RN

## 2020-08-27 NOTE — PROGRESS NOTES
Anticoagulation Management    Unable to reach Jose M today.    Today's INR result of 3.1 is supratherapeutic (goal INR of 2.0-3.0).  Result received from: Clinic Lab    Follow up required to confirm warfarin dose taken and assess for changes    VM left to call [facility] back when able.      Anticoagulation clinic to follow up    Joanie Yin RN  ANTICOAGULATION FOLLOW-UP CLINIC VISIT    Patient Name:  Jose M Rea  Date:  8/27/2020  Contact Type:  Telephone    SUBJECTIVE:  Patient Findings     Positives:   Change in diet/appetite (maybe less greens)    Comments:   No changes in activity level, medications (including over the counter), or health. No recent illnesses, diarrhea ,swelling or infections. No alcohol use. He may be eating less greens than usual. The prior issue with facial flushing seems to have gone away per patient. No missed doses of warfarin. Patient took dosing as prescribed. No signs of clots or bleeding concerns. Patient will continue maintenance warfarin dosing, no need for adjustment. Recheck in 2 weeks. He will resume usual amount of greens.          Clinical Outcomes     Comments:   No changes in activity level, medications (including over the counter), or health. No recent illnesses, diarrhea ,swelling or infections. No alcohol use. He may be eating less greens than usual. The prior issue with facial flushing seems to have gone away per patient. No missed doses of warfarin. Patient took dosing as prescribed. No signs of clots or bleeding concerns. Patient will continue maintenance warfarin dosing, no need for adjustment. Recheck in 2 weeks. He will resume usual amount of greens.             OBJECTIVE    Recent labs: (last 7 days)     08/27/20  0920   INR 3.10*       ASSESSMENT / PLAN  INR assessment SUPRA    Recheck INR In: 2 WEEKS    INR Location Clinic      Anticoagulation Summary  As of 8/27/2020    INR goal:   2.0-3.0   TTR:   97.7 % (1 y)   INR used for dosing:   3.10! (8/27/2020)   Warfarin  maintenance plan:   10 mg (5 mg x 2) every Mon, Thu; 7.5 mg (5 mg x 1.5) all other days   Full warfarin instructions:   10 mg every Mon, Thu; 7.5 mg all other days   Weekly warfarin total:   57.5 mg   No change documented:   Joanie Yin RN   Plan last modified:   Joanie Yin RN (5/30/2019)   Next INR check:   9/9/2020   Priority:   High   Target end date:       Indications    Coagulation defect (H) [D68.9]  Acute deep vein thrombosis (DVT) of proximal vein of lower extremity (H) [I82.4Y9]  Long term current use of anticoagulant therapy [Z79.01]             Anticoagulation Episode Summary     INR check location:       Preferred lab:       Send INR reminders to:   [facility]    Comments:   * Tx from [place]. Factor 5 Leiden heterozygous positive. Indefinite anticoagulation per Dr. Hein on 3-27-30. Consent to communicate with Viri ma      Anticoagulation Care Providers     Provider Role Specialty Phone number    Pj Quijano MD [facility] [phone]            See the Encounter Report to view Anticoagulation Flowsheet and Dosing Calendar (Go to Encounters tab in chart review, and find the Anticoagulation Therapy Visit)        Joanie Yin RN

## 2020-08-31 DIAGNOSIS — Z79.01 CHRONIC ANTICOAGULATION: ICD-10-CM

## 2020-08-31 RX ORDER — WARFARIN SODIUM 5 MG/1
TABLET ORAL
Qty: 150 TABLET | Refills: 0 | Status: SHIPPED | OUTPATIENT
Start: 2020-08-31 | End: 2020-12-01

## 2020-08-31 NOTE — TELEPHONE ENCOUNTER
Signed Prescriptions:                        Disp   Refills    warfarin ANTICOAGULANT (COUMADIN) 5 MG tab*150 ta*0        Sig: TAKE TWO TABLETS BY MOUTH ON MONDAY AND THURSDAY.           TAKE ONE AND ONE-HALF TABLET ALL OTHER DAYS OR AS           DIRECTED BY THE ANTICOAGULATION CLINIC  Authorizing Provider: LUCIANO HERNANDEZ  Ordering User: URSULA YIN RN refilled medication per [facility] refill protocol.  Ursula Yin RN

## 2020-09-09 ENCOUNTER — ANTICOAGULATION THERAPY VISIT (OUTPATIENT)
Dept: ANTICOAGULATION | Facility: CLINIC | Age: 70
End: 2020-09-09
Payer: MEDICARE

## 2020-09-09 DIAGNOSIS — Z79.01 LONG TERM CURRENT USE OF ANTICOAGULANT THERAPY: ICD-10-CM

## 2020-09-09 DIAGNOSIS — D68.9 COAGULATION DEFECT (H): ICD-10-CM

## 2020-09-09 DIAGNOSIS — Z79.01 CHRONIC ANTICOAGULATION: ICD-10-CM

## 2020-09-09 DIAGNOSIS — I82.4Y9 ACUTE DEEP VEIN THROMBOSIS (DVT) OF PROXIMAL VEIN OF LOWER EXTREMITY (H): ICD-10-CM

## 2020-09-09 DIAGNOSIS — I82.4Y9 ACUTE DEEP VEIN THROMBOSIS (DVT) OF PROXIMAL VEIN OF LOWER EXTREMITY, UNSPECIFIED LATERALITY (H): ICD-10-CM

## 2020-09-09 LAB
CAPILLARY BLOOD COLLECTION: NORMAL
INR PPP: 2.7 (ref 0.86–1.14)

## 2020-09-09 PROCEDURE — 36416 COLLJ CAPILLARY BLOOD SPEC: CPT | Performed by: FAMILY MEDICINE

## 2020-09-09 PROCEDURE — 99207 ZZC NO CHARGE NURSE ONLY: CPT

## 2020-09-09 PROCEDURE — 85610 PROTHROMBIN TIME: CPT | Performed by: FAMILY MEDICINE

## 2020-09-09 NOTE — PROGRESS NOTES
ANTICOAGULATION FOLLOW-UP CLINIC VISIT    Patient Name:  Jose M Rea  Date:  2020  Contact Type:  Telephone    SUBJECTIVE:  Patient Findings     Positives:   Missed doses (Pt decided to take smaller dose last thur.,)    Comments:   No changes in medications, activity, or diet noted. No concerns with clotting, bleeding, or increased bruising noted.   Patient is to continue maintenance warfarin plan, and check INR in 4 weeks.  Patient verbalizes understanding and agrees to plan. No further questions or concerns.        Clinical Outcomes     Negatives:   Major bleeding event, Thromboembolic event, Anticoagulation-related hospital admission, Anticoagulation-related ED visit, Anticoagulation-related fatality    Comments:   No changes in medications, activity, or diet noted. No concerns with clotting, bleeding, or increased bruising noted.   Patient is to continue maintenance warfarin plan, and check INR in 4 weeks.  Patient verbalizes understanding and agrees to plan. No further questions or concerns.           OBJECTIVE    Recent labs: (last 7 days)     20  1141   INR 2.70*       ASSESSMENT / PLAN  INR assessment THER    Recheck INR In: 4 WEEKS    INR Location Clinic      Anticoagulation Summary  As of 2020    INR goal:   2.0-3.0   TTR:   96.8 % (1 y)   INR used for dosin.70 (2020)   Warfarin maintenance plan:   10 mg (5 mg x 2) every Mon, Thu; 7.5 mg (5 mg x 1.5) all other days   Full warfarin instructions:   10 mg every Mon, Thu; 7.5 mg all other days   Weekly warfarin total:   57.5 mg   Plan last modified:   Salma Diane RN (2020)   Next INR check:   10/7/2020   Priority:   Maintenance   Target end date:   Indefinite    Indications    Coagulation defect (H) [D68.9]  Acute deep vein thrombosis (DVT) of proximal vein of lower extremity (H) [I82.4Y9]  Long term current use of anticoagulant therapy [Z79.01]  Acute deep vein thrombosis (DVT) of proximal vein of lower  extremity  unspecified laterality (H) [I82.4Y9]             Anticoagulation Episode Summary     INR check location:       Preferred lab:       Send INR reminders to:   [facility]    Comments:   * Tx from [place]. Factor 5 Leiden heterozygous positive. Indefinite anticoagulation per Dr. Hein on 3-27-30. Consent to communicate with wifeViri      Anticoagulation Care Providers     Provider Role Specialty Phone number    Pj Quijano MD Referring [facility] [phone]            See the Encounter Report to view Anticoagulation Flowsheet and Dosing Calendar (Go to Encounters tab in chart review, and find the Anticoagulation Therapy Visit)        Salma Diane RN

## 2020-10-02 DIAGNOSIS — L20.89 OTHER ATOPIC DERMATITIS: ICD-10-CM

## 2020-10-02 DIAGNOSIS — L30.9 ECZEMA OF RIGHT UPPER EXTREMITY: ICD-10-CM

## 2020-10-02 RX ORDER — BETAMETHASONE DIPROPIONATE 0.5 MG/G
CREAM TOPICAL
Qty: 45 G | Refills: 1 | Status: SHIPPED | OUTPATIENT
Start: 2020-10-02 | End: 2022-03-08

## 2020-10-02 NOTE — TELEPHONE ENCOUNTER
Medication is being filled for 1 time refill only due to:  Patient needs to be seen because lov stated to follow up in 1-2 months.. Letter sent to make appt.   Martina PACHECO RN BSN PHN  Specialty Clinics

## 2020-10-02 NOTE — TELEPHONE ENCOUNTER
Requested Prescriptions   Pending Prescriptions Disp Refills     betamethasone dipropionate (DIPROSONE) 0.05 % external cream 45 g 1     Sig: Apply twice daily as needed to affected area on leg.       There is no refill protocol information for this order        Last office visit: 1/24/2020 with prescribing provider:  RANI Mathias   Future Office Visit:          Denise Behrendt  Specialty CSS

## 2020-10-02 NOTE — LETTER
[facility]  [address] [address]  Phone: [phone]       October 2, 2020         Jose M Rea  [address] [address]            Dear Jose M:    We are concerned about your health care.  We recently provided you with medication refills.  Many medications require routine follow-up with your doctor.    Your prescription(s) have been refilled so you may have time for the above noted follow-up. Please call to schedule soon so we can assure you have an appointment before your next refills are needed.    Thank you,      Marifer CASIANO / tr

## 2020-10-07 ENCOUNTER — ANTICOAGULATION THERAPY VISIT (OUTPATIENT)
Dept: ANTICOAGULATION | Facility: CLINIC | Age: 70
End: 2020-10-07

## 2020-10-07 ENCOUNTER — TELEPHONE (OUTPATIENT)
Dept: FAMILY MEDICINE | Facility: CLINIC | Age: 70
End: 2020-10-07

## 2020-10-07 DIAGNOSIS — I82.4Y9 ACUTE DEEP VEIN THROMBOSIS (DVT) OF PROXIMAL VEIN OF LOWER EXTREMITY, UNSPECIFIED LATERALITY (H): ICD-10-CM

## 2020-10-07 DIAGNOSIS — I82.4Y9 ACUTE DEEP VEIN THROMBOSIS (DVT) OF PROXIMAL VEIN OF LOWER EXTREMITY (H): ICD-10-CM

## 2020-10-07 DIAGNOSIS — Z79.01 LONG TERM CURRENT USE OF ANTICOAGULANT THERAPY: ICD-10-CM

## 2020-10-07 DIAGNOSIS — Z79.01 CHRONIC ANTICOAGULATION: ICD-10-CM

## 2020-10-07 DIAGNOSIS — D68.9 COAGULATION DEFECT (H): ICD-10-CM

## 2020-10-07 LAB — INR PPP: 3.4 (ref 0.86–1.14)

## 2020-10-07 PROCEDURE — 99207 PR NO CHARGE NURSE ONLY: CPT

## 2020-10-07 PROCEDURE — 36416 COLLJ CAPILLARY BLOOD SPEC: CPT | Performed by: FAMILY MEDICINE

## 2020-10-07 PROCEDURE — 85610 PROTHROMBIN TIME: CPT | Performed by: FAMILY MEDICINE

## 2020-10-07 NOTE — PROGRESS NOTES
ANTICOAGULATION FOLLOW-UP CLINIC VISIT    Patient Name:  Jose M Rea  Date:  10/7/2020  Contact Type:  Telephone    SUBJECTIVE:  Patient Findings     Positives:  Change in activity (More activity in the last month - riding a stationary bike in the house), Change in medications (Vitamin C)    Comments:  No changes in diet noted. No concerns with clotting, bleeding, or increased bruising noted. Took warfarin as prescribed.  Education provided regarding supra therapeutic INR.  Pt already took his coumadin today - pt is to take 5 mg tomorrow. Then resume maintenance dose and recheck INR in 2 weeks.  Patient educated on the increased risk for bleeding, precautions to take, and when to seek medical attention.  Patient verbalizes understanding and agrees to plan. No further questions or concerns.        Clinical Outcomes     Negatives:  Major bleeding event, Thromboembolic event, Anticoagulation-related hospital admission, Anticoagulation-related ED visit, Anticoagulation-related fatality    Comments:  No changes in diet noted. No concerns with clotting, bleeding, or increased bruising noted. Took warfarin as prescribed.  Education provided regarding supra therapeutic INR.  Pt already took his coumadin today - pt is to take 5 mg tomorrow. Then resume maintenance dose and recheck INR in 2 weeks.  Patient educated on the increased risk for bleeding, precautions to take, and when to seek medical attention.  Patient verbalizes understanding and agrees to plan. No further questions or concerns.           OBJECTIVE    Recent labs: (last 7 days)     10/07/20  0755   INR 3.40*       ASSESSMENT / PLAN  INR assessment SUPRA    Recheck INR In: 2 WEEKS    INR Location Clinic      Anticoagulation Summary  As of 10/7/2020    INR goal:  2.0-3.0   TTR:  92.4 % (1 y)   INR used for dosing:  3.40 (10/7/2020)   Warfarin maintenance plan:  10 mg (5 mg x 2) every Mon, Thu; 7.5 mg (5 mg x 1.5) all other days   Full warfarin instructions:   10/8: 5 mg; Otherwise 10 mg every Mon, Thu; 7.5 mg all other days   Weekly warfarin total:  57.5 mg   Plan last modified:  Salma Diane RN (9/9/2020)   Next INR check:  10/21/2020   Priority:  High   Target end date:  Indefinite    Indications    Coagulation defect (H) [D68.9]  Acute deep vein thrombosis (DVT) of proximal vein of lower extremity (H) [I82.4Y9]  Long term current use of anticoagulant therapy [Z79.01]  Acute deep vein thrombosis (DVT) of proximal vein of lower extremity  unspecified laterality (H) [I82.4Y9]             Anticoagulation Episode Summary     INR check location:      Preferred lab:      Send INR reminders to:  [facility]    Comments:  * Tx from [place]. Factor 5 Leiden heterozygous positive. Indefinite anticoagulation per Dr. Hein on 3-27-30. Consent to communicate with Viri ma      Anticoagulation Care Providers     Provider Role Specialty Phone number    Pj Quijano MD Referring [facility] [phone]            See the Encounter Report to view Anticoagulation Flowsheet and Dosing Calendar (Go to Encounters tab in chart review, and find the Anticoagulation Therapy Visit)        Salma Diane RN

## 2020-10-07 NOTE — PROGRESS NOTES
Pt called back unsure why his INR is high. Reviewed several things with pt and education provided. Questions answered.  Pt denies illness or alcohol. Denies other changes. Pt confirms that he does drink caffeine - coffee and pop. Denies any concerns.   Allergies (eye Sx) and inflammation can increase the INR - pt has been taking one benadryl allergy pill per day.  Pt does also take acetaminophen - about one per day.  Salma Diane RN on 10/7/2020 at 12:46 PM

## 2020-10-07 NOTE — PROGRESS NOTES
VM left for pt to return call to [facility] [phone]. Dosing instructions not given to pt.  Tentative plan: consider reducing dose today and rechecking INR in 2 weeks.  Salma Diane RN on 10/7/2020 at 9:37 AM

## 2020-10-07 NOTE — TELEPHONE ENCOUNTER
Lt shoulder pain with reaching for something.  The pain lasted a matter of seconds.  No cp, soa, diaphoresis, nausea.  If this continues to be seen. Alisha SILVEIRA RN

## 2020-10-07 NOTE — TELEPHONE ENCOUNTER
Reason for call:    Symptom or request:     Patient called stating wanting to discuss his left shoulder pain he experience about 20 minutes ago.  , denies other sx and no longer in pain.       Best Time:  any    Can we leave a detailed message on this number?  YES     Shama SCHAEFFER  Station

## 2020-10-21 ENCOUNTER — ANTICOAGULATION THERAPY VISIT (OUTPATIENT)
Dept: ANTICOAGULATION | Facility: CLINIC | Age: 70
End: 2020-10-21

## 2020-10-21 DIAGNOSIS — Z79.01 LONG TERM CURRENT USE OF ANTICOAGULANT THERAPY: ICD-10-CM

## 2020-10-21 DIAGNOSIS — Z79.01 CHRONIC ANTICOAGULATION: ICD-10-CM

## 2020-10-21 DIAGNOSIS — I82.4Y9 ACUTE DEEP VEIN THROMBOSIS (DVT) OF PROXIMAL VEIN OF LOWER EXTREMITY, UNSPECIFIED LATERALITY (H): ICD-10-CM

## 2020-10-21 DIAGNOSIS — I82.4Y9 ACUTE DEEP VEIN THROMBOSIS (DVT) OF PROXIMAL VEIN OF LOWER EXTREMITY (H): ICD-10-CM

## 2020-10-21 DIAGNOSIS — D68.9 COAGULATION DEFECT (H): ICD-10-CM

## 2020-10-21 LAB
CAPILLARY BLOOD COLLECTION: NORMAL
INR PPP: 3 (ref 0.86–1.14)

## 2020-10-21 PROCEDURE — 36416 COLLJ CAPILLARY BLOOD SPEC: CPT | Performed by: FAMILY MEDICINE

## 2020-10-21 PROCEDURE — 99207 PR NO CHARGE NURSE ONLY: CPT

## 2020-10-21 PROCEDURE — 85610 PROTHROMBIN TIME: CPT | Performed by: FAMILY MEDICINE

## 2020-10-21 NOTE — PROGRESS NOTES
"ANTICOAGULATION FOLLOW-UP CLINIC VISIT    Patient Name:  Jose M Rea  Date:  10/21/2020  Contact Type:  Telephone    SUBJECTIVE:  Patient Findings     Positives:  Change in medications    Comments:  Patient started vit c with radha hips about 3 weeks ago. Both vit C and radha hips can interact with warfarin per natural medicine database. Patient should try not to take more than 500 mg vit c daily and avoid radha hips. Patient was informed of this. He also started taking vit D. No other changes. Patient did cut his thumb joint recently and it bled for quite a while. He put \"new skin\" liquid bandage on it and it stopped. Writer reviewed with patient to hold firm pressure on a cut for 10-15 minutes and if still bleeding a lot, should go to UC. Will continue current dose and recheck INR in 3 weeks.         Clinical Outcomes     Comments:  Patient started vit c with radha hips about 3 weeks ago. Both vit C and radha hips can interact with warfarin per natural medicine database. Patient should try not to take more than 500 mg vit c daily and avoid radha hips. Patient was informed of this. He also started taking vit D. No other changes. Patient did cut his thumb joint recently and it bled for quite a while. He put \"new skin\" liquid bandage on it and it stopped. Writer reviewed with patient to hold firm pressure on a cut for 10-15 minutes and if still bleeding a lot, should go to UC. Will continue current dose and recheck INR in 3 weeks.            OBJECTIVE    Recent labs: (last 7 days)     10/21/20  0818   INR 3.00*       ASSESSMENT / PLAN  INR assessment THER    Recheck INR In: 3 WEEKS    INR Location Clinic      Anticoagulation Summary  As of 10/21/2020    INR goal:  2.0-3.0   TTR:  88.6 % (1 y)   INR used for dosing:  3.00 (10/21/2020)   Warfarin maintenance plan:  10 mg (5 mg x 2) every Mon, Thu; 7.5 mg (5 mg x 1.5) all other days   Full warfarin instructions:  10 mg every Mon, Thu; 7.5 mg all other days   Weekly " warfarin total:  57.5 mg   No change documented:  Joanie Yin, RN   Plan last modified:  Salma Diane RN (9/9/2020)   Next INR check:  11/11/2020   Priority:  Maintenance   Target end date:  Indefinite    Indications    Coagulation defect (H) [D68.9]  Acute deep vein thrombosis (DVT) of proximal vein of lower extremity (H) [I82.4Y9]  Long term current use of anticoagulant therapy [Z79.01]  Acute deep vein thrombosis (DVT) of proximal vein of lower extremity  unspecified laterality (H) [I82.4Y9]             Anticoagulation Episode Summary     INR check location:      Preferred lab:      Send INR reminders to:  [facility]    Comments:  * Tx from [place]. Factor 5 Leiden heterozygous positive. Indefinite anticoagulation per Dr. Hein on 3-27-30. Consent to communicate with Viri ma      Anticoagulation Care Providers     Provider Role Specialty Phone number    Pj Quijano MD Referring [facility] [phone]            See the Encounter Report to view Anticoagulation Flowsheet and Dosing Calendar (Go to Encounters tab in chart review, and find the Anticoagulation Therapy Visit)        Joanie Yin, RN

## 2020-11-03 ENCOUNTER — IMMUNIZATION (OUTPATIENT)
Dept: FAMILY MEDICINE | Facility: CLINIC | Age: 70
End: 2020-11-03
Payer: MEDICARE

## 2020-11-03 PROCEDURE — G0008 ADMIN INFLUENZA VIRUS VAC: HCPCS

## 2020-11-03 PROCEDURE — 90662 IIV NO PRSV INCREASED AG IM: CPT

## 2020-11-04 ENCOUNTER — TELEPHONE (OUTPATIENT)
Dept: FAMILY MEDICINE | Facility: CLINIC | Age: 70
End: 2020-11-04

## 2020-11-04 ENCOUNTER — DOCUMENTATION ONLY (OUTPATIENT)
Dept: ANTICOAGULATION | Facility: CLINIC | Age: 70
End: 2020-11-04

## 2020-11-04 ENCOUNTER — OFFICE VISIT (OUTPATIENT)
Dept: URGENT CARE | Facility: URGENT CARE | Age: 70
End: 2020-11-04
Payer: MEDICARE

## 2020-11-04 ENCOUNTER — HOSPITAL ENCOUNTER (EMERGENCY)
Facility: CLINIC | Age: 70
Discharge: HOME | End: 2020-11-04
Attending: EMERGENCY MEDICINE | Admitting: EMERGENCY MEDICINE
Payer: MEDICARE

## 2020-11-04 VITALS
HEART RATE: 80 BPM | WEIGHT: 196.2 LBS | TEMPERATURE: 97.8 F | RESPIRATION RATE: 14 BRPM | SYSTOLIC BLOOD PRESSURE: 136 MMHG | BODY MASS INDEX: 28.15 KG/M2 | DIASTOLIC BLOOD PRESSURE: 82 MMHG

## 2020-11-04 VITALS
SYSTOLIC BLOOD PRESSURE: 132 MMHG | WEIGHT: 196 LBS | OXYGEN SATURATION: 98 % | HEIGHT: 71 IN | DIASTOLIC BLOOD PRESSURE: 70 MMHG | HEART RATE: 80 BPM | BODY MASS INDEX: 27.44 KG/M2 | RESPIRATION RATE: 16 BRPM | TEMPERATURE: 97.9 F

## 2020-11-04 DIAGNOSIS — H57.11 ACUTE RIGHT EYE PAIN: Primary | ICD-10-CM

## 2020-11-04 DIAGNOSIS — D68.9 COAGULATION DEFECT (H): ICD-10-CM

## 2020-11-04 DIAGNOSIS — H57.11 ACUTE RIGHT EYE PAIN: ICD-10-CM

## 2020-11-04 DIAGNOSIS — I82.4Y9 ACUTE DEEP VEIN THROMBOSIS (DVT) OF PROXIMAL VEIN OF LOWER EXTREMITY (H): ICD-10-CM

## 2020-11-04 DIAGNOSIS — I82.4Y9 ACUTE DEEP VEIN THROMBOSIS (DVT) OF PROXIMAL VEIN OF LOWER EXTREMITY, UNSPECIFIED LATERALITY (H): ICD-10-CM

## 2020-11-04 DIAGNOSIS — Z79.01 LONG TERM CURRENT USE OF ANTICOAGULANT THERAPY: ICD-10-CM

## 2020-11-04 DIAGNOSIS — H00.12 CHALAZION OF RIGHT LOWER EYELID: ICD-10-CM

## 2020-11-04 DIAGNOSIS — H11.151 PINGUECULA OF RIGHT EYE: ICD-10-CM

## 2020-11-04 PROCEDURE — 99282 EMERGENCY DEPT VISIT SF MDM: CPT | Performed by: EMERGENCY MEDICINE

## 2020-11-04 PROCEDURE — 99207 PR NO CHARGE LOS: CPT | Performed by: NURSE PRACTITIONER

## 2020-11-04 RX ORDER — TETRACAINE HYDROCHLORIDE 5 MG/ML
1-2 SOLUTION OPHTHALMIC ONCE
Status: DISCONTINUED | OUTPATIENT
Start: 2020-11-04 | End: 2020-11-04 | Stop reason: HOSPADM

## 2020-11-04 ASSESSMENT — ENCOUNTER SYMPTOMS
COUGH: 0
SORE THROAT: 0
FEVER: 0
CHILLS: 0
EYE PAIN: 1
PHOTOPHOBIA: 0
EYE DISCHARGE: 0

## 2020-11-04 ASSESSMENT — PAIN SCALES - GENERAL: PAINLEVEL: MILD PAIN (2)

## 2020-11-04 ASSESSMENT — MIFFLIN-ST. JEOR: SCORE: 1653.24

## 2020-11-04 NOTE — ED NOTES
Bed: ED15  Expected date:   Expected time:   Means of arrival:   Comments:  Eye pain from triage when ready   Detail Level: Detailed Detail Level: Generalized

## 2020-11-04 NOTE — ED PROVIDER NOTES
History     Chief Complaint   Patient presents with     Eye Problem     sometimes when he turns his head he gets a sharp pain up into his head, this has been increasing over the past 1.5 weeks.      HPI  Jose M Rea is a 70 year old male with history of DVT on anticoagulation, hyperlipidemia, bilateral phacoemulsification (2019), who presents with neck and eye pain.  [facility] and can be seen in 2 days.  Mention that he also has symptoms radiating from his neck with head movements.  It was suggested that he contact his primary care office.  He contacted the primary care physician's office and spoke to a nurse and after description of symptoms recommended that he presents to an urgent care or emergency department for evaluation.  ED contacted urgent care and they recommend emergency department evaluation which is what brings him in today.  He reports that for the past week and a half he has had some intermittent pain on the right side of his neck that is very short and often occurs with head movements.  There is no particular movement that is bothersome to him and symptoms are not reliably reproducible.  The pain is at the base of his neck on the right side and radiates up the back of his head.  Sometimes he feels it in his eye as well.  He is also had a stye in his eye which he has been using warm compresses for.  He has noticed some discomfort in his right eye today which prompted [facility].  No change in vision.  No headaches, fever, chills, sore throat, cough, or shortness of breath.  Wears corrective lenses but no contacts.  Denies any ocular trauma or foreign body sensation.    The patient's PMHx, Surgical Hx, Allergies, and Medications were all reviewed with the patient.    Allergies:  Allergies   Allergen Reactions     Clindamycin      Bactroban [Mupirocin]      Cephalexin Rash     Doxycycline Rash     Metal [Staples] Rash and Blisters       Problem  List:    Patient Active Problem List    Diagnosis Date Noted     Acute deep vein thrombosis (DVT) of proximal vein of lower extremity, unspecified laterality (H) 08/27/2020     Priority: Medium     Coagulation defect (H) 04/23/2020     Priority: Medium     H/O deep venous thrombosis 03/12/2020     Priority: Medium     Erectile dysfunction, unspecified erectile dysfunction type 02/18/2020     Priority: Medium     Hyperlipidemia LDL goal <100 09/24/2018     Priority: Medium     Acute deep vein thrombosis (DVT) of proximal vein of lower extremity (H) 09/24/2018     Priority: Medium     DVT May 2016.  He has been recommended permanent anticoagulation.        Eczema 09/24/2018     Priority: Medium     Chronic anticoagulation 09/24/2018     Priority: Medium     Long term current use of anticoagulant therapy 09/24/2018     Priority: Medium        Past Medical History:    Past Medical History:   Diagnosis Date     ED (erectile dysfunction)      Hyperlipidemia        Past Surgical History:    Past Surgical History:   Procedure Laterality Date     PHACOEMULSIFICATION WITH STANDARD INTRAOCULAR LENS IMPLANT Left 4/3/2019    Procedure: Cataract Removal with Implant;  Surgeon: Cristofer Price MD;  Location: [facility] OR     PHACOEMULSIFICATION WITH STANDARD INTRAOCULAR LENS IMPLANT Right 4/24/2019    Procedure: Cataract Removal with Implant;  Surgeon: Cristofer Price MD;  Location: [facility] OR     SURGICAL HISTORY OF -       appendectomy in 1956     SURGICAL HISTORY OF -       right shoulder surgery in 2016       Family History:    Family History   Problem Relation Age of Onset     Coronary Artery Disease Paternal Grandfather         MI in late 50s or early 60s     Sleep Apnea Son      Thrombosis Son         PEs in his mid 30s     Prostate Cancer No family hx of      Colon Cancer No family hx of        Social History:  Marital Status:   [2]  Social History     Tobacco Use     Smoking status: Current Every Day Smoker  "    Packs/day: 0.50     Years: 50.00     Pack years: 25.00     Types: Cigarettes     Smokeless tobacco: Never Used     Tobacco comment: started at age 18, 1/2 pack daily   Substance Use Topics     Alcohol use: Yes     Comment: occasional beer     Drug use: No        Medications:         ACETAMINOPHEN PO       betamethasone dipropionate (DIPROSONE) 0.05 % external cream       clotrimazole (LOTRIMIN) 1 % cream       ketoconazole (NIZORAL) 2 % cream       Multiple Vitamins-Minerals (MENS ONE DAILY PO)       sildenafil (REVATIO) 20 MG tablet       simvastatin (ZOCOR) 20 MG tablet       warfarin ANTICOAGULANT (COUMADIN) 5 MG tablet          Review of Systems   Constitutional: Negative for chills and fever.   HENT: Negative for congestion and sore throat.    Eyes: Positive for pain. Negative for photophobia and discharge.   Respiratory: Negative for cough.    Cardiovascular: Negative for chest pain.       Physical Exam   BP: (!) 146/75  Pulse: 82  Temp: 97.9  F (36.6  C)  Resp: 16  Height: 179.1 cm (5' 10.5\")  Weight: 88.9 kg (196 lb)  SpO2: 96 %    Physical Exam  GEN: Awake, alert, and cooperative. No acute distress  HENT: MMM. External ears and nose normal bilaterally.  No rash or skin tenderness on scalp.  EYES: EOM intact.  No RAPD.  Mild conjunctival injection bilaterally.  Visual acuity 10/6.5 bilaterally.  Intraocular pressure OS: 8-8-9; OD 8-8-8.  No fluorescein uptake on slit-lamp examination.  He does have a small lesion on his lower eyelid consistent with chalazion.  Also has what appears to be pinguecula except is clear and not white.  No pain with extraocular movements.  No cell or flare on slit lamp examination.  NECK: Supple, symmetric.  No midline tenderness.  No paraspinal muscular tenderness.  No pain with rotation laterally to the left, to right, or with flexion.  CV : Extremities warm and well-perfused  PULM: Normal effort.   NEURO: Normal speech. Following commands.   INT: Warm. No diaphoresis. Normal " color.        ED Course        Procedures           Critical Care time:  none               No results found for this or any previous visit (from the past 24 hour(s)).    Medications   tetracaine (PONTOCAINE) 0.5 % ophthalmic solution 1-2 drop (has no administration in time range)       Assessments & Plan (with Medical Decision Making)   70 year old male with past medical history of DVT on anticoagulation, bilateral cataract surgery 2019 with right eye and neck pain described in HPI.  On arrival to Emergency Department, vital signs were blood pressure 146/75, temperature 97.9, pulse 82, respiratory rate 16, SPO2 96% on room air.  Ocular exam described above.  I do not see any evidence of zoster on his skin or dendritic lesions on his eye.  He does appear to have a chalazion and what appears similar to pinguecula however it is clear and not wait.  Normal vision normal intraocular pressures unremarkable slit-lamp examination.  No neck pain or tenderness on exam.  I feel that these 2 are likely unrelated.  If he does develop a rash she will need evaluation for zoster.  Given his reassuring evaluation I feel he is appropriate to be discharged emergency department and can [facility].  Follow-up and ED return precautions discussed with patient.  He expresses agreement understanding of plan and discharged in stable condition.    I have reviewed the nursing notes.         Discharge Medication List as of 11/4/2020  4:51 PM          Final diagnoses:   Acute right eye pain   Chalazion of right lower eyelid   Pinguecula of right eye - probable     Aneudy Palacios MD    11/4/2020   [facility] EMERGENCY DEPT    Disclaimer: This note consists of words and symbols derived from keyboarding and dictation using voice recognition software.  As a result, there may be errors that have gone undetected.  Please consider this when interpreting information found in this  note.             Aneudy Palacios MD  11/04/20 7507

## 2020-11-04 NOTE — TELEPHONE ENCOUNTER
Reason for call:  Patient reporting a symptom    Symptom or request: Patient has shooting pain in eye in various situations, often when he turns his neck    Duration (how long have symptoms been present): 2 weeks    Have you been treated for this before? No    Phone Number patient can be reached at:  Cell number on file:    Telephone Information:   Mobile [phone]       Best Time:  Any    Can we leave a detailed message on this number:  YES    Call taken on 11/4/2020 at 11:40 AM by Lori Grey

## 2020-11-04 NOTE — DISCHARGE INSTRUCTIONS
Your evaluation the emergency department was reassuring.  Your intraocular pressures were 8 on the left and on the right.  No fluorescein uptake on your exam.  You have what appears to be a chalazion on your lower lid and a pinguecula. I did not see any evidence of herpes zoster.  Keep your appointment with Dr. Baird at [facility] on Friday.  If you have worsening pain, acute change in your vision, or other new concerning symptoms, return to the emergency department for further evaluation and treatment.

## 2020-11-04 NOTE — ED NOTES
Pt complains of right eye pain.  Had recent stye, states that he used drops as directed, but has now had a few episodes of turning his head and his eye hurts.  Pt denies vision changes.  [facility] but PMD's nurse advised to come to ED/UC. No hx of blepharitis.

## 2020-11-04 NOTE — ED NOTES
Per RN, I did a vision Acuity on Pt \.     I used a 10' distances eye chart, Pt     Scored 10/6.5 on both eyes.

## 2020-11-04 NOTE — PATIENT INSTRUCTIONS
Go directly to the [facility] for further evaluation. They are expecting you.

## 2020-11-04 NOTE — ED TRIAGE NOTES
sometimes when he turns his head he gets a sharp pain up into his head, this has been increasing over the past 1.5 weeks.

## 2020-11-04 NOTE — PROGRESS NOTES
ANTICOAGULATION  MANAGEMENT: Discharge Review    Jose M Rea chart reviewed for anticoagulation continuity of care    Emergency room visit on 11/4/2020 for Eye Complaint.    Discharge disposition: Home    Results:    No results for input(s): INR, [id number], AXA in the last 168 hours.  Anticoagulation inpatient management:     not applicable     Anticoagulation discharge instructions:     Warfarin dosing: home regimen continued   Bridging: No   INR goal change: No      Medication changes affecting anticoagulation: No    Additional factors affecting anticoagulation: No    Plan     No adjustment to anticoagulation plan needed    Patient not contacted    No adjustment to Anticoagulation Calendar was required    Kb Braun RN

## 2020-11-04 NOTE — ED AVS SNAPSHOT
[facility]  [address] [address]  Phone: [phone]  Fax: [phone]                                    Jose M Rea   MRN: [id number]    Department: [facility]   Date of Visit: 11/4/2020           After Visit Summary Signature Page    I have received my discharge instructions, and my questions have been answered. I have discussed any challenges I see with this plan with the nurse or doctor.    ..........................................................................................................................................  Patient/Patient Representative Signature      ..........................................................................................................................................  Patient Representative Print Name and Relationship to Patient    ..................................................               ................................................  Date                                   Time    ..........................................................................................................................................  Reviewed by Signature/Title    ...................................................              ..............................................  Date                                               Time          22EPIC Rev 08/18

## 2020-11-04 NOTE — PROGRESS NOTES
"Subjective     Jose M Rea is a 70 year old male who presents to clinic today for the following health issues:    HPI       Chief Complaint   Patient presents with     Eye Problem     Right eye pressure and pain x1 week off and on      Neck Pain     When he turns head right neck area hurts and radiates to right eye                Review of Systems   Constitutional, HEENT, cardiovascular, pulmonary, GI, , musculoskeletal, neuro, skin, endocrine and psych systems are negative, except as otherwise noted.      Objective    /82 (BP Location: Right arm, Patient Position: Sitting, Cuff Size: Adult Large)   Pulse 80   Temp 97.8  F (36.6  C) (Tympanic)   Resp 14   Wt 89 kg (196 lb 3.2 oz)   BMI 28.15 kg/m    Body mass index is 28.15 kg/m .  Physical Exam   GENERAL: healthy, alert and no distress, nontoxic in appearance  Remainder of exam not done due to fact he needs more indepth workup and should be in ER for this. He will see eye doctor Friday morning.  No results found for this or any previous visit (from the past 24 hour(s)).        Assessment & Plan   Problem List Items Addressed This Visit     None      Visit Diagnoses     Acute right eye pain    -  Primary             BMI:   Estimated body mass index is 28.15 kg/m  as calculated from the following:    Height as of 8/12/20: 1.778 m (5' 10\").    Weight as of this encounter: 89 kg (196 lb 3.2 oz).   Weight management plan: Patient was referred to their PCP to discuss a diet and exercise plan.           Patient Instructions   Go directly to the [place] [facility] for further evaluation. They are expecting you.    No follow-ups on file.    NICHELLE Luo [facility] [place]    "

## 2020-11-09 ENCOUNTER — ALLIED HEALTH/NURSE VISIT (OUTPATIENT)
Dept: FAMILY MEDICINE | Facility: CLINIC | Age: 70
End: 2020-11-09
Payer: MEDICARE

## 2020-11-09 DIAGNOSIS — Z23 NEED FOR VACCINATION: Primary | ICD-10-CM

## 2020-11-09 PROCEDURE — 99207 PR NO CHARGE NURSE ONLY: CPT

## 2020-11-09 PROCEDURE — 90750 HZV VACC RECOMBINANT IM: CPT

## 2020-11-09 PROCEDURE — 90471 IMMUNIZATION ADMIN: CPT

## 2020-11-09 NOTE — NURSING NOTE
Chief Complaint   Patient presents with     Imm/Inj     SHINGRIX #1     Prior to immunization administration, verified patients identity using patient s name and date of birth. Please see Immunization Activity for additional information.     Screening Questionnaire for Adult Immunization    Are you sick today?   No   Do you have allergies to medications, food, a vaccine component or latex?   No   Have you ever had a serious reaction after receiving a vaccination?   No   Do you have a long-term health problem with heart, lung, kidney, or metabolic disease (e.g., diabetes), asthma, a blood disorder, no spleen, complement component deficiency, a cochlear implant, or a spinal fluid leak?  Are you on long-term aspirin therapy?   No   Do you have cancer, leukemia, HIV/AIDS, or any other immune system problem?   No   Do you have a parent, brother, or sister with an immune system problem?   No   In the past 3 months, have you taken medications that affect  your immune system, such as prednisone, other steroids, or anticancer drugs; drugs for the treatment of rheumatoid arthritis, Crohn s disease, or psoriasis; or have you had radiation treatments?   No   Have you had a seizure, or a brain or other nervous system problem?   No   During the past year, have you received a transfusion of blood or blood    products, or been given immune (gamma) globulin or antiviral drug?   No   For women: Are you pregnant or is there a chance you could become       pregnant during the next month?   No   Have you received any vaccinations in the past 4 weeks?   No     Immunization questionnaire answers were all negative.        Per orders of Dr. VALLEJO, injection of SHINGRIX #1 given by Evonne Kaiser CMA. Patient instructed to remain in clinic for 15 minutes afterwards, and to report any adverse reaction to me immediately.       Screening performed by Evonne Kaiser CMA on 11/9/2020 at 10:44 AM.    
nonweight-bearing

## 2020-11-11 ENCOUNTER — ANTICOAGULATION THERAPY VISIT (OUTPATIENT)
Dept: ANTICOAGULATION | Facility: CLINIC | Age: 70
End: 2020-11-11

## 2020-11-11 DIAGNOSIS — I82.4Y9 ACUTE DEEP VEIN THROMBOSIS (DVT) OF PROXIMAL VEIN OF LOWER EXTREMITY, UNSPECIFIED LATERALITY (H): ICD-10-CM

## 2020-11-11 DIAGNOSIS — Z79.01 LONG TERM CURRENT USE OF ANTICOAGULANT THERAPY: ICD-10-CM

## 2020-11-11 DIAGNOSIS — I82.4Y9 ACUTE DEEP VEIN THROMBOSIS (DVT) OF PROXIMAL VEIN OF LOWER EXTREMITY (H): ICD-10-CM

## 2020-11-11 DIAGNOSIS — Z79.01 CHRONIC ANTICOAGULATION: ICD-10-CM

## 2020-11-11 DIAGNOSIS — D68.9 COAGULATION DEFECT (H): ICD-10-CM

## 2020-11-11 LAB
CAPILLARY BLOOD COLLECTION: NORMAL
INR PPP: 3 (ref 0.86–1.14)

## 2020-11-11 PROCEDURE — 85610 PROTHROMBIN TIME: CPT | Performed by: FAMILY MEDICINE

## 2020-11-11 PROCEDURE — 36416 COLLJ CAPILLARY BLOOD SPEC: CPT | Performed by: FAMILY MEDICINE

## 2020-11-11 PROCEDURE — 99207 PR NO CHARGE NURSE ONLY: CPT

## 2020-11-11 NOTE — PROGRESS NOTES
ANTICOAGULATION FOLLOW-UP CLINIC VISIT    Patient Name:  Jose M Rea  Date:  11/11/2020  Contact Type:  Telephone    SUBJECTIVE:  Patient Findings     Comments:  No changes in medications, activity, or diet noted. No concerns with clotting, bleeding, or increased bruising noted. Took warfarin as prescribed.  Patient is to continue maintenance warfarin plan, and check INR in 4 weeks.  Patient verbalizes understanding and agrees to plan. No further questions or concerns.        Clinical Outcomes     Negatives:  Major bleeding event, Thromboembolic event, Anticoagulation-related hospital admission, Anticoagulation-related ED visit, Anticoagulation-related fatality    Comments:  No changes in medications, activity, or diet noted. No concerns with clotting, bleeding, or increased bruising noted. Took warfarin as prescribed.  Patient is to continue maintenance warfarin plan, and check INR in 4 weeks.  Patient verbalizes understanding and agrees to plan. No further questions or concerns.           OBJECTIVE    Recent labs: (last 7 days)     11/11/20  0755   INR 3.00*       ASSESSMENT / PLAN  INR assessment THER    Recheck INR In: 4 WEEKS    INR Location Clinic      Anticoagulation Summary  As of 11/11/2020    INR goal:  2.0-3.0   TTR:  88.6 % (1 y)   INR used for dosing:  3.00 (11/11/2020)   Warfarin maintenance plan:  10 mg (5 mg x 2) every Mon, Thu; 7.5 mg (5 mg x 1.5) all other days   Full warfarin instructions:  10 mg every Mon, Thu; 7.5 mg all other days   Weekly warfarin total:  57.5 mg   No change documented:  Salma Diane RN   Plan last modified:  Salma Diane RN (9/9/2020)   Next INR check:  12/9/2020   Priority:  Maintenance   Target end date:  Indefinite    Indications    Coagulation defect (H) [D68.9]  Acute deep vein thrombosis (DVT) of proximal vein of lower extremity (H) [I82.4Y9]  Long term current use of anticoagulant therapy [Z79.01]  Acute deep vein thrombosis (DVT) of proximal vein of lower  extremity  unspecified laterality (H) [I82.4Y9]             Anticoagulation Episode Summary     INR check location:      Preferred lab:      Send INR reminders to:  [facility]    Comments:  * Tx from [place]. Factor 5 Leiden heterozygous positive. Indefinite anticoagulation per Dr. Hein on 3-27-30. Consent to communicate with wifeViri      Anticoagulation Care Providers     Provider Role Specialty Phone number    Pj Quijano MD Referring Family Medicine [phone]            See the Encounter Report to view Anticoagulation Flowsheet and Dosing Calendar (Go to Encounters tab in chart review, and find the Anticoagulation Therapy Visit)        Salma Diane RN

## 2020-12-04 ENCOUNTER — TELEPHONE (OUTPATIENT)
Dept: FAMILY MEDICINE | Facility: CLINIC | Age: 70
End: 2020-12-04

## 2020-12-04 NOTE — TELEPHONE ENCOUNTER
Patient calling to inform [facility] that he is scheduled for 1 tooth extraction on 12/10/20 and that dentist prefers his INR <3.0.    Patient's INR has been at 3 or slightly above, recently, so I informed patient to have 2 small servings of greens prior to his INR on 12/09/20.    I also informed patient that warfarin works best with consistent amounts of vitamin K, he will consider.    Elvia Rueda RN

## 2020-12-09 ENCOUNTER — ANTICOAGULATION THERAPY VISIT (OUTPATIENT)
Dept: ANTICOAGULATION | Facility: CLINIC | Age: 70
End: 2020-12-09

## 2020-12-09 DIAGNOSIS — D68.9 COAGULATION DEFECT (H): ICD-10-CM

## 2020-12-09 DIAGNOSIS — Z79.01 LONG TERM CURRENT USE OF ANTICOAGULANT THERAPY: ICD-10-CM

## 2020-12-09 DIAGNOSIS — Z79.01 CHRONIC ANTICOAGULATION: ICD-10-CM

## 2020-12-09 DIAGNOSIS — I82.4Y9 ACUTE DEEP VEIN THROMBOSIS (DVT) OF PROXIMAL VEIN OF LOWER EXTREMITY (H): ICD-10-CM

## 2020-12-09 DIAGNOSIS — I82.4Y9 ACUTE DEEP VEIN THROMBOSIS (DVT) OF PROXIMAL VEIN OF LOWER EXTREMITY, UNSPECIFIED LATERALITY (H): ICD-10-CM

## 2020-12-09 LAB
CAPILLARY BLOOD COLLECTION: NORMAL
INR PPP: 3.1 (ref 0.86–1.14)

## 2020-12-09 PROCEDURE — 85610 PROTHROMBIN TIME: CPT | Performed by: FAMILY MEDICINE

## 2020-12-09 PROCEDURE — 99207 PR NO CHARGE NURSE ONLY: CPT

## 2020-12-09 PROCEDURE — 36416 COLLJ CAPILLARY BLOOD SPEC: CPT | Performed by: FAMILY MEDICINE

## 2020-12-09 RX ORDER — WARFARIN SODIUM 5 MG/1
TABLET ORAL
Qty: 150 TABLET | Refills: 0 | COMMUNITY
Start: 2020-12-09 | End: 2021-03-11

## 2020-12-09 NOTE — PROGRESS NOTES
ANTICOAGULATION FOLLOW-UP CLINIC VISIT    Patient Name:  Jose M Rea  Date:  12/9/2020  Contact Type:  Telephone    SUBJECTIVE:  Patient Findings     Comments:  No changes in medications, activity, or diet noted. No concerns with clotting, bleeding, or increased bruising noted. Took warfarin as prescribed. Pt does not consume a lot of greens. He eats salads but it mainly contains iceberg lettuce. Pt did not want to increase greens at this time (vitamin K).  Reduce maintenance dose by 4.3% and recheck INR in 2 weeks. Pt prefers to lower dose as pt has been on the higher end of his goal range.  Patient educated on the increased risk for bleeding, precautions to take, and when to seek medical attention.  Patient verbalizes understanding and agrees to plan. No further questions or concerns.        Clinical Outcomes     Negatives:  Major bleeding event, Thromboembolic event, Anticoagulation-related hospital admission, Anticoagulation-related ED visit, Anticoagulation-related fatality    Comments:  No changes in medications, activity, or diet noted. No concerns with clotting, bleeding, or increased bruising noted. Took warfarin as prescribed. Pt does not consume a lot of greens. He eats salads but it mainly contains iceberg lettuce. Pt did not want to increase greens at this time (vitamin K).  Reduce maintenance dose by 4.3% and recheck INR in 2 weeks. Pt prefers to lower dose as pt has been on the higher end of his goal range.  Patient educated on the increased risk for bleeding, precautions to take, and when to seek medical attention.  Patient verbalizes understanding and agrees to plan. No further questions or concerns.           OBJECTIVE    Recent labs: (last 7 days)     12/09/20  0919   INR 3.10*       ASSESSMENT / PLAN  INR assessment SUPRA    Recheck INR In: 2 WEEKS    INR Location Clinic      Anticoagulation Summary  As of 12/9/2020    INR goal:  2.0-3.0   TTR:  80.9 % (1 y)   INR used for dosing:  3.10  (12/9/2020)   Warfarin maintenance plan:  10 mg (5 mg x 2) every Mon; 7.5 mg (5 mg x 1.5) all other days   Full warfarin instructions:  10 mg every Mon; 7.5 mg all other days   Weekly warfarin total:  55 mg   Plan last modified:  Salma Diane RN (12/9/2020)   Next INR check:  12/23/2020   Priority:  High   Target end date:  Indefinite    Indications    Coagulation defect (H) [D68.9]  Acute deep vein thrombosis (DVT) of proximal vein of lower extremity (H) [I82.4Y9]  Long term current use of anticoagulant therapy [Z79.01]  Acute deep vein thrombosis (DVT) of proximal vein of lower extremity  unspecified laterality (H) [I82.4Y9]             Anticoagulation Episode Summary     INR check location:      Preferred lab:      Send INR reminders to:  [facility]    Comments:  * Tx from [place]. Factor 5 Leiden heterozygous positive. Indefinite anticoagulation per Dr. Hein on 3-27-30. Consent to communicate with Viri ma      Anticoagulation Care Providers     Provider Role Specialty Phone number    Pj Quijano MD Referring Family Medicine [phone]            See the Encounter Report to view Anticoagulation Flowsheet and Dosing Calendar (Go to Encounters tab in chart review, and find the Anticoagulation Therapy Visit)        Salma Diane RN

## 2020-12-23 ENCOUNTER — ANTICOAGULATION THERAPY VISIT (OUTPATIENT)
Dept: ANTICOAGULATION | Facility: CLINIC | Age: 70
End: 2020-12-23

## 2020-12-23 DIAGNOSIS — Z79.01 LONG TERM CURRENT USE OF ANTICOAGULANT THERAPY: ICD-10-CM

## 2020-12-23 DIAGNOSIS — I82.4Y9 ACUTE DEEP VEIN THROMBOSIS (DVT) OF PROXIMAL VEIN OF LOWER EXTREMITY (H): ICD-10-CM

## 2020-12-23 DIAGNOSIS — D68.9 COAGULATION DEFECT (H): ICD-10-CM

## 2020-12-23 DIAGNOSIS — Z79.01 CHRONIC ANTICOAGULATION: ICD-10-CM

## 2020-12-23 DIAGNOSIS — I82.4Y9 ACUTE DEEP VEIN THROMBOSIS (DVT) OF PROXIMAL VEIN OF LOWER EXTREMITY, UNSPECIFIED LATERALITY (H): ICD-10-CM

## 2020-12-23 LAB
CAPILLARY BLOOD COLLECTION: NORMAL
INR PPP: 2.7 (ref 0.86–1.14)

## 2020-12-23 PROCEDURE — 85610 PROTHROMBIN TIME: CPT | Performed by: FAMILY MEDICINE

## 2020-12-23 PROCEDURE — 99207 PR NO CHARGE NURSE ONLY: CPT

## 2020-12-23 PROCEDURE — 36416 COLLJ CAPILLARY BLOOD SPEC: CPT | Performed by: FAMILY MEDICINE

## 2020-12-23 NOTE — PROGRESS NOTES
ANTICOAGULATION FOLLOW-UP CLINIC VISIT    Patient Name:  Jose M Rea  Date:  2020  Contact Type:  Telephone    SUBJECTIVE:  Patient Findings     Positives:  Upcoming dental procedure    Comments:  Patient may have a tooth extracted on 2020.   Patient will continue weekly maintenance dose. INR is therapeutic.   Recheck in 3 weeks. Possible sooner depending on dental extraction.  Patient verbalizes understanding and agrees to plan. No further questions or concerns.          Clinical Outcomes     Comments:  Patient may have a tooth extracted on 2020.   Patient will continue weekly maintenance dose. INR is therapeutic.   Recheck in 3 weeks. Possible sooner depending on dental extraction.  Patient verbalizes understanding and agrees to plan. No further questions or concerns.             OBJECTIVE    Recent labs: (last 7 days)     20  0919   INR 2.70*       ASSESSMENT / PLAN  INR assessment THER    Recheck INR In: 3 WEEKS    INR Location Outside lab      Anticoagulation Summary  As of 2020    INR goal:  2.0-3.0   TTR:  80.0 % (1 y)   INR used for dosin.70 (2020)   Warfarin maintenance plan:  10 mg (5 mg x 2) every Mon; 7.5 mg (5 mg x 1.5) all other days   Full warfarin instructions:  10 mg every Mon; 7.5 mg all other days   Weekly warfarin total:  55 mg   No change documented:  Kb Braun RN   Plan last modified:  Salma Diane RN (2020)   Next INR check:  2021   Priority:  High   Target end date:  Indefinite    Indications    Coagulation defect (H) [D68.9]  Acute deep vein thrombosis (DVT) of proximal vein of lower extremity (H) [I82.4Y9]  Long term current use of anticoagulant therapy [Z79.01]  Acute deep vein thrombosis (DVT) of proximal vein of lower extremity  unspecified laterality (H) [I82.4Y9]             Anticoagulation Episode Summary     INR check location:      Preferred lab:      Send INR reminders to:  [facility]    Comments:  * Tx  from [place]. Factor 5 Leiden heterozygous positive. Indefinite anticoagulation per Dr. Hein on 3-27-30. Consent to communicate with wife, Viri      Anticoagulation Care Providers     Provider Role Specialty Phone number    Pj Quijano MD Referring Family Medicine [phone]            See the Encounter Report to view Anticoagulation Flowsheet and Dosing Calendar (Go to Encounters tab in chart review, and find the Anticoagulation Therapy Visit)        Kb Braun RN

## 2021-01-02 ENCOUNTER — OFFICE VISIT (OUTPATIENT)
Dept: URGENT CARE | Facility: URGENT CARE | Age: 71
End: 2021-01-02
Payer: MEDICARE

## 2021-01-02 VITALS
HEART RATE: 84 BPM | DIASTOLIC BLOOD PRESSURE: 68 MMHG | SYSTOLIC BLOOD PRESSURE: 122 MMHG | TEMPERATURE: 97.7 F | WEIGHT: 193 LBS | BODY MASS INDEX: 27.3 KG/M2 | RESPIRATION RATE: 16 BRPM

## 2021-01-02 DIAGNOSIS — H69.90 DYSFUNCTION OF EUSTACHIAN TUBE, UNSPECIFIED LATERALITY: Primary | ICD-10-CM

## 2021-01-02 PROCEDURE — 99213 OFFICE O/P EST LOW 20 MIN: CPT | Performed by: FAMILY MEDICINE

## 2021-01-02 ASSESSMENT — PAIN SCALES - GENERAL: PAINLEVEL: NO PAIN (0)

## 2021-01-02 NOTE — PROGRESS NOTES
SUBJECTIVE:  Jose M Rea is a 70 year old male who presents with bilateral ear fullness and pressure for 1 week(s).   Severity: mild   Timing:gradual onset and still present  Additional symptoms include none.      History of recurrent otitis: no    Past Medical History:   Diagnosis Date     ED (erectile dysfunction)      Hyperlipidemia      Current Outpatient Medications   Medication Sig Dispense Refill     ACETAMINOPHEN PO Take 650 mg by mouth       betamethasone dipropionate (DIPROSONE) 0.05 % external cream Apply twice daily as needed to affected area on leg. 45 g 1     clotrimazole (LOTRIMIN) 1 % cream as needed        ketoconazole (NIZORAL) 2 % cream as needed        Multiple Vitamins-Minerals (MENS ONE DAILY PO)        sildenafil (REVATIO) 20 MG tablet Sidenafil (Viagra) comes in 20mg strength pills. Take as many pills as needed up to maximum of 5 pills at a time prior to intercourse.  Hold on file until needed. 30 tablet 11     simvastatin (ZOCOR) 20 MG tablet Take 1 tablet (20 mg) by mouth daily 90 tablet 3     warfarin ANTICOAGULANT (COUMADIN) 5 MG tablet Take 10 mg every Mon; 7.5 mg all other days or As directed by Anticoagulation clinic 150 tablet 0     History   Smoking Status     Current Every Day Smoker     Packs/day: 0.50     Years: 50.00     Types: Cigarettes   Smokeless Tobacco     Never Used     Comment: started at age 18, 1/2 pack daily       ROS:   Review of systems negative except as stated above.    OBJECTIVE:  /68 (BP Location: Right arm, Patient Position: Sitting, Cuff Size: Adult Regular)   Pulse 84   Temp 97.7  F (36.5  C) (Tympanic)   Resp 16   Wt 87.5 kg (193 lb)   BMI 27.30 kg/m    The right TM is normal: no effusions, no erythema, and normal landmarks and small cerumen     The right auditory canal is normal and without drainage, edema or erythema  The left TM is normal: no effusions, no erythema, and normal landmarks  The left auditory canal is small cerumen  Oropharynx  exam is normal: no lesions, erythema, adenopathy or exudate.  GENERAL: alert, mild distress and patient says he gets anxious about things  EYES: EOMI,  PERRL, conjunctiva clear  NECK: supple, non-tender to palpation, no adenopathy noted  RESP: lungs clear to auscultation - no rales, rhonchi or wheezes  CV: regular rates and rhythm, normal S1 S2, no murmur noted  SKIN: no suspicious lesions or rashes     ASSESSMENT:  Eustachin tube dysfunction and mild cerumenosis      PLAN:  Ears were irrigated by staff with some improvement.  Encourage to try Certrizine one daily.  Follow up with primary care provider if no improvement.

## 2021-01-10 ENCOUNTER — NURSE TRIAGE (OUTPATIENT)
Dept: NURSING | Facility: CLINIC | Age: 71
End: 2021-01-10

## 2021-01-11 NOTE — TELEPHONE ENCOUNTER
"Pt reports small amount of rectal bleeding, bright red, only on toilet paper, after having a normal BM around 5:30 pm. No constipation or diarrhea. No continued bleeding. No vomiting, dizziness or.abdominal pain. On warfarin. Last INR 12/23/20 was 2.7. Triaged to ED now or pcp triage. Pt does not wish to go ED; requests PCP triage. Paged on-call Dr Doe @6:22pm. C/b rec'd @6:26pm. Disc'd above w/ Dr Doe. Dr Doe states pt does not need to go to ED if only small amount of bleeding on toilet paper. Tell him call back if bleeding becomes continuous or occurs again; if a lot of bleeding go to ED. Follow up w/ PCP this week in clinic. Disc'd on-call's response w/ pt at 6:29pm   Pt mentioned at very end of call that  he may have taken one extra Zyrtec today by mistake. Advised pt contact Poison Control at [phone] and follow their instructions.   Pt voiced understanding of and agreement with above plan .         Reason for Disposition    Taking Coumadin (warfarin) or other strong blood thinner, or known bleeding disorder (e.g., thrombocytopenia)    Additional Information    Negative: Shock suspected (e.g., cold/pale/clammy skin, too weak to stand, low BP, rapid pulse)    Negative: Difficult to awaken or acting confused (e.g., disoriented, slurred speech)    Negative: Passed out (i.e., lost consciousness, collapsed and was not responding)    Negative: [1] Vomiting AND [2] contains red blood or black (\"coffee ground\") material  (Exception: few red streaks in vomit that only happened once)    Negative: Sounds like a life-threatening emergency to the triager    Negative: Diarrhea is main symptom    Negative: Stool color other than brown or tan is main concern  (no bleeding and no melena)    Negative: SEVERE rectal bleeding (large blood clots; on and off, or constant bleeding)    Negative: SEVERE dizziness (e.g., unable to stand, requires support to walk, feels like passing out now)    Negative: [1] MODERATE rectal " bleeding (small blood clots, passing blood without stool, or toilet water turns red) AND [2] more than once a day    Negative: Pale skin (pallor) of new onset or worsening    Negative: Tarry or jet black-colored stool (not dark green)    Negative: [1] Constant abdominal pain AND [2] present > 2 hours    Negative: Rectal foreign body (i.e., now or within past week;  inserted or swallowed)    Protocols used: RECTAL BLEEDING-A-AH

## 2021-01-13 ENCOUNTER — ANTICOAGULATION THERAPY VISIT (OUTPATIENT)
Dept: ANTICOAGULATION | Facility: CLINIC | Age: 71
End: 2021-01-13

## 2021-01-13 DIAGNOSIS — Z79.01 LONG TERM CURRENT USE OF ANTICOAGULANT THERAPY: ICD-10-CM

## 2021-01-13 DIAGNOSIS — Z79.01 CHRONIC ANTICOAGULATION: ICD-10-CM

## 2021-01-13 DIAGNOSIS — I82.4Y9 ACUTE DEEP VEIN THROMBOSIS (DVT) OF PROXIMAL VEIN OF LOWER EXTREMITY, UNSPECIFIED LATERALITY (H): ICD-10-CM

## 2021-01-13 DIAGNOSIS — I82.4Y9 ACUTE DEEP VEIN THROMBOSIS (DVT) OF PROXIMAL VEIN OF LOWER EXTREMITY (H): ICD-10-CM

## 2021-01-13 DIAGNOSIS — D68.9 COAGULATION DEFECT (H): ICD-10-CM

## 2021-01-13 LAB
CAPILLARY BLOOD COLLECTION: NORMAL
INR PPP: 2.6 (ref 0.86–1.14)

## 2021-01-13 PROCEDURE — 36416 COLLJ CAPILLARY BLOOD SPEC: CPT | Performed by: FAMILY MEDICINE

## 2021-01-13 PROCEDURE — 99207 PR NO CHARGE NURSE ONLY: CPT

## 2021-01-13 PROCEDURE — 85610 PROTHROMBIN TIME: CPT | Performed by: FAMILY MEDICINE

## 2021-01-13 NOTE — PROGRESS NOTES
ANTICOAGULATION FOLLOW-UP CLINIC VISIT    Patient Name:  Jose M Rea  Date:  2021  Contact Type:  Telephone    SUBJECTIVE:  Patient Findings     Positives:  Signs/symptoms of bleeding, Change in medications (zyrtec started)    Comments:  Patient had a small amount of bright red blood on toilet paper after a normal BM on 10. No bleeding since then and provider is aware. No changes in diet or general health. Recheck INR in 4-5 weeks.        Clinical Outcomes     Comments:  Patient had a small amount of bright red blood on toilet paper after a normal BM on 1-10. No bleeding since then and provider is aware. No changes in diet or general health. Recheck INR in 4-5 weeks.           OBJECTIVE    Recent labs: (last 7 days)     21  0907   INR 2.60*       ASSESSMENT / PLAN  INR assessment THER    Recheck INR In: 4 WEEKS    INR Location Clinic      Anticoagulation Summary  As of 2021    INR goal:  2.0-3.0   TTR:  80.0 % (1 y)   INR used for dosin.60 (2021)   Warfarin maintenance plan:  10 mg (5 mg x 2) every Mon; 7.5 mg (5 mg x 1.5) all other days   Full warfarin instructions:  10 mg every Mon; 7.5 mg all other days   Weekly warfarin total:  55 mg   No change documented:  Joanie Yin RN   Plan last modified:  Salma Diane, RN (2020)   Next INR check:  2/10/2021   Priority:  Maintenance   Target end date:  Indefinite    Indications    Coagulation defect (H) [D68.9]  Acute deep vein thrombosis (DVT) of proximal vein of lower extremity (H) [I82.4Y9]  Long term current use of anticoagulant therapy [Z79.01]  Acute deep vein thrombosis (DVT) of proximal vein of lower extremity  unspecified laterality (H) [I82.4Y9]             Anticoagulation Episode Summary     INR check location:      Preferred lab:      Send INR reminders to:  [facility]    Comments:  * Tx from [place]. Factor 5 Leiden heterozygous positive. Indefinite anticoagulation per Dr. Hein on 3-27-30. Consent to  communicate with wife, Viri. Please call mobile # first      Anticoagulation Care Providers     Provider Role Specialty Phone number    Pj Quijano MD Referring Family Medicine [phone]            See the Encounter Report to view Anticoagulation Flowsheet and Dosing Calendar (Go to Encounters tab in chart review, and find the Anticoagulation Therapy Visit)        Joanie Yin RN

## 2021-01-27 ENCOUNTER — ALLIED HEALTH/NURSE VISIT (OUTPATIENT)
Dept: FAMILY MEDICINE | Facility: CLINIC | Age: 71
End: 2021-01-27
Payer: MEDICARE

## 2021-01-27 DIAGNOSIS — Z23 NEED FOR VACCINATION: Primary | ICD-10-CM

## 2021-01-27 PROCEDURE — 90471 IMMUNIZATION ADMIN: CPT

## 2021-01-27 PROCEDURE — 90750 HZV VACC RECOMBINANT IM: CPT

## 2021-01-27 PROCEDURE — 99207 PR NO CHARGE NURSE ONLY: CPT

## 2021-01-27 NOTE — PROGRESS NOTES
Prior to immunization administration, verified patients identity using patient s name and date of birth. Please see Immunization Activity for additional information.     Screening Questionnaire for Adult Immunization    Are you sick today?   No   Do you have allergies to medications, food, a vaccine component or latex?   No   Have you ever had a serious reaction after receiving a vaccination?   No   Do you have a long-term health problem with heart, lung, kidney, or metabolic disease (e.g., diabetes), asthma, a blood disorder, no spleen, complement component deficiency, a cochlear implant, or a spinal fluid leak?  Are you on long-term aspirin therapy?   No   Do you have cancer, leukemia, HIV/AIDS, or any other immune system problem?   No   Do you have a parent, brother, or sister with an immune system problem?   No   In the past 3 months, have you taken medications that affect  your immune system, such as prednisone, other steroids, or anticancer drugs; drugs for the treatment of rheumatoid arthritis, Crohn s disease, or psoriasis; or have you had radiation treatments?   No   Have you had a seizure, or a brain or other nervous system problem?   No   During the past year, have you received a transfusion of blood or blood    products, or been given immune (gamma) globulin or antiviral drug?   No   For women: Are you pregnant or is there a chance you could become       pregnant during the next month?   No   Have you received any vaccinations in the past 4 weeks?   No     Immunization questionnaire answers were all negative.        Per orders of Dr. Dillard, injection of SHINGRIX #2 given by Evonne Kaiser CMA. Patient instructed to remain in clinic for 15 minutes afterwards, and to report any adverse reaction to me immediately.       Screening performed by Evonne Kaiser CMA on 1/27/2021 at 10:36 AM.

## 2021-02-10 ENCOUNTER — ANTICOAGULATION THERAPY VISIT (OUTPATIENT)
Dept: ANTICOAGULATION | Facility: CLINIC | Age: 71
End: 2021-02-10

## 2021-02-10 DIAGNOSIS — I82.4Y9 ACUTE DEEP VEIN THROMBOSIS (DVT) OF PROXIMAL VEIN OF LOWER EXTREMITY, UNSPECIFIED LATERALITY (H): ICD-10-CM

## 2021-02-10 DIAGNOSIS — I82.4Y9 ACUTE DEEP VEIN THROMBOSIS (DVT) OF PROXIMAL VEIN OF LOWER EXTREMITY (H): ICD-10-CM

## 2021-02-10 DIAGNOSIS — Z79.01 LONG TERM CURRENT USE OF ANTICOAGULANT THERAPY: ICD-10-CM

## 2021-02-10 DIAGNOSIS — Z79.01 CHRONIC ANTICOAGULATION: ICD-10-CM

## 2021-02-10 DIAGNOSIS — D68.9 COAGULATION DEFECT (H): ICD-10-CM

## 2021-02-10 LAB
CAPILLARY BLOOD COLLECTION: NORMAL
INR PPP: 3.2 (ref 0.86–1.14)

## 2021-02-10 PROCEDURE — 85610 PROTHROMBIN TIME: CPT | Performed by: FAMILY MEDICINE

## 2021-02-10 PROCEDURE — 36416 COLLJ CAPILLARY BLOOD SPEC: CPT | Performed by: FAMILY MEDICINE

## 2021-02-10 PROCEDURE — 99207 PR NO CHARGE NURSE ONLY: CPT

## 2021-02-10 NOTE — PROGRESS NOTES
ANTICOAGULATION FOLLOW-UP CLINIC VISIT    Patient Name:  Jose M Rea  Date:  2/10/2021  Contact Type:  Telephone    SUBJECTIVE:  Patient Findings     Comments:  No identifiable changes since last INR. Patient had a tooth pulled on 1-19 that was infected but he has not been on abx and area has been good for several weeks. No diet changes. He has been taking zyrtec off and on and wife states he seemed a little congested yesterday but is fine today. He also had the shingles vaccine 2 weeks ago but that should not be affecting his INR today. No other illnesses, diarrhea, pain or swelling. No bleeding concerns. Will continue current dose and recheck after PCP visit in 2+ weeks.        Clinical Outcomes     Negatives:  Major bleeding event, Thromboembolic event, Anticoagulation-related hospital admission, Anticoagulation-related ED visit, Anticoagulation-related fatality    Comments:  No identifiable changes since last INR. Patient had a tooth pulled on 1-19 that was infected but he has not been on abx and area has been good for several weeks. No diet changes. He has been taking zyrtec off and on and wife states he seemed a little congested yesterday but is fine today. He also had the shingles vaccine 2 weeks ago but that should not be affecting his INR today. No other illnesses, diarrhea, pain or swelling. No bleeding concerns. Will continue current dose and recheck after PCP visit in 2+ weeks.           OBJECTIVE    Recent labs: (last 7 days)     02/10/21  0917   INR 3.20*       ASSESSMENT / PLAN  INR assessment SUPRA    Recheck INR In: 2 WEEKS    INR Location Clinic      Anticoagulation Summary  As of 2/10/2021    INR goal:  2.0-3.0   TTR:  77.4 % (1 y)   INR used for dosing:  3.20 (2/10/2021)   Warfarin maintenance plan:  10 mg (5 mg x 2) every Mon; 7.5 mg (5 mg x 1.5) all other days   Full warfarin instructions:  10 mg every Mon; 7.5 mg all other days   Weekly warfarin total:  55 mg   No change documented:   Joanie Yin, RN   Plan last modified:  Salma Diane RN (12/9/2020)   Next INR check:  2/26/2021   Priority:  High   Target end date:  Indefinite    Indications    Coagulation defect (H) [D68.9]  Acute deep vein thrombosis (DVT) of proximal vein of lower extremity (H) [I82.4Y9]  Long term current use of anticoagulant therapy [Z79.01]  Acute deep vein thrombosis (DVT) of proximal vein of lower extremity  unspecified laterality (H) [I82.4Y9]             Anticoagulation Episode Summary     INR check location:      Preferred lab:      Send INR reminders to:  [facility]    Comments:  * Tx from [place]. Factor 5 Leiden heterozygous positive. Indefinite anticoagulation per Dr. Hein on 3-27-30. Consent to communicate with wife, Viri. Please call mobile # first      Anticoagulation Care Providers     Provider Role Specialty Phone number    Pj Quijano MD Referring Family Medicine [phone]            See the Encounter Report to view Anticoagulation Flowsheet and Dosing Calendar (Go to Encounters tab in chart review, and find the Anticoagulation Therapy Visit)        Joanie Yin RN

## 2021-02-12 NOTE — NURSING NOTE
"Chief Complaint   Patient presents with     Consult     Reffered by Dr. Quijano, RT foot arch pains, left foor haveing hot and cold sensations, had back injury 13 months ago        Initial /87 (BP Location: Left arm, Patient Position: Sitting, Cuff Size: Adult Regular)   Pulse 76   Ht 1.772 m (5' 9.75\")   Wt 88 kg (194 lb)   BMI 28.04 kg/m   Estimated body mass index is 28.04 kg/m  as calculated from the following:    Height as of this encounter: 1.772 m (5' 9.75\").    Weight as of this encounter: 88 kg (194 lb).  Medications and allergies reviewed.      Makayla ODONNELL MA    " Follow-up scheduled. AVS gone over with patient and all questions answered. Pt agreeable with plan.

## 2021-02-15 ENCOUNTER — TELEPHONE (OUTPATIENT)
Dept: FAMILY MEDICINE | Facility: CLINIC | Age: 71
End: 2021-02-15

## 2021-02-15 DIAGNOSIS — E78.5 HYPERLIPIDEMIA LDL GOAL <100: ICD-10-CM

## 2021-02-15 NOTE — TELEPHONE ENCOUNTER
"Requested Prescriptions   Pending Prescriptions Disp Refills     simvastatin (ZOCOR) 20 MG tablet [Pharmacy Med Name: Simvastatin 20 MG Oral Tablet] 90 tablet 0     Sig: Take 1 tablet by mouth once daily       Statins Protocol Passed - 2/15/2021  1:05 PM        Passed - LDL on file in past 12 months     Recent Labs   Lab Test 02/18/20  1011   LDL 78             Passed - No abnormal creatine kinase in past 12 months     No lab results found.             Passed - Recent (12 mo) or future (30 days) visit within the authorizing provider's specialty     Patient has had an office visit with the authorizing provider or a provider within the authorizing providers department within the previous 12 mos or has a future within next 30 days. See \"Patient Info\" tab in inbasket, or \"Choose Columns\" in Meds & Orders section of the refill encounter.              Passed - Medication is active on med list        Passed - Patient is age 18 or older             "

## 2021-02-18 RX ORDER — SIMVASTATIN 20 MG
TABLET ORAL
Qty: 30 TABLET | Refills: 0 | Status: SHIPPED | OUTPATIENT
Start: 2021-02-18 | End: 2021-02-26

## 2021-02-18 NOTE — TELEPHONE ENCOUNTER
Patient is currently out of medication, has future appt.    Next 5 appointments (look out 90 days)    Feb 26, 2021  9:20 AM  PHYSICAL with Rigoberto Hernandez MD  [facility] ([facility] [place] )  Arrive at: Clinic [address] [address]  [phone]         Shama SCHAEFFER  Station

## 2021-02-18 NOTE — TELEPHONE ENCOUNTER
Medication is being filled for 1 time refill only due to:  Patient needs to be seen because due for appt.   Has appt scheduled.  Amairani SANCHEZ MSN, RN

## 2021-02-26 ENCOUNTER — OFFICE VISIT (OUTPATIENT)
Dept: FAMILY MEDICINE | Facility: CLINIC | Age: 71
End: 2021-02-26
Payer: MEDICARE

## 2021-02-26 ENCOUNTER — ANTICOAGULATION THERAPY VISIT (OUTPATIENT)
Dept: ANTICOAGULATION | Facility: CLINIC | Age: 71
End: 2021-02-26

## 2021-02-26 VITALS
HEART RATE: 72 BPM | BODY MASS INDEX: 26.18 KG/M2 | HEIGHT: 71 IN | WEIGHT: 187 LBS | SYSTOLIC BLOOD PRESSURE: 124 MMHG | OXYGEN SATURATION: 99 % | RESPIRATION RATE: 16 BRPM | TEMPERATURE: 97.8 F | DIASTOLIC BLOOD PRESSURE: 68 MMHG

## 2021-02-26 DIAGNOSIS — Z86.718 H/O DEEP VENOUS THROMBOSIS: ICD-10-CM

## 2021-02-26 DIAGNOSIS — D68.9 COAGULATION DEFECT (H): ICD-10-CM

## 2021-02-26 DIAGNOSIS — Z86.0101 H/O ADENOMATOUS POLYP OF COLON: ICD-10-CM

## 2021-02-26 DIAGNOSIS — Z12.11 SCREEN FOR COLON CANCER: ICD-10-CM

## 2021-02-26 DIAGNOSIS — Z12.5 SCREENING FOR PROSTATE CANCER: ICD-10-CM

## 2021-02-26 DIAGNOSIS — E78.5 HYPERLIPIDEMIA LDL GOAL <100: ICD-10-CM

## 2021-02-26 DIAGNOSIS — Z00.00 ENCOUNTER FOR MEDICARE ANNUAL WELLNESS EXAM: Primary | ICD-10-CM

## 2021-02-26 DIAGNOSIS — Z79.01 LONG TERM CURRENT USE OF ANTICOAGULANT THERAPY: ICD-10-CM

## 2021-02-26 DIAGNOSIS — Z13.1 SCREENING FOR DIABETES MELLITUS: ICD-10-CM

## 2021-02-26 DIAGNOSIS — N52.9 ERECTILE DYSFUNCTION, UNSPECIFIED ERECTILE DYSFUNCTION TYPE: ICD-10-CM

## 2021-02-26 DIAGNOSIS — Z79.01 CHRONIC ANTICOAGULATION: ICD-10-CM

## 2021-02-26 DIAGNOSIS — I82.4Y9 ACUTE DEEP VEIN THROMBOSIS (DVT) OF PROXIMAL VEIN OF LOWER EXTREMITY (H): ICD-10-CM

## 2021-02-26 LAB
CHOLEST SERPL-MCNC: 160 MG/DL
GLUCOSE SERPL-MCNC: 89 MG/DL (ref 70–99)
HDLC SERPL-MCNC: 56 MG/DL
INR PPP: 2.2 (ref 0.86–1.14)
LDLC SERPL CALC-MCNC: 71 MG/DL
NONHDLC SERPL-MCNC: 104 MG/DL
PSA SERPL-ACNC: 0.65 UG/L (ref 0–4)
TRIGL SERPL-MCNC: 167 MG/DL

## 2021-02-26 PROCEDURE — 82947 ASSAY GLUCOSE BLOOD QUANT: CPT | Performed by: FAMILY MEDICINE

## 2021-02-26 PROCEDURE — G0439 PPPS, SUBSEQ VISIT: HCPCS | Performed by: FAMILY MEDICINE

## 2021-02-26 PROCEDURE — 99214 OFFICE O/P EST MOD 30 MIN: CPT | Mod: 25 | Performed by: FAMILY MEDICINE

## 2021-02-26 PROCEDURE — 85610 PROTHROMBIN TIME: CPT | Performed by: FAMILY MEDICINE

## 2021-02-26 PROCEDURE — 80061 LIPID PANEL: CPT | Performed by: FAMILY MEDICINE

## 2021-02-26 PROCEDURE — 36415 COLL VENOUS BLD VENIPUNCTURE: CPT | Performed by: FAMILY MEDICINE

## 2021-02-26 PROCEDURE — G0103 PSA SCREENING: HCPCS | Performed by: FAMILY MEDICINE

## 2021-02-26 RX ORDER — SILDENAFIL CITRATE 20 MG/1
TABLET ORAL
Qty: 30 TABLET | Refills: 11 | Status: SHIPPED | OUTPATIENT
Start: 2021-02-26 | End: 2022-03-25

## 2021-02-26 RX ORDER — SIMVASTATIN 20 MG
20 TABLET ORAL DAILY
Qty: 90 TABLET | Refills: 3 | Status: SHIPPED | OUTPATIENT
Start: 2021-02-26 | End: 2022-03-22

## 2021-02-26 ASSESSMENT — ACTIVITIES OF DAILY LIVING (ADL): CURRENT_FUNCTION: NO ASSISTANCE NEEDED

## 2021-02-26 ASSESSMENT — MIFFLIN-ST. JEOR: SCORE: 1612.42

## 2021-02-26 NOTE — LETTER
"March 1, 2021      Jose M Rea  [address] [address]        Dear ,    We are writing to inform you of your test results.    \"No signs of prostate cancer.   No signs of diabetes.   Cholesterol is looking good.   Sincerely,   Rigoberto Hernandez MD\"    Resulted Orders   Prostate spec antigen screen   Result Value Ref Range    PSA 0.65 0 - 4 ug/L      Comment:      Assay Method:  Chemiluminescence using Siemens Vista analyzer   Lipid panel reflex to direct LDL Fasting   Result Value Ref Range    Cholesterol 160 <200 mg/dL    Triglycerides 167 (H) <150 mg/dL      Comment:      Borderline high:  150-199 mg/dl  High:             200-499 mg/dl  Very high:       >499 mg/dl  Fasting specimen      HDL Cholesterol 56 >39 mg/dL    LDL Cholesterol Calculated 71 <100 mg/dL      Comment:      Desirable:       <100 mg/dl    Non HDL Cholesterol 104 <130 mg/dL   Glucose   Result Value Ref Range    Glucose 89 70 - 99 mg/dL      Comment:      Fasting specimen       If you have any questions or concerns, please call the clinic at the number listed above.       Sincerely,      Rigoberto Hernandez MD          "

## 2021-02-26 NOTE — PROGRESS NOTES
"SUBJECTIVE:   Jose M Rea is a 70 year old male who presents for Preventive Visit.      Patient has been advised of split billing requirements and indicates understanding: Yes   Are you in the first 12 months of your Medicare coverage?  No    Healthy Habits:    In general, how would you rate your overall health?  Very good    Frequency of exercise:  6-7 days/week    Duration of exercise:  15-30 minutes    Do you usually eat at least 4 servings of fruit and vegetables a day, include whole grains    & fiber and avoid regularly eating high fat or \"junk\" foods?  Yes    Taking medications regularly:  Yes    Barriers to taking medications:  None    Medication side effects:  None    Ability to successfully perform activities of daily living:  No assistance needed    Home Safety:  No safety concerns identified    Hearing Impairment:  No hearing concerns    In the past 6 months, have you been bothered by leaking of urine?  No    PHQ-2 Total Score:    Do you feel safe in your environment? Yes    Have you ever done Advance Care Planning? (For example, a Health Directive, POLST, or a discussion with a medical provider or your loved ones about your wishes): Yes, patient states has an Advance Care Planning document and will bring a copy to the clinic.    He is taking medication for cholesterol and ED.  No side effects.  Needs to have refill of the medications.     Fall risk  Fallen 2 or more times in the past year?: No  Any fall with injury in the past year?: No    Cognitive Screening   1) Repeat 3 items (Leader, Season, Table)    2) Clock draw: NORMAL  3) 3 item recall: Recalls 3 objects  Results: NORMAL clock, 1-2 items recalled: COGNITIVE IMPAIRMENT LESS LIKELY    Mini-CogTM Copyright GIANLUCA Salter. Licensed by the author for use in [facility]; reprinted with permission (ally@.[facility]). All rights reserved.      Do you have sleep apnea, excessive snoring or daytime drowsiness?: no    Reviewed and updated as needed " this visit by clinical staff  Tobacco  Allergies  Meds   Med Hx  Surg Hx  Fam Hx  Soc Hx        Reviewed and updated as needed this visit by Provider                Social History     Tobacco Use     Smoking status: Current Every Day Smoker     Packs/day: 0.50     Years: 50.00     Pack years: 25.00     Types: Cigarettes     Smokeless tobacco: Never Used     Tobacco comment: started at age 18, 1/2 pack daily   Substance Use Topics     Alcohol use: Yes     Comment: occasional beer     If you drink alcohol do you typically have >3 drinks per day or >7 drinks per week? No    Alcohol Use 2/14/2019   Prescreen: >3 drinks/day or >7 drinks/week? No               Current providers sharing in care for this patient include:   Patient Care Team:  Rigoberto Hernandez MD as PCP - General (Family Medicine)  Marifer Garner RN as Specialty Care Coordinator (Hematology & Oncology)  Ashli Hein MD as Assigned Cancer Care Provider  Rigoberto Hernandez MD as Assigned PCP    The following health maintenance items are reviewed in Epic and correct as of today:  Health Maintenance   Topic Date Due     FALL RISK ASSESSMENT  02/18/2021     MEDICARE ANNUAL WELLNESS VISIT  02/18/2021     COLORECTAL CANCER SCREENING  07/01/2021     LIPID  02/18/2025     ADVANCE CARE PLANNING  02/27/2025     DTAP/TDAP/TD IMMUNIZATION (2 - Td) 02/13/2028     HEPATITIS C SCREENING  Completed     PHQ-2  Completed     INFLUENZA VACCINE  Completed     Pneumococcal Vaccine: Pediatrics (0 to 5 Years) and At-Risk Patients (6 to 64 Years)  Completed     Pneumococcal Vaccine: 65+ Years  Completed     ZOSTER IMMUNIZATION  Completed     AORTIC ANEURYSM SCREENING (SYSTEM ASSIGNED)  Completed     IPV IMMUNIZATION  Aged Out     MENINGITIS IMMUNIZATION  Aged Out     HEPATITIS B IMMUNIZATION  Aged Out           Review of Systems  Review Of Systems  Skin: uses creams as needed  Eyes: seeing eye doctor  Ears/Nose/Throat: ears are feeling like  "plugged  Respiratory: No shortness of breath, dyspnea on exertion, cough, or hemoptysis  Cardiovascular: negative  Gastrointestinal: negative  Genitourinary: needs refill of med for ED  Musculoskeletal: negative  Neurologic: negative  Psychiatric: negative  Hematologic/Lymphatic/Immunologic: on warfarin  Endocrine: negative      OBJECTIVE:   /68 (BP Location: Left arm, Patient Position: Sitting, Cuff Size: Adult Large)   Pulse 72   Temp 97.8  F (36.6  C) (Tympanic)   Resp 16   Ht 1.791 m (5' 10.5\")   Wt 84.8 kg (187 lb)   SpO2 99%   BMI 26.45 kg/m   Estimated body mass index is 26.45 kg/m  as calculated from the following:    Height as of this encounter: 1.791 m (5' 10.5\").    Weight as of this encounter: 84.8 kg (187 lb).  Physical Exam  GENERAL: healthy, alert and no distress  EYES: Eyes grossly normal to inspection, PERRL and conjunctivae and sclerae normal  HENT: ear canals and TM's normal, nose and mouth without ulcers or lesions  NECK: no adenopathy, no asymmetry, masses, or scars and thyroid normal to palpation  RESP: lungs clear to auscultation - no rales, rhonchi or wheezes  CV: regular rate and rhythm, normal S1 S2, no S3 or S4, no murmur, click or rub, no peripheral edema and peripheral pulses strong  ABDOMEN: soft, nontender, no hepatosplenomegaly, no masses and bowel sounds normal   (male): normal male genitalia without lesions or urethral discharge, no hernia  MS: no gross musculoskeletal defects noted, no edema  SKIN: no suspicious lesions or rashes  SKIN: has a mass about 3 by 4 mm right 4th finger medially. In subcutaneous tissue, rubbery.  Healing cyst that seemed to have ruptured on his scalp about 3 mm diameter, base of left occipital region.  NEURO: Normal strength and tone, mentation intact and speech normal  PSYCH: mentation appears normal, affect normal/bright  LYMPH: anterior cervical: no adenopathy  posterior cervical: no adenopathy        ASSESSMENT / PLAN:   (Z00.00) " "Encounter for Medicare annual wellness exam  (primary encounter diagnosis)  Comment: Discussed healthy lifestyle and preventative cares.    Plan:     (D68.9) Coagulation defect (H)  Comment: noted and on warfarin  Plan:     (E78.5) Hyperlipidemia LDL goal <100  Comment: need to check labs and refill his medication  Plan: Lipid panel reflex to direct LDL Fasting,         simvastatin (ZOCOR) 20 MG tablet            (N52.9) Erectile dysfunction, unspecified erectile dysfunction type  Comment: refilled his medications  Plan: sildenafil (REVATIO) 20 MG tablet            (Z86.718) H/O deep venous thrombosis  Comment:   Plan:     (Z12.5) Screening for prostate cancer  Comment:   Plan: Prostate spec antigen screen            (Z13.1) Screening for diabetes mellitus  Comment:   Plan: Glucose            (Z12.11) Screen for colon cancer  Comment:   Plan: GASTROENTEROLOGY ADULT REF PROCEDURE ONLY            (Z86.010) H/O adenomatous polyp of colon  Comment:   Plan: GASTROENTEROLOGY ADULT REF PROCEDURE ONLY              Patient has been advised of split billing requirements and indicates understanding: Yes  COUNSELING:  Reviewed preventive health counseling, as reflected in patient instructions       Regular exercise       Healthy diet/nutrition       Vision screening       Colon cancer screening       Prostate cancer screening    Estimated body mass index is 26.45 kg/m  as calculated from the following:    Height as of this encounter: 1.791 m (5' 10.5\").    Weight as of this encounter: 84.8 kg (187 lb).        He reports that he has been smoking cigarettes. He has a 25.00 pack-year smoking history. He has never used smokeless tobacco.  Tobacco Cessation Action Plan:   Information offered: Patient not interested at this time      Appropriate preventive services were discussed with this patient, including applicable screening as appropriate for cardiovascular disease, diabetes, osteopenia/osteoporosis, and glaucoma.  As " appropriate for age/gender, discussed screening for colorectal cancer, prostate cancer, breast cancer, and cervical cancer. Checklist reviewing preventive services available has been given to the patient.    Reviewed patients plan of care and provided an AVS. The Basic Care Plan (routine screening as documented in Health Maintenance) for Jose M meets the Care Plan requirement. This Care Plan has been established and reviewed with the Patient.    Counseling Resources:  ATP IV Guidelines  Pooled Cohorts Equation Calculator  Breast Cancer Risk Calculator  Breast Cancer: Medication to Reduce Risk  FRAX Risk Assessment  ICSI Preventive Guidelines  Dietary Guidelines for Americans, 2010  USDA's MyPlate  ASA Prophylaxis  Lung CA Screening    Rigoberto Hernandez MD  [facility]    Identified Health Risks:

## 2021-02-26 NOTE — PATIENT INSTRUCTIONS
Please go to lab.    I refilled your medications.    Please get your colonoscopy done.    For your finger on your right 4 th finger I believe you have a lipoma, if this bothers you I will do a referral to ortho.    Please be aware that there will be an additional charge during your preventative visit due to either a new diagnosis and/or chronic disease management.    Preventative visits screen for diseases prior to they occur.  They do not cover for any new diagnosis or chronic disease management which would include medication refills, labs etc.    If you have questions regarding your coverage please check with your insurance provider.  At [facility] we need to code correctly to be in compliance with all insurance companies.          Thank you for choosing [facility].  You may be receiving an email and/or telephone survey request from [facility] Customer Experience regarding your visit today.  Please take a few minutes to respond to the survey to let us know how we are doing.      If you have questions or concerns, please contact us via [employer] or you can contact your care team at [phone].    Our Clinic hours are:  Monday 6:40 am  to 7:00 pm  Tuesday -Friday 6:40 am to 5:00 pm    The [place] outpatient lab hours are:  Monday - Friday 6:10 am to 4:45 pm  Saturdays 7:00 am to 11:00 am  Appointments are required, call [phone]    If you have clinical questions after hours or would like to schedule an appointment,  call the clinic at [phone].    Patient Education   Personalized Prevention Plan  You are due for the preventive services outlined below.  Your care team is available to assist you in scheduling these services.  If you have already completed any of these items, please share that information with your care team to update in your medical record.  Health Maintenance Due   Topic Date Due     PHQ-2  01/01/2021     FALL RISK ASSESSMENT  02/18/2021     Annual Wellness Visit  02/18/2021

## 2021-02-26 NOTE — PROGRESS NOTES
ANTICOAGULATION MANAGEMENT     Patient Name:  Jose M Rea  Date:  2021    ASSESSMENT /SUBJECTIVE:    Today's INR result of 2.20 is therapeutic. Goal INR of 2.0-3.0      Warfarin dose taken: Warfarin taken as instructed    Diet: No new diet changes affecting INR    Medication changes/ interactions: No new medications/supplements affecting INR    Previous INR: Supratherapeutic 3.20    S/S of bleeding or thromboembolism: No    New injury or illness: No    Upcoming surgery, procedure or cardioversion: No    Additional findings: None      PLAN:    Telephone call with  Jose M regarding INR result and instructed:     Warfarin Dosing Instructions: Continue your current warfarin dose 10mg Mon; 7.5mg all other days    Instructed patient to follow up no later than: 4 weeks  Lab visit scheduled    Education provided: Importance of notifying clinic for changes in medications or health; a sooner lab recheck maybe needed       Jose M verbalizes understanding and agrees to warfarin dosing plan.    Instructed to call the Anticoagulation Clinic for any changes, questions or concerns. (#[phone])        Mayra Perdomo RN [facility]       OBJECTIVE:  Recent labs: (last 7 days)     21  1009   INR 2.20*         INR assessment THER    Recheck INR In: 4 WEEKS    INR Location Clinic      Anticoagulation Summary  As of 2021    INR goal:  2.0-3.0   TTR:  76.5 % (1 y)   INR used for dosin.20 (2021)   Warfarin maintenance plan:  10 mg (5 mg x 2) every Mon; 7.5 mg (5 mg x 1.5) all other days   Full warfarin instructions:  10 mg every Mon; 7.5 mg all other days   Weekly warfarin total:  55 mg   No change documented:  Mayra Perdomo RN   Plan last modified:  Salma Diane RN (2020)   Next INR check:  3/26/2021   Priority:  Maintenance   Target end date:  Indefinite    Indications    Coagulation defect (H) [D68.9]  Acute deep vein thrombosis (DVT) of proximal vein of lower extremity (H) (Resolved)  [I82.4Y9]  Long term current use of anticoagulant therapy [Z79.01]  Acute deep vein thrombosis (DVT) of proximal vein of lower extremity  unspecified laterality (H) (Resolved) [I82.4Y9]             Anticoagulation Episode Summary     INR check location:      Preferred lab:      Send INR reminders to:  [facility]    Comments:  * Tx from [place]. Factor 5 Leiden heterozygous positive. Indefinite anticoagulation per Dr. Hein on 3-27-30. Consent to communicate with wife, Viri. Please call mobile # first      Anticoagulation Care Providers     Provider Role Specialty Phone number    Pj Quijano MD Referring Family Medicine [phone]

## 2021-03-05 ENCOUNTER — IMMUNIZATION (OUTPATIENT)
Dept: NURSING | Facility: CLINIC | Age: 71
End: 2021-03-05
Payer: MEDICARE

## 2021-03-05 PROCEDURE — 91300 PR COVID VAC PFIZER DIL RECON 30 MCG/0.3 ML IM: CPT

## 2021-03-05 PROCEDURE — 0001A PR COVID VAC PFIZER DIL RECON 30 MCG/0.3 ML IM: CPT

## 2021-03-26 ENCOUNTER — IMMUNIZATION (OUTPATIENT)
Dept: NURSING | Facility: CLINIC | Age: 71
End: 2021-03-26
Attending: FAMILY MEDICINE
Payer: MEDICARE

## 2021-03-26 ENCOUNTER — ANTICOAGULATION THERAPY VISIT (OUTPATIENT)
Dept: ANTICOAGULATION | Facility: CLINIC | Age: 71
End: 2021-03-26

## 2021-03-26 DIAGNOSIS — D68.9 COAGULATION DEFECT (H): ICD-10-CM

## 2021-03-26 DIAGNOSIS — Z79.01 CHRONIC ANTICOAGULATION: ICD-10-CM

## 2021-03-26 DIAGNOSIS — Z79.01 LONG TERM CURRENT USE OF ANTICOAGULANT THERAPY: ICD-10-CM

## 2021-03-26 DIAGNOSIS — I82.4Y9 ACUTE DEEP VEIN THROMBOSIS (DVT) OF PROXIMAL VEIN OF LOWER EXTREMITY (H): ICD-10-CM

## 2021-03-26 LAB
CAPILLARY BLOOD COLLECTION: NORMAL
INR PPP: 2.9 (ref 0.86–1.14)

## 2021-03-26 PROCEDURE — 36416 COLLJ CAPILLARY BLOOD SPEC: CPT | Performed by: FAMILY MEDICINE

## 2021-03-26 PROCEDURE — 85610 PROTHROMBIN TIME: CPT | Performed by: FAMILY MEDICINE

## 2021-03-26 PROCEDURE — 91300 PR COVID VAC PFIZER DIL RECON 30 MCG/0.3 ML IM: CPT

## 2021-03-26 PROCEDURE — 0002A PR COVID VAC PFIZER DIL RECON 30 MCG/0.3 ML IM: CPT

## 2021-03-26 NOTE — PROGRESS NOTES
ANTICOAGULATION MANAGEMENT     Patient Name:  Jose M Rea  Date:  3/26/2021    ASSESSMENT /SUBJECTIVE:    Today's INR result of 2.9 is therapeutic. Goal INR of 2.0-3.0      Warfarin dose taken: Warfarin taken as instructed    Diet: No new diet changes affecting INR    Medication changes/ interactions: No new medications/supplements affecting INR    Previous INR: Therapeutic     S/S of bleeding or thromboembolism: No    New injury or illness: No    Upcoming surgery, procedure or cardioversion: No    Additional findings: Second COVID vaccine today      PLAN:    Telephone call with Jose M regarding INR result and instructed:     Warfarin Dosing Instructions: Continue your current warfarin dose 10 mg Monday and 7.5 mg all other days    Instructed patient to follow up no later than: 5 weeks  Lab visit scheduled    Education provided: Importance of therapeutic range, Importance of following up for INR monitoring at instructed interval, Importance of taking warfarin as instructed, Importance of notifying clinic for changes in medications; a sooner lab recheck maybe needed. and Importance of notifying clinic for diarrhea, nausea/vomiting, reduced intake, and/or illness; a sooner lab recheck maybe needed.      Jose M verbalizes understanding and agrees to warfarin dosing plan.    Instructed to call the Anticoagulation Clinic for any changes, questions or concerns. (#[phone])        Ana Leong RN      OBJECTIVE:  Recent labs: (last 7 days)     21  0806   INR 2.90*         No question data found.  Anticoagulation Summary  As of 3/26/2021    INR goal:  2.0-3.0   TTR:  76.5 % (1 y)   INR used for dosin.90 (3/26/2021)   Warfarin maintenance plan:  10 mg (5 mg x 2) every Mon; 7.5 mg (5 mg x 1.5) all other days   Full warfarin instructions:  10 mg every Mon; 7.5 mg all other days   Weekly warfarin total:  55 mg   No change documented:  Ana Leong RN   Plan last modified:  Salma Diane RN  (12/9/2020)   Next INR check:     Priority:  Maintenance   Target end date:  Indefinite    Indications    Coagulation defect (H) [D68.9]  Acute deep vein thrombosis (DVT) of proximal vein of lower extremity (H) (Resolved) [I82.4Y9]  Long term current use of anticoagulant therapy [Z79.01]  Acute deep vein thrombosis (DVT) of proximal vein of lower extremity  unspecified laterality (H) (Resolved) [I82.4Y9]             Anticoagulation Episode Summary     INR check location:      Preferred lab:      Send INR reminders to:  [facility]    Comments:  * Tx from [place]. Factor 5 Leiden heterozygous positive. Indefinite anticoagulation per Dr. Hein on 3-27-30. Consent to communicate with wife, Viri. Please call mobile # first      Anticoagulation Care Providers     Provider Role Specialty Phone number    Pj Quijano MD Referring Family Medicine [phone]

## 2021-03-29 ENCOUNTER — TELEPHONE (OUTPATIENT)
Dept: FAMILY MEDICINE | Facility: CLINIC | Age: 71
End: 2021-03-29

## 2021-03-29 DIAGNOSIS — Z86.718 H/O DEEP VENOUS THROMBOSIS: ICD-10-CM

## 2021-03-29 DIAGNOSIS — Z79.01 LONG TERM CURRENT USE OF ANTICOAGULANT THERAPY: ICD-10-CM

## 2021-03-29 DIAGNOSIS — D68.9 COAGULATION DEFECT (H): ICD-10-CM

## 2021-03-29 DIAGNOSIS — Z79.01 CHRONIC ANTICOAGULATION: Primary | ICD-10-CM

## 2021-03-29 NOTE — TELEPHONE ENCOUNTER
Needs updated SCr for Lovenox dosing to verify kidney function    [facility]  MANAGEMENT    Assessment     Warfarin interruption plan for colonoscopy on 2021.    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     Provoked DVT secondary to surgery    VTE: 2016 Anticoagulation Forum clinical guidance recommends, no bridge therapy for most VTE patients interrupting warfarin with possible exceptions for VTE within previous month, prior hx recurrent VTE during anticoagulation therapy interruption, or undergoing a procedure with high for VTE  (joint replacement surgery or major abdominal cancer resection)     Colonoscopy is low bleed risk & low VTE risk procedure    Plan       Pre-Procedure:    Hold warfarin until after procedure startin2021     Lovenox 80 mg subq Q 12 hrs (1 mg/kg Q 12 hr for CrCl >30ml/min)     Start Lovenox: 2021    Last dose of Lovenox prior to procedure: 04/15/2021  Or    Prophylaxis Lovenox 40mg Q24 2021 to 04/15/2021       Post-Procedure:    Resume home warfarin dose if okay with provider doing procedure on night of procedure, 2021 PM: 15mg    Resume Lovenox ~ 24-48 hrs post procedure when okay with provider doing procedure. Continue until INR >= 2.3    Recheck INR 5-7 days after resuming warfarin   ?   Mireya Kingston RPH    Subjective/Objective:      Jose M Rea, a 71 year old male    Reason for Anticoagulation: DVT and Factor V Leiden    Goal INR Range: 2.0-3.0    Patient bridged in past: Yes: with acute VTE 2016    Pertinent History: N/A    Wt Readings from Last 3 Encounters:   21 84.8 kg (187 lb)   21 87.5 kg (193 lb)   20 88.9 kg (196 lb)        Ideal body weight: 74.1 kg (163 lb 7.5 oz)  Adjusted ideal body weight: 78.4 kg (172 lb 14.1 oz)     Lab Results   Component Value Date    INR 2.90 (H) 2021    INR  2.20 (H) 02/26/2021    INR 3.20 (H) 02/10/2021     No results found for: HGB, HCT, PLT  Lab Results   Component Value Date    CR 0.93 01/06/2017    CR 1.00 12/13/2016     CrCl cannot be calculated (Patient's most recent lab result is older than the maximum 10 days allowed.).

## 2021-03-29 NOTE — TELEPHONE ENCOUNTER
Please call Jose M,  Due to his coagulation defect he will need to be bridged with Lovenox to prevent and DVT.  Please have him check with anti-coag clinic.  Rigoberto Hernandez MD  Family Medicine

## 2021-03-29 NOTE — TELEPHONE ENCOUNTER
Patient calling stating he has scheduled a colonoscopy on 4/16/21 at [facility]. Patient can be reached at [phone].    Routing to PCP and Pharm D. For review.    ISRRAEL GilN, RN  Anticoagulation Clinic

## 2021-03-30 DIAGNOSIS — Z11.59 ENCOUNTER FOR SCREENING FOR OTHER VIRAL DISEASES: ICD-10-CM

## 2021-03-30 NOTE — TELEPHONE ENCOUNTER
Please do the prophylactic dosing of Lovenox.  I have also ordered a future basic profile to check his kidney function.  Please have him do this lab.  Rigoberto Hernandez MD  Family Medicine

## 2021-03-30 NOTE — TELEPHONE ENCOUNTER
Routed to Dr Hernandez.  Please review and let me now if you want full therapeutic or prophylactic dosing of Lovenox, and we will get things set up.  I have already ordered a SCr lab order.     Thanks!   Mireya Kingston, PharmD BCACP   Anticoagulation Clinical Pharmacist      Routing comment

## 2021-03-31 NOTE — TELEPHONE ENCOUNTER
Last warfarin dose: 04/10/2021  Lovenox sent to [facility], but patient should have BMP done before picking up to confirm correct dose.  04/11/2021, NO warfarin  04/12/2021, NO warfarin  04/13/2021, NO warfarin, begin enoxaparin injections into the abdomen every 24 hours in the morning.  04/14/2021, NO warfarin, enoxaparin injection into the abdomen in the morning.  04/15/2021, NO warfarin, enoxaparin injection into the abdomen in the morning (no enoxaparin 24 hours prior to surgery)  04/16/2021, DAY OF PROCEDURE, NO enoxaparin. Restart warfarin 15mg (3 tabs) in the evening, unless instructed otherwise by the physician.  04/17/20201, Restart enoxaparin injections into the abdomen every 24 hours (in the morning), unless instructed otherwise by the physician, and 7.5mg (1.5 tabs) warfarin in the evening.   04/18/2021, Enoxaparin injection into the abdomen in the morning and 7.5mg (1.5 tabs) warfarin in the evening.  04/19/2021, Enoxaparin injection into the abdomen in the morning and 10mg (2 tabs) warfarin in the evening.  04/20/2021, Enoxaparin injection into the abdomen in the morning and 7.5mg (1.5 tabs) warfarin in the evening.  04/21/2021, Enoxaparin injection into the abdomen in the morning. Recheck INR at the  Lab for further dosing instructions.   If you have any questions please call the Anticoagulation Clinic at [phone].

## 2021-04-06 ENCOUNTER — OFFICE VISIT (OUTPATIENT)
Dept: FAMILY MEDICINE | Facility: CLINIC | Age: 71
End: 2021-04-06
Payer: MEDICARE

## 2021-04-06 ENCOUNTER — TELEPHONE (OUTPATIENT)
Dept: FAMILY MEDICINE | Facility: CLINIC | Age: 71
End: 2021-04-06

## 2021-04-06 ENCOUNTER — DOCUMENTATION ONLY (OUTPATIENT)
Dept: FAMILY MEDICINE | Facility: CLINIC | Age: 71
End: 2021-04-06

## 2021-04-06 VITALS
WEIGHT: 192 LBS | OXYGEN SATURATION: 98 % | HEART RATE: 81 BPM | BODY MASS INDEX: 27.16 KG/M2 | SYSTOLIC BLOOD PRESSURE: 122 MMHG | TEMPERATURE: 97.5 F | RESPIRATION RATE: 14 BRPM | DIASTOLIC BLOOD PRESSURE: 66 MMHG

## 2021-04-06 DIAGNOSIS — Z79.01 LONG TERM CURRENT USE OF ANTICOAGULANT THERAPY: ICD-10-CM

## 2021-04-06 DIAGNOSIS — J01.90 ACUTE SINUSITIS WITH SYMPTOMS > 10 DAYS: Primary | ICD-10-CM

## 2021-04-06 DIAGNOSIS — D68.9 COAGULATION DEFECT (H): ICD-10-CM

## 2021-04-06 PROCEDURE — 99213 OFFICE O/P EST LOW 20 MIN: CPT | Performed by: INTERNAL MEDICINE

## 2021-04-06 NOTE — TELEPHONE ENCOUNTER
04/06/21    Dr. Iqbal,     You will see this patient today. Can you remind him to go to the lab for a BMP. Below are the instructions for the upcoming Colonoscopy, could you please make sure the patient recieves these as well. Thank you!    Kb Braun RN, BSN, PHN  Anticoagulation Clinic   [phone]

## 2021-04-06 NOTE — TELEPHONE ENCOUNTER
Patient returned call. He reports that he initially called because he wanted to clarify if he was supposed to take both warfarin and enoxaparin before his upcoming colonoscopy. Since initially calling, he did speak to the anticoagulation clinic and discussed this. It also looks like a letter with instructions on med dosing was mailed to him today. Patient reports that all questions are answered at this time. Advised to please call if he does not feel that he has a good understanding and med dosing recommendations. Patient in agreement.     Ana Wheeler RN

## 2021-04-06 NOTE — PROGRESS NOTES
Patient called and reported procedure rescheduled to 4/30.  Instructions below given to patient over the phone, will mail a copy at patients request.      Last warfarin dose: 04/10/2021     04/25/2021, NO warfarin     04/26/2021, NO warfarin     04/27/2021, NO warfarin, begin enoxaparin injections into the abdomen every 24 hours in the morning.     04/8/2021, NO warfarin, enoxaparin injection into the abdomen in the morning.     04/29/2021, NO warfarin, enoxaparin injection into the abdomen in the morning (no enoxaparin 24 hours prior to surgery)     04/30/2021, DAY OF PROCEDURE, NO enoxaparin. Restart warfarin 15mg (3 tabs) in the evening, unless instructed otherwise by the physician.     05/01/20201, Restart enoxaparin injections into the abdomen every 24 hours (in the morning), unless instructed otherwise by the physician, and 7.5mg (1.5 tabs) warfarin in the evening.      05/02/2021, Enoxaparin injection into the abdomen in the morning and 7.5mg (1.5 tabs) warfarin in the evening.     05/03/2021, Enoxaparin injection into the abdomen in the morning and 10mg (2 tabs) warfarin in the evening.     05/04/2021, Enoxaparin injection into the abdomen in the morning and 7.5mg (1.5 tabs) warfarin in the evening.    05/05/2021, Enoxaparin injection into the abdomen in the morning and 7.5mg (1.5 tabs) warfarin in the evening.    05/06/2021, Enoxaparin injection into the abdomen in the morning and 7.5mg (1.5 tabs) warfarin in the evening.       05/07/2021, Enoxaparin injection into the abdomen in the morning. Recheck INR at the  Lab for further dosing instructions.      The enoxaparin injections are important for you to administer while you are off your warfarin and as we have you resume your warfarin dosing.  The warfarin is a long acting medication that takes 7-10 days after resuming to reach therapeutic levels of 2.0-3.0.  During this time, the enoxaparin will bridge for you to keep your risk of clotting low.  Enoxaparin  is a fast acting medication that will thin your blood immediately after injection.  Once you are back within your therapeutic range of 2.0-3.0, you will discontinue the enoxaparin as it is no longer necessary.    If you have any questions please call the Anticoagulation Clinic at [phone].

## 2021-04-06 NOTE — PATIENT INSTRUCTIONS
Last warfarin dose: 04/10/2021    04/11/2021, NO warfarin    04/12/2021, NO warfarin    04/13/2021, NO warfarin, begin enoxaparin injections into the abdomen every 24 hours in the morning.    04/14/2021, NO warfarin, enoxaparin injection into the abdomen in the morning.    04/15/2021, NO warfarin, enoxaparin injection into the abdomen in the morning (no enoxaparin 24 hours prior to surgery)    04/16/2021, DAY OF PROCEDURE, NO enoxaparin. Restart warfarin 15mg (3 tabs) in the evening, unless instructed otherwise by the physician.    04/17/20201, Restart enoxaparin injections into the abdomen every 24 hours (in the morning), unless instructed otherwise by the physician, and 7.5mg (1.5 tabs) warfarin in the evening.     04/18/2021, Enoxaparin injection into the abdomen in the morning and 7.5mg (1.5 tabs) warfarin in the evening.    04/19/2021, Enoxaparin injection into the abdomen in the morning and 10mg (2 tabs) warfarin in the evening.    04/20/2021, Enoxaparin injection into the abdomen in the morning and 7.5mg (1.5 tabs) warfarin in the evening.    04/21/2021, Enoxaparin injection into the abdomen in the morning. Recheck INR at the  Lab for further dosing instructions.     If you have any questions please call the Anticoagulation Clinic at [phone].

## 2021-04-06 NOTE — TELEPHONE ENCOUNTER
Reason for Call:  Medication or medication refill:    Do you use a [facility]?  Name of the pharmacy and phone number for the current request:  [facility]    Name of the medication requested: Enoxaparin injection      Can we leave a detailed message on this number? YES    Phone number patient can be reached at: Home number on file [phone] (home)    Best Time: any    Call taken on 4/6/2021 at 1:16 PM by Elsy Howard

## 2021-04-06 NOTE — TELEPHONE ENCOUNTER
04/06/21    Patient was placed on Augmentin today for a sinus infection. Patient is trying to reschedule his colonoscopy for a different day. Patient will contact [facility] when he has the new date. Bridging was NOT reviewed with the patient. Patient will still need to go to lab for the BMP/Creatinine.    Kb Braun RN, BSN, PHN  Anticoagulation Clinic   [phone]

## 2021-04-06 NOTE — PATIENT INSTRUCTIONS
1. Stop the visine - can worsen eye symptoms   2. Start antibiotic - Augmentin - twice daily x 10 day  3. Can try Sudafed, hot packs

## 2021-04-06 NOTE — LETTER
April 14, 2021      Jose M Rea  [address] [address] [address]      Dear Jose M,    Enclosed is your Hold/Bridge plan.      [facility] Anticoagulation Team

## 2021-04-06 NOTE — LETTER
April 6, 2021      Jose M Rea  [address] [address] [address]      Dear Jose M,    As we spoke on the phone today, attached are the instructions for your upcoming procedure on 4/30/21 in regards to your warfarin.    Last warfarin dose: 04/10/2021     04/25/2021, NO warfarin     04/26/2021, NO warfarin     04/27/2021, NO warfarin, begin enoxaparin injections into the abdomen every 24 hours in the morning.     04/8/2021, NO warfarin, enoxaparin injection into the abdomen in the morning.     04/29/2021, NO warfarin, enoxaparin injection into the abdomen in the morning (no enoxaparin 24 hours prior to surgery)     04/30/2021, DAY OF PROCEDURE, NO enoxaparin. Restart warfarin 15mg (3 tabs) in the evening, unless instructed otherwise by the physician.     05/01/20201, Restart enoxaparin injections into the abdomen every 24 hours (in the morning), unless instructed otherwise by the physician, and 7.5mg (1.5 tabs) warfarin in the evening.      05/02/2021, Enoxaparin injection into the abdomen in the morning and 7.5mg (1.5 tabs) warfarin in the evening.     05/03/2021, Enoxaparin injection into the abdomen in the morning and 10mg (2 tabs) warfarin in the evening.     05/04/2021, Enoxaparin injection into the abdomen in the morning and 7.5mg (1.5 tabs) warfarin in the evening.      05/05/2021, Enoxaparin injection into the abdomen in the morning and 7.5mg (1.5 tabs) warfarin in the evening.    05/06/2021, Enoxaparin injection into the abdomen in the morning and 7.5mg (1.5 tabs) warfarin in the evening.       05/07/2021, Enoxaparin injection into the abdomen in the morning. Recheck INR at the  Lab for further dosing instructions.      The enoxaparin injections are important for you to administer while you are off your warfarin and as we have you resume your warfarin dosing.  The warfarin is a long acting medication that takes 7-10 days after resuming to reach therapeutic levels of 2.0-3.0.  During this time,  the enoxaparin will bridge for you to keep your risk of clotting low.  Enoxaparin is a fast acting medication that will thin your blood immediately after injection.  Once you are back within your therapeutic range of 2.0-3.0, you will discontinue the enoxaparin as it is no longer necessary.    If you have any questions please call the Anticoagulation Clinic at [phone].    Thank you,    [facility] Anticoagulation Team

## 2021-04-06 NOTE — PROGRESS NOTES
Assessment & Plan   Problem List Items Addressed This Visit     None      Visit Diagnoses     Acute sinusitis with symptoms > 10 days    -  Primary    Relevant Medications    amoxicillin-clavulanate (AUGMENTIN) 875-125 MG tablet                    Patient Instructions   1. Stop the visine - can worsen eye symptoms   2. Start antibiotic - Augmentin - twice daily x 10 day  3. Can try Sudafed, hot packs      No follow-ups on file.    Crystal Iqbal DO  [facility] CenterJOAQUIN Sagluero is a 71 year old who presents for the following health issues     HPI     Acute Illness  Acute illness concerns: Sinus Infection   Onset/Duration: a month or longer  Symptoms:  Fever: no  Chills/Sweats: no  Headache (location?): YES  Sinus Pressure: YES  Conjunctivitis:  YES  Ear Pain: YES: both  Rhinorrhea: no  Congestion: no  Sore Throat: no  Cough: YES - just to clear the phlem  Wheeze: no  Decreased Appetite: no  Nausea: no  Vomiting: no  Diarrhea: no  Dysuria/Freq.: no  Dysuria or Hematuria: no  Fatigue/Achiness: YES  Loss of taste or smell: no  Chest pain/pressure:  Rash: no  Sick/Strep Exposure: no  Therapies tried and outcome: acetaminophen, zyrtec, Visine eye drops - somewhat helpful  --no recent seasonal allergies.  Had hay fever as a child, nothing as an adult  --no previous sinus infections      Review of Systems   Constitutional, HEENT, cardiovascular, pulmonary, gi and gu systems are negative, except as otherwise noted.      Objective    /66   Pulse 81   Temp 97.5  F (36.4  C) (Tympanic)   Resp 14   Wt 87.1 kg (192 lb)   SpO2 98%   BMI 27.16 kg/m    Body mass index is 27.16 kg/m .  Physical Exam   GENERAL APPEARANCE: healthy, alert and no distress  EYES: Eyes grossly normal to inspection, PERRL and conjunctivae and sclerae normal  HENT: ear canals and TM's normal, nasal mucosa edematous with yellow crusting, and maxillary sinus tenderness bilateral  NECK: no adenopathy, no  asymmetry, masses, or scars and thyroid normal to palpation  RESP: lungs clear to auscultation - no rales, rhonchi or wheezes  CV: regular rates and rhythm, normal S1 S2, no S3 or S4 and no murmur, click or rub

## 2021-04-07 NOTE — TELEPHONE ENCOUNTER
04/07/21    Per Progress note on 4/6, patient rescheduled his colonoscopy to 4/30/21. Ana MARADIAGA updated the bridging directions and did mail a copy to the patient.    Kb Braun RN, BSN, PHN  Anticoagulation Clinic   [phone]

## 2021-04-12 ENCOUNTER — TELEPHONE (OUTPATIENT)
Dept: FAMILY MEDICINE | Facility: CLINIC | Age: 71
End: 2021-04-12

## 2021-04-12 NOTE — TELEPHONE ENCOUNTER
Call placed to patient.     Relayed that letter was placed in the mailbox this evening to be mailed tomorrow.  HIREN Barfield will print letter and place at the AFR desk in [place] for patient .  Relayed that patient can  the letter from the AFR desk in [place] if he does not receive the letter in the mail.       Patient verbalized understanding.   No further questions/concerns.     Rene Collado RN

## 2021-04-14 NOTE — PROGRESS NOTES
Incoming call from patient, reports that he has not received letter in mail with hold/bridge instructions.     Will mail again.     Leandro Talamantes RN  [facility] Anticoagulation [facility]

## 2021-04-16 DIAGNOSIS — Z86.718 H/O DEEP VENOUS THROMBOSIS: Primary | ICD-10-CM

## 2021-04-27 ENCOUNTER — ALLIED HEALTH/NURSE VISIT (OUTPATIENT)
Dept: FAMILY MEDICINE | Facility: CLINIC | Age: 71
End: 2021-04-27
Payer: MEDICARE

## 2021-04-27 DIAGNOSIS — Z11.59 ENCOUNTER FOR SCREENING FOR OTHER VIRAL DISEASES: ICD-10-CM

## 2021-04-27 DIAGNOSIS — Z86.718 H/O DEEP VENOUS THROMBOSIS: Primary | ICD-10-CM

## 2021-04-27 LAB
SARS-COV-2 RNA RESP QL NAA+PROBE: NORMAL
SPECIMEN SOURCE: NORMAL

## 2021-04-27 PROCEDURE — U0003 INFECTIOUS AGENT DETECTION BY NUCLEIC ACID (DNA OR RNA); SEVERE ACUTE RESPIRATORY SYNDROME CORONAVIRUS 2 (SARS-COV-2) (CORONAVIRUS DISEASE [COVID-19]), AMPLIFIED PROBE TECHNIQUE, MAKING USE OF HIGH THROUGHPUT TECHNOLOGIES AS DESCRIBED BY CMS-2020-01-R: HCPCS | Performed by: SURGERY

## 2021-04-27 PROCEDURE — U0005 INFEC AGEN DETEC AMPLI PROBE: HCPCS | Performed by: SURGERY

## 2021-04-27 PROCEDURE — 99207 PR NO CHARGE NURSE ONLY: CPT

## 2021-04-27 NOTE — PROGRESS NOTES
Pt here accompanied by spouse. Nurse teaching given on administration of Lovenox. He is bridging with enoxaparin in anticipation of upcoming colonoscopy on 4/30/21. He went off his warfarin as directed by [facility] and today is his first dose of Lovenox.      Watched the Lovenox self-injection video which can be found at:    [email or web address]    Pt & spouse also provided with the Step-by Step Guide print out that can found at the same website.    After viewing video and RN, pt's spouse correctly followed all instructions and was able to administer dose to pt without incident. Pt tolerated well.     Pt and spouse expressed confidence in doing this at home and denied having any questions.     Discussed proper disposal of sharps.    Due to injection administration, patient instructed to remain in clinic for 15 minutes  afterwards, and to report any adverse reaction to me immediately.    Keri Wright RN, BSN 4/27/2021 9:28 AM

## 2021-04-28 ENCOUNTER — ANESTHESIA EVENT (OUTPATIENT)
Dept: GASTROENTEROLOGY | Facility: CLINIC | Age: 71
End: 2021-04-28
Payer: MEDICARE

## 2021-04-28 LAB
LABORATORY COMMENT REPORT: NORMAL
SARS-COV-2 RNA RESP QL NAA+PROBE: NEGATIVE
SPECIMEN SOURCE: NORMAL

## 2021-04-28 ASSESSMENT — LIFESTYLE VARIABLES: TOBACCO_USE: 1

## 2021-04-28 NOTE — ANESTHESIA PREPROCEDURE EVALUATION
Anesthesia Pre-Procedure Evaluation    Patient: Jose M Rea   MRN: [id number] : 1950        Preoperative Diagnosis: Screen for colon cancer [Z12.11]  History of adenomatous polyp of colon [Z86.010]   Procedure : Procedure(s):  COLONOSCOPY     Past Medical History:   Diagnosis Date     ED (erectile dysfunction)      Hyperlipidemia       Past Surgical History:   Procedure Laterality Date     APPENDECTOMY       ORTHOPEDIC SURGERY       PHACOEMULSIFICATION WITH STANDARD INTRAOCULAR LENS IMPLANT Left 4/3/2019    Procedure: Cataract Removal with Implant;  Surgeon: Cristofer Price MD;  Location: [facility] OR     PHACOEMULSIFICATION WITH STANDARD INTRAOCULAR LENS IMPLANT Right 2019    Procedure: Cataract Removal with Implant;  Surgeon: Cristofer Price MD;  Location: [facility] OR     SURGICAL HISTORY OF -       appendectomy in      SURGICAL HISTORY OF -       right shoulder surgery in 2016      Allergies   Allergen Reactions     Clindamycin      Bactroban [Mupirocin]      Cephalexin Rash     Doxycycline Rash     Metal [Staples] Rash and Blisters      Social History     Tobacco Use     Smoking status: Current Every Day Smoker     Packs/day: 0.50     Years: 50.00     Pack years: 25.00     Types: Cigarettes     Smokeless tobacco: Never Used     Tobacco comment: started at age 18, 1/2 pack daily   Substance Use Topics     Alcohol use: Yes     Comment: occasional beer      Wt Readings from Last 1 Encounters:   21 87.1 kg (192 lb)        Anesthesia Evaluation   Pt has had prior anesthetic. Type: General, MAC and Regional.    No history of anesthetic complications       ROS/MED HX  ENT/Pulmonary:     (+) tobacco use, Current use,     Neurologic:  - neg neurologic ROS     Cardiovascular:  - neg cardiovascular ROS   (+) Dyslipidemia -----Taking blood thinners     METS/Exercise Tolerance: >4 METS    Hematologic: Comments: Factor 5 Leiden heterozygous positive    (+) History of blood clots,      Musculoskeletal:  - neg musculoskeletal ROS     GI/Hepatic:  - neg GI/hepatic ROS     Renal/Genitourinary:  - neg Renal ROS     Endo:  - neg endo ROS     Psychiatric/Substance Use:  - neg psychiatric ROS     Infectious Disease:  - neg infectious disease ROS     Malignancy:  - neg malignancy ROS     Other:  - neg other ROS          Physical Exam    Airway        Mallampati: II   TM distance: > 3 FB   Neck ROM: full   Mouth opening: > 3 cm    Respiratory Devices and Support         Dental  no notable dental history         Cardiovascular   cardiovascular exam normal       Rhythm and rate: regular and normal     Pulmonary   pulmonary exam normal        breath sounds clear to auscultation           OUTSIDE LABS:  CBC: No results found for: WBC, HGB, HCT, PLT  BMP:   Lab Results   Component Value Date    POTASSIUM 4.4 01/06/2017    POTASSIUM 4.0 12/13/2016    CR 0.93 01/06/2017    CR 1.00 12/13/2016    GLC 89 02/26/2021    GLC 87 02/18/2020     COAGS:   Lab Results   Component Value Date    INR 2.90 (H) 03/26/2021     POC: No results found for: BGM, HCG, HCGS  HEPATIC:   Lab Results   Component Value Date    ALT 28 01/06/2017    AST 18 01/06/2017     OTHER: No results found for: PH, LACT, A1C, KIRILL, PHOS, MAG, LIPASE, AMYLASE, TSH, T4, T3, CRP, SED    Anesthesia Plan    ASA Status:  2      Anesthesia Type: General.   Induction: Propofol.   Maintenance: TIVA.        Consents    Anesthesia Plan(s) and associated risks, benefits, and realistic alternatives discussed. Questions answered and patient/representative(s) expressed understanding.     - Discussed with:  Patient         Postoperative Care    Pain management: Oral pain medications.   PONV prophylaxis: Ondansetron (or other 5HT-3)     Comments:                NICHELLE Shah CRNA

## 2021-04-30 ENCOUNTER — HOSPITAL ENCOUNTER (OUTPATIENT)
Facility: CLINIC | Age: 71
Discharge: HOME | End: 2021-04-30
Attending: SURGERY | Admitting: SURGERY
Payer: MEDICARE

## 2021-04-30 ENCOUNTER — DOCUMENTATION ONLY (OUTPATIENT)
Dept: ANTICOAGULATION | Facility: CLINIC | Age: 71
End: 2021-04-30

## 2021-04-30 ENCOUNTER — ANESTHESIA (OUTPATIENT)
Dept: GASTROENTEROLOGY | Facility: CLINIC | Age: 71
End: 2021-04-30
Payer: MEDICARE

## 2021-04-30 VITALS
HEIGHT: 70 IN | BODY MASS INDEX: 27.2 KG/M2 | HEART RATE: 59 BPM | RESPIRATION RATE: 14 BRPM | SYSTOLIC BLOOD PRESSURE: 127 MMHG | TEMPERATURE: 98 F | OXYGEN SATURATION: 95 % | DIASTOLIC BLOOD PRESSURE: 97 MMHG | WEIGHT: 190 LBS

## 2021-04-30 DIAGNOSIS — D68.9 COAGULATION DEFECT (H): ICD-10-CM

## 2021-04-30 DIAGNOSIS — Z79.01 LONG TERM CURRENT USE OF ANTICOAGULANT THERAPY: ICD-10-CM

## 2021-04-30 LAB — COLONOSCOPY: NORMAL

## 2021-04-30 PROCEDURE — 45380 COLONOSCOPY AND BIOPSY: CPT | Mod: PT,XU | Performed by: SURGERY

## 2021-04-30 PROCEDURE — 370N000017 HC ANESTHESIA TECHNICAL FEE, PER MIN: Performed by: SURGERY

## 2021-04-30 PROCEDURE — 250N000009 HC RX 250: Performed by: SURGERY

## 2021-04-30 PROCEDURE — 88305 TISSUE EXAM BY PATHOLOGIST: CPT | Mod: TC | Performed by: SURGERY

## 2021-04-30 PROCEDURE — 250N000009 HC RX 250: Performed by: NURSE ANESTHETIST, CERTIFIED REGISTERED

## 2021-04-30 PROCEDURE — 258N000003 HC RX IP 258 OP 636: Performed by: SURGERY

## 2021-04-30 PROCEDURE — 45381 COLONOSCOPY SUBMUCOUS NJX: CPT | Mod: 51 | Performed by: SURGERY

## 2021-04-30 PROCEDURE — 88305 TISSUE EXAM BY PATHOLOGIST: CPT | Mod: 26 | Performed by: PATHOLOGY

## 2021-04-30 PROCEDURE — 250N000011 HC RX IP 250 OP 636: Performed by: NURSE ANESTHETIST, CERTIFIED REGISTERED

## 2021-04-30 PROCEDURE — 45385 COLONOSCOPY W/LESION REMOVAL: CPT | Performed by: SURGERY

## 2021-04-30 PROCEDURE — 45380 COLONOSCOPY AND BIOPSY: CPT | Mod: PT | Performed by: SURGERY

## 2021-04-30 PROCEDURE — 45385 COLONOSCOPY W/LESION REMOVAL: CPT | Mod: PT | Performed by: SURGERY

## 2021-04-30 RX ORDER — NALOXONE HYDROCHLORIDE 0.4 MG/ML
0.2 INJECTION, SOLUTION INTRAMUSCULAR; INTRAVENOUS; SUBCUTANEOUS
Status: CANCELLED | OUTPATIENT
Start: 2021-04-30

## 2021-04-30 RX ORDER — NALOXONE HYDROCHLORIDE 0.4 MG/ML
0.4 INJECTION, SOLUTION INTRAMUSCULAR; INTRAVENOUS; SUBCUTANEOUS
Status: CANCELLED | OUTPATIENT
Start: 2021-04-30

## 2021-04-30 RX ORDER — PROPOFOL 10 MG/ML
INJECTION, EMULSION INTRAVENOUS CONTINUOUS PRN
Status: DISCONTINUED | OUTPATIENT
Start: 2021-04-30 | End: 2021-04-30

## 2021-04-30 RX ORDER — ONDANSETRON 2 MG/ML
4 INJECTION INTRAMUSCULAR; INTRAVENOUS
Status: DISCONTINUED | OUTPATIENT
Start: 2021-04-30 | End: 2021-04-30 | Stop reason: HOSPADM

## 2021-04-30 RX ORDER — LIDOCAINE HYDROCHLORIDE 10 MG/ML
INJECTION, SOLUTION EPIDURAL; INFILTRATION; INTRACAUDAL; PERINEURAL PRN
Status: DISCONTINUED | OUTPATIENT
Start: 2021-04-30 | End: 2021-04-30

## 2021-04-30 RX ORDER — PROPOFOL 10 MG/ML
INJECTION, EMULSION INTRAVENOUS PRN
Status: DISCONTINUED | OUTPATIENT
Start: 2021-04-30 | End: 2021-04-30

## 2021-04-30 RX ORDER — LIDOCAINE 40 MG/G
CREAM TOPICAL
Status: DISCONTINUED | OUTPATIENT
Start: 2021-04-30 | End: 2021-04-30 | Stop reason: HOSPADM

## 2021-04-30 RX ORDER — FLUMAZENIL 0.1 MG/ML
0.2 INJECTION, SOLUTION INTRAVENOUS
Status: CANCELLED | OUTPATIENT
Start: 2021-04-30 | End: 2021-04-30

## 2021-04-30 RX ORDER — SODIUM CHLORIDE, SODIUM LACTATE, POTASSIUM CHLORIDE, CALCIUM CHLORIDE 600; 310; 30; 20 MG/100ML; MG/100ML; MG/100ML; MG/100ML
INJECTION, SOLUTION INTRAVENOUS CONTINUOUS
Status: DISCONTINUED | OUTPATIENT
Start: 2021-04-30 | End: 2021-04-30 | Stop reason: HOSPADM

## 2021-04-30 RX ADMIN — SODIUM CHLORIDE, POTASSIUM CHLORIDE, SODIUM LACTATE AND CALCIUM CHLORIDE: 600; 310; 30; 20 INJECTION, SOLUTION INTRAVENOUS at 09:35

## 2021-04-30 RX ADMIN — PROPOFOL 200 MCG/KG/MIN: 10 INJECTION, EMULSION INTRAVENOUS at 10:26

## 2021-04-30 RX ADMIN — LIDOCAINE HYDROCHLORIDE 1 ML: 10 INJECTION, SOLUTION EPIDURAL; INFILTRATION; INTRACAUDAL; PERINEURAL at 09:35

## 2021-04-30 RX ADMIN — LIDOCAINE HYDROCHLORIDE 40 MG: 10 INJECTION, SOLUTION EPIDURAL; INFILTRATION; INTRACAUDAL; PERINEURAL at 10:26

## 2021-04-30 RX ADMIN — PROPOFOL 100 MG: 10 INJECTION, EMULSION INTRAVENOUS at 10:26

## 2021-04-30 ASSESSMENT — MIFFLIN-ST. JEOR: SCORE: 1613.08

## 2021-04-30 NOTE — ANESTHESIA POSTPROCEDURE EVALUATION
Patient: Jose M Rea    Procedure(s):  COLONOSCOPY, WITH POLYPECTOMY AND BIOPSY  Colonoscopy, Flexible, With Lesion Removal Using Snare    Diagnosis:Screen for colon cancer [Z12.11]  History of adenomatous polyp of colon [Z86.010]  Diagnosis Additional Information: No value filed.    Anesthesia Type:  General    Note:  Disposition: Outpatient   Postop Pain Control: Uneventful            Sign Out: Well controlled pain   PONV: No   Neuro/Psych: Uneventful            Sign Out: Acceptable/Baseline neuro status   Airway/Respiratory: Uneventful            Sign Out: Acceptable/Baseline resp. status   CV/Hemodynamics: Uneventful            Sign Out: Acceptable CV status; No obvious hypovolemia; No obvious fluid overload   Other NRE: NONE   DID A NON-ROUTINE EVENT OCCUR? No           Last vitals:  Vitals:    04/30/21 0901   BP: 138/74   Resp: 18   Temp: 36.7  C (98  F)   SpO2: 98%       Last vitals prior to Anesthesia Care Transfer:  CRNA VITALS  4/30/2021 1018 - 4/30/2021 1118      4/30/2021             Pulse:  69    Ht Rate:  69    SpO2:  98 %          Electronically Signed By: Saqib Yee CRNA, APRN CRNA  April 30, 2021  11:22 AM

## 2021-04-30 NOTE — ANESTHESIA CARE TRANSFER NOTE
Patient: Jose M Rea    Procedure(s):  COLONOSCOPY, WITH POLYPECTOMY AND BIOPSY  Colonoscopy, Flexible, With Lesion Removal Using Snare    Diagnosis: Screen for colon cancer [Z12.11]  History of adenomatous polyp of colon [Z86.010]  Diagnosis Additional Information: No value filed.    Anesthesia Type:   General     Note:    Oropharynx: oropharynx clear of all foreign objects and spontaneously breathing  Level of Consciousness: drowsy  Oxygen Supplementation: nasal cannula  Level of Supplemental Oxygen (L/min / FiO2): 2  Independent Airway: airway patency satisfactory and stable  Dentition: dentition unchanged  Vital Signs Stable: post-procedure vital signs reviewed and stable  Report to RN Given: handoff report given  Patient transferred to: Phase II    Handoff Report: Identifed the Patient, Identified the Reponsible Provider, Reviewed the pertinent medical history, Discussed the surgical course, Reviewed Intra-OP anesthesia mangement and issues during anesthesia, Set expectations for post-procedure period and Allowed opportunity for questions and acknowledgement of understanding      Vitals: (Last set prior to Anesthesia Care Transfer)  CRNA VITALS  4/30/2021 1018 - 4/30/2021 1048      4/30/2021             Pulse:  69    Ht Rate:  69    SpO2:  98 %        Electronically Signed By: Saqib Yee CRNA, APRN CRNA  April 30, 2021  10:48 AM

## 2021-04-30 NOTE — PROGRESS NOTES
ANTICOAGULATION  MANAGEMENT: Discharge Review    Jose M Rea chart reviewed for anticoagulation continuity of care    Outpatient surgery/procedure on 4/30/21 for Colonoscopy.    Discharge disposition: Home    Results:    No results for input(s): INR, [id number], AXA in the last 168 hours.  Anticoagulation inpatient management:     not applicable     Anticoagulation discharge instructions:     Warfarin dosing: increase dose to 15 mg today   Bridging: bridging with enoxaparin (Lovenox)   INR goal change: No      Medication changes affecting anticoagulation: No    Additional factors affecting anticoagulation: No    Plan     No adjustment to anticoagulation plan needed    Patient not contacted    No adjustment to Anticoagulation Calendar was required    Kb Braun RN

## 2021-04-30 NOTE — H&P
71 year old year old male here for colonoscopy for personal history of polyps.  Last colonoscopy was 3 years ago.  Patient denies any changes in stool caliber or blood in stool. Patient denies family history of colon cancer.    Patient Active Problem List   Diagnosis     Hyperlipidemia LDL goal <100     Eczema     Chronic anticoagulation     Long term current use of anticoagulant therapy     Erectile dysfunction, unspecified erectile dysfunction type     H/O deep venous thrombosis     Coagulation defect (H)       Past Medical History:   Diagnosis Date     ED (erectile dysfunction)      Hyperlipidemia        Past Surgical History:   Procedure Laterality Date     APPENDECTOMY       ORTHOPEDIC SURGERY       PHACOEMULSIFICATION WITH STANDARD INTRAOCULAR LENS IMPLANT Left 4/3/2019    Procedure: Cataract Removal with Implant;  Surgeon: Cristofer Price MD;  Location: [facility]     PHACOEMULSIFICATION WITH STANDARD INTRAOCULAR LENS IMPLANT Right 4/24/2019    Procedure: Cataract Removal with Implant;  Surgeon: Cristofer Price MD;  Location: [facility]     SURGICAL HISTORY OF -       appendectomy in 1956     SURGICAL HISTORY OF -       right shoulder surgery in 2016       Family History   Problem Relation Age of Onset     Coronary Artery Disease Paternal Grandfather         MI in late 50s or early 60s     Sleep Apnea Son      Thrombosis Son         PEs in his mid 30s     Prostate Cancer No family hx of      Colon Cancer No family hx of        No current outpatient medications on file.       Allergies   Allergen Reactions     Clindamycin      Bactroban [Mupirocin]      Cephalexin Rash     Doxycycline Rash     Metal [Staples] Rash and Blisters       Pt reports that he has been smoking cigarettes. He has a 25.00 pack-year smoking history. He has never used smokeless tobacco. He reports current alcohol use. He reports that he does not use drugs.    Exam:  /74   Temp 98  F (36.7  C) (Oral)   Resp 18   Ht 1.778 m  "(5' 10\")   Wt 86.2 kg (190 lb)   SpO2 98%   BMI 27.26 kg/m      Awake, Alert OX3  Lungs - CTA bilaterally  CV - RRR, no murmurs, distal pulses intact  Abd - soft, non-distended, non-tender, +BS  Extr - No cyanosis or edema    A/P 71 year old year old male in need of colonoscopy for personal history of polyps. Risks, benefits, alternatives, and complications were discussed including the possibility of perforation, bleeding, missed lesion and the patient agreed to proceed.    He has been off Lovenox for 24 hours.  Off Coumadin for 5 days.  History of recurrent VTE    Mark Shoemaker, DO on 4/30/2021 at 10:19 AM      "

## 2021-05-03 ENCOUNTER — TELEPHONE (OUTPATIENT)
Dept: FAMILY MEDICINE | Facility: CLINIC | Age: 71
End: 2021-05-03

## 2021-05-03 DIAGNOSIS — J32.9 CHRONIC SINUSITIS, UNSPECIFIED LOCATION: Primary | ICD-10-CM

## 2021-05-03 LAB — COPATH REPORT: NORMAL

## 2021-05-03 NOTE — TELEPHONE ENCOUNTER
Thank You.    Message left referral available and provided number to schedule appointment.. Asked to return call for further questions.    Venecia MATHIS RN

## 2021-05-03 NOTE — TELEPHONE ENCOUNTER
Received call from patient. He was seen 4 /6 for sinus infection. He feels the infection is resolved but continues to feel like his ears are plugged and is asking for a referral to ENT.  Will route to Dr Iqbal.    .rp

## 2021-05-07 ENCOUNTER — ANTICOAGULATION THERAPY VISIT (OUTPATIENT)
Dept: ANTICOAGULATION | Facility: CLINIC | Age: 71
End: 2021-05-07

## 2021-05-07 DIAGNOSIS — D68.9 COAGULATION DEFECT (H): ICD-10-CM

## 2021-05-07 DIAGNOSIS — Z86.718 H/O DEEP VENOUS THROMBOSIS: ICD-10-CM

## 2021-05-07 DIAGNOSIS — Z79.01 CHRONIC ANTICOAGULATION: ICD-10-CM

## 2021-05-07 DIAGNOSIS — Z79.01 LONG TERM CURRENT USE OF ANTICOAGULANT THERAPY: ICD-10-CM

## 2021-05-07 LAB
CAPILLARY BLOOD COLLECTION: NORMAL
INR PPP: 2 (ref 0.86–1.14)

## 2021-05-07 PROCEDURE — 36416 COLLJ CAPILLARY BLOOD SPEC: CPT | Performed by: FAMILY MEDICINE

## 2021-05-07 PROCEDURE — 85610 PROTHROMBIN TIME: CPT | Performed by: FAMILY MEDICINE

## 2021-05-07 NOTE — PROGRESS NOTES
ANTICOAGULATION MANAGEMENT     Patient Name:  Jose M Rea  Date:  2021    ASSESSMENT /SUBJECTIVE:    Today's INR result of 2.00 is therapeutic. Goal INR of 2.0-3.0      Warfarin dose taken: Warfarin taken as instructed    Diet: No new diet changes affecting INR    Medication changes/ interactions: Taking Lovenox and warfarin may increase risk of bleeding, but not expected to affect INR    Previous INR: Therapeutic 2.90    S/S of bleeding or thromboembolism: No    New injury or illness: No    Upcoming surgery, procedure or cardioversion: No    Additional findings: Patient had a colonoscopy last week and is bridging with prophylactic Lovenox      PLAN:    Telephone call with Jose M regarding INR result and instructed:     Warfarin Dosing Instructions: Continue your current warfarin dose 10mg Mon; 7.5mg all other days  Continue bridging with Enoxaparin. 3 more syringes ordered.     Instructed patient to follow up no later than: 3 days  Lab visit scheduled    Education provided:  Lovenox/Heparin education provided: role of enoxaparin/heparin in bridge therapy  and Contact [facility]: [phone]  with any changes, questions or concerns.       Jose M verbalizes understanding and agrees to warfarin dosing plan.    Instructed to call the Anticoagulation Clinic for any changes, questions or concerns. (#[phone])        Mayra Perdomo RN [facility]       OBJECTIVE:  Recent labs: (last 7 days)     21  0755   INR 2.00*         INR assessment THER    Recheck INR In: 3 DAYS    INR Location Clinic      Anticoagulation Summary  As of 2021    INR goal:  2.0-3.0   TTR:  76.5 % (1 y)   INR used for dosin.00 (2021)   Warfarin maintenance plan:  10 mg (5 mg x 2) every Mon; 7.5 mg (5 mg x 1.5) all other days   Full warfarin instructions:  10 mg every Mon; 7.5 mg all other days   Weekly warfarin total:  55 mg   No change documented:  Mayra Perdomo RN   Plan last  modified:  Salma Diane RN (12/9/2020)   Next INR check:  5/10/2021   Priority:  High   Target end date:  Indefinite    Indications    Coagulation defect (H) [D68.9]  Acute deep vein thrombosis (DVT) of proximal vein of lower extremity (H) (Resolved) [I82.4Y9]  Long term current use of anticoagulant therapy [Z79.01]  Acute deep vein thrombosis (DVT) of proximal vein of lower extremity  unspecified laterality (H) (Resolved) [I82.4Y9]             Anticoagulation Episode Summary     INR check location:      Preferred lab:      Send INR reminders to:  [facility]    Comments:  * Tx from [place]. Factor 5 Leiden heterozygous positive. Indefinite anticoagulation per Dr. Hein on 3-27-30. Consent to communicate with wife, Viri. Please call mobile # first      Anticoagulation Care Providers     Provider Role Specialty Phone number    Pj Quijano MD Referring Family Medicine [phone]

## 2021-05-10 ENCOUNTER — ANTICOAGULATION THERAPY VISIT (OUTPATIENT)
Dept: ANTICOAGULATION | Facility: CLINIC | Age: 71
End: 2021-05-10

## 2021-05-10 DIAGNOSIS — Z79.01 LONG TERM CURRENT USE OF ANTICOAGULANT THERAPY: ICD-10-CM

## 2021-05-10 DIAGNOSIS — D68.9 COAGULATION DEFECT (H): ICD-10-CM

## 2021-05-10 DIAGNOSIS — Z86.718 H/O DEEP VENOUS THROMBOSIS: ICD-10-CM

## 2021-05-10 LAB
CAPILLARY BLOOD COLLECTION: NORMAL
INR PPP: 2.6 (ref 0.86–1.14)

## 2021-05-10 PROCEDURE — 36416 COLLJ CAPILLARY BLOOD SPEC: CPT | Performed by: FAMILY MEDICINE

## 2021-05-10 PROCEDURE — 85610 PROTHROMBIN TIME: CPT | Performed by: FAMILY MEDICINE

## 2021-05-10 NOTE — PROGRESS NOTES
ANTICOAGULATION MANAGEMENT     Patient Name:  Jose M Rea  Date:  5/10/2021    ASSESSMENT /SUBJECTIVE:    Today's INR result of 2.60 is therapeutic. Goal INR of 2.0-3.0      Warfarin dose taken: Warfarin taken as instructed    Diet: No new diet changes affecting INR    Medication changes/ interactions: Taking lovenox and warfarin may increase risk of bleeding, but not expected to affect INR    Previous INR: Therapeutic 2.00    S/S of bleeding or thromboembolism: No    New injury or illness: No    Upcoming surgery, procedure or cardioversion: No    Additional findings: None      PLAN:    Telephone call with Jose M regarding INR result and instructed:     Warfarin Dosing Instructions: Continue your current warfarin dose 10 mg every Mon; 7.5 mg all other days  Stop bridging with Enoxaparin    Instructed patient to follow up no later than: 4 weeks  Lab visit scheduled    Education provided: Target INR goal and significance of current INR result and Importance of notifying clinic for changes in medications; a sooner lab recheck maybe needed.      Jose M verbalizes understanding and agrees to warfarin dosing plan.    Instructed to call the Anticoagulation Clinic for any changes, questions or concerns. (#[phone])        Justa Vaca RN      OBJECTIVE:  Recent labs: (last 7 days)     21  0755 05/10/21  1405   INR 2.00* 2.60*         No question data found.  Anticoagulation Summary  As of 5/10/2021    INR goal:  2.0-3.0   TTR:  76.6 % (1 y)   INR used for dosin.60 (5/10/2021)   Warfarin maintenance plan:  10 mg (5 mg x 2) every Mon; 7.5 mg (5 mg x 1.5) all other days   Full warfarin instructions:  10 mg every Mon; 7.5 mg all other days   Weekly warfarin total:  55 mg   Plan last modified:  Salma Diane RN (2020)   Next INR check:     Priority:  High   Target end date:  Indefinite    Indications    Coagulation defect (H) [D68.9]  Acute deep vein thrombosis (DVT) of proximal vein of lower  extremity (H) (Resolved) [I82.4Y9]  Long term current use of anticoagulant therapy [Z79.01]  Acute deep vein thrombosis (DVT) of proximal vein of lower extremity  unspecified laterality (H) (Resolved) [I82.4Y9]             Anticoagulation Episode Summary     INR check location:      Preferred lab:      Send INR reminders to:  [facility]    Comments:  * Tx from [place]. Factor 5 Leiden heterozygous positive. Indefinite anticoagulation per Dr. Hein on 3-27-30. Consent to communicate with wife, Viri. Please call mobile # first      Anticoagulation Care Providers     Provider Role Specialty Phone number    Pj Quijano MD Referring Family Medicine [phone]

## 2021-05-12 NOTE — PROGRESS NOTES
Chief Complaint   Patient presents with     Ear Problem     Check hearing/ears/some tinnitus - left ear did feel plugged      History of Present Illness  Jose M Rea is a 71 year old male who presents to me today for ear evaluation.  I am seeing this patient in consultation for chronic sinusitis unspecified at the request of the provider Dr. Hernandez.  The patient reports developing bilateral ear fullness/plugging about 4 to 5 months ago.  He did have an upper respiratory illness around the time this happened.  He had persistent plugging and fullness that was worse on the left-hand side.  He was taking some Zyrtec and felt like this did help potentially later in the day.  He denies any otalgia, otorrhea, bloody otorrhea.  He does have intermittent tinnitus but that is been present for quite some time.  He is not had previous ear surgery.  He denies any significant noise exposure history.  He denies any significant allergy history.  He is a tobacco user, roughly 1/2 pack a day or less.  He has been smoking for over 50 years.     Past Medical History  Patient Active Problem List   Diagnosis     Hyperlipidemia LDL goal <100     Eczema     Chronic anticoagulation     Long term current use of anticoagulant therapy     Erectile dysfunction, unspecified erectile dysfunction type     H/O deep venous thrombosis     Coagulation defect (H)     Current Medications     Current Outpatient Medications:      ACETAMINOPHEN PO, Take 650 mg by mouth, Disp: , Rfl:      betamethasone dipropionate (DIPROSONE) 0.05 % external cream, Apply twice daily as needed to affected area on leg., Disp: 45 g, Rfl: 1     clotrimazole (LOTRIMIN) 1 % cream, as needed , Disp: , Rfl:      enoxaparin ANTICOAGULANT (LOVENOX) 40 MG/0.4ML syringe, Inject 0.4 mLs (40 mg) Subcutaneous daily As directed by INR clinic, Disp: 1.2 mL, Rfl: 0     fluticasone (FLONASE) 50 MCG/ACT nasal spray, Spray 2 sprays into both nostrils daily as needed for rhinitis or  allergies, Disp: 15.8 mL, Rfl: 3     Multiple Vitamins-Minerals (MENS ONE DAILY PO), , Disp: , Rfl:      sildenafil (REVATIO) 20 MG tablet, Sidenafil (Viagra) comes in 20mg strength pills. Take as many pills as needed up to maximum of 5 pills at a time prior to intercourse., Disp: 30 tablet, Rfl: 11     simvastatin (ZOCOR) 20 MG tablet, Take 1 tablet (20 mg) by mouth daily, Disp: 90 tablet, Rfl: 3     warfarin ANTICOAGULANT (COUMADIN) 5 MG tablet, Take 10 mg every Mon; 7.5 mg all other days or As directed by Anticoagulation clinic, Disp: 150 tablet, Rfl: 0    Allergies  Allergies   Allergen Reactions     Clindamycin      Bactroban [Mupirocin]      Cephalexin Rash     Doxycycline Rash     Metal [Staples] Rash and Blisters       Social History   Social History     Socioeconomic History     Marital status:      Spouse name: Not on file     Number of children: Not on file     Years of education: Not on file     Highest education level: Not on file   Occupational History     Not on file   Social Needs     Financial resource strain: Not on file     Food insecurity     Worry: Not on file     Inability: Not on file     Transportation needs     Medical: Not on file     Non-medical: Not on file   Tobacco Use     Smoking status: Current Every Day Smoker     Packs/day: 0.50     Years: 50.00     Pack years: 25.00     Types: Cigarettes     Smokeless tobacco: Never Used     Tobacco comment: started at age 18, 1/2 pack daily   Substance and Sexual Activity     Alcohol use: Yes     Comment: occasional beer     Drug use: No     Sexual activity: Yes     Partners: Female   Lifestyle     Physical activity     Days per week: Not on file     Minutes per session: Not on file     Stress: Not on file   Relationships     Social connections     Talks on phone: Not on file     Gets together: Not on file     Attends Anabaptism service: Not on file     Active member of club or organization: Not on file     Attends meetings of clubs or  "organizations: Not on file     Relationship status: Not on file     Intimate partner violence     Fear of current or ex partner: Not on file     Emotionally abused: Not on file     Physically abused: Not on file     Forced sexual activity: Not on file   Other Topics Concern     Parent/sibling w/ CABG, MI or angioplasty before 65F 55M? Not Asked   Social History Narrative     Not on file       Family History  Family History   Problem Relation Age of Onset     Coronary Artery Disease Paternal Grandfather         MI in late 50s or early 60s     Sleep Apnea Son      Thrombosis Son         PEs in his mid 30s     Prostate Cancer No family hx of      Colon Cancer No family hx of        Review of Systems  As per HPI and PMHx, otherwise 10+ comprehensive system review is negative.    Physical Exam  BP (!) 146/70 (BP Location: Right arm, Patient Position: Sitting, Cuff Size: Adult Regular)   Pulse 75   Temp 98.5  F (36.9  C) (Tympanic)   Ht 1.778 m (5' 10\")   Wt 86.2 kg (190 lb)   BMI 27.26 kg/m    GENERAL: Patient is a pleasant, cooperative 71 year old male in no acute distress.  HEAD: Normocephalic, atraumatic.  Hair and scalp are normal.  EYES: Pupils are equal, round, reactive to light and accommodation.  Extraocular movements are intact.  The sclera nonicteric without injection.  The extraocular structures are normal.  EARS: See below.  NOSE: Nares are patent.  Nasal mucosa is slightly dry with sticky, inflammatory mucus.  The nasal mucosa is boggy and inflamed.  The patient has a significant rightward anterior and superior nasal septal deviation.  No nasal cavity masses, polyps, or mucopurulence on anterior rhinoscopy.  NEUROLOGIC: Cranial nerves II through XII are grossly intact.  Voice is strong.  Patient is House-Brackmann I/VI bilaterally.  CARDIOVASCULAR: Extremities are warm and well-perfused.  No significant peripheral edema.  RESPIRATORY: Patient has nonlabored breathing without cough, wheeze, " stridor.  PSYCHIATRIC: Patient is alert and oriented.  Mood and affect appear normal.  SKIN: Warm and dry.  No scalp, face, or neck lesions noted.    Physical Exam and Procedure    EARS: Normal shape and symmetry.  No tenderness when palpating the mastoid or tragal areas bilaterally.  The ears were then examined under the otic binocular microscope.  Otoscopic exam on the right reveals a minimal amount of cerumen.  The right tympanic membrane was round, intact without evidence of effusion.  No retraction, granulation, drainage, or evidence of cholesteatoma.      Attention was turned to the left ear.  Otoscopic exam on the left reveals a minimal amount of cerumen.  The left tympanic membrane was round, intact without evidence of effusion.  No retraction, granulation, drainage, or evidence of cholesteatoma.      Audiogram  The patient underwent an audiogram performed today.  My review of the audiogram shows normal hearing up to 2000 Hz in both ears with moderate sloping to moderately severe sensorineural hearing loss in the right ear and moderate sloping to moderately severe mixed hearing loss in the left ear.  Pure-tone average is 23 dB on the right and 26 dB on the left.  Speech reception threshhold is 20 dB on the right and 20 dB on the left.  The patient had 100% word recognition on the right and 100% word recognition on the left.  The patient had a type A tympanogram on the right and a type A tympanogram on the left.     Assessment and Plan     ICD-10-CM    1. Sensorineural hearing loss, bilateral  H90.3 AUDIOLOGY ADULT REFERRAL     Microscopy, Binocular     fluticasone (FLONASE) 50 MCG/ACT nasal spray   2. Tinnitus, bilateral  H93.13 AUDIOLOGY ADULT REFERRAL     Microscopy, Binocular     fluticasone (FLONASE) 50 MCG/ACT nasal spray   3. Ear fullness, left  H93.8X2 AUDIOLOGY ADULT REFERRAL     Microscopy, Binocular     fluticasone (FLONASE) 50 MCG/ACT nasal spray   4. History of eustachian tube dysfunction  Z86.69  AUDIOLOGY ADULT REFERRAL     Microscopy, Binocular     fluticasone (FLONASE) 50 MCG/ACT nasal spray   5. History of sinusitis  Z87.09 AUDIOLOGY ADULT REFERRAL     Microscopy, Binocular     fluticasone (FLONASE) 50 MCG/ACT nasal spray   6. Tobacco dependence syndrome  F17.200 AUDIOLOGY ADULT REFERRAL     Microscopy, Binocular     fluticasone (FLONASE) 50 MCG/ACT nasal spray   7. Encounter for tobacco use cessation counseling  Z71.6 AUDIOLOGY ADULT REFERRAL     Microscopy, Binocular     fluticasone (FLONASE) 50 MCG/ACT nasal spray     It was my pleasure seeing Jose M Rea today in clinic.  The patient presents to me today with recent history of ear plugging/fullness/pressure that has resolved over the past few days.  Based on the patient's history, this was most consistent with eustachian tube dysfunction following an upper respiratory infection.  This likely was compounded with his tobacco history.  If or when symptoms similar return, I would recommend trialing daily Valsalva/auto insufflation or use of Otovent to try to open the eustachian tube.  We discussed adding Flonase 2 sprays each side once daily as needed.  Given that the patient is asymptomatic today and is audiometric assessment does not suggest any eustachian tube dysfunction or middle ear effusion, I would recommend observation.  The patient can return to clinic with problems or concerns in the future.     The patient does have sensorineural hearing loss in the high tones bilaterally.  He would not be a great candidate for hearing aid given how good his hearing is in the speech in conversation tones.  At this point time, I would recommend observation.  He can return for a hearing test in 1 to 3 years or sooner if his hearing changes.    Jose M to follow up with Primary Care provider regarding elevated blood pressure.    Misael Clark MD  Department of Otolarygology-Head and Neck Surgery  [facility]

## 2021-05-13 ENCOUNTER — OFFICE VISIT (OUTPATIENT)
Dept: AUDIOLOGY | Facility: CLINIC | Age: 71
End: 2021-05-13
Payer: MEDICARE

## 2021-05-13 ENCOUNTER — OFFICE VISIT (OUTPATIENT)
Dept: OTOLARYNGOLOGY | Facility: CLINIC | Age: 71
End: 2021-05-13
Payer: MEDICARE

## 2021-05-13 VITALS
HEART RATE: 75 BPM | BODY MASS INDEX: 27.2 KG/M2 | HEIGHT: 70 IN | WEIGHT: 190 LBS | DIASTOLIC BLOOD PRESSURE: 70 MMHG | SYSTOLIC BLOOD PRESSURE: 146 MMHG | TEMPERATURE: 98.5 F

## 2021-05-13 DIAGNOSIS — Z71.6 ENCOUNTER FOR TOBACCO USE CESSATION COUNSELING: ICD-10-CM

## 2021-05-13 DIAGNOSIS — H93.13 TINNITUS, BILATERAL: ICD-10-CM

## 2021-05-13 DIAGNOSIS — H91.93 HIGH FREQUENCY HEARING LOSS OF BOTH EARS: Primary | ICD-10-CM

## 2021-05-13 DIAGNOSIS — Z86.69 HISTORY OF EUSTACHIAN TUBE DYSFUNCTION: ICD-10-CM

## 2021-05-13 DIAGNOSIS — F17.200 TOBACCO DEPENDENCE SYNDROME: ICD-10-CM

## 2021-05-13 DIAGNOSIS — H90.3 SENSORINEURAL HEARING LOSS, BILATERAL: Primary | ICD-10-CM

## 2021-05-13 DIAGNOSIS — Z87.09 HISTORY OF SINUSITIS: ICD-10-CM

## 2021-05-13 DIAGNOSIS — H93.8X2 EAR FULLNESS, LEFT: ICD-10-CM

## 2021-05-13 PROCEDURE — 99203 OFFICE O/P NEW LOW 30 MIN: CPT | Mod: 25 | Performed by: OTOLARYNGOLOGY

## 2021-05-13 PROCEDURE — 92550 TYMPANOMETRY & REFLEX THRESH: CPT | Performed by: AUDIOLOGIST

## 2021-05-13 PROCEDURE — 92557 COMPREHENSIVE HEARING TEST: CPT | Performed by: AUDIOLOGIST

## 2021-05-13 PROCEDURE — 99207 PR NO CHARGE LOS: CPT | Performed by: AUDIOLOGIST

## 2021-05-13 PROCEDURE — 92504 EAR MICROSCOPY EXAMINATION: CPT | Performed by: OTOLARYNGOLOGY

## 2021-05-13 RX ORDER — FLUTICASONE PROPIONATE 50 MCG
2 SPRAY, SUSPENSION (ML) NASAL DAILY PRN
Qty: 15.8 ML | Refills: 3 | Status: SHIPPED | OUTPATIENT
Start: 2021-05-13 | End: 2022-11-28

## 2021-05-13 ASSESSMENT — MIFFLIN-ST. JEOR: SCORE: 1613.08

## 2021-05-13 NOTE — NURSING NOTE
"Initial BP (!) 146/70 (BP Location: Right arm, Patient Position: Sitting, Cuff Size: Adult Regular)   Pulse 75   Temp 98.5  F (36.9  C) (Tympanic)   Ht 1.778 m (5' 10\")   Wt 86.2 kg (190 lb)   BMI 27.26 kg/m   Estimated body mass index is 27.26 kg/m  as calculated from the following:    Height as of this encounter: 1.778 m (5' 10\").    Weight as of this encounter: 86.2 kg (190 lb). .    Marifer Powers CMA    "

## 2021-05-13 NOTE — LETTER
5/13/2021         RE: Jose M Rea  [address] [facility] [id number]        Dear Colleague,    Thank you for referring your patient, Jose M Rea, to the [facility]. Please see a copy of my visit note below.    Chief Complaint   Patient presents with     Ear Problem     Check hearing/ears/some tinnitus - left ear did feel plugged      History of Present Illness  Jose M Rea is a 71 year old male who presents to me today for ear evaluation.  I am seeing this patient in consultation for chronic sinusitis unspecified at the request of the provider Dr. Hernandez.  The patient reports developing bilateral ear fullness/plugging about 4 to 5 months ago.  He did have an upper respiratory illness around the time this happened.  He had persistent plugging and fullness that was worse on the left-hand side.  He was taking some Zyrtec and felt like this did help potentially later in the day.  He denies any otalgia, otorrhea, bloody otorrhea.  He does have intermittent tinnitus but that is been present for quite some time.  He is not had previous ear surgery.  He denies any significant noise exposure history.  He denies any significant allergy history.  He is a tobacco user, roughly 1/2 pack a day or less.  He has been smoking for over 50 years.     Past Medical History  Patient Active Problem List   Diagnosis     Hyperlipidemia LDL goal <100     Eczema     Chronic anticoagulation     Long term current use of anticoagulant therapy     Erectile dysfunction, unspecified erectile dysfunction type     H/O deep venous thrombosis     Coagulation defect (H)     Current Medications     Current Outpatient Medications:      ACETAMINOPHEN PO, Take 650 mg by mouth, Disp: , Rfl:      betamethasone dipropionate (DIPROSONE) 0.05 % external cream, Apply twice daily as needed to affected area on leg., Disp: 45 g, Rfl: 1     clotrimazole (LOTRIMIN) 1 % cream, as needed , Disp: , Rfl:      enoxaparin  ANTICOAGULANT (LOVENOX) 40 MG/0.4ML syringe, Inject 0.4 mLs (40 mg) Subcutaneous daily As directed by INR clinic, Disp: 1.2 mL, Rfl: 0     fluticasone (FLONASE) 50 MCG/ACT nasal spray, Spray 2 sprays into both nostrils daily as needed for rhinitis or allergies, Disp: 15.8 mL, Rfl: 3     Multiple Vitamins-Minerals (MENS ONE DAILY PO), , Disp: , Rfl:      sildenafil (REVATIO) 20 MG tablet, Sidenafil (Viagra) comes in 20mg strength pills. Take as many pills as needed up to maximum of 5 pills at a time prior to intercourse., Disp: 30 tablet, Rfl: 11     simvastatin (ZOCOR) 20 MG tablet, Take 1 tablet (20 mg) by mouth daily, Disp: 90 tablet, Rfl: 3     warfarin ANTICOAGULANT (COUMADIN) 5 MG tablet, Take 10 mg every Mon; 7.5 mg all other days or As directed by Anticoagulation clinic, Disp: 150 tablet, Rfl: 0    Allergies  Allergies   Allergen Reactions     Clindamycin      Bactroban [Mupirocin]      Cephalexin Rash     Doxycycline Rash     Metal [Staples] Rash and Blisters       Social History   Social History     Socioeconomic History     Marital status:      Spouse name: Not on file     Number of children: Not on file     Years of education: Not on file     Highest education level: Not on file   Occupational History     Not on file   Social Needs     Financial resource strain: Not on file     Food insecurity     Worry: Not on file     Inability: Not on file     Transportation needs     Medical: Not on file     Non-medical: Not on file   Tobacco Use     Smoking status: Current Every Day Smoker     Packs/day: 0.50     Years: 50.00     Pack years: 25.00     Types: Cigarettes     Smokeless tobacco: Never Used     Tobacco comment: started at age 18, 1/2 pack daily   Substance and Sexual Activity     Alcohol use: Yes     Comment: occasional beer     Drug use: No     Sexual activity: Yes     Partners: Female   Lifestyle     Physical activity     Days per week: Not on file     Minutes per session: Not on file     Stress:  "Not on file   Relationships     Social connections     Talks on phone: Not on file     Gets together: Not on file     Attends Mandaen service: Not on file     Active member of club or organization: Not on file     Attends meetings of clubs or organizations: Not on file     Relationship status: Not on file     Intimate partner violence     Fear of current or ex partner: Not on file     Emotionally abused: Not on file     Physically abused: Not on file     Forced sexual activity: Not on file   Other Topics Concern     Parent/sibling w/ CABG, MI or angioplasty before 65F 55M? Not Asked   Social History Narrative     Not on file       Family History  Family History   Problem Relation Age of Onset     Coronary Artery Disease Paternal Grandfather         MI in late 50s or early 60s     Sleep Apnea Son      Thrombosis Son         PEs in his mid 30s     Prostate Cancer No family hx of      Colon Cancer No family hx of        Review of Systems  As per HPI and PMHx, otherwise 10+ comprehensive system review is negative.    Physical Exam  BP (!) 146/70 (BP Location: Right arm, Patient Position: Sitting, Cuff Size: Adult Regular)   Pulse 75   Temp 98.5  F (36.9  C) (Tympanic)   Ht 1.778 m (5' 10\")   Wt 86.2 kg (190 lb)   BMI 27.26 kg/m    GENERAL: Patient is a pleasant, cooperative 71 year old male in no acute distress.  HEAD: Normocephalic, atraumatic.  Hair and scalp are normal.  EYES: Pupils are equal, round, reactive to light and accommodation.  Extraocular movements are intact.  The sclera nonicteric without injection.  The extraocular structures are normal.  EARS: See below.  NOSE: Nares are patent.  Nasal mucosa is slightly dry with sticky, inflammatory mucus.  The nasal mucosa is boggy and inflamed.  The patient has a significant rightward anterior and superior nasal septal deviation.  No nasal cavity masses, polyps, or mucopurulence on anterior rhinoscopy.  NEUROLOGIC: Cranial nerves II through XII are grossly " intact.  Voice is strong.  Patient is House-Brackmann I/VI bilaterally.  CARDIOVASCULAR: Extremities are warm and well-perfused.  No significant peripheral edema.  RESPIRATORY: Patient has nonlabored breathing without cough, wheeze, stridor.  PSYCHIATRIC: Patient is alert and oriented.  Mood and affect appear normal.  SKIN: Warm and dry.  No scalp, face, or neck lesions noted.    Physical Exam and Procedure    EARS: Normal shape and symmetry.  No tenderness when palpating the mastoid or tragal areas bilaterally.  The ears were then examined under the otic binocular microscope.  Otoscopic exam on the right reveals a minimal amount of cerumen.  The right tympanic membrane was round, intact without evidence of effusion.  No retraction, granulation, drainage, or evidence of cholesteatoma.      Attention was turned to the left ear.  Otoscopic exam on the left reveals a minimal amount of cerumen.  The left tympanic membrane was round, intact without evidence of effusion.  No retraction, granulation, drainage, or evidence of cholesteatoma.      Audiogram  The patient underwent an audiogram performed today.  My review of the audiogram shows normal hearing up to 2000 Hz in both ears with moderate sloping to moderately severe sensorineural hearing loss in the right ear and moderate sloping to moderately severe mixed hearing loss in the left ear.  Pure-tone average is 23 dB on the right and 26 dB on the left.  Speech reception threshhold is 20 dB on the right and 20 dB on the left.  The patient had 100% word recognition on the right and 100% word recognition on the left.  The patient had a type A tympanogram on the right and a type A tympanogram on the left.     Assessment and Plan     ICD-10-CM    1. Sensorineural hearing loss, bilateral  H90.3 AUDIOLOGY ADULT REFERRAL     Microscopy, Binocular     fluticasone (FLONASE) 50 MCG/ACT nasal spray   2. Tinnitus, bilateral  H93.13 AUDIOLOGY ADULT REFERRAL     Microscopy, Binocular      fluticasone (FLONASE) 50 MCG/ACT nasal spray   3. Ear fullness, left  H93.8X2 AUDIOLOGY ADULT REFERRAL     Microscopy, Binocular     fluticasone (FLONASE) 50 MCG/ACT nasal spray   4. History of eustachian tube dysfunction  Z86.69 AUDIOLOGY ADULT REFERRAL     Microscopy, Binocular     fluticasone (FLONASE) 50 MCG/ACT nasal spray   5. History of sinusitis  Z87.09 AUDIOLOGY ADULT REFERRAL     Microscopy, Binocular     fluticasone (FLONASE) 50 MCG/ACT nasal spray   6. Tobacco dependence syndrome  F17.200 AUDIOLOGY ADULT REFERRAL     Microscopy, Binocular     fluticasone (FLONASE) 50 MCG/ACT nasal spray   7. Encounter for tobacco use cessation counseling  Z71.6 AUDIOLOGY ADULT REFERRAL     Microscopy, Binocular     fluticasone (FLONASE) 50 MCG/ACT nasal spray     It was my pleasure seeing Joes M Rea today in clinic.  The patient presents to me today with recent history of ear plugging/fullness/pressure that has resolved over the past few days.  Based on the patient's history, this was most consistent with eustachian tube dysfunction following an upper respiratory infection.  This likely was compounded with his tobacco history.  If or when symptoms similar return, I would recommend trialing daily Valsalva/auto insufflation or use of Otovent to try to open the eustachian tube.  We discussed adding Flonase 2 sprays each side once daily as needed.  Given that the patient is asymptomatic today and is audiometric assessment does not suggest any eustachian tube dysfunction or middle ear effusion, I would recommend observation.  The patient can return to clinic with problems or concerns in the future.     The patient does have sensorineural hearing loss in the high tones bilaterally.  He would not be a great candidate for hearing aid given how good his hearing is in the speech in conversation tones.  At this point time, I would recommend observation.  He can return for a hearing test in 1 to 3 years or sooner if his  hearing changes.    Jose M to follow up with Primary Care provider regarding elevated blood pressure.    Misael Clark MD  Department of Otolarygology-Head and Neck Surgery  [facility]         Again, thank you for allowing me to participate in the care of your patient.        Sincerely,        Misael Clark MD

## 2021-05-13 NOTE — PATIENT INSTRUCTIONS
Per physician instructions      If you have questions or concerns on any instructions given to you by your provider today or if you need to schedule an appointment, you can reach us at [phone].     Can try Otovent to help equalize ear pressure  Can try Flonase 2 sprays each side once daily for ear pressure  Return if symptoms worsen

## 2021-05-13 NOTE — PROGRESS NOTES
AUDIOLOGY REPORT    SUBJECTIVE:  Jose M Rea is a 71 year old male who was seen at [facility] for an audiologic evaluation, referred by Dr. Clark.  No previous audiograms are available at today's appointment.  The patient reports an intermittent history of his ears feeling plugged. He reports the plugged sensation in worse in his left ear. He feels he is hearing well. He reports non-bothersome tinnitus for years. The patient denies bilateral otalgia, bilateral drainage and history of noise exposure.     OBJECTIVE:    Otoscopic exam indicates ears are clear of cerumen bilaterally       Pure Tone Thresholds assessed using conventional audiometry with good  reliability from 250-8000 Hz bilaterally using insert earphones and circumaural headphones     RIGHT:  normal, mild, moderate and severe sensorineural hearing loss    LEFT:    normal, mild, moderate and severe sensorineural hearing loss    Tympanogram:    RIGHT: normal eardrum mobility    LEFT:   normal eardrum mobility    Reflexes (reported by stimulus ear 1000 Hz):     RIGHT: Ipsilateral is absent    RIGHT: Contralateral is absent    LEFT: Ipsilateral is absent    LEFT: Contralateral is absent    Speech Reception Threshold:    RIGHT: 20 dB HL    LEFT:   20 dB HL  Word Recognition Score:     RIGHT: 100% at 60 dB HL using NU-6 recorded word list.    LEFT:   100% at 60 dB HL using NU-6 recorded word list.      ASSESSMENT:   Normal hearing through 2000 Hz sloping to a mild to severe asymmetrical high frequency sensorineural hearing loss bilaterally.     Today s results were discussed with the patient in detail.     PLAN: It is recommended that the patient be seen by Dr. Clark for medical evaluation of their ears and hearing evaluation. Patient was counseled regarding hearing loss and impact on communication.    Please call this clinic with questions regarding these results or recommendations.        RASHAD Salamanca-AAA  Clinical  audiologist [place] [facility] [id number]  5/13/2021

## 2021-06-08 ENCOUNTER — ANTICOAGULATION THERAPY VISIT (OUTPATIENT)
Dept: ANTICOAGULATION | Facility: CLINIC | Age: 71
End: 2021-06-08

## 2021-06-08 DIAGNOSIS — Z79.01 LONG TERM CURRENT USE OF ANTICOAGULANT THERAPY: ICD-10-CM

## 2021-06-08 DIAGNOSIS — Z86.718 H/O DEEP VENOUS THROMBOSIS: ICD-10-CM

## 2021-06-08 DIAGNOSIS — D68.9 COAGULATION DEFECT (H): ICD-10-CM

## 2021-06-08 LAB
CAPILLARY BLOOD COLLECTION: NORMAL
INR PPP: 2.8 (ref 0.86–1.14)

## 2021-06-08 PROCEDURE — 36416 COLLJ CAPILLARY BLOOD SPEC: CPT | Performed by: FAMILY MEDICINE

## 2021-06-08 PROCEDURE — 85610 PROTHROMBIN TIME: CPT | Performed by: FAMILY MEDICINE

## 2021-06-08 NOTE — PROGRESS NOTES
ANTICOAGULATION MANAGEMENT     Patient Name:  Jose M Rea  Date:  6/8/2021    ASSESSMENT /SUBJECTIVE:    Today's INR result of 2.8 is therapeutic. Goal INR of 2.0-3.0      Warfarin dose taken: Warfarin taken as instructed    Diet: No new diet changes affecting INR    Medication changes/ interactions: No new medications/supplements affecting INR    Previous INR: Therapeutic     S/S of bleeding or thromboembolism: No    New injury or illness: No    Upcoming surgery, procedure or cardioversion: No    Additional findings: None      PLAN:    Warfarin Dosing Instructions: Continue your current warfarin dose 10 mg Mondays; 7.5 mg ROW    Instructed patient to follow up no later than: 6 weeks  Lab visit scheduled    Education provided: Contact [facility]: [phone]  with any changes, questions or concerns.     Telephone call with Jose M whom verbalizes understanding and agrees to plan    Instructed to call the Anticoagulation Clinic for any changes, questions or concerns. (#[phone])        Joanie Yin RN      OBJECTIVE:  Recent labs: (last 7 days)     06/08/21  0838   INR 2.80*         No question data found.  Anticoagulation Summary  As of 6/8/2021    INR goal:  2.0-3.0   TTR:  76.5 % (1 y)   INR used for dosing:     Warfarin maintenance plan:  10 mg (5 mg x 2) every Mon; 7.5 mg (5 mg x 1.5) all other days   Full warfarin instructions:  10 mg every Mon; 7.5 mg all other days   Weekly warfarin total:  55 mg   Plan last modified:  Salma Diane RN (12/9/2020)   Next INR check:  7/20/2021   Priority:  Maintenance   Target end date:  Indefinite    Indications    Coagulation defect (H) [D68.9]  Acute deep vein thrombosis (DVT) of proximal vein of lower extremity (H) (Resolved) [I82.4Y9]  Long term current use of anticoagulant therapy [Z79.01]  Acute deep vein thrombosis (DVT) of proximal vein of lower extremity  unspecified laterality (H) (Resolved) [I82.4Y9]              Anticoagulation Episode Summary     INR check location:      Preferred lab:      Send INR reminders to:  [facility]    Comments:  * Tx from [place]. Factor 5 Leiden heterozygous positive. Indefinite anticoagulation per Dr. Hein on 3-27-30. Consent to communicate with wife, Viri. Please call mobile # first      Anticoagulation Care Providers     Provider Role Specialty Phone number    Pj Quijano MD Referring Family Medicine [phone]

## 2021-06-16 DIAGNOSIS — Z79.01 CHRONIC ANTICOAGULATION: ICD-10-CM

## 2021-06-16 RX ORDER — WARFARIN SODIUM 5 MG/1
TABLET ORAL
Qty: 150 TABLET | Refills: 0 | Status: SHIPPED | OUTPATIENT
Start: 2021-06-16 | End: 2021-09-27

## 2021-06-16 NOTE — TELEPHONE ENCOUNTER
Current warfarin dose:  Warfarin maintenance plan:  10 mg (5 mg x 2) every Mon; 7.5 mg (5 mg x 1.5) all other days   Full warfarin instructions:  10 mg every Mon; 7.5 mg all other days   Weekly warfarin total:  55 mg   Next INR check:  7/20/2021     Last INR result:  INR   Date Value Ref Range Status   06/08/2021 2.80 (H) 0.86 - 1.14 Final     Comment:     This test is intended for monitoring Coumadin therapy.  Results are not   accurate in patients with prolonged INR due to factor deficiency.       Last office visit: 4/6/2021     Refill authorized per [facility] protocol.    Zaid ANDRE RN

## 2021-07-08 ENCOUNTER — OFFICE VISIT (OUTPATIENT)
Dept: URGENT CARE | Facility: URGENT CARE | Age: 71
End: 2021-07-08
Payer: MEDICARE

## 2021-07-08 VITALS
HEART RATE: 78 BPM | OXYGEN SATURATION: 98 % | TEMPERATURE: 97.7 F | BODY MASS INDEX: 28.12 KG/M2 | DIASTOLIC BLOOD PRESSURE: 79 MMHG | SYSTOLIC BLOOD PRESSURE: 150 MMHG | WEIGHT: 196 LBS | RESPIRATION RATE: 18 BRPM

## 2021-07-08 DIAGNOSIS — H69.93 DYSFUNCTION OF BOTH EUSTACHIAN TUBES: Primary | ICD-10-CM

## 2021-07-08 PROCEDURE — 99213 OFFICE O/P EST LOW 20 MIN: CPT | Performed by: NURSE PRACTITIONER

## 2021-07-08 NOTE — PROGRESS NOTES
Assessment & Plan   Problem List Items Addressed This Visit     None      Visit Diagnoses     Dysfunction of both eustachian tubes    -  Primary             15 minutes spent on the date of the encounter doing chart review, history and exam, documentation and further activities per the note       Patient Instructions   Increase rest and fluids. Tylenol and/or Ibuprofen for comfort. Cool mist vaporizer. If your symptoms worsen or do not resolve follow up with your primary care provider in 1 week and sooner if needed.      Mucinex 600-1200 mg 12 hour formula for ear, head and chest congestion.  It can also thin post nasal drip which can cause a cough and sore throat.    You can also do the ear pulls or milk the eustachian tubes as discussed    Indications for emergent return to emergency department discussed with patient, who verbalized good understanding and agreement.  Patient understands the limitations of today's evaluation.           Patient Education     Earache, No Infection (Adult)   Earaches can happen without an infection. They can occur when air and fluid build up behind the eardrum. They may cause a feeling of fullness and discomfort. They may also impair hearing. This is called otitis media with effusion (OME) or serous otitis media. It means there is fluid in the middle ear. It is not the same as acute otitis media, which is often from an infection.  OME can happen when you have a cold if congestion blocks the passage that drains the middle ear. This passage is called the eustachian tube. OME may also occur with nasal allergies or after a bacterial infection in the middle ear. Other causes are:    Trauma    Improper cleaning of wax from the ear    Bacterial infection of the mastoid bone (mastoiditis)    Tumor    Jaw pain    Changes in pressure, such as from flying or scuba diving    The pain or discomfort may come and go. You may hear clicking or popping sounds when you chew or swallow. You may feel  that your balance is off. Or you may hear ringing in the ear.  It often takes from several weeks up to 3 months for the fluid to clear on its own. Oral pain relievers and ear drops help if there is pain. Decongestants and antihistamines sometimes help. Antibiotics don't help since there is no infection. Your healthcare provider may give you a nasal spray to help reduce swelling in the nose and eustachian tube. This can allow the ear to drain.  If your OME doesn't get better after 3 months, surgery may be used to drain the fluid. A small tube may also be put in the eardrum to help with drainage.  Because the middle ear fluid can become infected, watch for signs of an infection. These may develop later. They may include increased ear pain, fever, or drainage from the ear.  Home care  These home-care tips will help you take care of yourself:    You may use over-the-counter medicine as directed by your healthcare provider to control pain, unless medicine was prescribed. If you have chronic liver or kidney disease or ever had a stomach ulcer or GI bleeding, talk with your healthcare provider before using any medicines.    Aspirin should never be used in anyone younger than age 18 who has a fever. It may cause severe liver damage.    Ask your healthcare provider if you may use over-the-counter decongestants such as phenylephrine or pseudoephedrine. Keep in mind they are not always helpful.    Talk with your healthcare provider about using nasal spray decongestants. Don't use them for more than 3 days, or as directed by your healthcare provider. Longer use can make congestion worse. Prescription nasal sprays from your healthcare provider don't often have such restrictions.    Antihistamines may help if you are also having allergy symptoms.    You may use medicines such as guaifenesin to thin mucus and help with drainage.  Follow-up care  Follow up with your healthcare provider or as advised if you are not feeling better  after 3 days.  When to seek medical advice  Call your healthcare provider right away if any of these occur:    Ear pain that gets worse or that does not start to get better     Fever of 100.4 F (38 C) or higher, or as directed by your healthcare provider    Fluid or blood draining from the ear    Headache or sinus pain    Stiff neck    Unusual drowsiness or confusion  [employer] last reviewed this educational content on 12/1/2019 2000-2021 [employer]. All rights reserved. This information is not intended as a substitute for professional medical care. Always follow your healthcare professional's instructions.               Return in about 1 week (around 7/15/2021), or if symptoms worsen or fail to improve, for Follow up with your primary care provider.    NICHELLE Luo CNP  [facility]    Benita Salguero is a 71 year old who presents for the following health issues     HPI     Chief Complaint   Patient presents with     Otalgia     left ear started hurting 7/8/21, can hear out of it fine, stabbing pain           Review of Systems   Constitutional, HEENT, cardiovascular, pulmonary, GI, , musculoskeletal, neuro, skin, endocrine and psych systems are negative, except as otherwise noted.      Objective    BP (!) 150/79 (BP Location: Right arm, Patient Position: Sitting, Cuff Size: Adult Regular)   Pulse 78   Temp 97.7  F (36.5  C) (Tympanic)   Resp 18   Wt 88.9 kg (196 lb)   SpO2 98%   BMI 28.12 kg/m    Body mass index is 28.12 kg/m .  Physical Exam   GENERAL: healthy, alert and no distress,nontoxic in appearance  EYES: Eyes grossly normal to inspection, PERRL and conjunctivae and sclerae normal  HENT: ear canals and TM's normal, nose and mouth without ulcers or lesions  NECK: no adenopathy, supple with full ROM  RESP: lungs clear to auscultation - no rales, rhonchi or wheezes  CV: regular rate and rhythm, normal S1 S2, no S3 or S4, no murmur, click or  rub, no peripheral edema   ABDOMEN: soft, nontender  MS: no gross musculoskeletal defects noted, no edema    No results found for this or any previous visit (from the past 24 hour(s)).

## 2021-07-12 DIAGNOSIS — Z86.718 H/O DEEP VENOUS THROMBOSIS: ICD-10-CM

## 2021-07-12 DIAGNOSIS — D68.9 COAGULATION DEFECT (H): Primary | ICD-10-CM

## 2021-07-12 DIAGNOSIS — Z79.01 LONG TERM CURRENT USE OF ANTICOAGULANT THERAPY: ICD-10-CM

## 2021-07-20 ENCOUNTER — TELEPHONE (OUTPATIENT)
Dept: FAMILY MEDICINE | Facility: CLINIC | Age: 71
End: 2021-07-20

## 2021-07-20 ENCOUNTER — ANTICOAGULATION THERAPY VISIT (OUTPATIENT)
Dept: ANTICOAGULATION | Facility: CLINIC | Age: 71
End: 2021-07-20

## 2021-07-20 ENCOUNTER — LAB (OUTPATIENT)
Dept: LAB | Facility: CLINIC | Age: 71
End: 2021-07-20
Payer: MEDICARE

## 2021-07-20 ENCOUNTER — DOCUMENTATION ONLY (OUTPATIENT)
Dept: FAMILY MEDICINE | Facility: CLINIC | Age: 71
End: 2021-07-20

## 2021-07-20 DIAGNOSIS — D68.9 COAGULATION DEFECT (H): Primary | ICD-10-CM

## 2021-07-20 DIAGNOSIS — D68.9 COAGULATION DEFECT (H): ICD-10-CM

## 2021-07-20 DIAGNOSIS — Z86.718 H/O DEEP VENOUS THROMBOSIS: ICD-10-CM

## 2021-07-20 DIAGNOSIS — Z79.01 CHRONIC ANTICOAGULATION: Primary | ICD-10-CM

## 2021-07-20 DIAGNOSIS — Z79.01 LONG TERM CURRENT USE OF ANTICOAGULANT THERAPY: ICD-10-CM

## 2021-07-20 LAB — INR BLD: 2.8 (ref 0.9–1.1)

## 2021-07-20 PROCEDURE — 85610 PROTHROMBIN TIME: CPT

## 2021-07-20 PROCEDURE — 36416 COLLJ CAPILLARY BLOOD SPEC: CPT

## 2021-07-20 NOTE — TELEPHONE ENCOUNTER
INR result managed by Anticoagulation Clinic. See Anticoagulation Therapy Visit Encounter from 07/20/21 for details.     Justa Vaca RN 07/20/21 at 12:20 PM

## 2021-07-20 NOTE — TELEPHONE ENCOUNTER
Reason for Call:  Other call back    Detailed comments: Patient calling nurse back for inr results and what he needs to do next.    Phone Number Patient can be reached at: Home number on file [phone] (home)    Best Time: Anytime    Can we leave a detailed message on this number? YES    Call taken on 7/20/2021 at 12:06 PM by Edna Pickett

## 2021-07-20 NOTE — PROGRESS NOTES
ANTICOAGULATION MANAGEMENT      Jose M Rea due for annual renewal of referral to anticoagulation monitoring. Order pended for your review and signature.      ANTICOAGULATION SUMMARY      Warfarin indication(s)     DVT  Coagulation defect    Heart valve present?  NO       Current goal range   INR: 2.0-3.0     Goal appropriate for indication? Yes, INR 2-3 appropriate for hx of DVT, PE, hypercoagulable state, Afib, LVAD, or bileaflet AVR without risk factors     Current duration of therapy Indefinite/long term therapy   Time in Therapeutic Range (TTR)  (Goal > 60%) 76.5%       Office visit with referring provider's group within last year yes on 2/26/2021       Justa Vaca RN

## 2021-07-20 NOTE — PROGRESS NOTES
ANTICOAGULATION MANAGEMENT     Jose M Rea 71 year old male is on warfarin with therapeutic INR result. (Goal INR 2.0-3.0)    Recent labs: (last 7 days)     07/20/21  0927   INR 2.8*       ASSESSMENT     Source(s): Patient/Caregiver Call       Warfarin doses taken: Warfarin taken as instructed    Diet: No new diet changes identified    New illness, injury, or hospitalization: No    Medication/supplement changes: None noted    Signs or symptoms of bleeding or clotting: No    Previous INR: Therapeutic last 2(+) visits    Additional findings: None     PLAN     Recommended plan for no diet, medication or health factor changes affecting INR     Dosing Instructions: Continue your current warfarin dose with next INR in 6 weeks       Summary  As of 7/20/2021    Full warfarin instructions:  10 mg every Mon; 7.5 mg all other days   Next INR check:  8/31/2021             Telephone call with Jose M who verbalizes understanding and agrees to plan    Lab visit scheduled    Education provided: Contact [phone]  with any changes, questions or concerns.     Plan made per [facility] anticoagulation protocol    Justa Vaca RN  Anticoagulation Clinic  7/20/2021    _______________________________________________________________________     Anticoagulation Episode Summary     Current INR goal:  2.0-3.0   TTR:  76.5 % (1 y)   Target end date:  Indefinite   Send INR reminders to:  [facility]    Indications    Coagulation defect (H) [D68.9]  Acute deep vein thrombosis (DVT) of proximal vein of lower extremity (H) (Resolved) [I82.4Y9]  Long term current use of anticoagulant therapy [Z79.01]  Acute deep vein thrombosis (DVT) of proximal vein of lower extremity  unspecified laterality (H) (Resolved) [I82.4Y9]           Comments:  *         Anticoagulation Care Providers     Provider Role Specialty Phone number    Pj Quijano MD Referring Family Medicine [phone]                Anticoagulation Management    Unable  to reach Jose M today.    Today's INR result of 2.8 is therapeutic (goal INR of 2.0-3.0).  Result received from: Clinic Lab    Follow up required to confirm warfarin dose taken and assess for changes    Left message to continue current dose of warfarin 7.5 mg tonight. Tentative plan: if no changes/concerns continue current maintenance dose and recheck INR in 6 weeks.       Anticoagulation clinic to follow up    Justa Vaca RN

## 2021-08-31 ENCOUNTER — ANTICOAGULATION THERAPY VISIT (OUTPATIENT)
Dept: ANTICOAGULATION | Facility: CLINIC | Age: 71
End: 2021-08-31

## 2021-08-31 ENCOUNTER — LAB (OUTPATIENT)
Dept: LAB | Facility: CLINIC | Age: 71
End: 2021-08-31
Payer: MEDICARE

## 2021-08-31 DIAGNOSIS — Z79.01 CHRONIC ANTICOAGULATION: ICD-10-CM

## 2021-08-31 DIAGNOSIS — D68.9 COAGULATION DEFECT (H): ICD-10-CM

## 2021-08-31 DIAGNOSIS — Z79.01 LONG TERM CURRENT USE OF ANTICOAGULANT THERAPY: ICD-10-CM

## 2021-08-31 DIAGNOSIS — Z86.718 H/O DEEP VENOUS THROMBOSIS: ICD-10-CM

## 2021-08-31 DIAGNOSIS — D68.9 COAGULATION DEFECT (H): Primary | ICD-10-CM

## 2021-08-31 LAB — INR BLD: 2.7 (ref 0.9–1.1)

## 2021-08-31 PROCEDURE — 85610 PROTHROMBIN TIME: CPT

## 2021-08-31 PROCEDURE — 36416 COLLJ CAPILLARY BLOOD SPEC: CPT

## 2021-08-31 NOTE — PROGRESS NOTES
ANTICOAGULATION MANAGEMENT     Jose M Rea 71 year old male is on warfarin with therapeutic INR result. (Goal INR 2.0-3.0)    Recent labs: (last 7 days)     08/31/21  0932   INR 2.7*       ASSESSMENT     Source(s): Patient/Caregiver Call       Warfarin doses taken: Warfarin taken as instructed    Diet: No new diet changes identified    New illness, injury, or hospitalization: No    Medication/supplement changes: None noted    Signs or symptoms of bleeding or clotting: No    Previous INR: Therapeutic last 2(+) visits    Additional findings: None     PLAN     Recommended plan for no diet, medication or health factor changes affecting INR     Dosing Instructions: Continue your current warfarin dose with next INR in 6 weeks       Summary  As of 8/31/2021    Full warfarin instructions:  10 mg every Mon; 7.5 mg all other days   Next INR check:  10/12/2021             Telephone call with Jose M who verbalizes understanding and agrees to plan    Lab visit scheduled    Education provided: Please call back if any changes to your diet, medications or how you've been taking warfarin and Contact [phone]  with any changes, questions or concerns.     Plan made per [facility] anticoagulation protocol    Kb Braun RN  Anticoagulation Clinic  8/31/2021    _______________________________________________________________________     Anticoagulation Episode Summary     Current INR goal:  2.0-3.0   TTR:  79.7 % (1 y)   Target end date:  Indefinite   Send INR reminders to:  [facility]    Indications    Coagulation defect (H) [D68.9]  Acute deep vein thrombosis (DVT) of proximal vein of lower extremity (H) (Resolved) [I82.4Y9]  Long term current use of anticoagulant therapy [Z79.01]  Acute deep vein thrombosis (DVT) of proximal vein of lower extremity  unspecified laterality (H) (Resolved) [I82.4Y9]  Chronic anticoagulation [Z79.01]           Comments:  *         Anticoagulation Care Providers     Provider Role  Specialty Phone number    Pj Quijano MD Referring Family Medicine [phone]    Rigoberto Hernandez MD Referring Family Medicine [phone]

## 2021-09-26 ENCOUNTER — TELEPHONE (OUTPATIENT)
Dept: FAMILY MEDICINE | Facility: CLINIC | Age: 71
End: 2021-09-26

## 2021-09-26 DIAGNOSIS — Z79.01 CHRONIC ANTICOAGULATION: ICD-10-CM

## 2021-09-27 RX ORDER — WARFARIN SODIUM 5 MG/1
TABLET ORAL
Qty: 150 TABLET | Refills: 0 | Status: SHIPPED | OUTPATIENT
Start: 2021-09-27 | End: 2021-10-12

## 2021-09-27 NOTE — TELEPHONE ENCOUNTER
Current warfarin dose:  Warfarin maintenance plan:  10 mg (5 mg x 2) every Mon; 7.5 mg (5 mg x 1.5) all other days   Weekly warfarin total:  55 mg     Last office visit: 4/6/2021    Last INR result:  INR   Date Value Ref Range Status   08/31/2021 2.7 (H) 0.9 - 1.1 Final   06/08/2021 2.80 (H) 0.86 - 1.14 Final     Comment:     This test is intended for monitoring Coumadin therapy.  Results are not   accurate in patients with prolonged INR due to factor deficiency.         Refill authorized per [facility] protocol.    LAURY Vaca RN BSN

## 2021-09-27 NOTE — TELEPHONE ENCOUNTER
Reason for Call:  Medication or medication refill:    Do you use a [facility]?  Name of the pharmacy and phone number for the current request:  [facility]    Name of the medication requested: Warfarin    Other request: Patient is out of medication    Can we leave a detailed message on this number? YES    Phone number patient can be reached at: Home number on file [phone] (home)    Best Time: Any    Call taken on 9/27/2021 at 9:23 AM by Lori Grey

## 2021-10-04 ENCOUNTER — ALLIED HEALTH/NURSE VISIT (OUTPATIENT)
Dept: FAMILY MEDICINE | Facility: CLINIC | Age: 71
End: 2021-10-04
Payer: MEDICARE

## 2021-10-04 VITALS — DIASTOLIC BLOOD PRESSURE: 68 MMHG | SYSTOLIC BLOOD PRESSURE: 138 MMHG

## 2021-10-04 DIAGNOSIS — Z01.30 BP CHECK: Primary | ICD-10-CM

## 2021-10-04 PROCEDURE — 99207 PR NO CHARGE NURSE ONLY: CPT | Performed by: FAMILY MEDICINE

## 2021-10-04 NOTE — PROGRESS NOTES
Jose M Rea was evaluated at [facility] on October 4, 2021 at which time his blood pressure was:    BP Readings from Last 3 Encounters:   10/04/21 138/68   07/08/21 (!) 150/79   05/13/21 (!) 146/70     Pulse Readings from Last 3 Encounters:   07/08/21 78   05/13/21 75   04/30/21 59       Reviewed lifestyle modifications for blood pressure control and reduction: including making healthy food choices, managing weight, getting regular exercise, smoking cessation, reducing alcohol consumption, monitoring blood pressure regularly.     Symptoms: None    BP Goal:< 140/90 mmHg    BP Assessment:  BP at goal    Potential Reasons for BP too high: NA - Not applicable    BP Follow-Up Plan: Recheck BP in 6 months at pharmacy    Recommendation to Provider: Continue to monitor blood pressure regularly.     Note completed by:   Morgan Echevarria, PharmD  [facility]

## 2021-10-12 ENCOUNTER — LAB (OUTPATIENT)
Dept: LAB | Facility: CLINIC | Age: 71
End: 2021-10-12
Payer: MEDICARE

## 2021-10-12 ENCOUNTER — ANTICOAGULATION THERAPY VISIT (OUTPATIENT)
Dept: ANTICOAGULATION | Facility: CLINIC | Age: 71
End: 2021-10-12

## 2021-10-12 DIAGNOSIS — D68.9 COAGULATION DEFECT (H): Primary | ICD-10-CM

## 2021-10-12 DIAGNOSIS — Z86.718 H/O DEEP VENOUS THROMBOSIS: ICD-10-CM

## 2021-10-12 DIAGNOSIS — D68.9 COAGULATION DEFECT (H): ICD-10-CM

## 2021-10-12 DIAGNOSIS — Z79.01 LONG TERM CURRENT USE OF ANTICOAGULANT THERAPY: ICD-10-CM

## 2021-10-12 DIAGNOSIS — Z79.01 CHRONIC ANTICOAGULATION: ICD-10-CM

## 2021-10-12 LAB — INR BLD: 3 (ref 0.9–1.1)

## 2021-10-12 PROCEDURE — 36416 COLLJ CAPILLARY BLOOD SPEC: CPT

## 2021-10-12 PROCEDURE — 85610 PROTHROMBIN TIME: CPT

## 2021-10-12 RX ORDER — WARFARIN SODIUM 5 MG/1
TABLET ORAL
Qty: 150 TABLET | Refills: 1 | COMMUNITY
Start: 2021-10-12 | End: 2021-12-17

## 2021-10-12 NOTE — PROGRESS NOTES
ANTICOAGULATION MANAGEMENT     Jose M Rea 71 year old male is on warfarin with therapeutic INR result. (Goal INR 2.0-3.0)    Recent labs: (last 7 days)     10/12/21  0930   INR 3.0*       ASSESSMENT     Source(s): Chart Review and Patient/Caregiver Call       Warfarin doses taken: Warfarin taken as instructed    Diet: No new diet changes identified    New illness, injury, or hospitalization: No-patient has had a cold for a few days and taken some cold medication    Medication/supplement changes: None noted    Signs or symptoms of bleeding or clotting: No    Previous INR: Therapeutic last 2(+) visits    Additional findings: none     PLAN     Recommended plan for temporary change(s) affecting INR     Dosing Instructions: Continue your current warfarin dose with next INR in 6 weeks       Summary  As of 10/12/2021    Full warfarin instructions:  10 mg every Mon; 7.5 mg all other days   Next INR check:  11/23/2021             Telephone call with Jose M who verbalizes understanding and agrees to plan    Lab visit scheduled    Education provided: Importance of notifying clinic for changes in medications; a sooner lab recheck maybe needed. and Contact [phone]  with any changes, questions or concerns.     Plan made per [facility] protocol    Justa Vaca RN  Anticoagulation Clinic  10/12/2021    _______________________________________________________________________     Anticoagulation Episode Summary     Current INR goal:  2.0-3.0   TTR:  85.4 % (1 y)   Target end date:  Indefinite   Send INR reminders to:  [facility]    Indications    Coagulation defect (H) [D68.9]  Acute deep vein thrombosis (DVT) of proximal vein of lower extremity (H) (Resolved) [I82.4Y9]  Long term current use of anticoagulant therapy [Z79.01]  Acute deep vein thrombosis (DVT) of proximal vein of lower extremity  unspecified laterality (H) (Resolved) [I82.4Y9]  Chronic anticoagulation [Z79.01]           Comments:  *          Anticoagulation Care Providers     Provider Role Specialty Phone number    Pj Quijano MD Referring Family Medicine [phone]    Rigoberto Hernandez MD Referring Family Medicine [phone]

## 2021-11-23 ENCOUNTER — ANTICOAGULATION THERAPY VISIT (OUTPATIENT)
Dept: ANTICOAGULATION | Facility: CLINIC | Age: 71
End: 2021-11-23

## 2021-11-23 ENCOUNTER — LAB (OUTPATIENT)
Dept: LAB | Facility: CLINIC | Age: 71
End: 2021-11-23
Payer: MEDICARE

## 2021-11-23 DIAGNOSIS — D68.9 COAGULATION DEFECT (H): Primary | ICD-10-CM

## 2021-11-23 DIAGNOSIS — Z79.01 LONG TERM CURRENT USE OF ANTICOAGULANT THERAPY: ICD-10-CM

## 2021-11-23 DIAGNOSIS — Z79.01 CHRONIC ANTICOAGULATION: ICD-10-CM

## 2021-11-23 DIAGNOSIS — Z86.718 H/O DEEP VENOUS THROMBOSIS: ICD-10-CM

## 2021-11-23 DIAGNOSIS — D68.9 COAGULATION DEFECT (H): ICD-10-CM

## 2021-11-23 LAB — INR BLD: 3 (ref 0.9–1.1)

## 2021-11-23 PROCEDURE — 36416 COLLJ CAPILLARY BLOOD SPEC: CPT

## 2021-11-23 PROCEDURE — 85610 PROTHROMBIN TIME: CPT

## 2021-11-23 NOTE — PROGRESS NOTES
ANTICOAGULATION MANAGEMENT     Jose M Rea 71 year old male is on warfarin with therapeutic INR result. (Goal INR 2.0-3.0)    Recent labs: (last 7 days)     11/23/21  0924   INR 3.0*       ASSESSMENT     Source(s): Chart Review and Patient/Caregiver Call       Warfarin doses taken: Warfarin taken as instructed    Diet: No new diet changes identified    New illness, injury, or hospitalization: No    Medication/supplement changes: None noted    Signs or symptoms of bleeding or clotting: No    Previous INR: Therapeutic last 2(+) visits    Additional findings: None     PLAN     Recommended plan for no diet, medication or health factor changes affecting INR     Dosing Instructions: Continue your current warfarin dose with next INR in 6 weeks       Summary  As of 11/23/2021    Full warfarin instructions:  10 mg every Mon; 7.5 mg all other days   Next INR check:  1/4/2022             Telephone call with Jose M who verbalizes understanding and agrees to plan    Lab visit scheduled    Education provided: Contact [phone]  with any changes, questions or concerns.     Plan made per [facility] anticoagulation protocol    Justa Vaca RN  Anticoagulation Clinic  11/23/2021    _______________________________________________________________________     Anticoagulation Episode Summary     Current INR goal:  2.0-3.0   TTR:  91.1 % (1 y)   Target end date:  Indefinite   Send INR reminders to:  [facility]    Indications    Coagulation defect (H) [D68.9]  Acute deep vein thrombosis (DVT) of proximal vein of lower extremity (H) (Resolved) [I82.4Y9]  Long term current use of anticoagulant therapy [Z79.01]  Acute deep vein thrombosis (DVT) of proximal vein of lower extremity  unspecified laterality (H) (Resolved) [I82.4Y9]  Chronic anticoagulation [Z79.01]           Comments:  *         Anticoagulation Care Providers     Provider Role Specialty Phone number    Pj Quijano MD Referring Family Medicine [phone]     Rigoberto Hernandez MD [facility] [phone]

## 2021-12-17 ENCOUNTER — TELEPHONE (OUTPATIENT)
Dept: FAMILY MEDICINE | Facility: CLINIC | Age: 71
End: 2021-12-17
Payer: MEDICARE

## 2021-12-17 DIAGNOSIS — Z79.01 CHRONIC ANTICOAGULATION: ICD-10-CM

## 2021-12-17 RX ORDER — WARFARIN SODIUM 5 MG/1
TABLET ORAL
Qty: 150 TABLET | Refills: 0 | Status: SHIPPED | OUTPATIENT
Start: 2021-12-17 | End: 2022-03-22

## 2021-12-17 NOTE — TELEPHONE ENCOUNTER
Reason for Call:  Other prescription    Detailed comments: Patient is at the pharmacy and they don't have any refills for Warfarin. The Rx from October is Historical is was not sent anywhere.   Patient will be out of the medication in two days.   Please call patient when it is sent.     Pending Prescriptions:                       Disp   Refills    warfarin ANTICOAGULANT (COUMADIN) 5 MG ta*150 ta*1            Sig: Take 10 mg every Mon; 7.5 mg all other days or as           directed by Anticoagulation Clinic    [facility] [address] [place] [address] - [address] [place] [address]  [address] [facility] [address]  Phone: [phone] Fax: [phone]      Phone Number Patient can be reached at: Home number on file [phone] (home)    Best Time: any    Can we leave a detailed message on this number? YES    Call taken on 12/17/2021 at 10:05 AM by Velvet Lacy

## 2021-12-17 NOTE — TELEPHONE ENCOUNTER
11:29 AM    Writer spoke with the patient. Refill sent through for 90 days. Since patient has been having issues with refills going through, writer recommended to have the patient request a refill at each INR.    Kb Braun RN, BSN, PHN  Anticoagulation Clinic   [place] # [id number]  [phone]

## 2022-01-04 ENCOUNTER — LAB (OUTPATIENT)
Dept: LAB | Facility: CLINIC | Age: 72
End: 2022-01-04
Payer: MEDICARE

## 2022-01-04 ENCOUNTER — ANTICOAGULATION THERAPY VISIT (OUTPATIENT)
Dept: ANTICOAGULATION | Facility: CLINIC | Age: 72
End: 2022-01-04

## 2022-01-04 DIAGNOSIS — Z79.01 CHRONIC ANTICOAGULATION: ICD-10-CM

## 2022-01-04 DIAGNOSIS — D68.9 COAGULATION DEFECT (H): ICD-10-CM

## 2022-01-04 DIAGNOSIS — Z86.718 H/O DEEP VENOUS THROMBOSIS: ICD-10-CM

## 2022-01-04 DIAGNOSIS — Z79.01 LONG TERM CURRENT USE OF ANTICOAGULANT THERAPY: ICD-10-CM

## 2022-01-04 DIAGNOSIS — D68.9 COAGULATION DEFECT (H): Primary | ICD-10-CM

## 2022-01-04 LAB — INR BLD: 2.4 (ref 0.9–1.1)

## 2022-01-04 PROCEDURE — 85610 PROTHROMBIN TIME: CPT

## 2022-01-04 PROCEDURE — 36416 COLLJ CAPILLARY BLOOD SPEC: CPT

## 2022-01-04 NOTE — PROGRESS NOTES
ANTICOAGULATION MANAGEMENT     Jose M Rea 71 year old male is on warfarin with therapeutic INR result. (Goal INR 2.0-3.0)    Recent labs: (last 7 days)     01/04/22  0941   INR 2.4*       ASSESSMENT     Source(s): Patient/Caregiver Call       Warfarin doses taken: Warfarin taken as instructed    Diet: No new diet changes identified    New illness, injury, or hospitalization: No    Medication/supplement changes: None noted    Signs or symptoms of bleeding or clotting: No    Previous INR: Therapeutic last 2(+) visits    Additional findings: None     PLAN     Recommended plan for no diet, medication or health factor changes affecting INR     Dosing Instructions: Continue your current warfarin dose with next INR in 6 weeks       Summary  As of 1/4/2022    Full warfarin instructions:  10 mg every Mon; 7.5 mg all other days   Next INR check:  2/15/2022             Telephone call with Jose M who verbalizes understanding and agrees to plan    Lab visit scheduled    Education provided: Please call back if any changes to your diet, medications or how you've been taking warfarin and Contact [phone]  with any changes, questions or concerns.     Plan made per [facility] anticoagulation protocol    Kb Braun RN  Anticoagulation Clinic  1/4/2022    _______________________________________________________________________     Anticoagulation Episode Summary     Current INR goal:  2.0-3.0   TTR:  96.6 % (1 y)   Target end date:  Indefinite   Send INR reminders to:  [facility]    Indications    Coagulation defect (H) [D68.9]  Acute deep vein thrombosis (DVT) of proximal vein of lower extremity (H) (Resolved) [I82.4Y9]  Long term current use of anticoagulant therapy [Z79.01]  Acute deep vein thrombosis (DVT) of proximal vein of lower extremity  unspecified laterality (H) (Resolved) [I82.4Y9]  Chronic anticoagulation [Z79.01]           Comments:  *         Anticoagulation Care Providers     Provider Role Specialty  Phone number    Pj Quijano MD Referring Family Medicine [phone]    Rigobreto Hernandez MD Referring Family Medicine [phone]

## 2022-01-20 ENCOUNTER — TELEPHONE (OUTPATIENT)
Dept: FAMILY MEDICINE | Facility: CLINIC | Age: 72
End: 2022-01-20
Payer: MEDICARE

## 2022-01-20 DIAGNOSIS — Z13.1 SCREENING FOR DIABETES MELLITUS: ICD-10-CM

## 2022-01-20 DIAGNOSIS — E78.5 HYPERLIPIDEMIA LDL GOAL <100: Primary | ICD-10-CM

## 2022-01-20 DIAGNOSIS — Z13.220 SCREENING FOR LIPID DISORDERS: ICD-10-CM

## 2022-01-20 DIAGNOSIS — Z12.5 SCREENING FOR PROSTATE CANCER: ICD-10-CM

## 2022-01-20 NOTE — TELEPHONE ENCOUNTER
Pt calls & states he has yearly appt scheduled for 3/25/22.  Pt asking if labs can be ordered & done before appt.  Will route to provider to review.    Evonne Giron RN

## 2022-01-20 NOTE — TELEPHONE ENCOUNTER
Dr Hernandez,    Please see pt phone call & advise.  Labs pended for your consideration.    Evonne Giron RN

## 2022-02-15 ENCOUNTER — LAB (OUTPATIENT)
Dept: LAB | Facility: CLINIC | Age: 72
End: 2022-02-15
Payer: MEDICARE

## 2022-02-15 ENCOUNTER — ANTICOAGULATION THERAPY VISIT (OUTPATIENT)
Dept: ANTICOAGULATION | Facility: CLINIC | Age: 72
End: 2022-02-15

## 2022-02-15 DIAGNOSIS — D68.9 COAGULATION DEFECT (H): ICD-10-CM

## 2022-02-15 DIAGNOSIS — D68.9 COAGULATION DEFECT (H): Primary | ICD-10-CM

## 2022-02-15 DIAGNOSIS — Z79.01 CHRONIC ANTICOAGULATION: ICD-10-CM

## 2022-02-15 DIAGNOSIS — Z86.718 H/O DEEP VENOUS THROMBOSIS: ICD-10-CM

## 2022-02-15 DIAGNOSIS — Z79.01 LONG TERM CURRENT USE OF ANTICOAGULANT THERAPY: ICD-10-CM

## 2022-02-15 LAB — INR BLD: 2.5 (ref 0.9–1.1)

## 2022-02-15 PROCEDURE — 85610 PROTHROMBIN TIME: CPT

## 2022-02-15 PROCEDURE — 36416 COLLJ CAPILLARY BLOOD SPEC: CPT

## 2022-02-15 NOTE — PROGRESS NOTES
ANTICOAGULATION MANAGEMENT     Jose M Rea 71 year old male is on warfarin with therapeutic INR result. (Goal INR 2.0-3.0)    Recent labs: (last 7 days)     02/15/22  0957   INR 2.5*       ASSESSMENT     Source(s): Patient/Caregiver Call       Warfarin doses taken: Warfarin taken as instructed    Diet: No new diet changes identified    New illness, injury, or hospitalization: No    Medication/supplement changes: None noted    Signs or symptoms of bleeding or clotting: No    Previous INR: Therapeutic last 2(+) visits    Additional findings: None     PLAN     Recommended plan for no diet, medication or health factor changes affecting INR     Dosing Instructions: Continue your current warfarin dose with next INR in 6 weeks       Summary  As of 2/15/2022    Full warfarin instructions:  10 mg every Mon; 7.5 mg all other days   Next INR check:  3/29/2022             Telephone call with Jose M who verbalizes understanding and agrees to plan    Lab visit scheduled    Education provided: Please call back if any changes to your diet, medications or how you've been taking warfarin and Contact [phone]  with any changes, questions or concerns.     Plan made per [facility] anticoagulation protocol    Kb Braun RN  Anticoagulation Clinic  2/15/2022    _______________________________________________________________________     Anticoagulation Episode Summary     Current INR goal:  2.0-3.0   TTR:  100.0 % (1 y)   Target end date:  Indefinite   Send INR reminders to:  [facility]    Indications    Coagulation defect (H) [D68.9]  Acute deep vein thrombosis (DVT) of proximal vein of lower extremity (H) (Resolved) [I82.4Y9]  Long term current use of anticoagulant therapy [Z79.01]  Acute deep vein thrombosis (DVT) of proximal vein of lower extremity  unspecified laterality (H) (Resolved) [I82.4Y9]  Chronic anticoagulation [Z79.01]           Comments:  *         Anticoagulation Care Providers     Provider Role  Specialty Phone number    Pj Quijano MD Referring Family Medicine [phone]    Rigoberot Hernandez MD Referring Family Medicine [phone]

## 2022-02-23 ENCOUNTER — TELEPHONE (OUTPATIENT)
Dept: DERMATOLOGY | Facility: CLINIC | Age: 72
End: 2022-02-23
Payer: MEDICARE

## 2022-02-23 NOTE — TELEPHONE ENCOUNTER
Called pt and made appt for follow up and refills.   Martina PACHECO RN BSN PHN  Specialty Clinics

## 2022-02-23 NOTE — TELEPHONE ENCOUNTER
[facility] Call Center    Phone Message    May a detailed message be left on voicemail: yes     Reason for Call: Medication Refill Request    Has the patient contacted the pharmacy for the refill? Yes   Name of medication being requested: betamethasone dipropionate (DIPROSONE) 0.05 % external cream  Provider who prescribed the medication: Marifer Mathias PA-C  Pharmacy: [facility] [address] [place] [address] [address]  Date medication is needed: Now     Pt states the pharmacy also requested the refill with no response.     Action Taken: Message routed to:  Other: [facility]    Travel Screening: Not Applicable

## 2022-03-07 ENCOUNTER — TELEPHONE (OUTPATIENT)
Dept: OTOLARYNGOLOGY | Facility: CLINIC | Age: 72
End: 2022-03-07
Payer: MEDICARE

## 2022-03-07 NOTE — TELEPHONE ENCOUNTER
"Patient calling to ask to see ENT again.    \"My ears feel plugged up-especially my left ear, I was seen in Urgent care a year ago, January and told to take Zyrtec, then in April of 2021, I saw Dr. Iqbal who said to try Sudafed and she gae me an antibiotic, then in May 2021, I saw Dr. Clark who suggested Flonase, but the problem was still there, so in July, I saw Marifer Cantu and she said to take Mucinex, then I was seen at [facility] for a 2nd opinion and they said to do the same, but it still feels clogged up, but there has to be something that will clear this up permanently...\"    Please call and advise when he can be seen.     Tabitha Woodall RN            "

## 2022-03-08 ENCOUNTER — OFFICE VISIT (OUTPATIENT)
Dept: DERMATOLOGY | Facility: CLINIC | Age: 72
End: 2022-03-08
Payer: MEDICARE

## 2022-03-08 VITALS — DIASTOLIC BLOOD PRESSURE: 73 MMHG | SYSTOLIC BLOOD PRESSURE: 139 MMHG | OXYGEN SATURATION: 99 % | HEART RATE: 85 BPM

## 2022-03-08 DIAGNOSIS — L30.9 ECZEMA OF RIGHT UPPER EXTREMITY: ICD-10-CM

## 2022-03-08 DIAGNOSIS — L20.89 OTHER ATOPIC DERMATITIS: ICD-10-CM

## 2022-03-08 PROCEDURE — 99213 OFFICE O/P EST LOW 20 MIN: CPT | Performed by: PHYSICIAN ASSISTANT

## 2022-03-08 RX ORDER — BETAMETHASONE DIPROPIONATE 0.5 MG/G
CREAM TOPICAL
Qty: 45 G | Refills: 2 | Status: SHIPPED | OUTPATIENT
Start: 2022-03-08 | End: 2023-04-11

## 2022-03-08 RX ORDER — CETIRIZINE HYDROCHLORIDE 10 MG/1
10 TABLET ORAL DAILY
COMMUNITY

## 2022-03-08 RX ORDER — PSEUDOEPHEDRINE HCL 30 MG
TABLET ORAL EVERY 4 HOURS PRN
Status: ON HOLD | COMMUNITY
End: 2022-06-20

## 2022-03-08 NOTE — LETTER
3/8/2022         RE: Jose M Rea  [address] [id number]        Dear Colleague,    Thank you for referring your patient, Jose M Rea, to the [facility]. Please see a copy of my visit note below.    Jose M Rea is an extremely pleasant 71 year old year old male patient here today for atopic dermatitis. He notes betamethasone does a good of controlling his skin. He using gold bond daily.  Patient has no other skin complaints today.  Remainder of the HPI, Meds, PMH, Allergies, FH, and SH was reviewed in chart.    Pertinent Hx:   Atopic dermatitis   Past Medical History:   Diagnosis Date     ED (erectile dysfunction)      Hyperlipidemia        Past Surgical History:   Procedure Laterality Date     APPENDECTOMY       COLONOSCOPY N/A 4/30/2021    Procedure: COLONOSCOPY, WITH POLYPECTOMY AND BIOPSY;  Surgeon: Mark Shoemaker DO;  Location: [facility]     ORTHOPEDIC SURGERY       PHACOEMULSIFICATION WITH STANDARD INTRAOCULAR LENS IMPLANT Left 4/3/2019    Procedure: Cataract Removal with Implant;  Surgeon: Cristofer Price MD;  Location: [facility]     PHACOEMULSIFICATION WITH STANDARD INTRAOCULAR LENS IMPLANT Right 4/24/2019    Procedure: Cataract Removal with Implant;  Surgeon: Cristofer Price MD;  Location: [facility]     SURGICAL HISTORY OF -       appendectomy in 1956     SURGICAL HISTORY OF -       right shoulder surgery in 2016        Family History   Problem Relation Age of Onset     Coronary Artery Disease Paternal Grandfather         MI in late 50s or early 60s     Sleep Apnea Son      Thrombosis Son         PEs in his mid 30s     Prostate Cancer No family hx of      Colon Cancer No family hx of        Social History     Socioeconomic History     Marital status:      Spouse name: Not on file     Number of children: Not on file     Years of education: Not on file     Highest education level: Not on file   Occupational History     Not on  file   Tobacco Use     Smoking status: Current Every Day Smoker     Packs/day: 0.50     Years: 50.00     Pack years: 25.00     Types: Cigarettes     Smokeless tobacco: Never Used     Tobacco comment: started at age 18, 1/2 pack daily or less   Substance and Sexual Activity     Alcohol use: Yes     Comment: occasional beer     Drug use: No     Sexual activity: Yes     Partners: Female   Other Topics Concern     Parent/sibling w/ CABG, MI or angioplasty before 65F 55M? Not Asked   Social History Narrative     Not on file     Social Determinants of Health     Financial Resource Strain: Not on file   Food Insecurity: Not on file   Transportation Needs: Not on file   Physical Activity: Not on file   Stress: Not on file   Social Connections: Not on file   Intimate Partner Violence: Not on file   Housing Stability: Not on file       Outpatient Encounter Medications as of 3/8/2022   Medication Sig Dispense Refill     betamethasone dipropionate (DIPROSONE) 0.05 % external cream Apply twice daily as needed to affected area on leg. 45 g 2     cetirizine (ZYRTEC ALLERGY) 10 MG tablet Take 10 mg by mouth daily       clotrimazole (LOTRIMIN) 1 % cream as needed        fluticasone (FLONASE) 50 MCG/ACT nasal spray Spray 2 sprays into both nostrils daily as needed for rhinitis or allergies 15.8 mL 3     Multiple Vitamins-Minerals (MENS ONE DAILY PO)        pseudoePHEDrine (SUDAFED) 30 MG tablet Take by mouth every 4 hours as needed for congestion       sildenafil (REVATIO) 20 MG tablet Sidenafil (Viagra) comes in 20mg strength pills. Take as many pills as needed up to maximum of 5 pills at a time prior to intercourse. 30 tablet 11     simvastatin (ZOCOR) 20 MG tablet Take 1 tablet (20 mg) by mouth daily 90 tablet 3     warfarin ANTICOAGULANT (COUMADIN) 5 MG tablet Take 10 mg every Mon; 7.5 mg all other days or as directed by Anticoagulation Clinic 150 tablet 0     ACETAMINOPHEN PO Take 650 mg by mouth (Patient not taking: Reported on  3/8/2022)       enoxaparin ANTICOAGULANT (LOVENOX) 40 MG/0.4ML syringe Inject 0.4 mLs (40 mg) Subcutaneous daily As directed by INR clinic (Patient not taking: Reported on 3/8/2022) 1.2 mL 0     [DISCONTINUED] betamethasone dipropionate (DIPROSONE) 0.05 % external cream Apply twice daily as needed to affected area on leg. 45 g 1     No facility-administered encounter medications on file as of 3/8/2022.             O:   NAD, WDWN, Alert & Oriented, Mood & Affect wnl, Vitals stable   Here today alone   /73 (BP Location: Left arm, Patient Position: Sitting, Cuff Size: Adult Large)   Pulse 85   SpO2 99%    General appearance normal   Vitals stable   Alert, oriented and in no acute distress   Rare eczematous papules on torso and extremities     Eyes: Conjunctivae/lids:Normal     ENT: Lips: normal    MSK:Normal    Pulm: Breathing Normal    Neuro/Psych: Orientation:Alert and Orientedx3 ; Mood/Affect:normal     A/P:  1. Atopic Dermatitis   Apply betamethasone as needed.   Skin care regimen reviewed with patient: Eliminate harsh soaps, i.e. Dial, zest, irsih spring; Mild soaps such as Cetaphil or Dove sensitive skin, avoid hot or cold showers, aggressive use of emollients including vanicream, cetaphil or cerave discussed with patient.    Return to clinic as needed.         Again, thank you for allowing me to participate in the care of your patient.        Sincerely,        Marifer Hayes PA-C

## 2022-03-08 NOTE — NURSING NOTE
Chief Complaint   Patient presents with     Eczema     f/u refill prescription        Vitals:    03/08/22 0904   BP: 139/73   BP Location: Left arm   Patient Position: Sitting   Cuff Size: Adult Large   Pulse: 85   SpO2: 99%     Wt Readings from Last 1 Encounters:   07/08/21 88.9 kg (196 lb)       Meg Arango LPN .................3/8/2022

## 2022-03-09 NOTE — PROGRESS NOTES
Jose M Rea is an extremely pleasant 71 year old year old male patient here today for atopic dermatitis. He notes betamethasone does a good of controlling his skin. He using gold bond daily.  Patient has no other skin complaints today.  Remainder of the HPI, Meds, PMH, Allergies, FH, and SH was reviewed in chart.    Pertinent Hx:   Atopic dermatitis   Past Medical History:   Diagnosis Date     ED (erectile dysfunction)      Hyperlipidemia        Past Surgical History:   Procedure Laterality Date     APPENDECTOMY       COLONOSCOPY N/A 4/30/2021    Procedure: COLONOSCOPY, WITH POLYPECTOMY AND BIOPSY;  Surgeon: Mark Shoemaker DO;  Location: [facility]     ORTHOPEDIC SURGERY       PHACOEMULSIFICATION WITH STANDARD INTRAOCULAR LENS IMPLANT Left 4/3/2019    Procedure: Cataract Removal with Implant;  Surgeon: Cristofer Price MD;  Location: [facility]     PHACOEMULSIFICATION WITH STANDARD INTRAOCULAR LENS IMPLANT Right 4/24/2019    Procedure: Cataract Removal with Implant;  Surgeon: Cristofer Price MD;  Location: [facility]     SURGICAL HISTORY OF -       appendectomy in 1956     SURGICAL HISTORY OF -       right shoulder surgery in 2016        Family History   Problem Relation Age of Onset     Coronary Artery Disease Paternal Grandfather         MI in late 50s or early 60s     Sleep Apnea Son      Thrombosis Son         PEs in his mid 30s     Prostate Cancer No family hx of      Colon Cancer No family hx of        Social History     Socioeconomic History     Marital status:      Spouse name: Not on file     Number of children: Not on file     Years of education: Not on file     Highest education level: Not on file   Occupational History     Not on file   Tobacco Use     Smoking status: Current Every Day Smoker     Packs/day: 0.50     Years: 50.00     Pack years: 25.00     Types: Cigarettes     Smokeless tobacco: Never Used     Tobacco comment: started at age 18, 1/2 pack daily or less   Substance  and Sexual Activity     Alcohol use: Yes     Comment: occasional beer     Drug use: No     Sexual activity: Yes     Partners: Female   Other Topics Concern     Parent/sibling w/ CABG, MI or angioplasty before 65F 55M? Not Asked   Social History Narrative     Not on file     Social Determinants of Health     Financial Resource Strain: Not on file   Food Insecurity: Not on file   Transportation Needs: Not on file   Physical Activity: Not on file   Stress: Not on file   Social Connections: Not on file   Intimate Partner Violence: Not on file   Housing Stability: Not on file       Outpatient Encounter Medications as of 3/8/2022   Medication Sig Dispense Refill     betamethasone dipropionate (DIPROSONE) 0.05 % external cream Apply twice daily as needed to affected area on leg. 45 g 2     cetirizine (ZYRTEC ALLERGY) 10 MG tablet Take 10 mg by mouth daily       clotrimazole (LOTRIMIN) 1 % cream as needed        fluticasone (FLONASE) 50 MCG/ACT nasal spray Spray 2 sprays into both nostrils daily as needed for rhinitis or allergies 15.8 mL 3     Multiple Vitamins-Minerals (MENS ONE DAILY PO)        pseudoePHEDrine (SUDAFED) 30 MG tablet Take by mouth every 4 hours as needed for congestion       sildenafil (REVATIO) 20 MG tablet Sidenafil (Viagra) comes in 20mg strength pills. Take as many pills as needed up to maximum of 5 pills at a time prior to intercourse. 30 tablet 11     simvastatin (ZOCOR) 20 MG tablet Take 1 tablet (20 mg) by mouth daily 90 tablet 3     warfarin ANTICOAGULANT (COUMADIN) 5 MG tablet Take 10 mg every Mon; 7.5 mg all other days or as directed by Anticoagulation Clinic 150 tablet 0     ACETAMINOPHEN PO Take 650 mg by mouth (Patient not taking: Reported on 3/8/2022)       enoxaparin ANTICOAGULANT (LOVENOX) 40 MG/0.4ML syringe Inject 0.4 mLs (40 mg) Subcutaneous daily As directed by INR clinic (Patient not taking: Reported on 3/8/2022) 1.2 mL 0     [DISCONTINUED] betamethasone dipropionate (DIPROSONE) 0.05  % external cream Apply twice daily as needed to affected area on leg. 45 g 1     No facility-administered encounter medications on file as of 3/8/2022.             O:   NAD, WDWN, Alert & Oriented, Mood & Affect wnl, Vitals stable   Here today alone   /73 (BP Location: Left arm, Patient Position: Sitting, Cuff Size: Adult Large)   Pulse 85   SpO2 99%    General appearance normal   Vitals stable   Alert, oriented and in no acute distress   Rare eczematous papules on torso and extremities     Eyes: Conjunctivae/lids:Normal     ENT: Lips: normal    MSK:Normal    Pulm: Breathing Normal    Neuro/Psych: Orientation:Alert and Orientedx3 ; Mood/Affect:normal     A/P:  1. Atopic Dermatitis   Apply betamethasone as needed.   Skin care regimen reviewed with patient: Eliminate harsh soaps, i.e. Dial, zest, irsih spring; Mild soaps such as Cetaphil or Dove sensitive skin, avoid hot or cold showers, aggressive use of emollients including vanicream, cetaphil or cerave discussed with patient.    Return to clinic as needed.

## 2022-03-18 DIAGNOSIS — E78.5 HYPERLIPIDEMIA LDL GOAL <100: ICD-10-CM

## 2022-03-18 DIAGNOSIS — Z79.01 CHRONIC ANTICOAGULATION: ICD-10-CM

## 2022-03-21 ENCOUNTER — LAB (OUTPATIENT)
Dept: LAB | Facility: CLINIC | Age: 72
End: 2022-03-21
Payer: MEDICARE

## 2022-03-21 DIAGNOSIS — Z12.5 SCREENING FOR PROSTATE CANCER: ICD-10-CM

## 2022-03-21 DIAGNOSIS — E78.5 HYPERLIPIDEMIA LDL GOAL <100: ICD-10-CM

## 2022-03-21 DIAGNOSIS — Z86.718 H/O DEEP VENOUS THROMBOSIS: ICD-10-CM

## 2022-03-21 DIAGNOSIS — Z79.01 CHRONIC ANTICOAGULATION: ICD-10-CM

## 2022-03-21 DIAGNOSIS — Z13.1 SCREENING FOR DIABETES MELLITUS: ICD-10-CM

## 2022-03-21 LAB
CHOLEST SERPL-MCNC: 143 MG/DL
CREAT SERPL-MCNC: 0.86 MG/DL (ref 0.66–1.25)
FASTING STATUS PATIENT QL REPORTED: YES
FASTING STATUS PATIENT QL REPORTED: YES
GFR SERPL CREATININE-BSD FRML MDRD: >90 ML/MIN/1.73M2
GLUCOSE BLD-MCNC: 89 MG/DL (ref 70–99)
HDLC SERPL-MCNC: 44 MG/DL
LDLC SERPL CALC-MCNC: 48 MG/DL
NONHDLC SERPL-MCNC: 99 MG/DL
PSA SERPL-MCNC: 0.83 UG/L (ref 0–4)
TRIGL SERPL-MCNC: 253 MG/DL

## 2022-03-21 PROCEDURE — 36415 COLL VENOUS BLD VENIPUNCTURE: CPT

## 2022-03-21 PROCEDURE — 82565 ASSAY OF CREATININE: CPT

## 2022-03-21 PROCEDURE — 80061 LIPID PANEL: CPT

## 2022-03-21 PROCEDURE — 82947 ASSAY GLUCOSE BLOOD QUANT: CPT

## 2022-03-21 PROCEDURE — G0103 PSA SCREENING: HCPCS

## 2022-03-21 NOTE — TELEPHONE ENCOUNTER
"Requested Prescriptions   Pending Prescriptions Disp Refills    simvastatin (ZOCOR) 20 MG tablet [Pharmacy Med Name: Simvastatin 20 MG Oral Tablet] 90 tablet 0     Sig: Take 1 tablet by mouth once daily        Statins Protocol Failed - 3/18/2022  9:29 AM        Failed - LDL on file in past 12 months     Recent Labs   Lab Test 03/21/22  0814   LDL 48               Passed - No abnormal creatine kinase in past 12 months     No lab results found.             Passed - Recent (12 mo) or future (30 days) visit within the authorizing provider's specialty     Patient has had an office visit with the authorizing provider or a provider within the authorizing providers department within the previous 12 mos or has a future within next 30 days. See \"Patient Info\" tab in inbasket, or \"Choose Columns\" in Meds & Orders section of the refill encounter.              Passed - Medication is active on med list        Passed - Patient is age 18 or older           warfarin ANTICOAGULANT (COUMADIN) 5 MG tablet [Pharmacy Med Name: Warfarin Sodium 5 MG Oral Tablet] 150 tablet 0     Sig: TAKE 10MG (2 TABLETS) BY MOUTH EVERY MONDAY. TAKE 7.5MG (1.5 TABLETS) ALL OTHER DAYS OR AS DIRECTED BY ANTICOAGULATION CLINIC        Vitamin K Antagonists Failed - 3/18/2022  9:29 AM        Failed - INR is within goal in the past 6 weeks     Confirm INR is within goal in the past 6 weeks.     Recent Labs   Lab Test 02/15/22  0957   INR 2.5*                       Passed - Recent (12 mo) or future (30 days) visit within the authorizing provider's specialty     Patient has had an office visit with the authorizing provider or a provider within the authorizing providers department within the previous 12 mos or has a future within next 30 days. See \"Patient Info\" tab in inbasket, or \"Choose Columns\" in Meds & Orders section of the refill encounter.              Passed - Medication is active on med list        Passed - Patient is 18 years of age or older        "

## 2022-03-22 RX ORDER — WARFARIN SODIUM 5 MG/1
TABLET ORAL
Qty: 150 TABLET | Refills: 0 | Status: SHIPPED | OUTPATIENT
Start: 2022-03-22 | End: 2022-06-06

## 2022-03-22 RX ORDER — SIMVASTATIN 20 MG
TABLET ORAL
Qty: 90 TABLET | Refills: 0 | Status: SHIPPED | OUTPATIENT
Start: 2022-03-22 | End: 2022-03-25

## 2022-03-23 NOTE — PROGRESS NOTES
History of Present Illness - Jose M Rea is a very pleasant 72 year old male here to see me for the first time, but has seen Dr Clark for similar issues on 5/13/21    He presents to me today due to a year of persistent fullness in the ears.  An audiogram was done at his previous visit with Dr Clark, and personally reviewed. It did show a steep symmetric down sloping sensorineural hearing loss in both ears above 2000Hz.  No signficant conductive hearing loss, and tympanograms and Word Recognition were normal.    Initially he was told that this was eustachian tube dysfunction and was placed on Zyrtec but that did not help.  He eventually saw an ENT at [facility] in [facility] and was told that it was eustachian tube dysfunction, and was placed on Mucinex, Zyrtec, and Flonase.  And none of it seems to help.    The most notable symptoms is waxing and waning LEFT tinnitus and fullness.  It changes from day to day.    Otherwise no history of chronic ear disease.  No previous ear surgery.  No history of chemo or radiation therapy to the head and neck, and no major head trauma.  He denies a history of  service, no frequent firearm use.  No previous history working in an industrial environment.      Past Medical History -   Patient Active Problem List   Diagnosis     Hyperlipidemia LDL goal <100     Eczema     Chronic anticoagulation     Long term current use of anticoagulant therapy     Erectile dysfunction, unspecified erectile dysfunction type     H/O deep venous thrombosis     Coagulation defect (H)       Current Medications -   Current Outpatient Medications:      ACETAMINOPHEN PO, Take 650 mg by mouth (Patient not taking: Reported on 3/8/2022), Disp: , Rfl:      betamethasone dipropionate (DIPROSONE) 0.05 % external cream, Apply twice daily as needed to affected area on leg., Disp: 45 g, Rfl: 2     cetirizine (ZYRTEC ALLERGY) 10 MG tablet, Take 10 mg by mouth daily, Disp: , Rfl:      clotrimazole (LOTRIMIN) 1  % cream, as needed , Disp: , Rfl:      enoxaparin ANTICOAGULANT (LOVENOX) 40 MG/0.4ML syringe, Inject 0.4 mLs (40 mg) Subcutaneous daily As directed by INR clinic (Patient not taking: Reported on 3/8/2022), Disp: 1.2 mL, Rfl: 0     fluticasone (FLONASE) 50 MCG/ACT nasal spray, Spray 2 sprays into both nostrils daily as needed for rhinitis or allergies, Disp: 15.8 mL, Rfl: 3     Multiple Vitamins-Minerals (MENS ONE DAILY PO), , Disp: , Rfl:      pseudoePHEDrine (SUDAFED) 30 MG tablet, Take by mouth every 4 hours as needed for congestion, Disp: , Rfl:      sildenafil (REVATIO) 20 MG tablet, Sidenafil (Viagra) comes in 20mg strength pills. Take as many pills as needed up to maximum of 5 pills at a time prior to intercourse., Disp: 30 tablet, Rfl: 11     simvastatin (ZOCOR) 20 MG tablet, Take 1 tablet by mouth once daily, Disp: 90 tablet, Rfl: 0     warfarin ANTICOAGULANT (COUMADIN) 5 MG tablet, TAKE 10MG (2 TABLETS) BY MOUTH EVERY MONDAY. TAKE 7.5MG (1.5 TABLETS) ALL OTHER DAYS OR AS DIRECTED BY ANTICOAGULATION CLINIC, Disp: 150 tablet, Rfl: 0    Allergies -   Allergies   Allergen Reactions     Clindamycin      Bactroban [Mupirocin]      Cephalexin Rash     Doxycycline Rash     Metal [Staples] Rash and Blisters       Social History -   Social History     Socioeconomic History     Marital status:      Spouse name: Not on file     Number of children: Not on file     Years of education: Not on file     Highest education level: Not on file   Occupational History     Not on file   Tobacco Use     Smoking status: Current Every Day Smoker     Packs/day: 0.50     Years: 50.00     Pack years: 25.00     Types: Cigarettes     Smokeless tobacco: Never Used     Tobacco comment: started at age 18, 1/2 pack daily or less   Substance and Sexual Activity     Alcohol use: Yes     Comment: occasional beer     Drug use: No     Sexual activity: Yes     Partners: Female   Other Topics Concern     Parent/sibling w/ CABG, MI or  angioplasty before 65F 55M? Not Asked   Social History Narrative     Not on file     Social Determinants of Health     Financial Resource Strain: Not on file   Food Insecurity: Not on file   Transportation Needs: Not on file   Physical Activity: Not on file   Stress: Not on file   Social Connections: Not on file   Intimate Partner Violence: Not on file   Housing Stability: Not on file       Family History -   Family History   Problem Relation Age of Onset     Coronary Artery Disease Paternal Grandfather         MI in late 50s or early 60s     Sleep Apnea Son      Thrombosis Son         PEs in his mid 30s     Prostate Cancer No family hx of      Colon Cancer No family hx of        Review of Systems - As per HPI and PMHx, otherwise 10+ system review of the head and neck, and general constitution is negative.    Physical Exam  B/P: Data Unavailable, T: Data Unavailable, P: Data Unavailable, R: Data Unavailable  Vitals: There were no vitals taken for this visit.  BMI= There is no height or weight on file to calculate BMI.    General - The patient is well nourished and well developed, and appears to have good nutritional status.  Alert and oriented to person and place, answers questions and cooperates with examination appropriately.   Head and Face - Normocephalic and atraumatic, with no gross asymmetry noted of the contour of the facial features.  The facial nerve is intact, with strong symmetric movements.  Voice and Breathing - The patient was breathing comfortably without the use of accessory muscles. There was no wheezing, stridor, or stertor.  The patients voice was clear and strong, and had appropriate pitch and quality.  Ears - The tympanic membranes are normal in appearance, bony landmarks are intact.  No retraction, perforation, or masses.  No fluid or purulence was seen in the external canal or the middle ear. No evidence of infection of the middle ear or external canal, cerumen was normal in appearance.  Eyes  - Extraocular movements intact, and the pupils were reactive to light.  Sclera were not icteric or injected, conjunctiva were pink and moist.  Mouth - Examination of the oral cavity showed pink, healthy oral mucosa. No lesions or ulcerations noted.  The tongue was mobile and midline, and the dentition were in good condition.    Throat - The walls of the oropharynx were smooth, pink, moist, symmetric, and had no lesions or ulcerations.  The tonsillar pillars and soft palate were symmetric.  The uvula was midline on elevation.    Neck - Normal midline excursion of the laryngotracheal complex during swallowing.  Full range of motion on passive movement.  Palpation of the occipital, submental, submandibular, internal jugular chain, and supraclavicular nodes did not demonstrate any abnormal lymph nodes or masses.  The carotid pulse was palpable bilaterally.  Palpation of the thyroid was soft and smooth, with no nodules or goiter appreciated.  The trachea was mobile and midline.  Nose - External contour is symmetric, no gross deflection or scars.  Nasal mucosa is pink and moist with no abnormal mucus.  The septum was midline and non-obstructive, turbinates of normal size and position.  No polyps, masses, or purulence noted on examination.    Audiologic Studies - An audiogram and tympanogram were performed today as part of the evaluation and personally reviewed. I also compared today's testing with his last test in 2021  The tympanogram shows a normal Type A curve, with normal canal volume and middle ear pressure.  There is no sign of eustachian tube dysfunction or middle ear effusion.    The audiogram showed a stable RIGHT ear, with a severe down sloping sensorineural hearing loss above 2000Hz and no conductive hearing loss.  The LEFT ear has worsened by about 10-15dB since last year.      A/P - Jose M Rea is a 72 year old male  (H90.3) Sensorineural hearing loss, bilateral  (primary encounter diagnosis)  (H93.13)  Tinnitus, bilateral  (H93.8X3) Ear fullness, bilateral  (H90.3) SNHL (sensory-neural hearing loss), asymmetrical    Based on the history, audiogram, and ear exam, I don't find any evidence of medical or surgical disease that would have caused the hearing loss.  Although noise exposure could be a factor, I suspect that this is primarily genetic    With regards to the fullness and tinnitus shifting, I am also considering that this might be Cochlear Hydrops or Atypical Migraine    However, the progression on the LEFT is something to consider further.  I have counseled him on indications for an MRI scan of the brain.    If the MRI is normal, I would consider a test empiric dosing of Maxzide.    We discussed the natural history of the steady loss of human hearing, and things to help.  I certainly recommend considering hearing aids, and they have my medical clearance to look into being fitted, and information has been provided.

## 2022-03-25 ENCOUNTER — OFFICE VISIT (OUTPATIENT)
Dept: FAMILY MEDICINE | Facility: CLINIC | Age: 72
End: 2022-03-25
Payer: MEDICARE

## 2022-03-25 VITALS
WEIGHT: 197.4 LBS | DIASTOLIC BLOOD PRESSURE: 64 MMHG | HEART RATE: 77 BPM | RESPIRATION RATE: 18 BRPM | BODY MASS INDEX: 28.26 KG/M2 | SYSTOLIC BLOOD PRESSURE: 136 MMHG | TEMPERATURE: 97.3 F | OXYGEN SATURATION: 99 % | HEIGHT: 70 IN

## 2022-03-25 DIAGNOSIS — E78.5 HYPERLIPIDEMIA LDL GOAL <100: ICD-10-CM

## 2022-03-25 DIAGNOSIS — Z12.5 SCREENING FOR PROSTATE CANCER: ICD-10-CM

## 2022-03-25 DIAGNOSIS — N52.9 ERECTILE DYSFUNCTION, UNSPECIFIED ERECTILE DYSFUNCTION TYPE: ICD-10-CM

## 2022-03-25 DIAGNOSIS — H93.8X2 EAR FULLNESS, LEFT: ICD-10-CM

## 2022-03-25 DIAGNOSIS — Z00.00 ENCOUNTER FOR MEDICARE ANNUAL WELLNESS EXAM: Primary | ICD-10-CM

## 2022-03-25 DIAGNOSIS — Z13.1 SCREENING FOR DIABETES MELLITUS: ICD-10-CM

## 2022-03-25 DIAGNOSIS — D68.9 COAGULATION DEFECT (H): ICD-10-CM

## 2022-03-25 PROCEDURE — 99213 OFFICE O/P EST LOW 20 MIN: CPT | Mod: 25 | Performed by: FAMILY MEDICINE

## 2022-03-25 PROCEDURE — G0439 PPPS, SUBSEQ VISIT: HCPCS | Performed by: FAMILY MEDICINE

## 2022-03-25 RX ORDER — SIMVASTATIN 20 MG
20 TABLET ORAL DAILY
Qty: 90 TABLET | Refills: 3 | Status: SHIPPED | OUTPATIENT
Start: 2022-03-25 | End: 2022-06-17

## 2022-03-25 RX ORDER — SILDENAFIL CITRATE 20 MG/1
TABLET ORAL
Qty: 30 TABLET | Refills: 11 | Status: SHIPPED | OUTPATIENT
Start: 2022-03-25 | End: 2022-04-03

## 2022-03-25 RX ORDER — FLUTICASONE PROPIONATE 50 MCG
2 SPRAY, SUSPENSION (ML) NASAL DAILY PRN
Qty: 16 G | Refills: 11 | Status: CANCELLED | OUTPATIENT
Start: 2022-03-25

## 2022-03-25 ASSESSMENT — PAIN SCALES - GENERAL: PAINLEVEL: NO PAIN (0)

## 2022-03-25 ASSESSMENT — ENCOUNTER SYMPTOMS
HEARTBURN: 0
PARESTHESIAS: 0
CONSTIPATION: 0
NAUSEA: 0
HEMATURIA: 0
DIZZINESS: 0
HEMATOCHEZIA: 0
ARTHRALGIAS: 0
COUGH: 0
HEADACHES: 0
SORE THROAT: 0
CHILLS: 0
DYSURIA: 0
ABDOMINAL PAIN: 0
EYE PAIN: 0
MYALGIAS: 0
JOINT SWELLING: 0
FEVER: 0
SHORTNESS OF BREATH: 0
DIARRHEA: 0
FREQUENCY: 0
WEAKNESS: 0
NERVOUS/ANXIOUS: 0
PALPITATIONS: 0

## 2022-03-25 ASSESSMENT — ACTIVITIES OF DAILY LIVING (ADL): CURRENT_FUNCTION: NO ASSISTANCE NEEDED

## 2022-03-25 NOTE — PATIENT INSTRUCTIONS
Please ENT as planned.    I refilled your generic viagra medication.  You can use up to 100 mg at a time.    I refilled your simvastatin medication.    Please be aware that there will be an additional charge during your preventative visit due to either a new diagnosis and/or chronic disease management.    Preventative visits screen for diseases prior to they occur.  They do not cover for any new diagnosis or chronic disease management which would include medication refills, labs etc.    If you have questions regarding your coverage please check with your insurance provider.  At [facility] we need to code correctly to be in compliance with all insurance companies.          Thank you for choosing [facility].  You may be receiving an email and/or telephone survey request from [facility] Customer Experience regarding your visit today.  Please take a few minutes to respond to the survey to let us know how we are doing.      If you have questions or concerns, please contact us via [employer] or you can contact your care team at [phone] option [phone].    Our Clinic hours are:  Monday - Thursday 7am-6pm  Friday 7am-5pm    The [place] outpatient lab hours are:  Monday - Friday 7am-4:30pm    Appointments are required, call [phone]    If you have clinical questions after hours or would like to schedule an appointment,  call the clinic at [phone].      Your labs are looking good.  Just pay attention to your triglycerides.        Patient Education   Personalized Prevention Plan  You are due for the preventive services outlined below.  Your care team is available to assist you in scheduling these services.  If you have already completed any of these items, please share that information with your care team to update in your medical record.  Health Maintenance Due   Topic Date Due     ANNUAL REVIEW OF HM ORDERS  Never done     LUNG CANCER SCREENING  Never done     Flu Vaccine (1) 09/01/2021     FALL RISK ASSESSMENT   02/26/2022       Understanding USDA MyPlate  The USDA has guidelines to help you make healthy food choices. These are called MyPlate. MyPlate shows the food groups that make up healthy meals using the image of a place setting. Before you eat, think about the healthiest choices for what to put on your plate or in your cup or bowl. To learn more about building a healthy plate, visit www.choosemyplate.gov.    The food groups    Fruits. Any fruit or 100% fruit juice counts as part of the Fruit Group. Fruits may be fresh, canned, frozen, or dried, and may be whole, cut-up, or pureed. Make 1/2 of your plate fruits and vegetables.    Vegetables. Any vegetable or 100% vegetable juice counts as a member of the Vegetable Group. Vegetables may be fresh, frozen, canned, or dried. They can be served raw or cooked and may be whole, cut-up, or mashed. Make 1/2 of your plate fruits and vegetables.    Grains. All foods made from grains are part of the Grains Group. These include wheat, rice, oats, cornmeal, and barley. Grains are often used to make foods such as bread, pasta, oatmeal, cereal, tortillas, and grits. Grains should be no more than 1/4 of your plate. At least half of your grains should be whole grains.    Protein. This group includes meat, poultry, seafood, beans and peas, eggs, processed soy products (such as tofu), nuts (including nut butters), and seeds. Make protein choices no more than 1/4 of your plate. Meat and poultry choices should be lean or low fat.    Dairy. The Dairy Group includes all fluid milk products and foods made from milk that contain calcium, such as yogurt and cheese. (Foods that have little calcium, such as cream, butter, and cream cheese, are not part of this group.) Most dairy choices should be low-fat or fat-free.    Oils. Oils aren't a food group, but they do contain essential nutrients. However it's important to watch your intake of oils. These are fats that are liquid at room temperature.  They include canola, corn, olive, soybean, vegetable, and sunflower oil. Foods that are mainly oil include mayonnaise, certain salad dressings, and soft margarines. You likely already get your daily oil allowance from the foods you eat.  Things to limit  Eating healthy also means limiting these things in your diet:       Salt (sodium). Many processed foods have a lot of sodium. To keep sodium intake down, eat fresh vegetables, meats, poultry, and seafood when possible. Purchase low-sodium, reduced-sodium, or no-salt-added food products at the store. And don't add salt to your meals at home. Instead, season them with herbs and spices such as dill, oregano, cumin, and paprika. Or try adding flavor with lemon or lime zest and juice.    Saturated fat. Saturated fats are most often found in animal products such as beef, pork, and chicken. They are often solid at room temperature, such as butter. To reduce your saturated fat intake, choose leaner cuts of meat and poultry. And try healthier cooking methods such as grilling, broiling, roasting, or baking. For a simple lower-fat swap, use plain nonfat yogurt instead of mayonnaise when making potato salad or macaroni salad.    Added sugars. These are sugars added to foods. They are in foods such as ice cream, candy, soda, fruit drinks, sports drinks, energy drinks, cookies, pastries, jams, and syrups. Cut down on added sugars by sharing sweet treats with a family member or friend. You can also choose fruit for dessert, and drink water or other unsweetened beverages.     [employer] last reviewed this educational content on 6/1/2020 2000-2021 [employer]. All rights reserved. This information is not intended as a substitute for professional medical care. Always follow your healthcare professional's instructions.          Signs of Hearing Loss      Hearing much better with one ear can be a sign of hearing loss.   Hearing loss is a problem shared by many people. In  "fact, it is one of the most common health problems, particularly as people age. Most people age 65 and older have some hearing loss. By age 80, almost everyone does. Hearing loss often occurs slowly over the years. So you may not realize your hearing has gotten worse.  Have your hearing checked  Call your healthcare provider if you:    Have to strain to hear normal conversation    Have to watch other people s faces very carefully to follow what they re saying    Need to ask people to repeat what they ve said    Often misunderstand what people are saying    Turn the volume of the television or radio up so high that others complain    Feel that people are mumbling when they re talking to you    Find that the effort to hear leaves you feeling tired and irritated    Notice, when using the phone, that you hear better with one ear than the other  [employer] last reviewed this educational content on 1/1/2020 2000-2021 [employer]. All rights reserved. This information is not intended as a substitute for professional medical care. Always follow your healthcare professional's instructions.           Patient Education     Triglycerides  Does this test have other names?  Lipid panel, fasting lipoprotein panel  What is this test?  This test measures the amount of triglycerides in your blood.  Triglycerides are one of several types of fats in your blood. Other kinds are LDL (\"bad\") cholesterol and HDL (\"good\") cholesterol.   Knowing your triglyceride level is important, especially if you have diabetes, are overweight or a smoker, or are mostly inactive. High triglyceride levels may put you at greater risk for a heart attack or stroke.     This test is part of a group of cholesterol and blood fat tests called a fasting lipoprotein panel, or lipid panel. This panel is recommended for all adults at least once every 5 years, or as recommended by your healthcare provider.   Knowing your triglyceride level helps your " healthcare provider suggest healthy changes to your diet or lifestyle. If you have triglycerides that are high to very high, your provider is more likely to prescribe medicines to lower your triglycerides or your LDL cholesterol.   Why do I need this test?  You may need this test as part of a routine checkup. You may also need this test if you're overweight, drink too much alcohol, rarely exercise, or have other conditions like high blood pressure or diabetes.   If you are on cholesterol-lowering medicines, you may have this test to see how well your treatment is working.   What other tests might I have along with this test?  Your healthcare provider will order screening tests for LDL, HDL, and total cholesterol.  What do my test results mean?  Test results may vary depending on your age, gender, health history, the method used for the test, and other things. Your test results may not mean you have a problem. Ask your healthcare provider what your test results mean for you.    Results are given in milligrams per deciliter (mg/dL). Normal levels of triglycerides are less than 150 mg/dL.   Here are how higher numbers are classified:    150 to 199 mg/dL: Borderline high    200 to 499 mg/dL: High    500 mg/dL and above: Very high  If you have a high triglyceride level, you have a greater risk for heart attack and stroke.   A triglyceride level above 150 mg/dL also means that you may have an increased risk for metabolic syndrome. This is a cluster of symptoms including high blood pressure, high blood sugar, and high body fat around the waist. These symptoms have been linked to increased risk for diabetes, heart disease, and stroke.   High triglycerides levels can also be caused by certain diseases or inherited conditions.  If your triglyceride level is above 200 mg/dL, your healthcare provider may recommend that you:     Lose weight    Limit high-fat foods containing saturated fats. These are animal fats found in meat,  butter, and whole milk.    Limit trans fats, which are found in many processed foods like chips and store-bought cookies    Cut back on drinks with added sugars, such as soda    Limit your alcohol intake    Stop smoking    Control your blood pressure    Exercise for at least 30 minutes a day, 5 days a week    Limit the calories from fat in your diet to 25% to 35% of your total intake  If your triglycerides are extremely high--above 500 mg/dL--you may have an added risk for problems with your pancreas. You will likely need medicine to lower your levels along with recommended changes in diet and lifestyle.   How is this test done?  The test is done with a blood sample. A needle is used to draw blood from a vein in your arm or hand.    Does this test pose any risks?  Having a blood test with a needle carries some risks. These include bleeding, infection, bruising, and feeling lightheaded. When the needle pricks your arm or hand, you may feel a slight sting or pain. Afterward, the site may be sore.    What might affect my test results?  Not fasting for the required length of time before the test can affect your results. Certain medicines can affect your results, as can drinking alcohol.   How do I prepare for the test?  If you have this test as part of a cholesterol screening, you will need to not eat or drink anything but water for 9 to 12 hours before the test. Tell your healthcare provider about all medicines, herbs, vitamins, and supplements you are taking. This includes medicines that don't need a prescription and any illegal drugs you may use.   [employer] last reviewed this educational content on 8/1/2020 2000-2021 [employer]. All rights reserved. This information is not intended as a substitute for professional medical care. Always follow your healthcare professional's instructions.

## 2022-03-25 NOTE — PROGRESS NOTES
"SUBJECTIVE:   CC: Jose M Rea is an 72 year old male who presents for preventative health visit.       Patient has been advised of split billing requirements and indicates understanding: Yes  Healthy Habits:     In general, how would you rate your overall health?  Good    Frequency of exercise:  4-5 days/week    Duration of exercise:  15-30 minutes    Do you usually eat at least 4 servings of fruit and vegetables a day, include whole grains    & fiber and avoid regularly eating high fat or \"junk\" foods?  No    Taking medications regularly:  Yes    Barriers to taking medications:  None    Medication side effects:  None    Ability to successfully perform activities of daily living:  No assistance needed    Home Safety:  No safety concerns identified    Hearing Impairment:  Need to ask people to speak up or repeat themselves and difficulty understanding soft or whispered speech    In the past 6 months, have you been bothered by leaking of urine?  No    In general, how would you rate your overall mental or emotional health?  Good      PHQ-2 Total Score: 0    Additional concerns today:  Yes         Eye exam with ophthalmology on this date: within the last 4-6 weeks. [facility] [place].      ENT Symptoms             Symptoms: cc Present Absent Comment   Fever/Chills       Fatigue       Muscle Aches       Eye Irritation       Sneezing       Nasal Adriano/Drg       Sinus Pressure/Pain       Loss of smell       Dental pain       Sore Throat       Swollen Glands       Ear Pain/Fullness X    Left side ear pain off and on. Feels plugged.    Cough       Wheeze       Chest Pain       Shortness of breath       Rash       Other         Symptom duration:  about 1.5 years.    Symptom severity:  mild   Treatments tried:  sudafed, flonase, zyrtec. Has ENT appointment on Monday in [place].    Contacts:  none           Current providers sharing in care for this patient include:   Patient Care Team:  Rigoberto Hernandez MD as PCP " - General (Family Medicine)  Marifer Garner, RN as Specialty Care Coordinator (Hematology & Oncology)  Rigoberto Hernandez MD as Assigned PCP  Misael Clark MD as Assigned Surgical Provider        Today's PHQ-2 Score:   PHQ-2 ( 1999 [employer]) 3/25/2022   Q1: Little interest or pleasure in doing things 0   Q2: Feeling down, depressed or hopeless 0   PHQ-2 Score 0   PHQ-2 Total Score (12-17 Years)- Positive if 3 or more points; Administer PHQ-A if positive -   Q1: Little interest or pleasure in doing things Not at all   Q2: Feeling down, depressed or hopeless Not at all   PHQ-2 Score 0       Abuse: Current or Past(Physical, Sexual or Emotional)- No  Do you feel safe in your environment? Yes        Social History     Tobacco Use     Smoking status: Current Every Day Smoker     Packs/day: 0.50     Years: 50.00     Pack years: 25.00     Types: Cigarettes     Smokeless tobacco: Never Used     Tobacco comment: started at age 18, 1/2 pack daily or less   Substance Use Topics     Alcohol use: Not Currently     Comment: occasional beer     If you drink alcohol do you typically have >3 drinks per day or >7 drinks per week? No    Alcohol Use 3/25/2022   Prescreen: >3 drinks/day or >7 drinks/week? No   Prescreen: >3 drinks/day or >7 drinks/week? -   No flowsheet data found.    Last PSA:   PSA   Date Value Ref Range Status   02/26/2021 0.65 0 - 4 ug/L Final     Comment:     Assay Method:  Chemiluminescence using Siemens Vista analyzer     Prostate Specific Antigen Screen   Date Value Ref Range Status   03/21/2022 0.83 0.00 - 4.00 ug/L Final       Reviewed orders with patient. Reviewed health maintenance and updated orders accordingly - Yes      Reviewed and updated as needed this visit by clinical staff   Tobacco  Allergies  Meds   Med Hx  Surg Hx  Fam Hx  Soc Hx        Reviewed and updated as needed this visit by Provider                     Review of Systems   Constitutional: Negative for chills and fever.   HENT:  "Positive for congestion, ear pain and hearing loss. Negative for sore throat.    Eyes: Negative for pain and visual disturbance.   Respiratory: Negative for cough and shortness of breath.    Cardiovascular: Negative for chest pain, palpitations and peripheral edema.   Gastrointestinal: Negative for abdominal pain, constipation, diarrhea, heartburn, hematochezia and nausea.   Genitourinary: Positive for impotence. Negative for dysuria, frequency, genital sores, hematuria, penile discharge and urgency.   Musculoskeletal: Negative for arthralgias, joint swelling and myalgias.   Skin: Negative for rash.   Neurological: Negative for dizziness, weakness, headaches and paresthesias.   Psychiatric/Behavioral: Negative for mood changes. The patient is not nervous/anxious.          OBJECTIVE:   /64 (BP Location: Left arm, Patient Position: Sitting, Cuff Size: Adult Large)   Pulse 77   Temp 97.3  F (36.3  C) (Tympanic)   Resp 18   Ht 1.771 m (5' 9.72\")   Wt 89.5 kg (197 lb 6.4 oz)   SpO2 99%   BMI 28.55 kg/m      Physical Exam  GENERAL: healthy, alert and no distress  EYES: Eyes grossly normal to inspection, PERRL and conjunctivae and sclerae normal  HENT: ear canals and TM's normal, nose and mouth without ulcers or lesions  NECK: no adenopathy, no asymmetry, masses, or scars and thyroid normal to palpation  RESP: lungs clear to auscultation - no rales, rhonchi or wheezes  CV: regular rate and rhythm, normal S1 S2, no S3 or S4, no murmur, click or rub, no peripheral edema and peripheral pulses strong  ABDOMEN: soft, nontender, no hepatosplenomegaly, no masses and bowel sounds normal  MS: no gross musculoskeletal defects noted, no edema  SKIN: no suspicious lesions or rashes  NEURO: Normal strength and tone, mentation intact and speech normal  PSYCH: mentation appears normal, affect normal/bright  LYMPH: anterior cervical: no adenopathy  posterior cervical: no adenopathy        ASSESSMENT/PLAN:   (Z00.00) Encounter " "for Medicare annual wellness exam  (primary encounter diagnosis)  Comment: Discussed healthy lifestyle and preventative cares.    Plan:     (N52.9) Erectile dysfunction, unspecified erectile dysfunction type  Comment: discussed med and how to take, may increase, he is only using 1-2 tabs prn  Plan: sildenafil (REVATIO) 20 MG tablet, OFFICE/OUTPT        VISIT,EST,LEVL IV            (E78.5) Hyperlipidemia LDL goal <100  Comment: on med and controlled, reviewed higher triglycerides  Plan: simvastatin (ZOCOR) 20 MG tablet, OFFICE/OUTPT         VISIT,EST,LEVL IV, CANCELED: Lipid panel reflex        to direct LDL Fasting            (D68.9) Coagulation defect (H)  Comment: followed by [facility]  Plan:     (Z13.1) Screening for diabetes mellitus  Comment:   Plan: CANCELED: Glucose            (Z12.5) Screening for prostate cancer  Comment:   Plan: CANCELED: Prostate Specific Antigen Screen            (H93.8X2) Ear fullness, left  Comment: seeing ENT next week  Plan:     Patient has been advised of split billing requirements and indicates understanding: Yes    COUNSELING:   Reviewed preventive health counseling, as reflected in patient instructions       Regular exercise       Healthy diet/nutrition       Colorectal cancer screening       Prostate cancer screening    Estimated body mass index is 28.55 kg/m  as calculated from the following:    Height as of this encounter: 1.771 m (5' 9.72\").    Weight as of this encounter: 89.5 kg (197 lb 6.4 oz).         He reports that he has been smoking cigarettes. He has a 25.00 pack-year smoking history. He has never used smokeless tobacco.  Tobacco Cessation Action Plan:   Information offered: Patient not interested at this time      Counseling Resources:  ATP IV Guidelines  Pooled Cohorts Equation Calculator  FRAX Risk Assessment  ICSI Preventive Guidelines  Dietary Guidelines for Americans, 2010  [employer]'s MyPlate  ASA Prophylaxis  Lung CA Screening    Rigoberto Hernandez MD   HEALTH " [facility]

## 2022-03-25 NOTE — PROGRESS NOTES
"    The patient was counseled and encouraged to consider modifying their diet and eating habits. He was provided with information on recommended healthy diet options.  The patient was provided with written information regarding signs of hearing loss.  Answers for HPI/ROS submitted by the patient on 3/25/2022  In general, how would you rate your overall physical health?: good  Frequency of exercise:: 4-5 days/week  Do you usually eat at least 4 servings of fruit and vegetables a day, include whole grains & fiber, and avoid regularly eating high fat or \"junk\" foods? : No  Taking medications regularly:: Yes  Medication side effects:: None  Activities of Daily Living: no assistance needed  Home safety: no safety concerns identified  Hearing Impairment:: need to ask people to speak up or repeat themselves, difficulty understanding soft or whispered speech  In the past 6 months, have you been bothered by leaking of urine?: No  abdominal pain: No  Blood in stool: No  Blood in urine: No  chest pain: No  chills: No  congestion: Yes  constipation: No  cough: No  diarrhea: No  dizziness: No  ear pain: Yes  eye pain: No  nervous/anxious: No  fever: No  frequency: No  genital sores: No  headaches: No  hearing loss: Yes  heartburn: No  arthralgias: No  joint swelling: No  peripheral edema: No  mood changes: No  myalgias: No  nausea: No  dysuria: No  palpitations: No  Skin sensation changes: No  sore throat: No  urgency: No  rash: No  shortness of breath: No  visual disturbance: No  weakness: No  impotence: Yes  penile discharge: No  In general, how would you rate your overall mental or emotional health?: good  Additional concerns today:: Yes  Duration of exercise:: 15-30 minutes      "

## 2022-03-27 ENCOUNTER — TELEPHONE (OUTPATIENT)
Dept: FAMILY MEDICINE | Facility: CLINIC | Age: 72
End: 2022-03-27
Payer: MEDICARE

## 2022-03-27 DIAGNOSIS — N52.9 ERECTILE DYSFUNCTION, UNSPECIFIED ERECTILE DYSFUNCTION TYPE: Primary | ICD-10-CM

## 2022-03-27 NOTE — TELEPHONE ENCOUNTER
Prior Authorization Retail Medication Request    Medication/Dose: sildenafil  ICD code (if different than what is on RX):    Previously Tried and Failed:  viagra 50 mg  Rationale:  Patient has used since 2019    Insurance Name:  Not provided  Insurance ID:  Not provided  [facility] key: [id number]      Pharmacy Information (if different than what is on RX)  Name:    Phone:

## 2022-03-28 ENCOUNTER — OFFICE VISIT (OUTPATIENT)
Dept: OTOLARYNGOLOGY | Facility: CLINIC | Age: 72
End: 2022-03-28
Payer: MEDICARE

## 2022-03-28 ENCOUNTER — OFFICE VISIT (OUTPATIENT)
Dept: AUDIOLOGY | Facility: CLINIC | Age: 72
End: 2022-03-28
Payer: MEDICARE

## 2022-03-28 VITALS
HEART RATE: 78 BPM | SYSTOLIC BLOOD PRESSURE: 137 MMHG | WEIGHT: 197 LBS | DIASTOLIC BLOOD PRESSURE: 81 MMHG | BODY MASS INDEX: 28.49 KG/M2 | OXYGEN SATURATION: 99 %

## 2022-03-28 DIAGNOSIS — H93.8X3 EAR FULLNESS, BILATERAL: ICD-10-CM

## 2022-03-28 DIAGNOSIS — H90.3 SNHL (SENSORY-NEURAL HEARING LOSS), ASYMMETRICAL: ICD-10-CM

## 2022-03-28 DIAGNOSIS — H90.3 SENSORINEURAL HEARING LOSS, BILATERAL: Primary | ICD-10-CM

## 2022-03-28 DIAGNOSIS — G96.198 MENINGEAL DISORDER: ICD-10-CM

## 2022-03-28 DIAGNOSIS — H93.13 TINNITUS, BILATERAL: ICD-10-CM

## 2022-03-28 DIAGNOSIS — H91.93 HIGH FREQUENCY HEARING LOSS OF BOTH EARS: ICD-10-CM

## 2022-03-28 PROCEDURE — 92550 TYMPANOMETRY & REFLEX THRESH: CPT

## 2022-03-28 PROCEDURE — 92557 COMPREHENSIVE HEARING TEST: CPT

## 2022-03-28 PROCEDURE — 99207 PR NO CHARGE LOS: CPT

## 2022-03-28 PROCEDURE — 99214 OFFICE O/P EST MOD 30 MIN: CPT | Performed by: OTOLARYNGOLOGY

## 2022-03-28 NOTE — LETTER
3/28/2022         RE: Jose M Rea  [address] [facility] [address]        Dear Colleague,    Thank you for referring your patient, Jose M Rea, to the [facility]. Please see a copy of my visit note below.    History of Present Illness - Jose M Rea is a very pleasant 72 year old male here to see me for the first time, but has seen Dr Clark for similar issues on 5/13/21    He presents to me today due to a year of persistent fullness in the ears.  An audiogram was done at his previous visit with Dr Clark, and personally reviewed. It did show a steep symmetric down sloping sensorineural hearing loss in both ears above 2000Hz.  No signficant conductive hearing loss, and tympanograms and Word Recognition were normal.    Initially he was told that this was eustachian tube dysfunction and was placed on Zyrtec but that did not help.  He eventually saw an ENT at [facility] in [facility] and was told that it was eustachian tube dysfunction, and was placed on Mucinex, Zyrtec, and Flonase.  And none of it seems to help.    The most notable symptoms is waxing and waning LEFT tinnitus and fullness.  It changes from day to day.    Otherwise no history of chronic ear disease.  No previous ear surgery.  No history of chemo or radiation therapy to the head and neck, and no major head trauma.  He denies a history of  service, no frequent firearm use.  No previous history working in an industrial environment.      Past Medical History -   Patient Active Problem List   Diagnosis     Hyperlipidemia LDL goal <100     Eczema     Chronic anticoagulation     Long term current use of anticoagulant therapy     Erectile dysfunction, unspecified erectile dysfunction type     H/O deep venous thrombosis     Coagulation defect (H)       Current Medications -   Current Outpatient Medications:      ACETAMINOPHEN PO, Take 650 mg by mouth (Patient not taking: Reported on 3/8/2022), Disp: , Rfl:       betamethasone dipropionate (DIPROSONE) 0.05 % external cream, Apply twice daily as needed to affected area on leg., Disp: 45 g, Rfl: 2     cetirizine (ZYRTEC ALLERGY) 10 MG tablet, Take 10 mg by mouth daily, Disp: , Rfl:      clotrimazole (LOTRIMIN) 1 % cream, as needed , Disp: , Rfl:      enoxaparin ANTICOAGULANT (LOVENOX) 40 MG/0.4ML syringe, Inject 0.4 mLs (40 mg) Subcutaneous daily As directed by INR clinic (Patient not taking: Reported on 3/8/2022), Disp: 1.2 mL, Rfl: 0     fluticasone (FLONASE) 50 MCG/ACT nasal spray, Spray 2 sprays into both nostrils daily as needed for rhinitis or allergies, Disp: 15.8 mL, Rfl: 3     Multiple Vitamins-Minerals (MENS ONE DAILY PO), , Disp: , Rfl:      pseudoePHEDrine (SUDAFED) 30 MG tablet, Take by mouth every 4 hours as needed for congestion, Disp: , Rfl:      sildenafil (REVATIO) 20 MG tablet, Sidenafil (Viagra) comes in 20mg strength pills. Take as many pills as needed up to maximum of 5 pills at a time prior to intercourse., Disp: 30 tablet, Rfl: 11     simvastatin (ZOCOR) 20 MG tablet, Take 1 tablet by mouth once daily, Disp: 90 tablet, Rfl: 0     warfarin ANTICOAGULANT (COUMADIN) 5 MG tablet, TAKE 10MG (2 TABLETS) BY MOUTH EVERY MONDAY. TAKE 7.5MG (1.5 TABLETS) ALL OTHER DAYS OR AS DIRECTED BY ANTICOAGULATION CLINIC, Disp: 150 tablet, Rfl: 0    Allergies -   Allergies   Allergen Reactions     Clindamycin      Bactroban [Mupirocin]      Cephalexin Rash     Doxycycline Rash     Metal [Staples] Rash and Blisters       Social History -   Social History     Socioeconomic History     Marital status:      Spouse name: Not on file     Number of children: Not on file     Years of education: Not on file     Highest education level: Not on file   Occupational History     Not on file   Tobacco Use     Smoking status: Current Every Day Smoker     Packs/day: 0.50     Years: 50.00     Pack years: 25.00     Types: Cigarettes     Smokeless tobacco: Never Used     Tobacco  comment: started at age 18, 1/2 pack daily or less   Substance and Sexual Activity     Alcohol use: Yes     Comment: occasional beer     Drug use: No     Sexual activity: Yes     Partners: Female   Other Topics Concern     Parent/sibling w/ CABG, MI or angioplasty before 65F 55M? Not Asked   Social History Narrative     Not on file     Social Determinants of Health     Financial Resource Strain: Not on file   Food Insecurity: Not on file   Transportation Needs: Not on file   Physical Activity: Not on file   Stress: Not on file   Social Connections: Not on file   Intimate Partner Violence: Not on file   Housing Stability: Not on file       Family History -   Family History   Problem Relation Age of Onset     Coronary Artery Disease Paternal Grandfather         MI in late 50s or early 60s     Sleep Apnea Son      Thrombosis Son         PEs in his mid 30s     Prostate Cancer No family hx of      Colon Cancer No family hx of        Review of Systems - As per HPI and PMHx, otherwise 10+ system review of the head and neck, and general constitution is negative.    Physical Exam  B/P: Data Unavailable, T: Data Unavailable, P: Data Unavailable, R: Data Unavailable  Vitals: There were no vitals taken for this visit.  BMI= There is no height or weight on file to calculate BMI.    General - The patient is well nourished and well developed, and appears to have good nutritional status.  Alert and oriented to person and place, answers questions and cooperates with examination appropriately.   Head and Face - Normocephalic and atraumatic, with no gross asymmetry noted of the contour of the facial features.  The facial nerve is intact, with strong symmetric movements.  Voice and Breathing - The patient was breathing comfortably without the use of accessory muscles. There was no wheezing, stridor, or stertor.  The patients voice was clear and strong, and had appropriate pitch and quality.  Ears - The tympanic membranes are normal in  appearance, bony landmarks are intact.  No retraction, perforation, or masses.  No fluid or purulence was seen in the external canal or the middle ear. No evidence of infection of the middle ear or external canal, cerumen was normal in appearance.  Eyes - Extraocular movements intact, and the pupils were reactive to light.  Sclera were not icteric or injected, conjunctiva were pink and moist.  Mouth - Examination of the oral cavity showed pink, healthy oral mucosa. No lesions or ulcerations noted.  The tongue was mobile and midline, and the dentition were in good condition.    Throat - The walls of the oropharynx were smooth, pink, moist, symmetric, and had no lesions or ulcerations.  The tonsillar pillars and soft palate were symmetric.  The uvula was midline on elevation.    Neck - Normal midline excursion of the laryngotracheal complex during swallowing.  Full range of motion on passive movement.  Palpation of the occipital, submental, submandibular, internal jugular chain, and supraclavicular nodes did not demonstrate any abnormal lymph nodes or masses.  The carotid pulse was palpable bilaterally.  Palpation of the thyroid was soft and smooth, with no nodules or goiter appreciated.  The trachea was mobile and midline.  Nose - External contour is symmetric, no gross deflection or scars.  Nasal mucosa is pink and moist with no abnormal mucus.  The septum was midline and non-obstructive, turbinates of normal size and position.  No polyps, masses, or purulence noted on examination.    Audiologic Studies - An audiogram and tympanogram were performed today as part of the evaluation and personally reviewed. I also compared today's testing with his last test in 2021  The tympanogram shows a normal Type A curve, with normal canal volume and middle ear pressure.  There is no sign of eustachian tube dysfunction or middle ear effusion.    The audiogram showed a stable RIGHT ear, with a severe down sloping sensorineural  hearing loss above 2000Hz and no conductive hearing loss.  The LEFT ear has worsened by about 10-15dB since last year.      A/P - Jose M Rea is a 72 year old male  (H90.3) Sensorineural hearing loss, bilateral  (primary encounter diagnosis)  (H93.13) Tinnitus, bilateral  (H93.8X3) Ear fullness, bilateral  (H90.3) SNHL (sensory-neural hearing loss), asymmetrical    Based on the history, audiogram, and ear exam, I don't find any evidence of medical or surgical disease that would have caused the hearing loss.  Although noise exposure could be a factor, I suspect that this is primarily genetic    With regards to the fullness and tinnitus shifting, I am also considering that this might be Cochlear Hydrops or Atypical Migraine    However, the progression on the LEFT is something to consider further.  I have counseled him on indications for an MRI scan of the brain.    If the MRI is normal, I would consider a test empiric dosing of Maxzide.    We discussed the natural history of the steady loss of human hearing, and things to help.  I certainly recommend considering hearing aids, and they have my medical clearance to look into being fitted, and information has been provided.            Again, thank you for allowing me to participate in the care of your patient.        Sincerely,        Devaughn Wood MD

## 2022-03-28 NOTE — NURSING NOTE
"Chief Complaint   Patient presents with     Ear Problem     feel plugged off and on       Vitals:    03/28/22 0950   BP: 137/81   BP Location: Right arm   Patient Position: Sitting   Cuff Size: Adult Large   Pulse: 78   SpO2: 99%   Weight: 89.4 kg (197 lb)     Wt Readings from Last 1 Encounters:   03/28/22 89.4 kg (197 lb)     Ht Readings from Last 1 Encounters:   03/25/22 1.771 m (5' 9.72\")       Chinmay Miles [facility], 3/28/2022 9:51 AM    "

## 2022-03-28 NOTE — PROGRESS NOTES
AUDIOLOGY REPORT:    Patient was referred to [facility] from ENT by Dr. Wood for a hearing examination. Patient reports longstanding left-sided aural fullness. He reports it becoming very bothersome and exhausting. He denies pain, drainage and dizziness. He does report longstanding, constant, mostly left-sided, tinnitus.      Testing:    Otoscopy:   Otoscopic exam indicates ears are clear of cerumen bilaterally     Tympanograms:    RIGHT: normal eardrum mobility     LEFT:   normal eardrum mobility    Reflexes (reported by stimulus ear): 1000 Hz  RIGHT: Ipsilateral is absent at frequencies tested  RIGHT: Contralateral is absent at frequencies tested  LEFT:   Ipsilateral is absent at frequencies tested  LEFT:   Contralateral is present at normal levels    Thresholds:   Pure Tone Thresholds assessed using conventional audiometry with good  reliability from 250-8000 Hz bilaterally using insert earphones and circumaural headphones     RIGHT:  normal sloping to severe sensorineural hearing loss    LEFT:    mild rising to normal at 2000 Hz sloping to severe sensorineural hearing loss    Speech Reception Threshold:    RIGHT: 20 dB HL    LEFT:   25 dB HL  Speech Reception Thresholds are in good agreement with pure tone thresholds    Word Recognition Score:     RIGHT: 100% at 60 dB HL using NU-6 recorded word list.    LEFT:   100% at 65 dB HL using NU-6 recorded word list.    When compared to previous audiogram in 2017, hearing has remained stable with the exception of a 15-35 dB decline at 250 and 500 Hz in the left ear. Discussed results with the patient.     Patient was returned to ENT for follow up.     Ghada Montoya, CCC-A  Licensed Audiologist  [place] #[id number]    03/28/22

## 2022-03-28 NOTE — PATIENT INSTRUCTIONS
Based on the history, audiogram, and ear exam, I don't find any evidence of medical or surgical disease that would have caused the hearing loss.  Although noise exposure could be a factor, I suspect that this is primarily genetic    With regards to the fullness and tinnitus shifting, I am also considering that this might be Cochlear Hydrops or Atypical Migraine    However, the progression on the LEFT is something to consider further.  I have counseled him on indications for an MRI scan of the brain.    If the MRI is normal, I would consider a test empiric dosing of Maxzide.    We discussed the natural history of the steady loss of human hearing, and things to help.  I certainly recommend considering hearing aids, and they have my medical clearance to look into being fitted, and information has been provided.

## 2022-03-29 ENCOUNTER — LAB (OUTPATIENT)
Dept: LAB | Facility: CLINIC | Age: 72
End: 2022-03-29
Payer: MEDICARE

## 2022-03-29 ENCOUNTER — ANTICOAGULATION THERAPY VISIT (OUTPATIENT)
Dept: ANTICOAGULATION | Facility: CLINIC | Age: 72
End: 2022-03-29

## 2022-03-29 DIAGNOSIS — D68.9 COAGULATION DEFECT (H): Primary | ICD-10-CM

## 2022-03-29 DIAGNOSIS — Z79.01 LONG TERM CURRENT USE OF ANTICOAGULANT THERAPY: ICD-10-CM

## 2022-03-29 DIAGNOSIS — Z79.01 CHRONIC ANTICOAGULATION: ICD-10-CM

## 2022-03-29 DIAGNOSIS — D68.9 COAGULATION DEFECT (H): ICD-10-CM

## 2022-03-29 DIAGNOSIS — Z86.718 H/O DEEP VENOUS THROMBOSIS: ICD-10-CM

## 2022-03-29 LAB — INR BLD: 2.3 (ref 0.9–1.1)

## 2022-03-29 PROCEDURE — 36416 COLLJ CAPILLARY BLOOD SPEC: CPT

## 2022-03-29 PROCEDURE — 85610 PROTHROMBIN TIME: CPT

## 2022-03-29 NOTE — PROGRESS NOTES
ANTICOAGULATION MANAGEMENT     Jose M Rea 72 year old male is on warfarin with therapeutic INR result. (Goal INR 2.0-3.0)    Recent labs: (last 7 days)     03/29/22  0948   INR 2.3*       ASSESSMENT       Source(s): Chart Review    Previous INR was Therapeutic last 2(+) visits    Medication, diet, health changes since last INR chart reviewed; none identified. Having MRI of brain for ear fullness and tinnitus but no changes to meds yet.           PLAN     Recommended plan for no diet, medication or health factor changes affecting INR     Dosing Instructions: Continue your current warfarin dose with next INR in 6 weeks       Summary  As of 3/29/2022    Full warfarin instructions:  10 mg every Mon; 7.5 mg all other days   Next INR check:  5/10/2022             Detailed voice message left for Jose M with dosing instructions and follow up date.     Contact [phone]  to schedule and with any changes, questions or concerns.     Education provided: Please call back if any changes to your diet, medications or how you've been taking warfarin and Contact [phone]  with any changes, questions or concerns.     Plan made per [facility] anticoagulation protocol    Joanie Yin RN  Anticoagulation Clinic  3/29/2022    _______________________________________________________________________     Anticoagulation Episode Summary     Current INR goal:  2.0-3.0   TTR:  100.0 % (1 y)   Target end date:  Indefinite   Send INR reminders to:  [facility]    Indications    Coagulation defect (H) [D68.9]  Acute deep vein thrombosis (DVT) of proximal vein of lower extremity (H) (Resolved) [I82.4Y9]  Long term current use of anticoagulant therapy [Z79.01]  Acute deep vein thrombosis (DVT) of proximal vein of lower extremity  unspecified laterality (H) (Resolved) [I82.4Y9]  Chronic anticoagulation [Z79.01]           Comments:  *         Anticoagulation Care Providers     Provider Role Specialty Phone number    Pj Quijano,  MD Referring Family Medicine [phone]    Rigoberto Hernandez MD Referring Family Medicine [phone]

## 2022-03-30 NOTE — TELEPHONE ENCOUNTER
Central Prior Authorization Team   Phone: [phone]      PRIOR AUTHORIZATION DENIED    Medication: sildenafil--DENIED    Denial Date: 3/30/2022    Denial Rational:        Appeal Information:          IF NO APPEAL IS TRIED PLEASE CLOSE ENCOUNTER

## 2022-03-30 NOTE — TELEPHONE ENCOUNTER
Central Prior Authorization Team   Phone: [phone]      PA Initiation    Medication: sildenafil--initiated  Insurance Company: Raul Burnette - Phone [phone] Fax [phone]  Pharmacy Filling the Rx: [facility] [address] [place] [address]  Filling Pharmacy Phone: [phone]  Filling Pharmacy Fax:    Start Date: 3/30/2022

## 2022-03-31 ENCOUNTER — HOSPITAL ENCOUNTER (OUTPATIENT)
Dept: MRI IMAGING | Facility: CLINIC | Age: 72
Discharge: HOME | End: 2022-03-31
Attending: OTOLARYNGOLOGY | Admitting: OTOLARYNGOLOGY
Payer: MEDICARE

## 2022-03-31 DIAGNOSIS — H93.13 TINNITUS, BILATERAL: ICD-10-CM

## 2022-03-31 DIAGNOSIS — H90.3 SENSORINEURAL HEARING LOSS, BILATERAL: ICD-10-CM

## 2022-03-31 DIAGNOSIS — H90.3 SNHL (SENSORY-NEURAL HEARING LOSS), ASYMMETRICAL: ICD-10-CM

## 2022-03-31 DIAGNOSIS — H93.8X3 EAR FULLNESS, BILATERAL: ICD-10-CM

## 2022-03-31 PROCEDURE — A9585 GADOBUTROL INJECTION: HCPCS | Performed by: OTOLARYNGOLOGY

## 2022-03-31 PROCEDURE — 70553 MRI BRAIN STEM W/O & W/DYE: CPT

## 2022-03-31 PROCEDURE — 255N000002 HC RX 255 OP 636: Performed by: OTOLARYNGOLOGY

## 2022-03-31 RX ORDER — GADOBUTROL 604.72 MG/ML
9 INJECTION INTRAVENOUS ONCE
Status: COMPLETED | OUTPATIENT
Start: 2022-03-31 | End: 2022-03-31

## 2022-03-31 RX ADMIN — GADOBUTROL 9 ML: 604.72 INJECTION INTRAVENOUS at 13:34

## 2022-04-01 DIAGNOSIS — H81.09 COCHLEAR HYDROPS, UNSPECIFIED LATERALITY: Primary | ICD-10-CM

## 2022-04-01 RX ORDER — TRIAMTERENE/HYDROCHLOROTHIAZID 37.5-25 MG
0.5 TABLET ORAL DAILY
Qty: 30 TABLET | Refills: 3 | Status: SHIPPED | OUTPATIENT
Start: 2022-04-01 | End: 2022-10-26

## 2022-04-01 NOTE — PROGRESS NOTES
Called and spoke with Jose M, MRI normal.  The thickening of the meninges was mentioned, and I will get back to him when I get some more information on that.  I believe it is likely to be an irrelevant normal variation.    For the waxing and waning hearing and tinnitus, I am going to prescribe a very small dose of Maxzide to do daily.    Follow-up with me after a month to let me know if it is helping or not.

## 2022-04-03 RX ORDER — SILDENAFIL 100 MG/1
50-100 TABLET, FILM COATED ORAL DAILY PRN
Qty: 6 TABLET | Refills: 11 | Status: SHIPPED | OUTPATIENT
Start: 2022-04-03 | End: 2022-10-26

## 2022-04-06 ENCOUNTER — TELEPHONE (OUTPATIENT)
Dept: OTOLARYNGOLOGY | Facility: CLINIC | Age: 72
End: 2022-04-06
Payer: MEDICARE

## 2022-04-06 NOTE — CONFIDENTIAL NOTE
Called and spoke at length with patient and his wife, referral made to Dr marie with Neurosurgery for further evaluation

## 2022-04-12 ENCOUNTER — TELEPHONE (OUTPATIENT)
Dept: OTOLARYNGOLOGY | Facility: CLINIC | Age: 72
End: 2022-04-12
Payer: MEDICARE

## 2022-04-12 DIAGNOSIS — G96.198 MENINGEAL DISORDER: Primary | ICD-10-CM

## 2022-04-12 NOTE — TELEPHONE ENCOUNTER
Reason for Call: Request for an order or referral:    Order or referral being requested: Referral for neurosurgery    Date needed: as soon as possible    Has the patient been seen by the PCP for this problem? Patient said this should be coming from Dr Wood.    Additional comments: He would like to talk to Dr Wood if possible     Phone number Patient can be reached at:  Cell number on file:    Telephone Information:   Mobile [phone]       Best Time:      Can we leave a detailed message on this number?  NO    Call taken on 4/12/2022 at 2:09 PM by Jessica Okeefe

## 2022-04-14 NOTE — TELEPHONE ENCOUNTER
Patient calling again for the referral as he wants it done today.  Please call and verify when this has been done.

## 2022-04-21 ENCOUNTER — OFFICE VISIT (OUTPATIENT)
Dept: NEUROSURGERY | Facility: CLINIC | Age: 72
End: 2022-04-21
Payer: MEDICARE

## 2022-04-21 VITALS — DIASTOLIC BLOOD PRESSURE: 76 MMHG | SYSTOLIC BLOOD PRESSURE: 143 MMHG | OXYGEN SATURATION: 99 % | HEART RATE: 75 BPM

## 2022-04-21 DIAGNOSIS — G96.198 MENINGEAL DISORDER: ICD-10-CM

## 2022-04-21 PROCEDURE — 99204 OFFICE O/P NEW MOD 45 MIN: CPT | Performed by: NEUROLOGICAL SURGERY

## 2022-04-21 ASSESSMENT — PAIN SCALES - GENERAL: PAINLEVEL: NO PAIN (0)

## 2022-04-21 NOTE — NURSING NOTE
"Jose M Rea is a 72 year old male who presents for:  Chief Complaint   Patient presents with     Neurologic Problem     Meningeal disorder; MRI review        Initial Vitals:  BP (!) 143/76   Pulse 75   SpO2 99%  Estimated body mass index is 28.49 kg/m  as calculated from the following:    Height as of 3/25/22: 5' 9.72\" (1.771 m).    Weight as of 3/28/22: 197 lb (89.4 kg).. There is no height or weight on file to calculate BSA. BP completed using cuff size: regular  No Pain (0)        Khoi Chang MA    "

## 2022-04-21 NOTE — LETTER
4/21/2022         RE: Jose M Rea  [address] [address] [facility] [address]        Dear Colleague,    Thank you for referring your patient, Jose M Rea, to the [facility]. Please see a copy of my visit note below.    I was asked by Dr. 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  Has had several months of fullness in the left ear.  He saw Dr. Wood and MR Brain was obtained.  He denies headaches or positional symptoms.  No weakness or numbness.  Occasional left eye visual sensations that Ophthalmology felt were ocular migraines.  MR Brain, personally reviewed, with diffuse, smooth meningeal enhancement and thickening with broad differential.     Past Medical History:   Diagnosis Date     ED (erectile dysfunction)      Hyperlipidemia      Past Surgical History:   Procedure Laterality Date     APPENDECTOMY       COLONOSCOPY N/A 4/30/2021    Procedure: COLONOSCOPY, WITH POLYPECTOMY AND BIOPSY;  Surgeon: Mark Shoemaker DO;  Location: [facility]     ORTHOPEDIC SURGERY       PHACOEMULSIFICATION WITH STANDARD INTRAOCULAR LENS IMPLANT Left 4/3/2019    Procedure: Cataract Removal with Implant;  Surgeon: Cristofer Price MD;  Location: [facility]     PHACOEMULSIFICATION WITH STANDARD INTRAOCULAR LENS IMPLANT Right 4/24/2019    Procedure: Cataract Removal with Implant;  Surgeon: Cristofer Price MD;  Location: [facility]     SURGICAL HISTORY OF -       appendectomy in 1956     SURGICAL HISTORY OF -       right shoulder surgery in 2016     Social History     Socioeconomic History     Marital status:      Spouse name: Not on file     Number of children: Not on file     Years of education: Not on file     Highest education level: Not on file   Occupational History     Not on file   Tobacco Use     Smoking status: Current Every Day Smoker     Packs/day: 0.50     Years: 50.00     Pack years: 25.00     Types:  Cigarettes     Smokeless tobacco: Never Used     Tobacco comment: started at age 18, 1/2 pack daily or less   Vaping Use     Vaping Use: Never used   Substance and Sexual Activity     Alcohol use: Not Currently     Comment: occasional beer     Drug use: No     Sexual activity: Yes     Partners: Female   Other Topics Concern     Parent/sibling w/ CABG, MI or angioplasty before 65F 55M? Not Asked   Social History Narrative     Not on file     Social Determinants of Health     Financial Resource Strain: Not on file   Food Insecurity: Not on file   Transportation Needs: Not on file   Physical Activity: Not on file   Stress: Not on file   Social Connections: Not on file   Intimate Partner Violence: Not on file   Housing Stability: Not on file     Family History   Problem Relation Age of Onset     Coronary Artery Disease Paternal Grandfather         MI in late 50s or early 60s     Sleep Apnea Son      Thrombosis Son         PEs in his mid 30s     Prostate Cancer No family hx of      Colon Cancer No family hx of         ROS: 10 point ROS neg other than the symptoms noted above in the HPI.    Physical Exam  BP (!) 143/76   Pulse 75   SpO2 99%   HEENT:  Normocephalic, atraumatic.  PERRLA.  EOM s intact.  Visual fields full to gross exam  Neck:  Supple, non-tender, without lymphadenopathy.  Heart:  No peripheral edema  Lungs:  No SOB  Abdomen:  Non-distended.   Skin:  Warm and dry.  Extremities:  No edema, cyanosis or clubbing.  Psychiatric:  No apparent distress  Musculoskeletal:  Normal bulk and tone    NEUROLOGICAL EXAMINATION:     Mental status:  Alert and Oriented x 3, speech is fluent.  Cranial nerves:  II-XII intact.   Motor:    Shoulder Abduction:  Right:  5/5   Left:  5/5  Biceps:                      Right:  5/5   Left:  5/5  Triceps:                     Right:  5/5   Left:  5/5  Wrist Extensors:       Right:  5/5   Left:  5/5  Wrist Flexors:           Right:  5/5   Left:  5/5  interosseus :            Right:  5/5    Left:  5/5  Hip Flexion:                Right: 5/5  Left:  5/5  Quadriceps:             Right:  5/5  Left:  5/5  Hamstrings:             Right:  5/5  Left:  5/5  Gastroc Soleus:        Right:  5/5  Left:  5/5  Tib/Ant:                      Right:  5/5  Left:  5/5  EHL:                     Right:  5/5  Left:  5/5  Sensation:  Intact  Reflexes:  Negative Babinski.  Negative Clonus.  Negative Bales's.  Coordination:  Smooth finger to nose testing.   Negative pronator drift.  Smooth tandem walking.    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 Henao for further eval         Again, thank you for allowing me to participate in the care of your patient.        Sincerely,        Gabriele Ward MD

## 2022-04-21 NOTE — PATIENT INSTRUCTIONS
Ordered a MRI cervical, thoracic, and lumbar with and without contrast to evaluate meningeal thickening. They will call to schedule. If the don't call within 2 days, call Replaced by [facility]: [phone]    [facility]: [phone]     You should schedule this within one week. We will call you with the results. If you don't hear from us 7 days after the scan, please call us.    [facility]  [facility] - [address] [address]  Telephone:  [phone]       Fax:  [phone]

## 2022-04-21 NOTE — PROGRESS NOTES
I was asked by Dr. 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  Has had several months of fullness in the left ear.  He saw Dr. Wood and MR Brain was obtained.  He denies headaches or positional symptoms.  No weakness or numbness.  Occasional left eye visual sensations that Ophthalmology felt were ocular migraines.  MR Brain, personally reviewed, with diffuse, smooth meningeal enhancement and thickening with broad differential.     Past Medical History:   Diagnosis Date     ED (erectile dysfunction)      Hyperlipidemia      Past Surgical History:   Procedure Laterality Date     APPENDECTOMY       COLONOSCOPY N/A 4/30/2021    Procedure: COLONOSCOPY, WITH POLYPECTOMY AND BIOPSY;  Surgeon: Makr Shoemaker DO;  Location: [facility]     ORTHOPEDIC SURGERY       PHACOEMULSIFICATION WITH STANDARD INTRAOCULAR LENS IMPLANT Left 4/3/2019    Procedure: Cataract Removal with Implant;  Surgeon: Cristofer Price MD;  Location: [facility]     PHACOEMULSIFICATION WITH STANDARD INTRAOCULAR LENS IMPLANT Right 4/24/2019    Procedure: Cataract Removal with Implant;  Surgeon: Cristofer Price MD;  Location: [facility]     SURGICAL HISTORY OF -       appendectomy in 1956     SURGICAL HISTORY OF -       right shoulder surgery in 2016     Social History     Socioeconomic History     Marital status:      Spouse name: Not on file     Number of children: Not on file     Years of education: Not on file     Highest education level: Not on file   Occupational History     Not on file   Tobacco Use     Smoking status: Current Every Day Smoker     Packs/day: 0.50     Years: 50.00     Pack years: 25.00     Types: Cigarettes     Smokeless tobacco: Never Used     Tobacco comment: started at age 18, 1/2 pack daily or less   Vaping Use     Vaping Use: Never used   Substance and Sexual Activity     Alcohol use: Not Currently     Comment: occasional beer     Drug use: No     Sexual  activity: Yes     Partners: Female   Other Topics Concern     Parent/sibling w/ CABG, MI or angioplasty before 65F 55M? Not Asked   Social History Narrative     Not on file     Social Determinants of Health     Financial Resource Strain: Not on file   Food Insecurity: Not on file   Transportation Needs: Not on file   Physical Activity: Not on file   Stress: Not on file   Social Connections: Not on file   Intimate Partner Violence: Not on file   Housing Stability: Not on file     Family History   Problem Relation Age of Onset     Coronary Artery Disease Paternal Grandfather         MI in late 50s or early 60s     Sleep Apnea Son      Thrombosis Son         PEs in his mid 30s     Prostate Cancer No family hx of      Colon Cancer No family hx of         ROS: 10 point ROS neg other than the symptoms noted above in the HPI.    Physical Exam  BP (!) 143/76   Pulse 75   SpO2 99%   HEENT:  Normocephalic, atraumatic.  PERRLA.  EOM s intact.  Visual fields full to gross exam  Neck:  Supple, non-tender, without lymphadenopathy.  Heart:  No peripheral edema  Lungs:  No SOB  Abdomen:  Non-distended.   Skin:  Warm and dry.  Extremities:  No edema, cyanosis or clubbing.  Psychiatric:  No apparent distress  Musculoskeletal:  Normal bulk and tone    NEUROLOGICAL EXAMINATION:     Mental status:  Alert and Oriented x 3, speech is fluent.  Cranial nerves:  II-XII intact.   Motor:    Shoulder Abduction:  Right:  5/5   Left:  5/5  Biceps:                      Right:  5/5   Left:  5/5  Triceps:                     Right:  5/5   Left:  5/5  Wrist Extensors:       Right:  5/5   Left:  5/5  Wrist Flexors:           Right:  5/5   Left:  5/5  interosseus :            Right:  5/5   Left:  5/5  Hip Flexion:                Right: 5/5  Left:  5/5  Quadriceps:             Right:  5/5  Left:  5/5  Hamstrings:             Right:  5/5  Left:  5/5  Gastroc Soleus:        Right:  5/5  Left:  5/5  Tib/Ant:                      Right:  5/5  Left:   5/5  EHL:                     Right:  5/5  Left:  5/5  Sensation:  Intact  Reflexes:  Negative Babinski.  Negative Clonus.  Negative Bales's.  Coordination:  Smooth finger to nose testing.   Negative pronator drift.  Smooth tandem walking.    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 Henao for further eval

## 2022-04-28 ENCOUNTER — HOSPITAL ENCOUNTER (OUTPATIENT)
Dept: MRI IMAGING | Facility: CLINIC | Age: 72
Discharge: HOME | End: 2022-04-28
Attending: NEUROLOGICAL SURGERY
Payer: MEDICARE

## 2022-04-28 ENCOUNTER — TELEPHONE (OUTPATIENT)
Dept: NEUROSURGERY | Facility: CLINIC | Age: 72
End: 2022-04-28

## 2022-04-28 DIAGNOSIS — G96.198 MENINGEAL DISORDER: ICD-10-CM

## 2022-04-28 PROCEDURE — 72156 MRI NECK SPINE W/O & W/DYE: CPT

## 2022-04-28 PROCEDURE — 255N000002 HC RX 255 OP 636: Performed by: NEUROLOGICAL SURGERY

## 2022-04-28 PROCEDURE — 72158 MRI LUMBAR SPINE W/O & W/DYE: CPT

## 2022-04-28 PROCEDURE — A9585 GADOBUTROL INJECTION: HCPCS | Performed by: NEUROLOGICAL SURGERY

## 2022-04-28 PROCEDURE — 72157 MRI CHEST SPINE W/O & W/DYE: CPT

## 2022-04-28 RX ORDER — GADOBUTROL 604.72 MG/ML
9 INJECTION INTRAVENOUS ONCE
Status: COMPLETED | OUTPATIENT
Start: 2022-04-28 | End: 2022-04-28

## 2022-04-28 RX ADMIN — GADOBUTROL 9 ML: 604.72 INJECTION INTRAVENOUS at 08:17

## 2022-04-28 NOTE — TELEPHONE ENCOUNTER
As per Gabriele Ward MD: Let's please set up a phone encounter to review.  OK for them to be seen in person also if they prefer.  Thanks

## 2022-05-01 ENCOUNTER — HOSPITAL ENCOUNTER (EMERGENCY)
Facility: CLINIC | Age: 72
Discharge: HOME | End: 2022-05-01
Attending: NURSE PRACTITIONER | Admitting: NURSE PRACTITIONER
Payer: MEDICARE

## 2022-05-01 VITALS
HEIGHT: 70 IN | TEMPERATURE: 98.3 F | OXYGEN SATURATION: 97 % | RESPIRATION RATE: 16 BRPM | WEIGHT: 190 LBS | HEART RATE: 85 BPM | BODY MASS INDEX: 27.2 KG/M2

## 2022-05-01 DIAGNOSIS — L30.3 INFECTIOUS ECZEMATOID DERMATITIS: ICD-10-CM

## 2022-05-01 PROCEDURE — G0463 HOSPITAL OUTPT CLINIC VISIT: HCPCS | Performed by: NURSE PRACTITIONER

## 2022-05-01 PROCEDURE — 99213 OFFICE O/P EST LOW 20 MIN: CPT | Performed by: NURSE PRACTITIONER

## 2022-05-01 NOTE — ED PROVIDER NOTES
History     Chief Complaint   Patient presents with     Arm Injury     Right arm skin infection     HPI  Jose M Rea is a 72 year old male who presents with rash of right forearm.  Pt states onset of rash 1-2 weeks ago.  Pt describes the rash is pruritic.    Pt denies injury, fever, aches, chills.  Pt recalls he woke up with this.  Pt states he has been shea butter for treatment without improvement.  He has cleaning with hydrogen peroxide once or twice daily without improvement.    Allergies:  Allergies   Allergen Reactions     Clindamycin      Bactroban [Mupirocin]      Cephalexin Rash     Doxycycline Rash     Metal [Staples] Rash and Blisters       Problem List:    Patient Active Problem List    Diagnosis Date Noted     Ear fullness, left 03/25/2022     Priority: Medium     Screening for diabetes mellitus 03/25/2022     Priority: Medium     Coagulation defect (H) 04/23/2020     Priority: Medium     H/O deep venous thrombosis 03/12/2020     Priority: Medium     Erectile dysfunction, unspecified erectile dysfunction type 02/18/2020     Priority: Medium     Hyperlipidemia LDL goal <100 09/24/2018     Priority: Medium     Eczema 09/24/2018     Priority: Medium     Chronic anticoagulation 09/24/2018     Priority: Medium     Long term current use of anticoagulant therapy 09/24/2018     Priority: Medium        Past Medical History:    Past Medical History:   Diagnosis Date     ED (erectile dysfunction)      Hyperlipidemia        Past Surgical History:    Past Surgical History:   Procedure Laterality Date     APPENDECTOMY       COLONOSCOPY N/A 4/30/2021    Procedure: COLONOSCOPY, WITH POLYPECTOMY AND BIOPSY;  Surgeon: Mark Shoemaker DO;  Location: [facility]     ORTHOPEDIC SURGERY       PHACOEMULSIFICATION WITH STANDARD INTRAOCULAR LENS IMPLANT Left 4/3/2019    Procedure: Cataract Removal with Implant;  Surgeon: Cristofer Price MD;  Location: [facility]     PHACOEMULSIFICATION WITH STANDARD INTRAOCULAR LENS  "IMPLANT Right 4/24/2019    Procedure: Cataract Removal with Implant;  Surgeon: Cristofer Price MD;  Location: [facility]     SURGICAL HISTORY OF -       appendectomy in 1956     SURGICAL HISTORY OF -       right shoulder surgery in 2016       Family History:    Family History   Problem Relation Age of Onset     Coronary Artery Disease Paternal Grandfather         MI in late 50s or early 60s     Sleep Apnea Son      Thrombosis Son         PEs in his mid 30s     Prostate Cancer No family hx of      Colon Cancer No family hx of        Social History:  Marital Status:   [2]  Social History     Tobacco Use     Smoking status: Current Every Day Smoker     Packs/day: 0.50     Years: 50.00     Pack years: 25.00     Types: Cigarettes     Smokeless tobacco: Never Used     Tobacco comment: started at age 18, 1/2 pack daily or less   Vaping Use     Vaping Use: Never used   Substance Use Topics     Alcohol use: Not Currently     Comment: occasional beer     Drug use: No        Medications:    amoxicillin-clavulanate (AUGMENTIN) 875-125 MG tablet  Emollient (CERAVE) CREA  betamethasone dipropionate (DIPROSONE) 0.05 % external cream  cetirizine (ZYRTEC ALLERGY) 10 MG tablet  clotrimazole (LOTRIMIN) 1 % cream  fluticasone (FLONASE) 50 MCG/ACT nasal spray  Multiple Vitamins-Minerals (MENS ONE DAILY PO)  pseudoePHEDrine (SUDAFED) 30 MG tablet  sildenafil (VIAGRA) 100 MG tablet  simvastatin (ZOCOR) 20 MG tablet  triamterene-HCTZ (MAXZIDE-25) 37.5-25 MG tablet  warfarin ANTICOAGULANT (COUMADIN) 5 MG tablet        Review of Systems  As mentioned above in the history present illness. All other systems were reviewed and are negative.    Physical Exam   Pulse: 85  Temp: 98.3  F (36.8  C)  Resp: 16  Height: 177.8 cm (5' 10\")  Weight: 86.2 kg (190 lb)  SpO2: 97 %      Physical Exam  Vitals and nursing note reviewed.   Constitutional:       General: He is not in acute distress.     Appearance: He is well-developed. He is not " toxic-appearing or diaphoretic.   HENT:      Head: Normocephalic and atraumatic.      Right Ear: External ear normal.      Left Ear: External ear normal.      Nose: Nose normal.   Eyes:      General:         Right eye: No discharge.         Left eye: No discharge.      Conjunctiva/sclera: Conjunctivae normal.   Cardiovascular:      Rate and Rhythm: Normal rate and regular rhythm.      Heart sounds: Normal heart sounds. No murmur heard.    No friction rub. No gallop.   Pulmonary:      Effort: Pulmonary effort is normal.      Breath sounds: Normal breath sounds. No stridor. No wheezing.   Skin:     General: Skin is warm.      Findings: Rash (on right forearm - papular, excoriated lesions on erythematous base - 4 cm by 3 cm oval patch on mid-forearm- see photo) present.   Neurological:      Mental Status: He is alert and oriented to person, place, and time.             ED Course                 Procedures         No results found for this or any previous visit (from the past 24 hour(s)).    Medications - No data to display    Assessments & Plan (with Medical Decision Making)     I have reviewed the nursing notes.    I have reviewed the findings, diagnosis, plan and need for follow up with the patient.  72-year-old male presenting to urgent care with concerns of a pruritic rash with onset of symptoms 2 weeks ago.  Patient states he recalls waking up with this.  Patient denies preceding illness or URI symptoms.  Patient reporting having received the shingles vaccine twice.  Patient states he is not having any deep aching pain.  Patient states he has tried hydrogen peroxide and [employer] lotion and Shea butter without improvement in symptoms.  Patient does admit to history of eczema and does also admit that he has betamethasone cream but has not tried this for treatment or therapy.  Patient reports otherwise feeling well and denies fever, aches, chills, sweats.  On exam patient is noted to have a temp of 98.3, heart rate  of 85, respiratory rate is 16, O2 saturation 97%.  Patient's right forearm has a papular excoriated patch with underlying erythema concerning for infective eczema dermatitis with differential of eczema without infection.  Will initiate Augmentin as patient states he has tolerated this before we will administer twice daily for 5 days.  Discussed care of eczema including washing with Cetaphil, purpose, basis followed by cream such as CeraVe, Vanicream, Eucerin.  Discussed during acute phase currently recommend the betamethasone cream twice daily until rash is completely resolved.  Recommend follow-up with dermatology if no improvement in symptoms.  Discussed worrisome reasons to return to the emergency room including fever, aches, chills.  Patient verbalized understanding and discharged in stable condition.    New Prescriptions    AMOXICILLIN-CLAVULANATE (AUGMENTIN) 875-125 MG TABLET    Take 1 tablet by mouth 2 times daily for 5 days    EMOLLIENT (CERAVE) CREA    Externally apply topically 2 times daily as needed (dry skiin)       Final diagnoses:   Infectious eczematoid dermatitis       5/1/2022   [facility] EMERGENCY DEPT     Crystal Seaman, NICHELLE CNP  05/01/22 0198

## 2022-05-01 NOTE — ED TRIAGE NOTES
Right arm skin infection     Triage Assessment     Row Name 05/01/22 1821       Triage Assessment (Adult)    Airway WDL WDL       Respiratory WDL    Respiratory WDL WDL       Skin Circulation/Temperature WDL    Skin Circulation/Temperature WDL WDL       Cardiac WDL    Cardiac WDL WDL       Peripheral/Neurovascular WDL    Peripheral Neurovascular WDL WDL       Cognitive/Neuro/Behavioral WDL    Cognitive/Neuro/Behavioral WDL WDL

## 2022-05-01 NOTE — ED NOTES
"Noticed rash on R forearm about a week ago. Has seemed to be getting worse. Has been cleaning with hydrogen peroxide and putting shea butter on it. Has had a similar rash in the past but was only \"seen\" virtually for tx.  "

## 2022-05-01 NOTE — ED NOTES
Similar skin infectiontarted in 2020 - started neosporin, didn't resolve. Went to clinic, tried several different therapies and found something that worked.

## 2022-05-02 ENCOUNTER — VIRTUAL VISIT (OUTPATIENT)
Dept: NEUROSURGERY | Facility: CLINIC | Age: 72
End: 2022-05-02
Payer: MEDICARE

## 2022-05-02 ENCOUNTER — LAB (OUTPATIENT)
Dept: FAMILY MEDICINE | Facility: CLINIC | Age: 72
End: 2022-05-02
Payer: MEDICARE

## 2022-05-02 ENCOUNTER — TELEPHONE (OUTPATIENT)
Dept: FAMILY MEDICINE | Facility: CLINIC | Age: 72
End: 2022-05-02

## 2022-05-02 VITALS — WEIGHT: 190 LBS | HEIGHT: 70 IN | BODY MASS INDEX: 27.2 KG/M2

## 2022-05-02 DIAGNOSIS — G96.198 MENINGEAL DISORDER: Primary | ICD-10-CM

## 2022-05-02 DIAGNOSIS — Z20.822 CLOSE EXPOSURE TO 2019 NOVEL CORONAVIRUS: ICD-10-CM

## 2022-05-02 LAB — SARS-COV-2 RNA RESP QL NAA+PROBE: NEGATIVE

## 2022-05-02 PROCEDURE — U0005 INFEC AGEN DETEC AMPLI PROBE: HCPCS

## 2022-05-02 PROCEDURE — 99442 PR PHYSICIAN TELEPHONE EVALUATION 11-20 MIN: CPT | Mod: 95 | Performed by: NEUROLOGICAL SURGERY

## 2022-05-02 PROCEDURE — U0003 INFECTIOUS AGENT DETECTION BY NUCLEIC ACID (DNA OR RNA); SEVERE ACUTE RESPIRATORY SYNDROME CORONAVIRUS 2 (SARS-COV-2) (CORONAVIRUS DISEASE [COVID-19]), AMPLIFIED PROBE TECHNIQUE, MAKING USE OF HIGH THROUGHPUT TECHNOLOGIES AS DESCRIBED BY CMS-2020-01-R: HCPCS

## 2022-05-02 PROCEDURE — 99207 PR NO CHARGE LOS: CPT

## 2022-05-02 NOTE — PATIENT INSTRUCTIONS
Patient Next Steps:    A referral has been placed for you to see Dr. Lynnette Henao with [facility]. If you have not heard from the scheduling office within 2 business days, please call [phone].    Please call us if you have any further questions or concerns.    [facility]   Phone: [phone]  Fax: [phone]

## 2022-05-02 NOTE — DISCHARGE INSTRUCTIONS
I recommend washing with purpose, basis, or Lubriderm.  I recommend daily cream such as CeraVe, Eucerin, Vanicream.  You should apply the cream once or twice daily.  For the rash I recommend applying your high potency steroid-betamethasone cream.  You should apply this twice daily for the next week.  As it improves you may decrease to once daily.  Continue doing it daily until the rash is completely resolved.  Follow-up with dermatology if no improvement in symptoms.  Return to the emergency room if you should develop acute worsening symptoms.  Also, start Augmentin and take 1 tablet by mouth twice daily.  This medication may cause your INR levels to be altered.  Please call the coagulation clinic tomorrow and advise them that you are starting this medication.  This medication was sent to the [facility] pharmacy in [place].  You may pick this up tomorrow morning.

## 2022-05-02 NOTE — PROGRESS NOTES
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  Has had several months of fullness in the left ear.  He saw Dr. Wood and MR Brain was obtained.  He denies headaches or positional symptoms.  No weakness or numbness.  Occasional left eye visual sensations that Ophthalmology felt were ocular migraines.  MR Brain, personally reviewed, with diffuse, smooth meningeal enhancement and thickening with broad differential.     Telephone encounter for follow-up.  MRI spinal axis was obtained.  It showed meningeal enhancement in the cervical spine, but no significant enhancement in the thoracic or lumbar spine.  There was no clear evidence of CSF leak.    Past Medical History:   Diagnosis Date     ED (erectile dysfunction)      Hyperlipidemia      Past Surgical History:   Procedure Laterality Date     APPENDECTOMY       COLONOSCOPY N/A 4/30/2021    Procedure: COLONOSCOPY, WITH POLYPECTOMY AND BIOPSY;  Surgeon: Mark Shoemaker DO;  Location: [facility]     ORTHOPEDIC SURGERY       PHACOEMULSIFICATION WITH STANDARD INTRAOCULAR LENS IMPLANT Left 4/3/2019    Procedure: Cataract Removal with Implant;  Surgeon: Cristofer Price MD;  Location: [facility]     PHACOEMULSIFICATION WITH STANDARD INTRAOCULAR LENS IMPLANT Right 4/24/2019    Procedure: Cataract Removal with Implant;  Surgeon: Cristofer Price MD;  Location: [facility]     SURGICAL HISTORY OF -       appendectomy in 1956     SURGICAL HISTORY OF -       right shoulder surgery in 2016     Social History     Socioeconomic History     Marital status:      Spouse name: Not on file     Number of children: Not on file     Years of education: Not on file     Highest education level: Not on file   Occupational History     Not on file   Tobacco Use     Smoking status: Current Every Day Smoker     Packs/day: 0.50     Years: 50.00     Pack years: 25.00     Types: Cigarettes     Smokeless tobacco: Never Used     Tobacco comment: started at age 18, 1/2 pack daily or  "less   Vaping Use     Vaping Use: Never used   Substance and Sexual Activity     Alcohol use: Not Currently     Comment: occasional beer     Drug use: No     Sexual activity: Yes     Partners: Female   Other Topics Concern     Parent/sibling w/ CABG, MI or angioplasty before 65F 55M? Not Asked   Social History Narrative     Not on file     Social Determinants of Health     Financial Resource Strain: Not on file   Food Insecurity: Not on file   Transportation Needs: Not on file   Physical Activity: Not on file   Stress: Not on file   Social Connections: Not on file   Intimate Partner Violence: Not on file   Housing Stability: Not on file     Family History   Problem Relation Age of Onset     Coronary Artery Disease Paternal Grandfather         MI in late 50s or early 60s     Sleep Apnea Son      Thrombosis Son         PEs in his mid 30s     Prostate Cancer No family hx of      Colon Cancer No family hx of         ROS: 10 point ROS neg other than the symptoms noted above in the HPI.    Physical Exam  Ht 1.778 m (5' 10\")   Wt 86.2 kg (190 lb)   BMI 27.26 kg/m    HEENT:  Normocephalic, atraumatic.  PERRLA.  EOM s intact.  Visual fields full to gross exam  Neck:  Supple, non-tender, without lymphadenopathy.  Heart:  No peripheral edema  Lungs:  No SOB  Abdomen:  Non-distended.   Skin:  Warm and dry.  Extremities:  No edema, cyanosis or clubbing.  Psychiatric:  No apparent distress  Musculoskeletal:  Normal bulk and tone    NEUROLOGICAL EXAMINATION:     Mental status:  Alert and Oriented x 3, speech is fluent.  Cranial nerves:  II-XII intact.   Motor:    Shoulder Abduction:  Right:  5/5   Left:  5/5  Biceps:                      Right:  5/5   Left:  5/5  Triceps:                     Right:  5/5   Left:  5/5  Wrist Extensors:       Right:  5/5   Left:  5/5  Wrist Flexors:           Right:  5/5   Left:  5/5  interosseus :            Right:  5/5   Left:  5/5  Hip Flexion:                Right: 5/5  Left:  5/5  Quadriceps:  "            Right:  5/5  Left:  5/5  Hamstrings:             Right:  5/5  Left:  5/5  Gastroc Soleus:        Right:  5/5  Left:  5/5  Tib/Ant:                      Right:  5/5  Left:  5/5  EHL:                     Right:  5/5  Left:  5/5  Sensation:  Intact  Reflexes:  Negative Babinski.  Negative Clonus.  Negative Bales's.  Coordination:  Smooth finger to nose testing.   Negative pronator drift.  Smooth tandem walking.    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 Henao for further work-up, possibly including lumbar puncture for cytology if she feels its indicated    15 minutes of telephone discussion

## 2022-05-02 NOTE — LETTER
5/2/2022         RE: Jose M Rea  [address] [address] [facility] [address] [facility] [address]        Dear Colleague,    Thank you for referring your patient, Jose M Rea, to the [facility]. Please see a copy of my visit note below.    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  Has had several months of fullness in the left ear.  He saw Dr. Wood and MR Brain was obtained.  He denies headaches or positional symptoms.  No weakness or numbness.  Occasional left eye visual sensations that Ophthalmology felt were ocular migraines.  MR Brain, personally reviewed, with diffuse, smooth meningeal enhancement and thickening with broad differential.     Telephone encounter for follow-up.  MRI spinal axis was obtained.  It showed meningeal enhancement in the cervical spine, but no significant enhancement in the thoracic or lumbar spine.  There was no clear evidence of CSF leak.    Past Medical History:   Diagnosis Date     ED (erectile dysfunction)      Hyperlipidemia      Past Surgical History:   Procedure Laterality Date     APPENDECTOMY       COLONOSCOPY N/A 4/30/2021    Procedure: COLONOSCOPY, WITH POLYPECTOMY AND BIOPSY;  Surgeon: Mark Shoemaker DO;  Location: [facility]     ORTHOPEDIC SURGERY       PHACOEMULSIFICATION WITH STANDARD INTRAOCULAR LENS IMPLANT Left 4/3/2019    Procedure: Cataract Removal with Implant;  Surgeon: Cristofer Price MD;  Location: [facility]     PHACOEMULSIFICATION WITH STANDARD INTRAOCULAR LENS IMPLANT Right 4/24/2019    Procedure: Cataract Removal with Implant;  Surgeon: Cristofer Price MD;  Location: [facility]     SURGICAL HISTORY OF -       appendectomy in 1956     SURGICAL HISTORY OF -       right shoulder surgery in 2016     Social History     Socioeconomic History     Marital status:      Spouse name: Not on file     Number of children: Not on file     Years of education: Not on file     Highest education level: Not on file  "  Occupational History     Not on file   Tobacco Use     Smoking status: Current Every Day Smoker     Packs/day: 0.50     Years: 50.00     Pack years: 25.00     Types: Cigarettes     Smokeless tobacco: Never Used     Tobacco comment: started at age 18, 1/2 pack daily or less   Vaping Use     Vaping Use: Never used   Substance and Sexual Activity     Alcohol use: Not Currently     Comment: occasional beer     Drug use: No     Sexual activity: Yes     Partners: Female   Other Topics Concern     Parent/sibling w/ CABG, MI or angioplasty before 65F 55M? Not Asked   Social History Narrative     Not on file     Social Determinants of Health     Financial Resource Strain: Not on file   Food Insecurity: Not on file   Transportation Needs: Not on file   Physical Activity: Not on file   Stress: Not on file   Social Connections: Not on file   Intimate Partner Violence: Not on file   Housing Stability: Not on file     Family History   Problem Relation Age of Onset     Coronary Artery Disease Paternal Grandfather         MI in late 50s or early 60s     Sleep Apnea Son      Thrombosis Son         PEs in his mid 30s     Prostate Cancer No family hx of      Colon Cancer No family hx of         ROS: 10 point ROS neg other than the symptoms noted above in the HPI.    Physical Exam  Ht 1.778 m (5' 10\")   Wt 86.2 kg (190 lb)   BMI 27.26 kg/m    HEENT:  Normocephalic, atraumatic.  PERRLA.  EOM s intact.  Visual fields full to gross exam  Neck:  Supple, non-tender, without lymphadenopathy.  Heart:  No peripheral edema  Lungs:  No SOB  Abdomen:  Non-distended.   Skin:  Warm and dry.  Extremities:  No edema, cyanosis or clubbing.  Psychiatric:  No apparent distress  Musculoskeletal:  Normal bulk and tone    NEUROLOGICAL EXAMINATION:     Mental status:  Alert and Oriented x 3, speech is fluent.  Cranial nerves:  II-XII intact.   Motor:    Shoulder Abduction:  Right:  5/5   Left:  5/5  Biceps:                      Right:  5/5   Left:  " 5/5  Triceps:                     Right:  5/5   Left:  5/5  Wrist Extensors:       Right:  5/5   Left:  5/5  Wrist Flexors:           Right:  5/5   Left:  5/5  interosseus :            Right:  5/5   Left:  5/5  Hip Flexion:                Right: 5/5  Left:  5/5  Quadriceps:             Right:  5/5  Left:  5/5  Hamstrings:             Right:  5/5  Left:  5/5  Gastroc Soleus:        Right:  5/5  Left:  5/5  Tib/Ant:                      Right:  5/5  Left:  5/5  EHL:                     Right:  5/5  Left:  5/5  Sensation:  Intact  Reflexes:  Negative Babinski.  Negative Clonus.  Negative Bales's.  Coordination:  Smooth finger to nose testing.   Negative pronator drift.  Smooth tandem walking.    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 Henao for further work-up, possibly including lumbar puncture for cytology if she feels its indicated    15 minutes of telephone discussion      Again, thank you for allowing me to participate in the care of your patient.        Sincerely,        Gabriele Ward MD

## 2022-05-02 NOTE — TELEPHONE ENCOUNTER
Spoke to Jose M, He was prescribed Augmentin x 5 days for a rash and will be picking up the RX and starting it tonight.     Education provided on potential interaction with warfarin.    INR scheduled later this week, instructed to continue warfarin at his usual dose until that time.    Patient verbalized understanding and had no other concerns or questions

## 2022-05-02 NOTE — TELEPHONE ENCOUNTER
Reason for Call:  Other - Anticoagulation    Detailed comments: Patient stated he is supposed to start a prescription for amoxicillin and augmentin today and he would like to know if he needs to change his warfarin dosing. Please call back    Phone Number Patient can be reached at: Cell number on file:    Telephone Information:   Mobile [phone]       Best Time: Anytime    Can we leave a detailed message on this number? YES    Call taken on 5/2/2022 at 8:41 AM by Jessica Goodson

## 2022-05-02 NOTE — NURSING NOTE
"Jose M Rea is a 72 year old male who presents for:  Chief Complaint   Patient presents with     Follow Up     MRI review        Initial Vitals:  Ht 5' 10\" (1.778 m)   Wt 190 lb (86.2 kg)   BMI 27.26 kg/m   Estimated body mass index is 27.26 kg/m  as calculated from the following:    Height as of this encounter: 5' 10\" (1.778 m).    Weight as of this encounter: 190 lb (86.2 kg).. Body surface area is 2.06 meters squared. BP completed using cuff size: NA (Not Taken)    Nursing Comments:     Meli Russell    "

## 2022-05-03 ENCOUNTER — TELEPHONE (OUTPATIENT)
Dept: NEUROSURGERY | Facility: CLINIC | Age: 72
End: 2022-05-03
Payer: MEDICARE

## 2022-05-03 ENCOUNTER — PATIENT OUTREACH (OUTPATIENT)
Dept: ONCOLOGY | Facility: CLINIC | Age: 72
End: 2022-05-03
Payer: MEDICARE

## 2022-05-03 ENCOUNTER — TELEPHONE (OUTPATIENT)
Dept: FAMILY MEDICINE | Facility: CLINIC | Age: 72
End: 2022-05-03
Payer: MEDICARE

## 2022-05-03 DIAGNOSIS — G96.198 MENINGEAL DISORDER: Primary | ICD-10-CM

## 2022-05-03 NOTE — TELEPHONE ENCOUNTER
Patient had phone consult this past week. Would like clarification if it is URGENT that he see Neurogist, He has been scheduled in Sept.   Thank you KS

## 2022-05-03 NOTE — TELEPHONE ENCOUNTER
9:59 AM    Patient originally had an INR scheduled for next week. Patient would prefer to come in next week vs. this Friday. The INR was changed to this Friday due to patient starting on Augmentin. Patient is aware of why the INR date was adjusted. Patient prefers to come in next week. INR changed to next Monday.    Kb Braun RN, BSN, PHN  Anticoagulation Clinic   [place] # [id number]  [phone]

## 2022-05-03 NOTE — TELEPHONE ENCOUNTER
Per Dr. Ward, Let's change referral to general neurology -    Referral updated.     Patient called and given number to schedule. Patient verbalized understanding

## 2022-05-03 NOTE — PROGRESS NOTES
I rec'd a referral for pt to be seen by Dr. Lynnette Henao.  I reached out to Dr. Henao to see if she would like any work-up done prior to her seeing the pt.  Her message to Dr. Ward:    Bo Suazo,     Can this pt go to general neurology for a work-up given that the differential is very broad; sarcoid, inflammation NOS, etc?   If you think the main concern is leptomeningeal carcinomatosis, then I can see the pt and work this up.     Lynnette     His response:  I don't have any specific concern for leptomeningeal carcinomatosis, so general neurology is fine, thanks        I have faxed the referral to Neurology at:  Fax:  [phone]

## 2022-05-03 NOTE — TELEPHONE ENCOUNTER
Reason for Call:  Other call back    Detailed comments: Would like to speak to an INR nurse about an antibiotic he is taking and if the INR appt on Friday is needed. Please call the patient back.     Phone Number Patient can be reached at: Cell number on file:    Telephone Information:   Mobile [phone]       Best Time: anytime     Can we leave a detailed message on this number? YES    Call taken on 5/3/2022 at 8:49 AM by Loulou Krihsna

## 2022-05-04 NOTE — TELEPHONE ENCOUNTER
Patient called in to see if his neurology referral is urgent, due to no openings until September.     Patient was referred to neurology by Dr. Ward.     Placed call to N to see how far out they are booking: Early July    Placed call to patient who is agreeable to N referral     Changed referral to external to fax over information     Faxed referral, C/T/L MRIs, ADT May 4, 2022 to fax number [phone]    Right Fax confirmed at 1002    Mary Aguirre RN

## 2022-05-09 ENCOUNTER — ANTICOAGULATION THERAPY VISIT (OUTPATIENT)
Dept: ANTICOAGULATION | Facility: CLINIC | Age: 72
End: 2022-05-09

## 2022-05-09 ENCOUNTER — ALLIED HEALTH/NURSE VISIT (OUTPATIENT)
Dept: FAMILY MEDICINE | Facility: CLINIC | Age: 72
End: 2022-05-09
Payer: MEDICARE

## 2022-05-09 ENCOUNTER — LAB (OUTPATIENT)
Dept: LAB | Facility: CLINIC | Age: 72
End: 2022-05-09

## 2022-05-09 VITALS — SYSTOLIC BLOOD PRESSURE: 128 MMHG | DIASTOLIC BLOOD PRESSURE: 64 MMHG

## 2022-05-09 DIAGNOSIS — Z79.01 CHRONIC ANTICOAGULATION: ICD-10-CM

## 2022-05-09 DIAGNOSIS — Z86.718 H/O DEEP VENOUS THROMBOSIS: ICD-10-CM

## 2022-05-09 DIAGNOSIS — D68.9 COAGULATION DEFECT (H): ICD-10-CM

## 2022-05-09 DIAGNOSIS — Z79.01 LONG TERM CURRENT USE OF ANTICOAGULANT THERAPY: ICD-10-CM

## 2022-05-09 DIAGNOSIS — D68.9 COAGULATION DEFECT (H): Primary | ICD-10-CM

## 2022-05-09 DIAGNOSIS — Z01.30 BP CHECK: Primary | ICD-10-CM

## 2022-05-09 LAB — INR BLD: 1.8 (ref 0.9–1.1)

## 2022-05-09 PROCEDURE — 36416 COLLJ CAPILLARY BLOOD SPEC: CPT

## 2022-05-09 PROCEDURE — 99207 PR NO CHARGE NURSE ONLY: CPT | Performed by: FAMILY MEDICINE

## 2022-05-09 PROCEDURE — 85610 PROTHROMBIN TIME: CPT

## 2022-05-09 NOTE — PROGRESS NOTES
Jose M Rea was evaluated at [facility] on May 9, 2022 at which time his blood pressure was:    BP Readings from Last 3 Encounters:   05/09/22 128/64   04/21/22 (!) 143/76   03/28/22 137/81     Pulse Readings from Last 3 Encounters:   05/01/22 85   04/21/22 75   03/28/22 78       Reviewed lifestyle modifications for blood pressure control and reduction: including making healthy food choices, managing weight, getting regular exercise, smoking cessation, reducing alcohol consumption, monitoring blood pressure regularly.     Symptoms: None    BP Goal:< 140/90 mmHg    BP Assessment:  BP at goal    Potential Reasons for BP too high: NA - Not applicable    BP Follow-Up Plan: Recheck BP in 6 months at pharmacy    Recommendation to Provider: Patient's blood pressure was well within goal after sitting for 5 minutes. He endorsed a lot of stress recently with trying to sell his house. Recommend no changes to medications.    Note completed by:  Sandra Gutiérrez, PharmD  Staff Pharmacist  [facility]  [phone]

## 2022-05-12 ENCOUNTER — TELEPHONE (OUTPATIENT)
Dept: DERMATOLOGY | Facility: CLINIC | Age: 72
End: 2022-05-12
Payer: MEDICARE

## 2022-05-12 NOTE — TELEPHONE ENCOUNTER
[facility] Call Center    Phone Message    May a detailed message be left on voicemail: yes     Reason for Call: Other:   Pt would like to discuss a few things and may need to schedule a telephone Appt.   Pt would like to discuss what soaps, creams and other questions to help with condition? Please call Pt to discuss. Thanks     Action Taken: Message routed to:  [facility] Derm    Travel Screening: Not Applicable

## 2022-05-13 ENCOUNTER — TELEPHONE (OUTPATIENT)
Dept: NEUROSURGERY | Facility: OTHER | Age: 72
End: 2022-05-13

## 2022-05-13 ENCOUNTER — LAB (OUTPATIENT)
Dept: FAMILY MEDICINE | Facility: CLINIC | Age: 72
End: 2022-05-13
Payer: MEDICARE

## 2022-05-13 DIAGNOSIS — Z20.822 SUSPECTED COVID-19 VIRUS INFECTION: ICD-10-CM

## 2022-05-13 LAB — SARS-COV-2 RNA RESP QL NAA+PROBE: POSITIVE

## 2022-05-13 PROCEDURE — U0005 INFEC AGEN DETEC AMPLI PROBE: HCPCS

## 2022-05-13 PROCEDURE — U0003 INFECTIOUS AGENT DETECTION BY NUCLEIC ACID (DNA OR RNA); SEVERE ACUTE RESPIRATORY SYNDROME CORONAVIRUS 2 (SARS-COV-2) (CORONAVIRUS DISEASE [COVID-19]), AMPLIFIED PROBE TECHNIQUE, MAKING USE OF HIGH THROUGHPUT TECHNOLOGIES AS DESCRIBED BY CMS-2020-01-R: HCPCS

## 2022-05-13 NOTE — TELEPHONE ENCOUNTER
"Spoke to patient and he reported he had dermatitis previously that has been taken care of. He now thinks he is having eczema breakouts. He is using dove soap and has been using gold bond \"generic\"  eczema cream. He stated he gets some pimple like bumps and he uses the betamethasone which takes care of the bumps. He is taking the zyrtec occasionally and reported that he is having some itching. Discussed emollients, soaps, and anti itch creams with patient and to take the zyrtec if needed. Reviewed with pt to use thick creams after his shower to get the best benefit and to apply sunscreen daily. Pt asked if he could apply the sunscreen when using the betamethasone. Advised to let the betamethasone absorb and then apply the sunscreen. Pt verbalized understanding. He will call if he continues to have issues with the eczema.  Martina PACHECO RN BSN PHN  Specialty Clinics    "

## 2022-05-14 ENCOUNTER — TELEPHONE (OUTPATIENT)
Dept: NURSING | Facility: CLINIC | Age: 72
End: 2022-05-14
Payer: MEDICARE

## 2022-05-14 NOTE — TELEPHONE ENCOUNTER
Coronavirus (COVID-19) Notification    Caller Name (Patient, parent, daughter/son, grandparent, etc)  Patient    Reason for call  Notify of Positive Coronavirus (COVID-19) lab results, assess symptoms,  review [facility] recommendations    Lab Result    Lab test:  2019-nCoV rRt-PCR or SARS-CoV-2 PCR    Oropharyngeal AND/OR nasopharyngeal swabs is POSITIVE for 2019-nCoV RNA/SARS-COV-2 PCR (COVID-19 virus)        Gather patient reported symptoms   Assessment   Current Symptoms at time of phone call, reported by patient: (if no symptoms, document: No symptoms] Congested and run down   I had a real bad cold about a week ago and wife was positive on 5/2/2022   Date of symptom(s) onset (if applicable) Last week sometime      If at time of call, Patients symptoms have worsened, the Patient should contact 911 or have someone drive them to Emergency Dept promptly:      If Patient calling 911, inform 911 personal that you have tested positive for the Coronavirus (COVID-19).  Place mask on and await 911 to arrive.    If Emergency Dept, If possible, please have another adult drive you to the Emergency Dept but you need to wear mask when in contact with other people.      Treatment Options:   Patient classified as COVID treatment eligible by Epic high risk algorithm: Yes  Is the patient symptomatic at the time of result notification? No    Review information with Patient    Your result was positive. This means you have COVID-19 (coronavirus).    How can I protect others?    These guidelines are for isolating before returning to work, school or .    If you DO have symptoms    Stay home and away from others     For at least 5 days after your symptoms started, AND    You are fever free for 24 hours (with no medicine that reduces fever), AND    Your other symptoms are better    Wear a mask for 10 full days anytime you are around others    If you DON'T have symptoms    Stay home and away from others for at least 5 days  after your positive test    Wear a mask for 10 full days anytime you are around others    There may be different guidelines for healthcare facilities.  Please check with the specific sites before arriving.    If you have been told by a doctor that you were severely ill with COVID-19 or are immunocompromised, you should isolate for at least 10 days.    You should not go back to work until you meet the guidelines above for ending your home isolation. You don't need to be retested for COVID-19 before going back to work--studies show that you won't spread the virus if it's been at least 10 days since your symptoms started (or 20 days, if you have a weak immune system).    Employers, schools, and daycares: This is an official notice for this person's medical guidelines for returning in-person.  They must meet the above guidelines before going back to work, school or  in person.    You will receive a positive COVID-19 letter via [employer] or the mail soon with additional self-care information (exception, no letters will be sent to presurgical/preprocedure patients).    Would you like me to review some of that information with you now?  Yes    How can I take care of myself?      Get lots of rest. Drink extra fluids (unless a doctor has told you not to).      Take Tylenol (acetaminophen) for fever or pain. If you have liver or kidney problems, ask your family doctor if it's okay to take Tylenol.     Take either:     650 mg (two 325 mg pills) every 4 to 6 hours, or     1,000 mg (two 500 mg pills) every 8 hours as needed.     Note: Do not take more than 3,000 mg in one day. Acetaminophen is found in many medicines (both prescribed and over-the-counter medicines). Read all labels to be sure you don't take too much.    For children, check the Tylenol bottle for the right dose (based on their age or weight).      If you have other health problems (like cancer, heart failure, an organ transplant or severe kidney disease):  Call your specialty clinic if you don't feel better in the next 2 days.      Know when to call 911: Emergency warning signs include:    Trouble breathing or shortness of breath    Pain or pressure in the chest that doesn't go away    Feeling confused like you haven't felt before, or not being able to wake up    Bluish-colored lips or face        If you were tested for an upcoming procedure, please contact your provider for next steps.    Marifer Armstrong LPN

## 2022-05-16 ENCOUNTER — TELEPHONE (OUTPATIENT)
Dept: NEUROSURGERY | Facility: CLINIC | Age: 72
End: 2022-05-16
Payer: MEDICARE

## 2022-05-16 NOTE — TELEPHONE ENCOUNTER
Placed call back to patient who is asking about what kind of physician he is to see -     Patient is to see a general neurologist. Patient verbalizes understanding.

## 2022-05-16 NOTE — TELEPHONE ENCOUNTER
Patient was previously speaking with Mary. He has a question on what kind of physician he needs to see. Please return the call to Jose M [phone]

## 2022-05-17 ENCOUNTER — DOCUMENTATION ONLY (OUTPATIENT)
Dept: NEUROSURGERY | Facility: CLINIC | Age: 72
End: 2022-05-17
Payer: MEDICARE

## 2022-05-17 ENCOUNTER — VIRTUAL VISIT (OUTPATIENT)
Dept: FAMILY MEDICINE | Facility: CLINIC | Age: 72
End: 2022-05-17
Payer: MEDICARE

## 2022-05-17 DIAGNOSIS — H93.8X2 EAR FULLNESS, LEFT: ICD-10-CM

## 2022-05-17 DIAGNOSIS — U07.1 INFECTION DUE TO 2019 NOVEL CORONAVIRUS: Primary | ICD-10-CM

## 2022-05-17 DIAGNOSIS — R63.4 UNINTENTIONAL WEIGHT LOSS: ICD-10-CM

## 2022-05-17 PROCEDURE — 99442 PR PHYSICIAN TELEPHONE EVALUATION 11-20 MIN: CPT | Mod: 95 | Performed by: NURSE PRACTITIONER

## 2022-05-17 NOTE — PROGRESS NOTES
Per Dr. Ward, reach out to Dr. Sidhu to see if he can see this patient sooner than expected neurology appt date.     Staff message sent to Dr. Sidhu.

## 2022-05-17 NOTE — PROGRESS NOTES
Per Dr. Sidhu, could fit patient into his 5/24 or 5/31 330 return slot but can override and use as a new. If he can be seen sooner off the wait list with me or anyone else that is just fine too    Attempted to reach out to patient cell phone, no answer. Left voice message for patient to call clinic back to further discuss.      Called patient home phone and spoke to him and wife (Consent to communicate with wife, Viri). Updated patient and wife of appt options. They are requesting 5/24/22 1530 appointment.    Patient will cancel his appointment with [facility]. Patient given new appointment time and address to  clinic.

## 2022-05-17 NOTE — PROGRESS NOTES
Jose M is a 72 year old who is being evaluated via a billable telephone visit.      What phone number would you like to be contacted at? [phone] PT IS READY NOW  How would you like to obtain your AVS? Mail a copy    Assessment & Plan     Infection due to 2019 novel coronavirus  Recent illness. Through quarantine at this point. Continue to monitor and follow up if not improving.     Ear fullness, left  Continue to follow up with specialists.     Unintentional weight loss  Likely related to recent illnesses and stress.  Advised increasing caloric intake and eating on a more regular basis follow-up if symptoms are not improving and weight loss continues to occur.                   Return in about 2 weeks (around 5/31/2022) for ongoing symptoms if not improving.    Elvia Luna, NICHELLE CNP  [facility]   Jose M is a 72 year old who presents for the following health issues     HPI       COVID-19 Symptom Review  Pt tested positive for covid 5/13/22 wants to discuss Sx's still has congestion,small cough, fatigue  pt has had problems with his ear x 1 year is wondering continue with ear treatments that was using before covid positive.      How many days ago did these symptoms start? 5/13/22    Are any of the following symptoms significant for you?    New or worsening difficulty breathing? No    Worsening cough? Yes, it's a dry cough.     Fever or chills? No    Headache: no    Sore throat: no    Chest pain: no    Diarrhea: no    Body aches? no    What treatments has patient tried? vicks dayquil, nyquil, tylenol   Does patient live in a nursing home, group home, or shelter? no  Does patient have a way to get food/medications during quarantined? Yes, I have a friend or family member who can help me.          Ongoing plugged sensation of ears. He has seen ENT and Neurology. MRI has been done to evaluate further. He has an appointment on 5/24 with a neurologist.     He also mentions  significant stress with selling the house and having a garage sale. He has had persistent congestion which hasn't changed much over the last several weeks. He tested positive on 5/13 for COVID 19. He had symptoms of covid that started first week of may but did not test positive at that time. He is now feeling well. He does mention weight loss. He reports an additional 10lb weight loss with recent acute illness.     MRI on 4/28/22 of the cervical spine.                                                             IMPRESSION:  1. 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 The findings are again nonspecific, but most commonly  seen in the setting of intracranial hypotension, or reactive dural  thickening in the setting of subdural fluid collection or  postprocedural change. Infectious/inflammatory, granulomatous and  neoplastic etiologies are less likely considerations.  2. Mildly prominent thin T2 hyperintense signal in the extradural  space of the cervical spinal canal is noted at multiple levels, with  associated postcontrast enhancement. This could simply represent  reactive dural thickening/enhancement, but a thin amount of extradural  fluid/fluid collection (which can be seen with CSF leak) cannot be  completely excluded.  3. Multilevel degenerative changes of the cervical spine, as  described.  4. Moderate spinal canal stenosis at C5-C6 and mild to moderate  bordering on moderate spinal canal stenosis at C4-C5. Lesser degrees  of spinal canal narrowing elsewhere.  5. Varying degrees of multilevel neural foraminal stenosis, as  detailed.  6. Small cystic lesions in the region of the right palatine tonsil are  nonspecific, but may represent submucosal cysts. Recommend correlation  with direct inspection.     PARVEEN ERVIN MD       Review of  Systems   Constitutional, HEENT, cardiovascular, pulmonary, gi and gu systems are negative, except as otherwise noted.      Objective           Vitals:  No vitals were obtained today due to virtual visit.    Physical Exam   healthy, alert and no distress  PSYCH: Alert and oriented times 3; coherent speech, normal   rate and volume, able to articulate logical thoughts, able   to abstract reason, no tangential thoughts, no hallucinations   or delusions  His affect is normal  RESP: No cough, no audible wheezing, able to talk in full sentences  Remainder of exam unable to be completed due to telephone visits          Phone call duration: 20 minutes

## 2022-05-23 ENCOUNTER — ANTICOAGULATION THERAPY VISIT (OUTPATIENT)
Dept: ANTICOAGULATION | Facility: CLINIC | Age: 72
End: 2022-05-23

## 2022-05-23 ENCOUNTER — LAB (OUTPATIENT)
Dept: LAB | Facility: CLINIC | Age: 72
End: 2022-05-23
Payer: MEDICARE

## 2022-05-23 DIAGNOSIS — D68.9 COAGULATION DEFECT (H): ICD-10-CM

## 2022-05-23 DIAGNOSIS — Z79.01 LONG TERM CURRENT USE OF ANTICOAGULANT THERAPY: ICD-10-CM

## 2022-05-23 DIAGNOSIS — Z79.01 CHRONIC ANTICOAGULATION: ICD-10-CM

## 2022-05-23 DIAGNOSIS — D68.9 COAGULATION DEFECT (H): Primary | ICD-10-CM

## 2022-05-23 DIAGNOSIS — Z86.718 H/O DEEP VENOUS THROMBOSIS: ICD-10-CM

## 2022-05-23 LAB — INR BLD: 3.8 (ref 0.9–1.1)

## 2022-05-23 PROCEDURE — 36416 COLLJ CAPILLARY BLOOD SPEC: CPT

## 2022-05-23 PROCEDURE — 85610 PROTHROMBIN TIME: CPT

## 2022-05-23 NOTE — PROGRESS NOTES
[facility]/[place]  Section of General Neurology  New Patient Visit      Jose M Rea MRN# [id number]   Age: 72 year old YOB: 1950     Requesting physician: No ref. provider found  Rigoberto Hernandez     Reason for Consultation: Abnormal brain MRI        History of Presenting Symptoms:   Jose M Rea is a 72 year old male who presents today for evaluation of Abnormal brain MRI.  This was obtained in the setting of otological symptoms as below which are improving.      He still has a feeling of his ear being plugged, and has tinnitus, maxzide has helped his symptoms.   He does get headaches.  1-2 tylenol a day.  No orthostatic component.      Has noticed instances of abnormalities:  Bright lights in left eye when riding exercise bike  This happened again and saw an eye doctor--was told this was ocular migraine most likely, which I agree with to description.  These are rare and not bothersome to the patient.       Last 6 weeks have been stressful, just sold his house, planned for a garage sale, weather was crummy.        The tinnitus isn't that bad any more, the ear plugging was the biggest issue which is now completely improved.      He is feeling better re COVID.      Here with his wife Viri.    Lives in [place]  He is retired .          Past Medical History:     Patient Active Problem List   Diagnosis     Hyperlipidemia LDL goal <100     Eczema     Chronic anticoagulation     Long term current use of anticoagulant therapy     Erectile dysfunction, unspecified erectile dysfunction type     H/O deep venous thrombosis     Coagulation defect (H)     Ear fullness, left     Screening for diabetes mellitus     Past Medical History:   Diagnosis Date     ED (erectile dysfunction)      Hyperlipidemia         Past Surgical History:     Past Surgical History:   Procedure Laterality Date     APPENDECTOMY       COLONOSCOPY N/A 4/30/2021    Procedure: COLONOSCOPY, WITH  POLYPECTOMY AND BIOPSY;  Surgeon: Mark Shoemaker DO;  Location: [facility]     ORTHOPEDIC SURGERY       PHACOEMULSIFICATION WITH STANDARD INTRAOCULAR LENS IMPLANT Left 4/3/2019    Procedure: Cataract Removal with Implant;  Surgeon: Cristofer Price MD;  Location: [facility]     PHACOEMULSIFICATION WITH STANDARD INTRAOCULAR LENS IMPLANT Right 4/24/2019    Procedure: Cataract Removal with Implant;  Surgeon: Cristofer Price MD;  Location: [facility]     SURGICAL HISTORY OF -       appendectomy in 1956     SURGICAL HISTORY OF -       right shoulder surgery in 2016        Social History:     Social History     Tobacco Use     Smoking status: Current Every Day Smoker     Packs/day: 0.50     Years: 50.00     Pack years: 25.00     Types: Cigarettes     Smokeless tobacco: Never Used     Tobacco comment: started at age 18, 1/2 pack daily or less   Vaping Use     Vaping Use: Never used   Substance Use Topics     Alcohol use: Not Currently     Comment: occasional beer     Drug use: No        Family History:     Family History   Problem Relation Age of Onset     Coronary Artery Disease Paternal Grandfather         MI in late 50s or early 60s     Sleep Apnea Son      Thrombosis Son         PEs in his mid 30s     Prostate Cancer No family hx of      Colon Cancer No family hx of         Medications:     Current Outpatient Medications   Medication Sig     betamethasone dipropionate (DIPROSONE) 0.05 % external cream Apply twice daily as needed to affected area on leg.     cetirizine (ZYRTEC) 10 MG tablet Take 10 mg by mouth daily     clotrimazole (LOTRIMIN) 1 % cream as needed      Emollient (CERAVE) CREA Externally apply topically 2 times daily as needed (dry skiin)     fluticasone (FLONASE) 50 MCG/ACT nasal spray Spray 2 sprays into both nostrils daily as needed for rhinitis or allergies     Multiple Vitamins-Minerals (MENS ONE DAILY PO)  (Patient not taking: Reported on 5/17/2022)     pseudoePHEDrine (SUDAFED) 30 MG  "tablet Take by mouth every 4 hours as needed for congestion (Patient not taking: Reported on 5/17/2022)     sildenafil (VIAGRA) 100 MG tablet Take 0.5-1 tablets ( mg) by mouth daily as needed (ED)     simvastatin (ZOCOR) 20 MG tablet Take 1 tablet (20 mg) by mouth daily     triamterene-HCTZ (MAXZIDE-25) 37.5-25 MG tablet Take 0.5 tablets by mouth daily     warfarin ANTICOAGULANT (COUMADIN) 5 MG tablet TAKE 10MG (2 TABLETS) BY MOUTH EVERY MONDAY. TAKE 7.5MG (1.5 TABLETS) ALL OTHER DAYS OR AS DIRECTED BY ANTICOAGULATION CLINIC     No current facility-administered medications for this visit.        Allergies:     Allergies   Allergen Reactions     Clindamycin      Bactroban [Mupirocin]      Cephalexin Rash     Doxycycline Rash     Metal [Staples] Rash and Blisters        Review of Systems:   As noted above     Physical Exam:   Vitals: /81   Pulse 74   Ht 1.778 m (5' 10\")   Wt 86.2 kg (190 lb)   SpO2 99%   BMI 27.26 kg/m    CV: peripheral pulse appreciated  Lungs: breathing comfortably  Extremities: no edema    Neuro:   General Appearance: No apparent distress, well-nourished, well-groomed, pleasant     Mental Status: Alert and oriented to person, place, and time. Speech fluent and comprehension intact. No dysarthria.     Cranial Nerves:   II: Visual fields: normal  III: Pupils: 3 mm, equal, round, reactive to light   III,IV,VI: Extraocular Movements: intact   V: Facial sensation: intact to light touch  VII: Facial strength: intact without asymmetry  VIII: Hearing: intact grossly  IX: Palate: intact   XII: Tongue movement: normal    Able to move neck in all directions/can touch chin to chest/no meningitis like findings.      Motor Exam:   5/5 Diffusely   No abnormal movements. Tone is normal throughout.    Sensory: intact to light touch and pinprick throughout with exception of b/l dorsal hands diminished to PP    Coordination: no dysmetria with finger-to-nose bilaterally    Reflexes: biceps, triceps, " brachioradialis, patellar 2+ on R 1+ L on , and ankle jerks 2+ and symmetric. Toes are downgoing/withdrawal bilaterally    Gait: normal casual gait, normal stride length         Data: Pertinent prior to visit   Imaging:  MR BRAIN WITHOUT AND WITH CONTRAST 3/31/2022 1:31 PM     INDICATION: Sensorineural hearing loss, bilateral. Tinnitus,  bilateral. Ear fullness, bilateral. SNHL (sensory-neural hearing  loss), asymmetrical.     TECHNIQUE: Noncontrast and contrast enhanced MRI of the brain.  CONTRAST: 9 mL Gadavist.      COMPARISON: None.     FINDINGS:  There is no restricted diffusion. Mild mucosal thickening  within the ethmoid air cells. Paranasal sinuses are otherwise free  from significant disease. Mastoid air cells appear free from  significant disease. Intraorbital contents are unremarkable.  Ventricles are within normal limits in size for the patient's age.  Intracranial flow voids are intact. There is diffuse, smooth  dural/pachymeningeal thickening and enhancement. Signal intensity of  the brain parenchyma is within normal limits for the patient's age. No  within either internal auditory canal. The bilateral cranial nerve VII  and VIII complexes and T2 signal hyperintensity of the inner ear  structures on the thin section T2-weighted imaging appear normal.                                                                      IMPRESSION:   1.  There is diffuse, smooth dural/pachymeningeal  thickening/enhancement. This is most commonly seen in the setting of  intracranial hypotension or reactive to subdural collections but is  not specific and is less commonly encountered with  infectious/inflammatory, granulomatous and neoplastic processes. There  is no definite findings of recent (acute/subacute) intracranial  hemorrhage. Consider follow-up.  2.  No mass within either internal auditory canal.         I personally reviewed the above imaging and agree with the findings in the report.      MRI C spine  4/28/22    IMPRESSION:  1. 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 The findings are again nonspecific, but most commonly  seen in the setting of intracranial hypotension, or reactive dural  thickening in the setting of subdural fluid collection or  postprocedural change. Infectious/inflammatory, granulomatous and  neoplastic etiologies are less likely considerations.  2. Mildly prominent thin T2 hyperintense signal in the extradural  space of the cervical spinal canal is noted at multiple levels, with  associated postcontrast enhancement. This could simply represent  reactive dural thickening/enhancement, but a thin amount of extradural  fluid/fluid collection (which can be seen with CSF leak) cannot be  completely excluded.  3. Multilevel degenerative changes of the cervical spine, as  described.  4. Moderate spinal canal stenosis at C5-C6 and mild to moderate  bordering on moderate spinal canal stenosis at C4-C5. Lesser degrees  of spinal canal narrowing elsewhere.  5. Varying degrees of multilevel neural foraminal stenosis, as  detailed.  6. Small cystic lesions in the region of the right palatine tonsil are  nonspecific, but may represent submucosal cysts. Recommend correlation  with direct inspection.    MRI THORACIC SPINE WITHOUT CONTRAST  4/28/2022 9:27 AM      HISTORY: Meningeal disorder.     TECHNIQUE: Multiplanar multisequence MRI of the thoracic spine without  contrast.     COMPARISON: None available. Correlation is made with cervical spine  MRI of same day.     FINDINGS: Normal vertebral body heights and sagittal alignment. There  is a small probable intraosseous hemangioma in the T6 vertebral body.  Minimal scattered Modic type 2 degenerative endplate signal changes at  a few levels. Bone marrow signal otherwise appears within  normal  limits. Mildly prominent T2/STIR hyperintense signal and enhancement  in the T8-T9 disc space and to a lesser degree in the T9-T10 disc  space. This is nonspecific, but there are no aggressive-appearing  erosive changes in the adjacent endplates and no significant adjacent  marrow signal abnormality; therefore, this is felt most likely to be  degenerative in nature. No spinal cord signal abnormality is  identified. A small probable perineural cyst in the region of the  right T7-T8 neural foramen (series 6 image 33) measuring approximately  6-7 mm. No extradural fluid collection or significant abnormal  enhancement is identified in the thoracic spinal canal.     Multilevel degenerative disc disease, including varying degrees of  mild and moderate disc height loss, most pronounced at T8-T9 and  T12-L1. Prominent marginal multilevel right anterolateral vertebral  endplate osteophytes are present. Minimal/mild scattered degenerative  changes of the facet joints. There is a small right central disc  protrusion at T7-T8 and small left central disc protrusion at T8-T9.  Shallow disc bulges are seen elsewhere. No significant spinal canal  stenosis. There appears to be up to moderate right neural foraminal  stenosis at T2-T3, T3-T4, and T4-T5 with relatively lesser degrees of  neural foraminal narrowing elsewhere. The visualized paraspinous soft  tissues are unremarkable.                                                                      IMPRESSION:  1. Multilevel degenerative changes of the thoracic spine, as  described.  2. No high-grade spinal canal or neural foraminal stenosis.  3. Signal abnormality and enhancement involving the T8-T9 and T9-T10  disc spaces without adjacent aggressive endplate erosive changes or  marrow signal abnormality, likely degenerative in nature.  4. No significant abnormal meningeal thickening or enhancement  identified in the thoracic region.  5. Small perineural cyst in the right  T7-T8 neural foramen, of  uncertain clinical significance. These are most commonly incidental.    MRI LUMBAR SPINE WITHOUT AND WITH CONTRAST   4/28/2022 9:38 AM      HISTORY: Meningeal disorder.      TECHNIQUE: Multiplanar multisequence MRI of the lumbar spine without  and with 9 mL Gadavist.      COMPARISON: None.      FINDINGS: Nomenclature is based on five lumbar vertebral bodies.  Normal vertebral body heights and sagittal alignment. Modic type I  degenerative endplate signal changes are seen at multiple levels, most  pronounced at L2-L3 and L5-S1. Varying degrees of diffuse  intervertebral disc desiccation. Normal appearance of the distal  spinal cord with the conus terminating at L1. Mildly prominent ventral  epidural enhancement at the level of L5 may represent prominence of  the epidural venous plexus. No extradural fluid collection is  identified. There is a small ovoid T2 hyperintense, predominantly T1  hypointense lesion in the left iliac bone subjacent to the left  sacroiliac joint (series 6 image 53) which appears to show  postcontrast enhancement. This is nonspecific, but may be degenerative  in nature, related to left sacroiliac joint degenerative change.  Otherwise, the visualized paraspinous soft tissues are unremarkable.     Segmental analysis:  T12-L1: Moderate disc height loss. Symmetric disc bulge. Normal  facets. No significant spinal canal or neural foraminal stenosis.     L1-L2: Normal disc height. No radiation. Normal facets. No spinal  canal or neural foraminal stenosis.     L2-L3: Mild disc height loss. Disc bulge slightly eccentric to the  left. Mild facet arthropathy. Mild spinal canal stenosis. Mild to  moderate left and mild right lateral recess stenosis. No significant  neural foraminal stenosis.     L3-L4: Mild disc height loss. Symmetric disc bulge with superimposed  shallow central disc herniation. Mild facet arthropathy. Mild to  moderate bilateral lateral recess stenosis and mild  overall central  spinal canal stenosis. No significant neural foraminal stenosis.     L4-L5: Severe disc height loss. Symmetric disc bulge with posterior  endplate osteophytic ridging. Mild to moderate facet arthropathy.  There is at least moderate bordering on moderate to marked bilateral  lateral recess stenosis, with some degree of contour deformity of the  bilateral traversing L5 nerve roots and mild to moderate central  spinal canal stenosis. There is mild to moderate right and mild left  neural foraminal stenosis.     L5-S1: Mild disc height loss. Symmetric disc bulge. Moderate facet  arthropathy. Probable small synovial cysts along the posterior margin  of the bilateral facet joints. Mild left lateral recess stenosis and  mild central spinal canal stenosis. Mild bilateral neural foraminal  stenosis.                                                                      IMPRESSION:  1. Multilevel degenerative changes of the lumbar spine, as described.  2. Mild to moderate central spinal canal stenosis and moderate  bordering on moderate to severe bilateral lateral recess stenosis at  L4-L5. Lesser degrees of spinal canal/lateral recess stenosis  elsewhere.  3. Mild/mild to moderate neural foraminal stenosis bilaterally at  L4-L5 and L5-S1.  4. Mildly prominent ventral epidural enhancement seen at the level of  L5-S1, which is nonspecific, but may be related to prominence of the  ventral epidural venous plexus. No definite extradural fluid  collection or prominent perineural cyst identified. No mass lesion  Seen.             Assessment and Plan:     Jose M Rea is a pleasant 72 year old male who is seen in evaluation of abnormal MRI findings.  To review of MRI neuroaxis findings the smooth/diffuse leptomeningeal enhancement in brain>C spine as noted in the radiology report is most commonly seen in setting of orthostatic/low pressure phenomena, commonly with orthostatic headache if symptomatic.  The patient has  no such symptoms.  For the purposes that the study was obtained I would consider this to be an incidental finding.  Discussed rationale for pursuing this further with CT myelography/  Hoping we can arrange CT myelography to look for occult CSF leak, if so obtaining some CSF to exclude less likely differentials at the same time would be ideal, also it would be best to do both at once given that it will require planning/getting INR down briefly to facilitate such a procedure, which would have it's own set of risk that I would not want to do more than once.  I do think looking for leak further would be wise so that symptoms do not develop if one is present.   My staff will work to coordinate this and this order will be placed with CSF studies as well if able to coordinate both.      His neurological examination is reassuringly normal at this time.   Pending CT myelography/CSF analysis we could consider further work up vs repeating imaging given the incidental nature of this finding/pending any clinical progression over time.       Have been a few case reports of orthostatic tinnitus/tinnitus from intracranial hypotension to my research--though patient denies tinnitus as a symptom right now (very faint if present at all) and overall ear symptoms are much better.   I doubt this study below correlates with his presentation.     Orthostatic tinnitus: an otological presentation of spontaneous intracranial hypotension. Auris Nasus Larynx. 2003;30(1):85-87      Lon Sidhu MD   of Neurology   [facility]      The total time of this encounter today amounted to 88 minutes. This time included time spent with the patient, prep work, ordering tests, and performing post visit documentation.

## 2022-05-23 NOTE — PROGRESS NOTES
ANTICOAGULATION MANAGEMENT     Jose M Rea 72 year old male is on warfarin with supratherapeutic INR result. (Goal INR 2.0-3.0)    Recent labs: (last 7 days)     05/23/22  0930   INR 3.8*       ASSESSMENT       Source(s): Chart Review and Patient/Caregiver Call       Warfarin doses taken: Warfarin taken as instructed    Diet: No new diet changes identified    New illness, injury, or hospitalization: No    Medication/supplement changes: None noted    Signs or symptoms of bleeding or clotting: No    Previous INR: Subtherapeutic  Additional findings: positive covind on the 13th  starting to feel better did stopped mvi and vit d his mvi had vit k he will go back on it.     PLAN     Recommended plan for no diet, medication or health factor changes affecting INR     Dosing Instructions: partial hold then continue your current warfarin dose with next INR in 2 weeks       Summary  As of 5/23/2022    Full warfarin instructions:  5/24: 5 mg; Otherwise 10 mg every Mon; 7.5 mg all other days   Next INR check:  6/6/2022             Telephone call with Jose M who verbalizes understanding and agrees to plan    Lab visit scheduled    Education provided: Please call back if any changes to your diet, medications or how you've been taking warfarin    Plan made per [facility] anticoagulation protocol    Shyla Fisher RN  Anticoagulation Clinic  5/23/2022    _______________________________________________________________________     Anticoagulation Episode Summary     Current INR goal:  2.0-3.0   TTR:  93.6 % (1 y)   Target end date:  Indefinite   Send INR reminders to:  [facility]    Indications    Coagulation defect (H) [D68.9]  Acute deep vein thrombosis (DVT) of proximal vein of lower extremity (H) (Resolved) [I82.4Y9]  Long term current use of anticoagulant therapy [Z79.01]  Acute deep vein thrombosis (DVT) of proximal vein of lower extremity  unspecified laterality (H) (Resolved) [I82.4Y9]  Chronic anticoagulation  [Z79.01]           Comments:           Anticoagulation Care Providers     Provider Role Specialty Phone number    Pj Quijano MD Referring Family Medicine [phone]    Rigoberto Hernandez MD Referring Family Medicine [phone]

## 2022-05-24 ENCOUNTER — OFFICE VISIT (OUTPATIENT)
Dept: NEUROLOGY | Facility: CLINIC | Age: 72
End: 2022-05-24
Payer: MEDICARE

## 2022-05-24 VITALS
OXYGEN SATURATION: 99 % | WEIGHT: 190 LBS | BODY MASS INDEX: 27.2 KG/M2 | HEIGHT: 70 IN | HEART RATE: 74 BPM | SYSTOLIC BLOOD PRESSURE: 131 MMHG | DIASTOLIC BLOOD PRESSURE: 81 MMHG

## 2022-05-24 DIAGNOSIS — R90.89 ABNORMAL FINDING ON MRI OF BRAIN: Primary | ICD-10-CM

## 2022-05-24 PROCEDURE — 99205 OFFICE O/P NEW HI 60 MIN: CPT | Performed by: STUDENT IN AN ORGANIZED HEALTH CARE EDUCATION/TRAINING PROGRAM

## 2022-05-24 NOTE — PATIENT INSTRUCTIONS
CT myelography would be a good next step.   I would consider trying to get CSF evaluation as well at the same time if possible as we would have to lower your INR and this would simplify things.    The purpose of this is to find a possible occult CSF leak which is the most common cause of such MRI findings.   But overall I would consider this incidental and something we should strongly consider further pursuing though it is not clear if we will find a reason.

## 2022-05-24 NOTE — LETTER
5/24/2022         RE: Jose M Rea  [address] [facility] [id number]        Dear Colleague,    Thank you for referring your patient, oJse M Rea, to the [facility]. Please see a copy of my visit note below.    [facility]  Section of General Neurology  New Patient Visit      Jose M Rea MRN# [id number]   Age: 72 year old YOB: 1950     Requesting physician: No ref. provider found  Rigoberto Hernandez     Reason for Consultation: Abnormal brain MRI        History of Presenting Symptoms:   Jose M Rea is a 72 year old male who presents today for evaluation of Abnormal brain MRI.  This was obtained in the setting of otological symptoms as below which are improving.      He still has a feeling of his ear being plugged, and has tinnitus, maxzide has helped his symptoms.   He does get headaches.  1-2 tylenol a day.  No orthostatic component.      Has noticed instances of abnormalities:  Bright lights in left eye when riding exercise bike  This happened again and saw an eye doctor--was told this was ocular migraine most likely, which I agree with to description.  These are rare and not bothersome to the patient.       Last 6 weeks have been stressful, just sold his house, planned for a garage sale, weather was crummy.        The tinnitus isn't that bad any more, the ear plugging was the biggest issue which is now completely improved.      He is feeling better re COVID.      Here with his wife Viri.    Lives in [place]  He is retired .          Past Medical History:     Patient Active Problem List   Diagnosis     Hyperlipidemia LDL goal <100     Eczema     Chronic anticoagulation     Long term current use of anticoagulant therapy     Erectile dysfunction, unspecified erectile dysfunction type     H/O deep venous thrombosis     Coagulation defect (H)     Ear fullness, left     Screening for diabetes  mellitus     Past Medical History:   Diagnosis Date     ED (erectile dysfunction)      Hyperlipidemia         Past Surgical History:     Past Surgical History:   Procedure Laterality Date     APPENDECTOMY       COLONOSCOPY N/A 4/30/2021    Procedure: COLONOSCOPY, WITH POLYPECTOMY AND BIOPSY;  Surgeon: Mark Shoemaker DO;  Location: [facility]     ORTHOPEDIC SURGERY       PHACOEMULSIFICATION WITH STANDARD INTRAOCULAR LENS IMPLANT Left 4/3/2019    Procedure: Cataract Removal with Implant;  Surgeon: Cristofer Price MD;  Location: [facility]     PHACOEMULSIFICATION WITH STANDARD INTRAOCULAR LENS IMPLANT Right 4/24/2019    Procedure: Cataract Removal with Implant;  Surgeon: Cristofer Price MD;  Location: [facility]     SURGICAL HISTORY OF -       appendectomy in 1956     SURGICAL HISTORY OF -       right shoulder surgery in 2016        Social History:     Social History     Tobacco Use     Smoking status: Current Every Day Smoker     Packs/day: 0.50     Years: 50.00     Pack years: 25.00     Types: Cigarettes     Smokeless tobacco: Never Used     Tobacco comment: started at age 18, 1/2 pack daily or less   Vaping Use     Vaping Use: Never used   Substance Use Topics     Alcohol use: Not Currently     Comment: occasional beer     Drug use: No        Family History:     Family History   Problem Relation Age of Onset     Coronary Artery Disease Paternal Grandfather         MI in late 50s or early 60s     Sleep Apnea Son      Thrombosis Son         PEs in his mid 30s     Prostate Cancer No family hx of      Colon Cancer No family hx of         Medications:     Current Outpatient Medications   Medication Sig     betamethasone dipropionate (DIPROSONE) 0.05 % external cream Apply twice daily as needed to affected area on leg.     cetirizine (ZYRTEC) 10 MG tablet Take 10 mg by mouth daily     clotrimazole (LOTRIMIN) 1 % cream as needed      Emollient (CERAVE) CREA Externally apply topically 2 times daily as needed  "(dry skiin)     fluticasone (FLONASE) 50 MCG/ACT nasal spray Spray 2 sprays into both nostrils daily as needed for rhinitis or allergies     Multiple Vitamins-Minerals (MENS ONE DAILY PO)  (Patient not taking: Reported on 5/17/2022)     pseudoePHEDrine (SUDAFED) 30 MG tablet Take by mouth every 4 hours as needed for congestion (Patient not taking: Reported on 5/17/2022)     sildenafil (VIAGRA) 100 MG tablet Take 0.5-1 tablets ( mg) by mouth daily as needed (ED)     simvastatin (ZOCOR) 20 MG tablet Take 1 tablet (20 mg) by mouth daily     triamterene-HCTZ (MAXZIDE-25) 37.5-25 MG tablet Take 0.5 tablets by mouth daily     warfarin ANTICOAGULANT (COUMADIN) 5 MG tablet TAKE 10MG (2 TABLETS) BY MOUTH EVERY MONDAY. TAKE 7.5MG (1.5 TABLETS) ALL OTHER DAYS OR AS DIRECTED BY ANTICOAGULATION CLINIC     No current facility-administered medications for this visit.        Allergies:     Allergies   Allergen Reactions     Clindamycin      Bactroban [Mupirocin]      Cephalexin Rash     Doxycycline Rash     Metal [Staples] Rash and Blisters        Review of Systems:   As noted above     Physical Exam:   Vitals: /81   Pulse 74   Ht 1.778 m (5' 10\")   Wt 86.2 kg (190 lb)   SpO2 99%   BMI 27.26 kg/m    CV: peripheral pulse appreciated  Lungs: breathing comfortably  Extremities: no edema    Neuro:   General Appearance: No apparent distress, well-nourished, well-groomed, pleasant     Mental Status: Alert and oriented to person, place, and time. Speech fluent and comprehension intact. No dysarthria.     Cranial Nerves:   II: Visual fields: normal  III: Pupils: 3 mm, equal, round, reactive to light   III,IV,VI: Extraocular Movements: intact   V: Facial sensation: intact to light touch  VII: Facial strength: intact without asymmetry  VIII: Hearing: intact grossly  IX: Palate: intact   XII: Tongue movement: normal    Able to move neck in all directions/can touch chin to chest/no meningitis like findings.      Motor Exam: "   5/5 Diffusely   No abnormal movements. Tone is normal throughout.    Sensory: intact to light touch and pinprick throughout with exception of b/l dorsal hands diminished to PP    Coordination: no dysmetria with finger-to-nose bilaterally    Reflexes: biceps, triceps, brachioradialis, patellar 2+ on R 1+ L on , and ankle jerks 2+ and symmetric. Toes are downgoing/withdrawal bilaterally    Gait: normal casual gait, normal stride length         Data: Pertinent prior to visit   Imaging:  MR BRAIN WITHOUT AND WITH CONTRAST 3/31/2022 1:31 PM     INDICATION: Sensorineural hearing loss, bilateral. Tinnitus,  bilateral. Ear fullness, bilateral. SNHL (sensory-neural hearing  loss), asymmetrical.     TECHNIQUE: Noncontrast and contrast enhanced MRI of the brain.  CONTRAST: 9 mL Gadavist.      COMPARISON: None.     FINDINGS:  There is no restricted diffusion. Mild mucosal thickening  within the ethmoid air cells. Paranasal sinuses are otherwise free  from significant disease. Mastoid air cells appear free from  significant disease. Intraorbital contents are unremarkable.  Ventricles are within normal limits in size for the patient's age.  Intracranial flow voids are intact. There is diffuse, smooth  dural/pachymeningeal thickening and enhancement. Signal intensity of  the brain parenchyma is within normal limits for the patient's age. No  within either internal auditory canal. The bilateral cranial nerve VII  and VIII complexes and T2 signal hyperintensity of the inner ear  structures on the thin section T2-weighted imaging appear normal.                                                                      IMPRESSION:   1.  There is diffuse, smooth dural/pachymeningeal  thickening/enhancement. This is most commonly seen in the setting of  intracranial hypotension or reactive to subdural collections but is  not specific and is less commonly encountered with  infectious/inflammatory, granulomatous and neoplastic processes.  There  is no definite findings of recent (acute/subacute) intracranial  hemorrhage. Consider follow-up.  2.  No mass within either internal auditory canal.         I personally reviewed the above imaging and agree with the findings in the report.      MRI C spine 4/28/22    IMPRESSION:  1. 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 The findings are again nonspecific, but most commonly  seen in the setting of intracranial hypotension, or reactive dural  thickening in the setting of subdural fluid collection or  postprocedural change. Infectious/inflammatory, granulomatous and  neoplastic etiologies are less likely considerations.  2. Mildly prominent thin T2 hyperintense signal in the extradural  space of the cervical spinal canal is noted at multiple levels, with  associated postcontrast enhancement. This could simply represent  reactive dural thickening/enhancement, but a thin amount of extradural  fluid/fluid collection (which can be seen with CSF leak) cannot be  completely excluded.  3. Multilevel degenerative changes of the cervical spine, as  described.  4. Moderate spinal canal stenosis at C5-C6 and mild to moderate  bordering on moderate spinal canal stenosis at C4-C5. Lesser degrees  of spinal canal narrowing elsewhere.  5. Varying degrees of multilevel neural foraminal stenosis, as  detailed.  6. Small cystic lesions in the region of the right palatine tonsil are  nonspecific, but may represent submucosal cysts. Recommend correlation  with direct inspection.    MRI THORACIC SPINE WITHOUT CONTRAST  4/28/2022 9:27 AM      HISTORY: Meningeal disorder.     TECHNIQUE: Multiplanar multisequence MRI of the thoracic spine without  contrast.     COMPARISON: None available. Correlation is made with cervical spine  MRI of same  day.     FINDINGS: Normal vertebral body heights and sagittal alignment. There  is a small probable intraosseous hemangioma in the T6 vertebral body.  Minimal scattered Modic type 2 degenerative endplate signal changes at  a few levels. Bone marrow signal otherwise appears within normal  limits. Mildly prominent T2/STIR hyperintense signal and enhancement  in the T8-T9 disc space and to a lesser degree in the T9-T10 disc  space. This is nonspecific, but there are no aggressive-appearing  erosive changes in the adjacent endplates and no significant adjacent  marrow signal abnormality; therefore, this is felt most likely to be  degenerative in nature. No spinal cord signal abnormality is  identified. A small probable perineural cyst in the region of the  right T7-T8 neural foramen (series 6 image 33) measuring approximately  6-7 mm. No extradural fluid collection or significant abnormal  enhancement is identified in the thoracic spinal canal.     Multilevel degenerative disc disease, including varying degrees of  mild and moderate disc height loss, most pronounced at T8-T9 and  T12-L1. Prominent marginal multilevel right anterolateral vertebral  endplate osteophytes are present. Minimal/mild scattered degenerative  changes of the facet joints. There is a small right central disc  protrusion at T7-T8 and small left central disc protrusion at T8-T9.  Shallow disc bulges are seen elsewhere. No significant spinal canal  stenosis. There appears to be up to moderate right neural foraminal  stenosis at T2-T3, T3-T4, and T4-T5 with relatively lesser degrees of  neural foraminal narrowing elsewhere. The visualized paraspinous soft  tissues are unremarkable.                                                                      IMPRESSION:  1. Multilevel degenerative changes of the thoracic spine, as  described.  2. No high-grade spinal canal or neural foraminal stenosis.  3. Signal abnormality and enhancement involving the T8-T9  and T9-T10  disc spaces without adjacent aggressive endplate erosive changes or  marrow signal abnormality, likely degenerative in nature.  4. No significant abnormal meningeal thickening or enhancement  identified in the thoracic region.  5. Small perineural cyst in the right T7-T8 neural foramen, of  uncertain clinical significance. These are most commonly incidental.    MRI LUMBAR SPINE WITHOUT AND WITH CONTRAST   4/28/2022 9:38 AM      HISTORY: Meningeal disorder.      TECHNIQUE: Multiplanar multisequence MRI of the lumbar spine without  and with 9 mL Gadavist.      COMPARISON: None.      FINDINGS: Nomenclature is based on five lumbar vertebral bodies.  Normal vertebral body heights and sagittal alignment. Modic type I  degenerative endplate signal changes are seen at multiple levels, most  pronounced at L2-L3 and L5-S1. Varying degrees of diffuse  intervertebral disc desiccation. Normal appearance of the distal  spinal cord with the conus terminating at L1. Mildly prominent ventral  epidural enhancement at the level of L5 may represent prominence of  the epidural venous plexus. No extradural fluid collection is  identified. There is a small ovoid T2 hyperintense, predominantly T1  hypointense lesion in the left iliac bone subjacent to the left  sacroiliac joint (series 6 image 53) which appears to show  postcontrast enhancement. This is nonspecific, but may be degenerative  in nature, related to left sacroiliac joint degenerative change.  Otherwise, the visualized paraspinous soft tissues are unremarkable.     Segmental analysis:  T12-L1: Moderate disc height loss. Symmetric disc bulge. Normal  facets. No significant spinal canal or neural foraminal stenosis.     L1-L2: Normal disc height. No radiation. Normal facets. No spinal  canal or neural foraminal stenosis.     L2-L3: Mild disc height loss. Disc bulge slightly eccentric to the  left. Mild facet arthropathy. Mild spinal canal stenosis. Mild to  moderate  left and mild right lateral recess stenosis. No significant  neural foraminal stenosis.     L3-L4: Mild disc height loss. Symmetric disc bulge with superimposed  shallow central disc herniation. Mild facet arthropathy. Mild to  moderate bilateral lateral recess stenosis and mild overall central  spinal canal stenosis. No significant neural foraminal stenosis.     L4-L5: Severe disc height loss. Symmetric disc bulge with posterior  endplate osteophytic ridging. Mild to moderate facet arthropathy.  There is at least moderate bordering on moderate to marked bilateral  lateral recess stenosis, with some degree of contour deformity of the  bilateral traversing L5 nerve roots and mild to moderate central  spinal canal stenosis. There is mild to moderate right and mild left  neural foraminal stenosis.     L5-S1: Mild disc height loss. Symmetric disc bulge. Moderate facet  arthropathy. Probable small synovial cysts along the posterior margin  of the bilateral facet joints. Mild left lateral recess stenosis and  mild central spinal canal stenosis. Mild bilateral neural foraminal  stenosis.                                                                      IMPRESSION:  1. Multilevel degenerative changes of the lumbar spine, as described.  2. Mild to moderate central spinal canal stenosis and moderate  bordering on moderate to severe bilateral lateral recess stenosis at  L4-L5. Lesser degrees of spinal canal/lateral recess stenosis  elsewhere.  3. Mild/mild to moderate neural foraminal stenosis bilaterally at  L4-L5 and L5-S1.  4. Mildly prominent ventral epidural enhancement seen at the level of  L5-S1, which is nonspecific, but may be related to prominence of the  ventral epidural venous plexus. No definite extradural fluid  collection or prominent perineural cyst identified. No mass lesion  Seen.             Assessment and Plan:     Jose M Rea is a pleasant 72 year old male who is seen in evaluation of abnormal MRI  findings.  To review of MRI neuroaxis findings the smooth/diffuse leptomeningeal enhancement in brain>C spine as noted in the radiology report is most commonly seen in setting of orthostatic/low pressure phenomena, commonly with orthostatic headache if symptomatic.  The patient has no such symptoms.  For the purposes that the study was obtained I would consider this to be an incidental finding.  Discussed rationale for pursuing this further with CT myelography/  Hoping we can arrange CT myelography to look for occult CSF leak, if so obtaining some CSF to exclude less likely differentials at the same time would be ideal, also it would be best to do both at once given that it will require planning/getting INR down briefly to facilitate such a procedure, which would have it's own set of risk that I would not want to do more than once.  I do think looking for leak further would be wise so that symptoms do not develop if one is present.   My staff will work to coordinate this and this order will be placed with CSF studies as well if able to coordinate both.      His neurological examination is reassuringly normal at this time.   Pending CT myelography/CSF analysis we could consider further work up vs repeating imaging given the incidental nature of this finding/pending any clinical progression over time.       Have been a few case reports of orthostatic tinnitus/tinnitus from intracranial hypotension to my research--though patient denies tinnitus as a symptom right now (very faint if present at all) and overall ear symptoms are much better.   I doubt this study below correlates with his presentation.     Orthostatic tinnitus: an otological presentation of spontaneous intracranial hypotension. Auris Nasus Larynx. 2003;30(1):85-87      Lon Sidhu MD   of Neurology   [facility]      The total time of this encounter today amounted to 88 minutes. This time included time spent  with the patient, prep work, ordering tests, and performing post visit documentation.        Again, thank you for allowing me to participate in the care of your patient.        Sincerely,        Mika Sidhu MD

## 2022-05-24 NOTE — NURSING NOTE
"Jose M Rea is a 72 year old male who presents for:  Chief Complaint   Patient presents with     Referral     Cerebral meningeal         Initial Vitals:  /81   Pulse 74   Ht 1.778 m (5' 10\")   Wt 86.2 kg (190 lb)   SpO2 99%   BMI 27.26 kg/m   Estimated body mass index is 27.26 kg/m  as calculated from the following:    Height as of this encounter: 1.778 m (5' 10\").    Weight as of this encounter: 86.2 kg (190 lb).. Body surface area is 2.06 meters squared. BP completed using cuff size: regular    Nursing Comments:     Meli Russell    "

## 2022-05-25 ENCOUNTER — TELEPHONE (OUTPATIENT)
Dept: NEUROLOGY | Facility: CLINIC | Age: 72
End: 2022-05-25
Payer: MEDICARE

## 2022-05-25 DIAGNOSIS — R90.89 ABNORMAL FINDING ON MRI OF BRAIN: Primary | ICD-10-CM

## 2022-05-25 NOTE — TELEPHONE ENCOUNTER
Called pt and let him know that Dr. Sidhu ordered the myelogram.  Let pt know that they should call to schedule within the next couple of days.  Fabricio said that an RN from care suites will reach out once scheduled to discuss instructions and aftercare to pt.     Will route to [facility] as FYI.    Advised pt to call back with any questions.     Libertad APODACA RN

## 2022-05-25 NOTE — TELEPHONE ENCOUNTER
Thanks so much!  Orders should be placed accurately.  After it is scheduled can you reach out to him to inform him that he needs to coordinate the INR lowering with the provider that manages his coumadin?

## 2022-05-25 NOTE — TELEPHONE ENCOUNTER
Spoke to Lacey at [facility] X-ray.  The provider needs to put in separate orders for the XR myelogram and the CT imaging for the area of the spine that provider would like to be examined.      They are able to pull extra CSF samples for testing, it just needs to be noted in the myelogram order and they also need orders for the specific labs needed.     Pt is on warfarin, his INR needs to be < 2.0 to do the procedure.     Routing to Dr. Sidhu to place orders.     Libertad APODACA RN

## 2022-06-06 ENCOUNTER — TELEPHONE (OUTPATIENT)
Dept: ANTICOAGULATION | Facility: CLINIC | Age: 72
End: 2022-06-06

## 2022-06-06 ENCOUNTER — ANTICOAGULATION THERAPY VISIT (OUTPATIENT)
Dept: ANTICOAGULATION | Facility: CLINIC | Age: 72
End: 2022-06-06

## 2022-06-06 ENCOUNTER — LAB (OUTPATIENT)
Dept: LAB | Facility: CLINIC | Age: 72
End: 2022-06-06
Payer: MEDICARE

## 2022-06-06 DIAGNOSIS — D68.9 COAGULATION DEFECT (H): ICD-10-CM

## 2022-06-06 DIAGNOSIS — Z86.718 H/O DEEP VENOUS THROMBOSIS: ICD-10-CM

## 2022-06-06 DIAGNOSIS — Z79.01 LONG TERM CURRENT USE OF ANTICOAGULANT THERAPY: ICD-10-CM

## 2022-06-06 DIAGNOSIS — I82.4Y9 ACUTE DEEP VEIN THROMBOSIS (DVT) OF PROXIMAL VEIN OF LOWER EXTREMITY, UNSPECIFIED LATERALITY (H): ICD-10-CM

## 2022-06-06 DIAGNOSIS — D68.9 COAGULATION DEFECT (H): Primary | ICD-10-CM

## 2022-06-06 DIAGNOSIS — Z79.01 CHRONIC ANTICOAGULATION: ICD-10-CM

## 2022-06-06 LAB — INR BLD: 3.3 (ref 0.9–1.1)

## 2022-06-06 PROCEDURE — 36416 COLLJ CAPILLARY BLOOD SPEC: CPT

## 2022-06-06 PROCEDURE — 85610 PROTHROMBIN TIME: CPT

## 2022-06-06 RX ORDER — WARFARIN SODIUM 5 MG/1
TABLET ORAL
Qty: 150 TABLET | Refills: 0 | COMMUNITY
Start: 2022-06-06 | End: 2022-06-21

## 2022-06-06 NOTE — PROGRESS NOTES
ANTICOAGULATION MANAGEMENT     Jose M Rea 72 year old male is on warfarin with supratherapeutic INR result. (Goal INR 2.0-3.0)    Recent labs: (last 7 days)     06/06/22  1241   INR 3.3*       ASSESSMENT       Source(s): Chart Review and Patient/Caregiver Call       Warfarin doses taken: Warfarin taken as instructed    Diet: No new diet changes identified    New illness, injury, or hospitalization: No    Medication/supplement changes: None noted    Signs or symptoms of bleeding or clotting: No    Previous INR: Supratherapeutic    Additional findings: COVID positive on 5/13, 6/20 myelogram (INR below 1.8) - procedure sent to [facility]     PLAN     Recommended plan for no diet, medication or health factor changes affecting INR     Dosing Instructions: decrease your warfarin dose (4.5% change) with next INR in 1 week   - [facility] to call by Friday about procedure plan.    Summary  As of 6/6/2022    Full warfarin instructions:  7.5 mg every day   Next INR check:  6/10/2022             Telephone call with Jose M who verbalizes understanding and agrees to plan    Will hold off on scheduling until procedure plan is set    Education provided: Please call back if any changes to your diet, medications or how you've been taking warfarin and Monitoring for bleeding signs and symptoms    Plan made per [facility] anticoagulation protocol    Salma Diane RN  Anticoagulation Clinic  6/6/2022    _______________________________________________________________________     Anticoagulation Episode Summary     Current INR goal:  2.0-3.0   TTR:  89.7 % (1 y)   Target end date:  Indefinite   Send INR reminders to:  [facility] [place]    Indications    Coagulation defect (H) [D68.9]  Acute deep vein thrombosis (DVT) of proximal vein of lower extremity (H) (Resolved) [I82.4Y9]  Long term current use of anticoagulant therapy [Z79.01]  Acute deep vein thrombosis (DVT) of proximal vein of lower extremity  unspecified laterality (H) (Resolved)  [I82.4Y9]  Chronic anticoagulation [Z79.01]           Comments:           Anticoagulation Care Providers     Provider Role Specialty Phone number    Pj Quijano MD Referring Family Medicine [phone]    Rigoberto Hernandez MD Referring Family Medicine [phone]

## 2022-06-06 NOTE — TELEPHONE ENCOUNTER
Pt states that he has a procedure coming up on 6/20 myelogram . He was advised to have his INR below 1.8 and he also reports that he may need to hold warfarin for 5 days.     Will route to [facility].  Salma Diane RN on 6/6/2022 at 1:30 PM

## 2022-06-06 NOTE — TELEPHONE ENCOUNTER
ANTICOAGULATION MANAGEMENT      Jose M Rea due for annual renewal of referral to anticoagulation monitoring. Order pended for your review and signature.      ANTICOAGULATION SUMMARY      Warfarin indication(s)     DVT  Coagulation defect    Heart valve present?  NO       Current goal range   INR: 2.0-3.0     Goal appropriate for indication? Yes, INR 2-3 appropriate for hx of DVT, PE, hypercoagulable state, Afib, LVAD, or bileaflet AVR without risk factors     Current duration of therapy Indefinite/long term therapy   Time in Therapeutic Range (TTR)  (Goal > 60%) 89.7%       Office visit with referring provider's group within last year yes on 3/25/22       Salma Diane RN

## 2022-06-08 NOTE — TELEPHONE ENCOUNTER
"ASTRID-PROCEDURAL ANTICOAGULATION  MANAGEMENT    ASSESSMENT     Warfarin interruption plan for myelogram on 06/20/2022.    Indication for Anticoagulation: DVT and Heterozygous Factor V Leiden      DVT 05/2016 provoked by surgery    Non-occlusive chronic DVT 03/2020 per [facility]        Astrid-Procedure Risk stratification for thromboembolism: moderate (2018 American Society of Hematology guideline)    VTE: 2018 American Society of Hematology recommends against bridging in low and moderate risk VTE patients holding warfarin    VTE: 2016 Anticoagulation Forum clinical guidance recommends no bridge therapy for most VTE patients interrupting warfarin with possible exceptions for VTE within previous month, prior hx recurrent VTE during anticoagulation therapy interruption, or undergoing a procedure with high for VTE  (joint replacement surgery or major abdominal cancer resection)     RECOMMENDATION       Pre-Procedure:    INR check 06/17/2022  o Hold warfarin for 2-3 days, until after procedure based in INR    o No Bridge      Post-Procedure:  o Resume warfarin dose if okay with provider doing procedure on night of procedure, 06/20/2022 PM: 10mg  o Recheck INR ~ 7 days after resuming warfarin       Plan routed to referring provider for approval  ?   Mireya Kingston [facility]    SUBJECTIVE/OBJECTIVE     Jose M P Rona, a 72 year old male    Goal INR Range: 2.0-3.0     Patient bridged in past: Yes: 04/2021 colonoscopy (prophy dose)    Pertinent History: N/A    Wt Readings from Last 3 Encounters:   05/24/22 86.2 kg (190 lb)   05/02/22 86.2 kg (190 lb)   05/01/22 86.2 kg (190 lb)      Ideal body weight: 73 kg (160 lb 15 oz)  Adjusted ideal body weight: 78.3 kg (172 lb 9 oz)     Estimated body mass index is 27.26 kg/m  as calculated from the following:    Height as of 5/24/22: 1.778 m (5' 10\").    Weight as of 5/24/22: 86.2 kg (190 lb).    Lab Results   Component Value Date    INR 3.3 (H) 06/06/2022    INR 3.8 (H) 05/23/2022    INR 1.8 " (H) 05/09/2022     No results found for: HGB, HCT, PLT  Lab Results   Component Value Date    CR 0.86 03/21/2022    CR 0.93 01/06/2017    CR 1.00 12/13/2016     CrCl cannot be calculated (Patient's most recent lab result is older than the maximum 30 days allowed.).

## 2022-06-08 NOTE — TELEPHONE ENCOUNTER
Per my nursing staff's discussion with the proceduralist it would need to be <2.0 for the procedure.  Up to you guys how you want to handle that re lovenox or otherwise to bridge him.  Not sure if they would do the procedure if on lovenox though.  Would depend on their discretion.

## 2022-06-09 NOTE — TELEPHONE ENCOUNTER
Please proceed with holding warfarin and no bridge based on reviewing the notes in the chart.  Sincerely,  Rigoberto Hernandez MD

## 2022-06-09 NOTE — TELEPHONE ENCOUNTER
Writer spoke with the patient. He is worried that his INR won't be low enough for the 6-20 procedure and he has a long commute for it. Writer reassured him that is why we are getting an INR on 6-17 so we can advise on how many days to hold warfarin or if any other instructions should be given. Patient will wait on taking his AM warfarin on 6-17 until after he hears from [facility].    Joanie Yin, RN, BSN, PHN

## 2022-06-15 ENCOUNTER — TELEPHONE (OUTPATIENT)
Dept: MEDSURG UNIT | Facility: CLINIC | Age: 72
End: 2022-06-15
Payer: MEDICARE

## 2022-06-16 ENCOUNTER — TELEPHONE (OUTPATIENT)
Dept: FAMILY MEDICINE | Facility: CLINIC | Age: 72
End: 2022-06-16
Payer: MEDICARE

## 2022-06-16 DIAGNOSIS — E78.5 HYPERLIPIDEMIA LDL GOAL <100: ICD-10-CM

## 2022-06-16 NOTE — TELEPHONE ENCOUNTER
Reason for Call:  Other call back    Detailed comments: INR Dosing     Patient wants call back for INR dosing. Please reach out to the patient today.     Phone Number Patient can be reached at: Cell number on file:    Telephone Information:   Mobile [phone]       Best Time: Anytime    Can we leave a detailed message on this number? YES    Call taken on 6/16/2022 at 8:58 AM by Marta Lopez

## 2022-06-16 NOTE — TELEPHONE ENCOUNTER
Writer spoke with patient and verified he can take his warfarin today. He will hold on tomorrow's dose until after he has his INR.    Joanie Yin RN, BSN, PHN

## 2022-06-17 ENCOUNTER — TELEPHONE (OUTPATIENT)
Dept: MEDSURG UNIT | Facility: CLINIC | Age: 72
End: 2022-06-17

## 2022-06-17 ENCOUNTER — ANTICOAGULATION THERAPY VISIT (OUTPATIENT)
Dept: ANTICOAGULATION | Facility: CLINIC | Age: 72
End: 2022-06-17

## 2022-06-17 ENCOUNTER — LAB (OUTPATIENT)
Dept: LAB | Facility: CLINIC | Age: 72
End: 2022-06-17
Payer: MEDICARE

## 2022-06-17 DIAGNOSIS — D68.9 COAGULATION DEFECT (H): ICD-10-CM

## 2022-06-17 DIAGNOSIS — Z79.01 LONG TERM CURRENT USE OF ANTICOAGULANT THERAPY: ICD-10-CM

## 2022-06-17 DIAGNOSIS — I82.4Y9 ACUTE DEEP VEIN THROMBOSIS (DVT) OF PROXIMAL VEIN OF LOWER EXTREMITY, UNSPECIFIED LATERALITY (H): ICD-10-CM

## 2022-06-17 DIAGNOSIS — Z79.01 CHRONIC ANTICOAGULATION: ICD-10-CM

## 2022-06-17 DIAGNOSIS — Z86.718 H/O DEEP VENOUS THROMBOSIS: ICD-10-CM

## 2022-06-17 DIAGNOSIS — D68.9 COAGULATION DEFECT (H): Primary | ICD-10-CM

## 2022-06-17 LAB — INR BLD: 2.9 (ref 0.9–1.1)

## 2022-06-17 PROCEDURE — 36416 COLLJ CAPILLARY BLOOD SPEC: CPT

## 2022-06-17 PROCEDURE — 85610 PROTHROMBIN TIME: CPT

## 2022-06-17 RX ORDER — SIMVASTATIN 20 MG
TABLET ORAL
Qty: 90 TABLET | Refills: 0 | Status: SHIPPED | OUTPATIENT
Start: 2022-06-17 | End: 2022-09-21

## 2022-06-17 NOTE — TELEPHONE ENCOUNTER
Pt aware of need to hold Coumadin per notes in chart. No allergies of concern with this procedure.

## 2022-06-17 NOTE — PROGRESS NOTES
ANTICOAGULATION MANAGEMENT     Jose M Rea 72 year old male is on warfarin with therapeutic INR result. (Goal INR 2.0-3.0)    Recent labs: (last 7 days)     06/17/22  1017   INR 2.9*       ASSESSMENT       Source(s): Patient/Caregiver Call       Warfarin doses taken: Warfarin taken as instructed    Diet: No new diet changes identified    New illness, injury, or hospitalization: No    Medication/supplement changes: None noted    Signs or symptoms of bleeding or clotting: No    Previous INR: Therapeutic last visit; previously outside of goal range    Additional findings: Upcoming surgery/procedure 6/20/22. Patient is concerned 3 day hold may not be enough to drop the INR. Writer advised the patient to have some Vitamin K greens Sunday night. Patient is to avoid alcohol this weekend.       PLAN     Recommended plan for temporary change(s) affecting INR     Dosing Instructions: hold 3 doses. Take 15 mg on 6/20 post procedure if ok per MD, then resume maintenance dose with next INR in 10 days.        Summary  As of 6/17/2022    Full warfarin instructions:  6/17: Hold; 6/18: Hold; 6/19: Hold; 6/20: 15 mg; Otherwise 7.5 mg every day   Next INR check:  6/27/2022             Telephone call with Jose M who verbalizes understanding and agrees to plan    Lab visit scheduled    Education provided: Please call back if any changes to your diet, medications or how you've been taking warfarin, Monitoring for bleeding signs and symptoms, Monitoring for clotting signs and symptoms, When to seek medical attention/emergency care and Contact [phone]  with any changes, questions or concerns.     Plan made with Jeanna Burks [facility].    Kb Braun RN  Anticoagulation Clinic  6/17/2022    _______________________________________________________________________     Anticoagulation Episode Summary     Current INR goal:  2.0-3.0   TTR:  87.5 % (1 y)   Target end date:  Indefinite   Send INR reminders to:  [facility]     Indications    Coagulation defect (H) [D68.9]  Acute deep vein thrombosis (DVT) of proximal vein of lower extremity (H) (Resolved) [I82.4Y9]  Long term current use of anticoagulant therapy [Z79.01]  Acute deep vein thrombosis (DVT) of proximal vein of lower extremity  unspecified laterality (H) (Resolved) [I82.4Y9]  Chronic anticoagulation [Z79.01]  H/O deep venous thrombosis [Z86.718]  Acute deep vein thrombosis (DVT) of proximal vein of lower extremity  unspecified laterality (H) [I82.4Y9]           Comments:           Anticoagulation Care Providers     Provider Role Specialty Phone number    Pj Quijano MD Referring Family Medicine [phone]    Rigoberto Hernandez MD Referring Family Medicine [phone]

## 2022-06-17 NOTE — PROGRESS NOTES
Chart reviewed for upcoming procedure. Orders in, Coumadin on hold, INR ordered for day of procedure, no pertinent allergies.

## 2022-06-20 ENCOUNTER — HOSPITAL ENCOUNTER (OUTPATIENT)
Dept: CT IMAGING | Facility: CLINIC | Age: 72
Discharge: HOME | End: 2022-06-20
Attending: STUDENT IN AN ORGANIZED HEALTH CARE EDUCATION/TRAINING PROGRAM | Admitting: STUDENT IN AN ORGANIZED HEALTH CARE EDUCATION/TRAINING PROGRAM
Payer: MEDICARE

## 2022-06-20 ENCOUNTER — ANTICOAGULATION THERAPY VISIT (OUTPATIENT)
Dept: ANTICOAGULATION | Facility: CLINIC | Age: 72
End: 2022-06-20

## 2022-06-20 ENCOUNTER — HOSPITAL ENCOUNTER (OUTPATIENT)
Dept: GENERAL RADIOLOGY | Facility: CLINIC | Age: 72
Discharge: HOME | End: 2022-06-20
Attending: STUDENT IN AN ORGANIZED HEALTH CARE EDUCATION/TRAINING PROGRAM | Admitting: STUDENT IN AN ORGANIZED HEALTH CARE EDUCATION/TRAINING PROGRAM
Payer: MEDICARE

## 2022-06-20 ENCOUNTER — HOSPITAL ENCOUNTER (OUTPATIENT)
Facility: CLINIC | Age: 72
Discharge: HOME | End: 2022-06-20
Admitting: STUDENT IN AN ORGANIZED HEALTH CARE EDUCATION/TRAINING PROGRAM
Payer: MEDICARE

## 2022-06-20 VITALS
RESPIRATION RATE: 18 BRPM | SYSTOLIC BLOOD PRESSURE: 147 MMHG | TEMPERATURE: 97.9 F | DIASTOLIC BLOOD PRESSURE: 72 MMHG | HEART RATE: 77 BPM

## 2022-06-20 DIAGNOSIS — Z79.01 CHRONIC ANTICOAGULATION: ICD-10-CM

## 2022-06-20 DIAGNOSIS — Z86.718 H/O DEEP VENOUS THROMBOSIS: ICD-10-CM

## 2022-06-20 DIAGNOSIS — R90.89 ABNORMAL FINDING ON MRI OF BRAIN: ICD-10-CM

## 2022-06-20 DIAGNOSIS — D68.9 COAGULATION DEFECT (H): Primary | ICD-10-CM

## 2022-06-20 DIAGNOSIS — Z79.01 LONG TERM CURRENT USE OF ANTICOAGULANT THERAPY: ICD-10-CM

## 2022-06-20 DIAGNOSIS — I82.4Y9 ACUTE DEEP VEIN THROMBOSIS (DVT) OF PROXIMAL VEIN OF LOWER EXTREMITY, UNSPECIFIED LATERALITY (H): ICD-10-CM

## 2022-06-20 LAB — INR BLD: 1.1 (ref 0.9–1.1)

## 2022-06-20 PROCEDURE — 250N000009 HC RX 250: Performed by: RADIOLOGY

## 2022-06-20 PROCEDURE — 250N000011 HC RX IP 250 OP 636: Performed by: RADIOLOGY

## 2022-06-20 PROCEDURE — 999N000154 HC STATISTIC RADIOLOGY XRAY, US, CT, MAR, NM

## 2022-06-20 PROCEDURE — 85610 PROTHROMBIN TIME: CPT

## 2022-06-20 PROCEDURE — 62305 MYELOGRAPHY LUMBAR INJECTION: CPT

## 2022-06-20 PROCEDURE — 72132 CT LUMBAR SPINE W/DYE: CPT | Mod: MG

## 2022-06-20 PROCEDURE — G1004 CDSM NDSC: HCPCS

## 2022-06-20 PROCEDURE — 36415 COLL VENOUS BLD VENIPUNCTURE: CPT

## 2022-06-20 PROCEDURE — 72129 CT CHEST SPINE W/DYE: CPT | Mod: MG

## 2022-06-20 RX ORDER — LIDOCAINE HYDROCHLORIDE 10 MG/ML
30 INJECTION, SOLUTION EPIDURAL; INFILTRATION; INTRACAUDAL; PERINEURAL ONCE
Status: COMPLETED | OUTPATIENT
Start: 2022-06-20 | End: 2022-06-20

## 2022-06-20 RX ORDER — IOPAMIDOL 612 MG/ML
15 INJECTION, SOLUTION INTRATHECAL ONCE
Status: COMPLETED | OUTPATIENT
Start: 2022-06-20 | End: 2022-06-20

## 2022-06-20 RX ORDER — ACETAMINOPHEN 325 MG/1
650 TABLET ORAL EVERY 4 HOURS PRN
Status: DISCONTINUED | OUTPATIENT
Start: 2022-06-20 | End: 2022-06-20 | Stop reason: HOSPADM

## 2022-06-20 RX ORDER — ACETAMINOPHEN 325 MG/1
650 TABLET ORAL EVERY 4 HOURS PRN
Status: CANCELLED | OUTPATIENT
Start: 2022-06-20

## 2022-06-20 RX ADMIN — IOPAMIDOL 10 ML: 612 INJECTION, SOLUTION INTRATHECAL at 10:19

## 2022-06-20 RX ADMIN — LIDOCAINE HYDROCHLORIDE 3 ML: 10 INJECTION, SOLUTION EPIDURAL; INFILTRATION; INTRACAUDAL; PERINEURAL at 10:15

## 2022-06-20 NOTE — PROGRESS NOTES
Care Suites Discharge Nursing Note    Patient Information  Name: Jose M Rea  Age: 72 year old    Discharge Education:  Discharge instructions reviewed: Yes  Additional education/resources provided: n/a  Patient/patient representative verbalizes understanding: Yes  Patient discharging on new medications: No  Medication education completed: Yes    Discharge Plans:   Discharge location: home  Discharge ride contacted: Yes  Approximate discharge time: 1150    Discharge Criteria:  Discharge criteria met and vital signs stable: Yes, ambulatory in room, myelogram site left low back bandaid clean and dry, denies any pain or H/A, taking PO fluids well, declines food, discharged to home per w/c with friend Jett carrasquillo.    Patient Belongs:  Patient belongings returned to patient: Yes    Scarlett Mitchell RN

## 2022-06-20 NOTE — PROGRESS NOTES
ANTICOAGULATION MANAGEMENT     Jose M Rea 72 year old male is on warfarin with subtherapeutic INR result. (Goal INR 2.0-3.0)    Recent labs: (last 7 days)     06/20/22  0835   INR 1.1       ASSESSMENT       Source(s): Chart Review    Previous INR was Therapeutic last visit; previously outside of goal range    Medication, diet, health changes since last INR: Procedure on 6/20/22. Will restart Coumadin tonight.            PLAN     Recommended plan for temporary change(s) affecting INR     Dosing Instructions: booster dose then continue your current warfarin dose with next INR in 1 week       Summary  As of 6/20/2022    Full warfarin instructions:  6/20: 15 mg; Otherwise 7.5 mg every day   Next INR check:  6/27/2022             Chart review only.    Lab visit scheduled    Education provided: None required    Plan made per [facility] anticoagulation protocol    Kb Braun RN  Anticoagulation Clinic  6/20/2022    _______________________________________________________________________     Anticoagulation Episode Summary     Current INR goal:  2.0-3.0   TTR:  87.1 % (1 y)   Target end date:  Indefinite   Send INR reminders to:  [facility]    Indications    Coagulation defect (H) [D68.9]  Acute deep vein thrombosis (DVT) of proximal vein of lower extremity (H) (Resolved) [I82.4Y9]  Long term current use of anticoagulant therapy [Z79.01]  Acute deep vein thrombosis (DVT) of proximal vein of lower extremity  unspecified laterality (H) (Resolved) [I82.4Y9]  Chronic anticoagulation [Z79.01]  H/O deep venous thrombosis [Z86.718]  Acute deep vein thrombosis (DVT) of proximal vein of lower extremity  unspecified laterality (H) [I82.4Y9]           Comments:           Anticoagulation Care Providers     Provider Role Specialty Phone number    Pj Quijano MD Referring Family Medicine [phone]    Rigoberto Hernandez MD Referring [facility] Medicine [phone]

## 2022-06-20 NOTE — DISCHARGE INSTRUCTIONS
Myelogram Discharge Instructions     After you go home:    You may resume your normal diet  Continue to drink at least 8 ounces of fluid every 1-2 hours until bedtime tonight and continue to drink extra fluids for the next 2 days  Caffeinated beverages may help prevent or reduce spinal headaches    Care of Puncture Site:    If there is a bandaid on your back - you may remove it tomorrow morning  You may shower tomorrow  No tub baths, whirlpools or swimming pool for 48 hours  Use ice packs as needed for discomfort     Activity - To help prevent spinal headache or spinal fluid leakage:    Minimize your activity today. Bedrest is recommended for the rest of the day to prevent or decrease the chance of getting a spinal headache. You can be up to the bathroom and for meals.  Keep your head up on extra pillows while in bed today  Resume normal activities tomorrow.   Avoid strenuous activity for the next 2 days  Do not drive a motor vehicle until tomorrow     Medicines:    You may resume all medications, including blood thinners  Resume your Warfarin/Coumadin at your regular dose today. Follow up with your provider to have your INR rechecked  Resume your Platelet Inhibitors and Aspirin tomorrow at your regular dose  For minor discomfort, you may take Acetaminophen (Tylenol) or Ibuprofen (Advil)            Call the doctor who ordered this procedure if:    Your headache becomes worse or is severe. (A minor headache is not unusual)  You have nausea or vomiting  The site is red, swollen, hot or tender  You have chills or a fever greater than 101 F (38 C)  Increase in pain, weakness or numbness  Stiff neck  Any questions or concerns    What to watch for:    A spinal headache can develop after this type of procedure.  It is described as a dull, throbbing headache, usually appearing within 48 hours after the procedure, that worsens when you are sitting upright or standing, but improves or goes away when you lie down. Most spinal  headaches resolve on their own.  If you believe that you may be experiencing a spinal headache, continue bedrest for 2-4 hours and drink plenty of fluids.  If your symptoms persist for 24 hours or more, call your doctor who ordered your procedure.  It can be normal to have some bruising or slight swelling at the injection site.      If you have questions or concerns call:                  [facility] Radiology Dept @ [phone]                                    between 8am-4:30pm Mon-Fri    If you have urgent questions outside of these normal business hours, please contact the [facility] on call doctor @ [phone]    The provider who performed your procedure was _________________.

## 2022-06-21 ENCOUNTER — TELEPHONE (OUTPATIENT)
Dept: NEUROLOGY | Facility: CLINIC | Age: 72
End: 2022-06-21

## 2022-06-21 ENCOUNTER — TELEPHONE (OUTPATIENT)
Dept: NEUROLOGY | Facility: CLINIC | Age: 72
End: 2022-06-21
Payer: MEDICARE

## 2022-06-21 DIAGNOSIS — D68.9 COAGULATION DEFECT (H): ICD-10-CM

## 2022-06-21 DIAGNOSIS — Z79.01 LONG TERM CURRENT USE OF ANTICOAGULANT THERAPY: ICD-10-CM

## 2022-06-21 DIAGNOSIS — Z79.01 CHRONIC ANTICOAGULATION: ICD-10-CM

## 2022-06-21 RX ORDER — WARFARIN SODIUM 5 MG/1
TABLET ORAL
Qty: 135 TABLET | Refills: 1 | Status: SHIPPED | OUTPATIENT
Start: 2022-06-21 | End: 2022-12-16

## 2022-06-21 NOTE — TELEPHONE ENCOUNTER
Patient has completed requesting testing, wants to know what next steps are.  Patient doesn't want to wait until phone visit the end of July to discuss results of Miogram.

## 2022-06-21 NOTE — TELEPHONE ENCOUNTER
Called an discussed with Jose M and his wife re CT myelogram results:    There was no CSF leak seen which is the biggest differential or potential cause I wanted to exclude.   Good news.    Staff or myself will reach out to him regarding if they were able to gather CSF (cererbrospinal fluid) for analysis at the same time.  They are not always able to do both but I was hoping they could.      We discussed that seeing as this was an incidental finding that Jose M and his wife for now would rather wait and not do more testing if no CSF was obtained.  They will call if he develops any new symptoms, CSF analysis would be the last piece of the puzzle so to speak to say with confidence that this is completely incidental.   He still has no clear symptoms of low pressure.     I would be happy to see them back to check in at any time.  They will reach out with questions.  They have a follow up with me in July right now should they choose to keep it.

## 2022-06-21 NOTE — TELEPHONE ENCOUNTER
ANTICOAGULATION MANAGEMENT:  Medication Refill    Anticoagulation Summary  As of 6/20/2022    Warfarin maintenance plan:  7.5 mg (5 mg x 1.5) every day   Next INR check:  6/27/2022   Target end date:  Indefinite    Indications    Coagulation defect (H) [D68.9]  Acute deep vein thrombosis (DVT) of proximal vein of lower extremity (H) (Resolved) [I82.4Y9]  Long term current use of anticoagulant therapy [Z79.01]  Acute deep vein thrombosis (DVT) of proximal vein of lower extremity  unspecified laterality (H) (Resolved) [I82.4Y9]  Chronic anticoagulation [Z79.01]  H/O deep venous thrombosis [Z86.718]  Acute deep vein thrombosis (DVT) of proximal vein of lower extremity  unspecified laterality (H) [I82.4Y9]             Anticoagulation Care Providers     Provider Role Specialty Phone number    Pj Quijano MD Referring Family Medicine [phone]    Rigoberto Hernandez MD Referring Family Medicine [phone]          Visit with referring provider/group within last year: Yes    [facility] referral signed within last year: Yes    Jose M meets all criteria for refill (current [facility] referral, office visit with referring provider/group in last year, lab monitoring up to date or not exceeding 2 weeks overdue). Rx instructions and quantity supplied updated to match patient's current dosing plan. Warfarin 90 day supply with 1 refill granted per [facility] protocol     Joanie Yin RN  Anticoagulation Clinic

## 2022-06-22 ENCOUNTER — OFFICE VISIT (OUTPATIENT)
Dept: AUDIOLOGY | Facility: CLINIC | Age: 72
End: 2022-06-22
Payer: COMMERCIAL

## 2022-06-22 DIAGNOSIS — H90.3 SENSORINEURAL HEARING LOSS, BILATERAL: Primary | ICD-10-CM

## 2022-06-22 PROCEDURE — 92591 PR HEARING AID EXAM BINAURAL: CPT | Performed by: AUDIOLOGIST

## 2022-06-22 PROCEDURE — V5275 EAR IMPRESSION: HCPCS | Performed by: AUDIOLOGIST

## 2022-06-22 NOTE — TELEPHONE ENCOUNTER
Please see telephone encounter from 6/21/22.  Provider has already spoken to pt.   Libertad APODACA RN

## 2022-06-22 NOTE — PROGRESS NOTES
AUDIOLOGY REPORT    SUBJECTIVE: Jose M Rea is a 72 year old male was seen in the Audiology Clinic at [facility] on 6/22/22 to discuss concerns with hearing and functional communication difficulties. The patient was accompanied by their wife. Jose M has been seen previously on 3/28/2022, and results revealed a bilateral asymmetric sensorineural hearing loss.  The patient was medically evaluated and determined to be cleared for binaural hearing aids by Dr. Wood. Jose M notes difficulty with communication in a variety of listening situations.    OBJECTIVE:  Patient is a hearing aid candidate. Patient would like to move forward with a hearing aid evaluation today. Therefore, the patient was presented with different options for amplification to help aid in communication. Discussed styles, levels of technology and monaural vs. binaural fitting.     The hearing aid(s) mutually chosen were:  Binaural: Phonak Audeo P50-R  COLOR: P1 Sand Beige   BATTERY SIZE: rechargeable  EARMOLD/TIPS: Micromold with canal lock  CANAL/ LENGTH: 1 MP L/R    Otoscopy revealed ears are clear of cerumen bilaterally. Bilateral earmolds were taken without incident.    ASSESSMENT:   Bilateral slightly asymmetric sensorineural hearing loss      Reviewed purchase information and warranty information with patient. The 45 day trial period was explained to patient. The patient was given a copy of the [place] [employer] consumer brochure on purchasing hearing instruments. Patient risk factors have been provided to the patient in writing prior to the sale of the hearing aid per FDA regulation. The risk factors are also available in the User Instructional Booklet to be presented on the day of the hearing aid fitting. Hearing aid(s) ordered. Hearing aid evaluation completed. Patient was advised to check with their insurance to ascertain of they are eligible for any hearing aid benefits.     PLAN: Jose M is  scheduled to return in 2-3 weeks for a hearing aid fitting and programming. Purchase agreement will be completed on that date. Please contact this clinic with any questions or concerns.      Mark Chris CCC-A  Licensed Audiologist #[id number]  6/22/2022

## 2022-06-27 ENCOUNTER — LAB (OUTPATIENT)
Dept: LAB | Facility: CLINIC | Age: 72
End: 2022-06-27
Payer: MEDICARE

## 2022-06-27 ENCOUNTER — ANTICOAGULATION THERAPY VISIT (OUTPATIENT)
Dept: ANTICOAGULATION | Facility: CLINIC | Age: 72
End: 2022-06-27

## 2022-06-27 DIAGNOSIS — D68.9 COAGULATION DEFECT (H): ICD-10-CM

## 2022-06-27 DIAGNOSIS — Z86.718 H/O DEEP VENOUS THROMBOSIS: ICD-10-CM

## 2022-06-27 DIAGNOSIS — Z79.01 CHRONIC ANTICOAGULATION: ICD-10-CM

## 2022-06-27 DIAGNOSIS — I82.4Y9 ACUTE DEEP VEIN THROMBOSIS (DVT) OF PROXIMAL VEIN OF LOWER EXTREMITY, UNSPECIFIED LATERALITY (H): ICD-10-CM

## 2022-06-27 DIAGNOSIS — D68.9 COAGULATION DEFECT (H): Primary | ICD-10-CM

## 2022-06-27 DIAGNOSIS — Z79.01 LONG TERM CURRENT USE OF ANTICOAGULANT THERAPY: ICD-10-CM

## 2022-06-27 LAB — INR BLD: 2.1 (ref 0.9–1.1)

## 2022-06-27 PROCEDURE — 36416 COLLJ CAPILLARY BLOOD SPEC: CPT

## 2022-06-27 PROCEDURE — 85610 PROTHROMBIN TIME: CPT

## 2022-06-27 NOTE — PROGRESS NOTES
ANTICOAGULATION MANAGEMENT     Jose M Jeffersonmaximiliano 72 year old male is on warfarin with therapeutic INR result. (Goal INR 2.0-3.0)    Recent labs: (last 7 days)     06/27/22  0758   INR 2.1*       ASSESSMENT       Source(s): Chart Review and Patient/Caregiver Call       Warfarin doses taken: Warfarin recently held for 3 days which may be affecting INR    Warfarin restarted on 6/20.  However, he mentioned he has been taking more than instructed.  (prior to procedure he was taking 10mg on Monday and 7.5mg ROW).  Dose was decreased on 6/6/22 d/t supra INR x2).   Held warfarin from 6/17-19.    Diet: No new diet changes identified    New illness, injury, or hospitalization: No   S/p complete spine Myelogram on 6/20/22.   Abnormal MRI of brain.    Medication/supplement changes: None noted    Signs or symptoms of bleeding or clotting: No    Previous INR: Subtherapeutic at 1.1 on 6/20/22.    Additional findings:  Also reported, he switched taking his warfarin from morning to evenings.       PLAN     Recommended plan for ongoing change(s) affecting INR     Dosing Instructions:   (now taking evenings @ 5p.  5mg tabs)    continue your current warfarin dose with next INR in 1-2 weeks       Summary  As of 6/27/2022    Full warfarin instructions:  7.5 mg every day   Next INR check:  7/11/2022             Telephone call with  Jose M ([phone]) who verbalizes understanding and agrees to plan   - advised to ensure he takes correct warfarin dose.     - will adjust warfarin dose based on next INR check.    Lab visit scheduled - INR on 7/6/22 @ [facility].    Education provided: Importance of consistent vitamin K intake, Goal range and significance of current result and Importance of taking warfarin as instructed    Plan made per [facility] anticoagulation protocol    Katherine Silva, RN  Anticoagulation Clinic  6/27/2022    _______________________________________________________________________     Anticoagulation Episode Summary      Current INR goal:  2.0-3.0   TTR:  85.4 % (1 y)   Target end date:  Indefinite   Send INR reminders to:  [facility]    Indications    Coagulation defect (H) [D68.9]  Acute deep vein thrombosis (DVT) of proximal vein of lower extremity (H) (Resolved) [I82.4Y9]  Long term current use of anticoagulant therapy [Z79.01]  Acute deep vein thrombosis (DVT) of proximal vein of lower extremity  unspecified laterality (H) (Resolved) [I82.4Y9]  Chronic anticoagulation [Z79.01]  H/O deep venous thrombosis [Z86.718]  Acute deep vein thrombosis (DVT) of proximal vein of lower extremity  unspecified laterality (H) [I82.4Y9]           Comments:           Anticoagulation Care Providers     Provider Role Specialty Phone number    Pj Quijano MD Referring Family Medicine [phone]    Rigoberto Hernandez MD Referring Family Medicine [phone]

## 2022-07-06 ENCOUNTER — ANTICOAGULATION THERAPY VISIT (OUTPATIENT)
Dept: ANTICOAGULATION | Facility: CLINIC | Age: 72
End: 2022-07-06

## 2022-07-06 ENCOUNTER — LAB (OUTPATIENT)
Dept: LAB | Facility: CLINIC | Age: 72
End: 2022-07-06
Payer: MEDICARE

## 2022-07-06 DIAGNOSIS — I82.4Y9 ACUTE DEEP VEIN THROMBOSIS (DVT) OF PROXIMAL VEIN OF LOWER EXTREMITY, UNSPECIFIED LATERALITY (H): ICD-10-CM

## 2022-07-06 DIAGNOSIS — Z79.01 LONG TERM CURRENT USE OF ANTICOAGULANT THERAPY: ICD-10-CM

## 2022-07-06 DIAGNOSIS — Z79.01 CHRONIC ANTICOAGULATION: ICD-10-CM

## 2022-07-06 DIAGNOSIS — D68.9 COAGULATION DEFECT (H): Primary | ICD-10-CM

## 2022-07-06 DIAGNOSIS — D68.9 COAGULATION DEFECT (H): ICD-10-CM

## 2022-07-06 DIAGNOSIS — Z86.718 H/O DEEP VENOUS THROMBOSIS: ICD-10-CM

## 2022-07-06 LAB — INR BLD: 2.5 (ref 0.9–1.1)

## 2022-07-06 PROCEDURE — 36416 COLLJ CAPILLARY BLOOD SPEC: CPT

## 2022-07-06 PROCEDURE — 85610 PROTHROMBIN TIME: CPT

## 2022-07-06 NOTE — PROGRESS NOTES
ANTICOAGULATION MANAGEMENT     Jose M Rea 72 year old male is on warfarin with therapeutic INR result. (Goal INR 2.0-3.0)    Recent labs: (last 7 days)     07/06/22  0801   INR 2.5*       ASSESSMENT       Source(s): Chart Review and Patient/Caregiver Call       Warfarin doses taken: Warfarin taken as instructed    Diet: No new diet changes identified    New illness, injury, or hospitalization: No    Medication/supplement changes: None noted    Signs or symptoms of bleeding or clotting: No    Previous INR: Therapeutic last visit; previously outside of goal range    Additional findings: None       PLAN     Recommended plan for no diet, medication or health factor changes affecting INR     Dosing Instructions: continue your current warfarin dose with next INR in 2 weeks       Summary  As of 7/6/2022    Full warfarin instructions:  7.5 mg every day   Next INR check:  7/21/2022             Telephone call with Jose M who verbalizes understanding and agrees to plan    Lab visit scheduled    Education provided: Please call back if any changes to your diet, medications or how you've been taking warfarin and Contact [phone]  with any changes, questions or concerns.     Plan made per [facility] anticoagulation protocol    Joanie Yin RN  Anticoagulation Clinic  7/6/2022    _______________________________________________________________________     Anticoagulation Episode Summary     Current INR goal:  2.0-3.0   TTR:  85.4 % (1 y)   Target end date:  Indefinite   Send INR reminders to:  [facility]    Indications    Coagulation defect (H) [D68.9]  Acute deep vein thrombosis (DVT) of proximal vein of lower extremity (H) (Resolved) [I82.4Y9]  Long term current use of anticoagulant therapy [Z79.01]  Acute deep vein thrombosis (DVT) of proximal vein of lower extremity  unspecified laterality (H) (Resolved) [I82.4Y9]  Chronic anticoagulation [Z79.01]  H/O deep venous thrombosis [Z86.718]  Acute deep vein thrombosis  (DVT) of proximal vein of lower extremity  unspecified laterality (H) [I82.4Y9]           Comments:  okay to leave DVM         Anticoagulation Care Providers     Provider Role Specialty Phone number    Pj Quijano MD Referring Family Medicine [phone]    Rigoberto Hernandez MD Referring Family Medicine [phone]

## 2022-07-21 ENCOUNTER — LAB (OUTPATIENT)
Dept: LAB | Facility: CLINIC | Age: 72
End: 2022-07-21
Payer: MEDICARE

## 2022-07-21 ENCOUNTER — ANTICOAGULATION THERAPY VISIT (OUTPATIENT)
Dept: ANTICOAGULATION | Facility: CLINIC | Age: 72
End: 2022-07-21

## 2022-07-21 DIAGNOSIS — Z79.01 CHRONIC ANTICOAGULATION: ICD-10-CM

## 2022-07-21 DIAGNOSIS — Z86.718 H/O DEEP VENOUS THROMBOSIS: ICD-10-CM

## 2022-07-21 DIAGNOSIS — D68.9 COAGULATION DEFECT (H): ICD-10-CM

## 2022-07-21 DIAGNOSIS — Z79.01 LONG TERM CURRENT USE OF ANTICOAGULANT THERAPY: ICD-10-CM

## 2022-07-21 DIAGNOSIS — D68.9 COAGULATION DEFECT (H): Primary | ICD-10-CM

## 2022-07-21 DIAGNOSIS — I82.4Y9 ACUTE DEEP VEIN THROMBOSIS (DVT) OF PROXIMAL VEIN OF LOWER EXTREMITY, UNSPECIFIED LATERALITY (H): ICD-10-CM

## 2022-07-21 LAB — INR BLD: 2.2 (ref 0.9–1.1)

## 2022-07-21 PROCEDURE — 85610 PROTHROMBIN TIME: CPT

## 2022-07-21 PROCEDURE — 36415 COLL VENOUS BLD VENIPUNCTURE: CPT

## 2022-07-21 NOTE — PROGRESS NOTES
ANTICOAGULATION MANAGEMENT     Jose M Jeffersonmaximiliano 72 year old male is on warfarin with therapeutic INR result. (Goal INR 2.0-3.0)    Recent labs: (last 7 days)     07/21/22  0810   INR 2.2*       ASSESSMENT       Source(s): Chart Review and Patient/Caregiver Call       Warfarin doses taken: Warfarin taken as instructed    Diet: No new diet changes identified    New illness, injury, or hospitalization: No    Medication/supplement changes: None noted    Signs or symptoms of bleeding or clotting: No    Previous INR: Therapeutic last 2(+) visits    Additional findings: None       PLAN     Recommended plan for no diet, medication or health factor changes affecting INR     Dosing Instructions: continue your current warfarin dose with next INR in 3 weeks       Summary  As of 7/21/2022    Full warfarin instructions:  7.5 mg every day   Next INR check:  8/11/2022             Telephone call with Jose M who verbalizes understanding and agrees to plan    Lab visit scheduled    Education provided: Please call back if any changes to your diet, medications or how you've been taking warfarin and Contact [phone]  with any changes, questions or concerns.     Plan made per [facility] anticoagulation protocol    Joanie Yin RN  Anticoagulation Clinic  7/21/2022    _______________________________________________________________________     Anticoagulation Episode Summary     Current INR goal:  2.0-3.0   TTR:  85.4 % (1 y)   Target end date:  Indefinite   Send INR reminders to:  [facility]    Indications    Coagulation defect (H) [D68.9]  Acute deep vein thrombosis (DVT) of proximal vein of lower extremity (H) (Resolved) [I82.4Y9]  Long term current use of anticoagulant therapy [Z79.01]  Acute deep vein thrombosis (DVT) of proximal vein of lower extremity  unspecified laterality (H) (Resolved) [I82.4Y9]  Chronic anticoagulation [Z79.01]  H/O deep venous thrombosis [Z86.718]  Acute deep vein thrombosis (DVT) of proximal vein of  lower extremity  unspecified laterality (H) [I82.4Y9]           Comments:  okay to leave DVM         Anticoagulation Care Providers     Provider Role Specialty Phone number    Pj Quijano MD Referring Family Medicine [phone]    Rigoberto Hernandez MD Referring Family Medicine [phone]

## 2022-08-03 ENCOUNTER — OFFICE VISIT (OUTPATIENT)
Dept: AUDIOLOGY | Facility: CLINIC | Age: 72
End: 2022-08-03
Payer: MEDICARE

## 2022-08-03 DIAGNOSIS — H90.3 BILATERAL SENSORINEURAL HEARING LOSS: Primary | ICD-10-CM

## 2022-08-03 PROCEDURE — V5261 HEARING AID, DIGIT, BIN, BTE: HCPCS | Performed by: AUDIOLOGIST

## 2022-08-03 PROCEDURE — V5011 HEARING AID FITTING/CHECKING: HCPCS | Mod: RT | Performed by: AUDIOLOGIST

## 2022-08-03 PROCEDURE — V5264 EAR MOLD/INSERT: HCPCS | Mod: RT | Performed by: AUDIOLOGIST

## 2022-08-03 PROCEDURE — V5020 CONFORMITY EVALUATION: HCPCS | Mod: RT | Performed by: AUDIOLOGIST

## 2022-08-03 PROCEDURE — 92593 PR HEARING AID CHECK, BINAURAL: CPT | Performed by: AUDIOLOGIST

## 2022-08-03 PROCEDURE — V5160 DISPENSING FEE BINAURAL: HCPCS | Performed by: AUDIOLOGIST

## 2022-08-03 PROCEDURE — 99207 PR NO CHARGE LOS: CPT | Performed by: AUDIOLOGIST

## 2022-08-03 NOTE — PATIENT INSTRUCTIONS

## 2022-08-03 NOTE — PROGRESS NOTES
AUDIOLOGY REPORT    SUBJECTIVE: Jose M Rea, a 72 year old male, was seen in the Audiology Clinic at [facility] today for a Binaural hearing aid fitting. Previous results have revealed a bilateral sensorineural hearing loss. The patient was given medical clearance to pursue amplification by  Sheldon Wood MD. The patient was accompanied to today's appointment by his wife.       OBJECTIVE:  Prior to fitting, a hearing aid check was performed to ensure device functionality. The hearing aid conformity evaluation was completed.The hearing aids were placed and they provided a good fit. Real-ear-probe-microphone measurements were completed on the [employer] system and were an acceptable match to NAL-NL2 target with soft sounds audible, moderate sounds comfortable, and loud sounds below discomfort. UCLs are verified through maximum power output measures and demonstrate appropriate limiting of loud inputs. Mr. Rea was oriented to proper hearing aid use, care, cleaning (no water, dry brush), batteries (size rechargeable, insertion/removal, toxicity, low-battery signal), aid insertion/removal, user booklet, warranty information, storage cases, and other hearing aid details. The patient confirmed understanding of hearing aid use and care, and showed proper insertion of hearing aid and batteries while in the office today. Mr. Rea reported good volume and sound quality today.    EAR(S) FIT: Binaural  HEARING AID MAKE: Right: Phonak; Left: Phonak  HEARING AID MODEL #: Right: Audeo P50-R; Left: Audeo P50-R  HEARING AID STYLE: Right: RITE; Left: RITE   LENGTH: Right:  1 MP; Left:  1 MP  EARMOLDS: Right: Micromold w canalock [id number]; Left:  Micromold w canalock [id number]  SERIAL NUMBERS: Right: [id number]; Left: [id number]  WARRANTY END DATE: Right: 10/2/2025; Left:: 10/2/2025      CHARGES:   Earmold(s): , Qty 2, $160.00, NU (New), RT (Right) and LT (Left)  Hearing Aid Check: Binaural, 10429,  $81.00  Dispensing Fee: Binaural, , $500.00  Fit/Orientation: Binaural, , $416.00  Hearing Aid Conformity Evaluation: 2, , $174.00  Hearing Aid Digital: Binaural, BTE,  $2429  Total: $3760     ASSESSMENT: Binaural hearing aid fitting completed today. Verification measures were performed. The 45 day trial period was explained to patient, and they expressed understanding. Mr. Rea signed the Hearing Aid Purchase Agreement and was given a copy, as well as details on his hearing aids. Patient was counseled that exact out of pocket amounts cannot be determined for hearing aid claims being sent to insurance. Any insurance coverage information presented to the patient is an estimate only, and is not a guarantee of payment. Patient has been advised to check with their own insurance.    PLAN: Mr. Rea will return for follow-up in 2-3 weeks for a hearing aid review appointment. Please call this clinic with questions regarding today s appointment.    Mark Chris CCC-A  Licensed Audiologist #[id number]  8/3/2022

## 2022-08-08 ENCOUNTER — LAB (OUTPATIENT)
Dept: FAMILY MEDICINE | Facility: CLINIC | Age: 72
End: 2022-08-08
Payer: MEDICARE

## 2022-08-08 DIAGNOSIS — Z20.822 ENCOUNTER FOR LABORATORY TESTING FOR COVID-19 VIRUS: ICD-10-CM

## 2022-08-08 PROCEDURE — U0003 INFECTIOUS AGENT DETECTION BY NUCLEIC ACID (DNA OR RNA); SEVERE ACUTE RESPIRATORY SYNDROME CORONAVIRUS 2 (SARS-COV-2) (CORONAVIRUS DISEASE [COVID-19]), AMPLIFIED PROBE TECHNIQUE, MAKING USE OF HIGH THROUGHPUT TECHNOLOGIES AS DESCRIBED BY CMS-2020-01-R: HCPCS

## 2022-08-08 PROCEDURE — U0005 INFEC AGEN DETEC AMPLI PROBE: HCPCS

## 2022-08-08 PROCEDURE — 99207 PR NO CHARGE LOS: CPT

## 2022-08-09 LAB — SARS-COV-2 RNA RESP QL NAA+PROBE: NEGATIVE

## 2022-08-11 ENCOUNTER — LAB (OUTPATIENT)
Dept: LAB | Facility: CLINIC | Age: 72
End: 2022-08-11
Payer: MEDICARE

## 2022-08-11 ENCOUNTER — ANTICOAGULATION THERAPY VISIT (OUTPATIENT)
Dept: ANTICOAGULATION | Facility: CLINIC | Age: 72
End: 2022-08-11

## 2022-08-11 DIAGNOSIS — D68.9 COAGULATION DEFECT (H): Primary | ICD-10-CM

## 2022-08-11 DIAGNOSIS — Z86.718 H/O DEEP VENOUS THROMBOSIS: ICD-10-CM

## 2022-08-11 DIAGNOSIS — Z79.01 LONG TERM CURRENT USE OF ANTICOAGULANT THERAPY: ICD-10-CM

## 2022-08-11 DIAGNOSIS — I82.4Y9 ACUTE DEEP VEIN THROMBOSIS (DVT) OF PROXIMAL VEIN OF LOWER EXTREMITY, UNSPECIFIED LATERALITY (H): ICD-10-CM

## 2022-08-11 DIAGNOSIS — Z79.01 CHRONIC ANTICOAGULATION: ICD-10-CM

## 2022-08-11 DIAGNOSIS — D68.9 COAGULATION DEFECT (H): ICD-10-CM

## 2022-08-11 LAB — INR BLD: 2.8 (ref 0.9–1.1)

## 2022-08-11 PROCEDURE — 85610 PROTHROMBIN TIME: CPT

## 2022-08-11 PROCEDURE — 36416 COLLJ CAPILLARY BLOOD SPEC: CPT

## 2022-08-11 NOTE — PROGRESS NOTES
ANTICOAGULATION MANAGEMENT     Jose M Rea 72 year old male is on warfarin with therapeutic INR result. (Goal INR 2.0-3.0)    Recent labs: (last 7 days)     08/11/22  0846   INR 2.8*       ASSESSMENT       Source(s): Chart Review and Patient/Caregiver Call       Warfarin doses taken: Warfarin taken as instructed    Diet: No new diet changes identified    New illness, injury, or hospitalization: No    Medication/supplement changes: None noted    Signs or symptoms of bleeding or clotting: No    Previous INR: Therapeutic last 2(+) visits    Additional findings: None       PLAN     Recommended plan for no diet, medication or health factor changes affecting INR     Dosing Instructions: Continue your current warfarin dose with next INR in 4 weeks       Summary  As of 8/11/2022    Full warfarin instructions:  7.5 mg every day   Next INR check:  9/7/2022             Telephone call with Jose M who verbalizes understanding and agrees to plan    Lab visit scheduled    Education provided: Contact [phone]  with any changes, questions or concerns.     Plan made per [facility] anticoagulation protocol    Justa Vaca RN  Anticoagulation Clinic  8/11/2022    _______________________________________________________________________     Anticoagulation Episode Summary     Current INR goal:  2.0-3.0   TTR:  85.4 % (1 y)   Target end date:  Indefinite   Send INR reminders to:  [facility]    Indications    Coagulation defect (H) [D68.9]  Acute deep vein thrombosis (DVT) of proximal vein of lower extremity (H) (Resolved) [I82.4Y9]  Long term current use of anticoagulant therapy [Z79.01]  Acute deep vein thrombosis (DVT) of proximal vein of lower extremity  unspecified laterality (H) (Resolved) [I82.4Y9]  Chronic anticoagulation [Z79.01]  H/O deep venous thrombosis [Z86.718]  Acute deep vein thrombosis (DVT) of proximal vein of lower extremity  unspecified laterality (H) [I82.4Y9]           Comments:  okay to leave DVM          Anticoagulation Care Providers     Provider Role Specialty Phone number    Pj Quijano MD Referring Family Medicine [phone]    Rigoberto Hernandez MD Referring Family Medicine [phone]

## 2022-08-19 ENCOUNTER — OFFICE VISIT (OUTPATIENT)
Dept: AUDIOLOGY | Facility: CLINIC | Age: 72
End: 2022-08-19
Payer: MEDICARE

## 2022-08-19 DIAGNOSIS — H90.3 SENSORINEURAL HEARING LOSS, BILATERAL: Primary | ICD-10-CM

## 2022-08-19 PROCEDURE — V5299 HEARING SERVICE: HCPCS | Performed by: AUDIOLOGIST

## 2022-08-19 NOTE — PROGRESS NOTES
AUDIOLOGY REPORT    SUBJECTIVE:Jose M Rea is a 72 year old male who was seen in the Audiology Clinic at the [facility] on 8/19/2022  for a follow-up check regarding the fitting of new hearing aids. Previous results have revealed bilateral hearing loss.  The patient has been seen previously in this clinic and was fit with binaural hearing aids on 8/3/2022.  Jose M reports ear discomfort with earmolds, poor sound quality and other sounds too sharp and tinny. At this time he would like to discontinue his hearing aid trial. They were accompanied today by their wife.    OBJECTIVE:      Attempts were offered to change the ear pieces, reduce the volume, and attempt to remove the 'tinny' aspect to the hearing aids. Jose M declined and said he feels at this point in time the hearing aid benefit is not worth the cost.     Based on patient report, the following changes were made; None.       ASSESSMENT: Hearing aids were returned in good working order to [facility] and will be refunded to the patient.     PLAN:Jose M will return if we would ever like another trial with amplification (patient reports he may wish to use the ITE style next time). Please call this clinic with any questions regarding today s appointment.    Mark Chris CCC-A  Licensed Audiologist #[id number]  8/19/2022

## 2022-09-07 ENCOUNTER — LAB (OUTPATIENT)
Dept: LAB | Facility: CLINIC | Age: 72
End: 2022-09-07
Payer: MEDICARE

## 2022-09-07 ENCOUNTER — ANTICOAGULATION THERAPY VISIT (OUTPATIENT)
Dept: ANTICOAGULATION | Facility: CLINIC | Age: 72
End: 2022-09-07

## 2022-09-07 DIAGNOSIS — I82.4Y9 ACUTE DEEP VEIN THROMBOSIS (DVT) OF PROXIMAL VEIN OF LOWER EXTREMITY, UNSPECIFIED LATERALITY (H): ICD-10-CM

## 2022-09-07 DIAGNOSIS — Z79.01 CHRONIC ANTICOAGULATION: ICD-10-CM

## 2022-09-07 DIAGNOSIS — D68.9 COAGULATION DEFECT (H): ICD-10-CM

## 2022-09-07 DIAGNOSIS — Z79.01 LONG TERM CURRENT USE OF ANTICOAGULANT THERAPY: ICD-10-CM

## 2022-09-07 DIAGNOSIS — Z86.718 H/O DEEP VENOUS THROMBOSIS: ICD-10-CM

## 2022-09-07 DIAGNOSIS — D68.9 COAGULATION DEFECT (H): Primary | ICD-10-CM

## 2022-09-07 LAB — INR BLD: 2.8 (ref 0.9–1.1)

## 2022-09-07 PROCEDURE — 36416 COLLJ CAPILLARY BLOOD SPEC: CPT

## 2022-09-07 PROCEDURE — 85610 PROTHROMBIN TIME: CPT

## 2022-09-07 NOTE — PROGRESS NOTES
ANTICOAGULATION MANAGEMENT     Jose M Jeffersonmaximiliano 72 year old male is on warfarin with therapeutic INR result. (Goal INR 2.0-3.0)    Recent labs: (last 7 days)     09/07/22  0824   INR 2.8*       ASSESSMENT       Source(s): Chart Review and Patient/Caregiver Call       Warfarin doses taken: Warfarin taken as instructed    Diet: No new diet changes identified    New illness, injury, or hospitalization: No    Medication/supplement changes: None noted    Signs or symptoms of bleeding or clotting: No    Previous INR: Therapeutic last 2(+) visits    Additional findings: None     PLAN     Recommended plan for no diet, medication or health factor changes affecting INR     Dosing Instructions: Continue your current warfarin dose with next INR in 5 weeks       Summary  As of 9/7/2022    Full warfarin instructions:  7.5 mg every day   Next INR check:  10/12/2022             Telephone call with Jose M who verbalizes understanding and agrees to plan    Lab visit scheduled    Education provided: Please call back if any changes to your diet, medications or how you've been taking warfarin    Plan made per [facility] anticoagulation protocol    Salma Diane RN  Anticoagulation Clinic  9/7/2022    _______________________________________________________________________     Anticoagulation Episode Summary     Current INR goal:  2.0-3.0   TTR:  85.4 % (1 y)   Target end date:  Indefinite   Send INR reminders to:  [facility]    Indications    Coagulation defect (H) [D68.9]  Acute deep vein thrombosis (DVT) of proximal vein of lower extremity (H) (Resolved) [I82.4Y9]  Long term current use of anticoagulant therapy [Z79.01]  Acute deep vein thrombosis (DVT) of proximal vein of lower extremity  unspecified laterality (H) (Resolved) [I82.4Y9]  Chronic anticoagulation [Z79.01]  H/O deep venous thrombosis [Z86.718]  Acute deep vein thrombosis (DVT) of proximal vein of lower extremity  unspecified laterality (H) [I82.4Y9]           Comments:   okay to leave DVM         Anticoagulation Care Providers     Provider Role Specialty Phone number    Pj Quijano MD Referring Family Medicine [phone]    Rigoberto Hernandez MD Referring Family Medicine [phone]

## 2022-09-18 ENCOUNTER — HEALTH MAINTENANCE LETTER (OUTPATIENT)
Age: 72
End: 2022-09-18

## 2022-10-12 ENCOUNTER — TELEPHONE (OUTPATIENT)
Dept: FAMILY MEDICINE | Facility: CLINIC | Age: 72
End: 2022-10-12

## 2022-10-12 ENCOUNTER — LAB (OUTPATIENT)
Dept: LAB | Facility: CLINIC | Age: 72
End: 2022-10-12
Payer: MEDICARE

## 2022-10-12 ENCOUNTER — ANTICOAGULATION THERAPY VISIT (OUTPATIENT)
Dept: ANTICOAGULATION | Facility: CLINIC | Age: 72
End: 2022-10-12

## 2022-10-12 DIAGNOSIS — I82.4Y9 ACUTE DEEP VEIN THROMBOSIS (DVT) OF PROXIMAL VEIN OF LOWER EXTREMITY, UNSPECIFIED LATERALITY (H): ICD-10-CM

## 2022-10-12 DIAGNOSIS — D68.9 COAGULATION DEFECT (H): Primary | ICD-10-CM

## 2022-10-12 DIAGNOSIS — Z79.01 CHRONIC ANTICOAGULATION: ICD-10-CM

## 2022-10-12 DIAGNOSIS — Z86.718 H/O DEEP VENOUS THROMBOSIS: ICD-10-CM

## 2022-10-12 DIAGNOSIS — D68.9 COAGULATION DEFECT (H): ICD-10-CM

## 2022-10-12 DIAGNOSIS — Z79.01 LONG TERM CURRENT USE OF ANTICOAGULANT THERAPY: ICD-10-CM

## 2022-10-12 LAB — INR BLD: 2.6 (ref 0.9–1.1)

## 2022-10-12 PROCEDURE — 85610 PROTHROMBIN TIME: CPT

## 2022-10-12 PROCEDURE — 36416 COLLJ CAPILLARY BLOOD SPEC: CPT

## 2022-10-12 NOTE — TELEPHONE ENCOUNTER
Reason for Call:  Other returning call    Detailed comments: PT RETURNING YAQUELIN'S CALL REGARDING INR    Phone Number Patient can be reached at: Cell number on file:    Telephone Information:   Mobile [phone]       Best Time: ANYTIME    Can we leave a detailed message on this number? YES    Call taken on 10/12/2022 at 10:08 AM by Cary Yoon

## 2022-10-12 NOTE — PROGRESS NOTES
ANTICOAGULATION MANAGEMENT     Jose M Rea 72 year old male is on warfarin with therapeutic INR result. (Goal INR 2.0-3.0)    Recent labs: (last 7 days)     10/12/22  0842   INR 2.6*       ASSESSMENT       Source(s): Chart Review and Patient/Caregiver Call       Warfarin doses taken: Warfarin taken as instructed    Diet: No new diet changes identified    New illness, injury, or hospitalization: No    Medication/supplement changes: None noted    Signs or symptoms of bleeding or clotting: No    Previous INR: Therapeutic last 2(+) visits    Additional findings: None     PLAN     Recommended plan for no diet, medication or health factor changes affecting INR     Dosing Instructions: Continue your current warfarin dose with next INR in 6 weeks       Summary  As of 10/12/2022    Full warfarin instructions:  7.5 mg every day   Next INR check:  11/23/2022             Telephone call with Jose M who verbalizes understanding and agrees to plan    Lab visit scheduled    Education provided: Please call back if any changes to your diet, medications or how you've been taking warfarin    Plan made per [facility] anticoagulation protocol    Salma Diane RN  Anticoagulation Clinic  10/12/2022    _______________________________________________________________________     Anticoagulation Episode Summary     Current INR goal:  2.0-3.0   TTR:  85.4 % (1 y)   Target end date:  Indefinite   Send INR reminders to:  [facility]    Indications    Coagulation defect (H) [D68.9]  Acute deep vein thrombosis (DVT) of proximal vein of lower extremity (H) (Resolved) [I82.4Y9]  Long term current use of anticoagulant therapy [Z79.01]  Acute deep vein thrombosis (DVT) of proximal vein of lower extremity  unspecified laterality (H) (Resolved) [I82.4Y9]  Chronic anticoagulation [Z79.01]  H/O deep venous thrombosis [Z86.718]  Acute deep vein thrombosis (DVT) of proximal vein of lower extremity  unspecified laterality (H) [I82.4Y9]            Comments:  okay to leave DVM         Anticoagulation Care Providers     Provider Role Specialty Phone number    Pj Quijano MD Referring Family Medicine [phone]    Rigoberto Hernandez MD Referring Family Medicine [phone]

## 2022-10-26 ENCOUNTER — OFFICE VISIT (OUTPATIENT)
Dept: FAMILY MEDICINE | Facility: CLINIC | Age: 72
End: 2022-10-26
Payer: MEDICARE

## 2022-10-26 ENCOUNTER — TELEPHONE (OUTPATIENT)
Dept: FAMILY MEDICINE | Facility: CLINIC | Age: 72
End: 2022-10-26

## 2022-10-26 VITALS
TEMPERATURE: 97.3 F | SYSTOLIC BLOOD PRESSURE: 132 MMHG | HEIGHT: 70 IN | DIASTOLIC BLOOD PRESSURE: 72 MMHG | HEART RATE: 79 BPM | BODY MASS INDEX: 28.35 KG/M2 | OXYGEN SATURATION: 98 % | WEIGHT: 198 LBS

## 2022-10-26 DIAGNOSIS — R10.9 ABDOMINAL DISCOMFORT: Primary | ICD-10-CM

## 2022-10-26 DIAGNOSIS — N52.9 ERECTILE DYSFUNCTION, UNSPECIFIED ERECTILE DYSFUNCTION TYPE: ICD-10-CM

## 2022-10-26 PROCEDURE — 99214 OFFICE O/P EST MOD 30 MIN: CPT | Performed by: NURSE PRACTITIONER

## 2022-10-26 RX ORDER — TADALAFIL 10 MG/1
TABLET ORAL
Qty: 10 TABLET | Refills: 11 | Status: SHIPPED | OUTPATIENT
Start: 2022-10-26 | End: 2023-04-14

## 2022-10-26 ASSESSMENT — PAIN SCALES - GENERAL: PAINLEVEL: NO PAIN (1)

## 2022-10-26 NOTE — TELEPHONE ENCOUNTER
Pt called to report one month or so of intermittent nagging discomfort in mid lower abdomen. There has been no blood in his stool and no urinary problems, no chills or fever.  Appt was available this afternoon so pt will be seen in clinic today.   Alisha Angelo RN

## 2022-10-26 NOTE — PATIENT INSTRUCTIONS
Schedule CT at   Stop Viagra, can try Cialis.  Schedule a urology appointment for further evaluation.

## 2022-10-26 NOTE — PROGRESS NOTES
"  Assessment & Plan     Abdominal discomfort  Ongoing for 3 weeks, not associated with fevers, diarrhea, constipation, vomiting. Benign exam. Given length of symptoms, will obtain imaging.  - CT Abdomen Pelvis w Contrast; Future    Erectile dysfunction, unspecified erectile dysfunction type  - tadalafil (CIALIS) 10 MG tablet; Take one tablet by mouth 30 minutes prior to sexual activity.  - Adult Urology  Referral; Future  - Jose M experiencing ED for the past year and feels his ED has significantly worsened in the past 6 months. No change with sildenafil. Unable to achieve erection. Can try tadalafil, however would also recommend urology follow up.       BMI:   Estimated body mass index is 28.41 kg/m  as calculated from the following:    Height as of this encounter: 1.778 m (5' 10\").    Weight as of this encounter: 89.8 kg (198 lb).           Return in about 1 week (around 11/2/2022) for worsening or continued symptoms.    NICHELLE Mansfield CNP  [facility]    Benita Salguero is a 72 year old, presenting for the following health issues:  Abdominal Pain    No GI hx besides appendectomy in 1956. Feels discomfort described as \"pressure, kind of feels like there is something there\" for the past 3-4 weeks. Reports this is a discomfort rather than a pain. No N/V/D/C. No bloody stool. Thinks there are randomly some days when he feels a little feverish but this passes quickly - does not feel the abd discomfort is worse on these days. No problems urinating. No hematuria.     Notes he has been experiencing ED for about the past year and was prescribed sildenafil but feels this helps a little bit, but not enough. Douglass is not painful but unable to have intercourse due to lack of erection. No penile or scrotal pain.     History of Present Illness       Reason for visit:  Discomfort in lower abdomen  Symptom onset:  3-4 weeks ago  Symptoms include:  Doscomfort  Symptom intensity:  " "Mild  Symptom progression:  Worsening  Had these symptoms before:  No  What makes it worse:  No  What makes it better:  No    He eats 0-1 servings of fruits and vegetables daily.He consumes 1 sweetened beverage(s) daily.He exercises with enough effort to increase his heart rate 10 to 19 minutes per day.  He exercises with enough effort to increase his heart rate 3 or less days per week.   He is taking medications regularly.           Review of Systems   Constitutional, HEENT, cardiovascular, pulmonary, gi and gu systems are negative, except as otherwise noted.      Objective    /72   Pulse 79   Temp 97.3  F (36.3  C) (Tympanic)   Ht 1.778 m (5' 10\")   Wt 89.8 kg (198 lb)   SpO2 98%   BMI 28.41 kg/m    Body mass index is 28.41 kg/m .  Physical Exam   GENERAL: healthy, alert and no distress  NECK: no adenopathy, no asymmetry, masses, or scars and thyroid normal to palpation  RESP: lungs clear to auscultation - no rales, rhonchi or wheezes  CV: regular rate and rhythm, normal S1 S2, no S3 or S4, no murmur, click or rub, no peripheral edema and peripheral pulses strong  ABDOMEN: soft, nontender, no hepatosplenomegaly, no masses and bowel sounds normal  SKIN: no suspicious lesions or rashes  PSYCH: mentation appears normal, affect normal/bright          Arlin Greene [facility]-FNP Student            "

## 2022-10-29 ENCOUNTER — HOSPITAL ENCOUNTER (OUTPATIENT)
Dept: CT IMAGING | Facility: CLINIC | Age: 72
Discharge: HOME | End: 2022-10-29
Attending: NURSE PRACTITIONER | Admitting: NURSE PRACTITIONER
Payer: MEDICARE

## 2022-10-29 DIAGNOSIS — R10.9 ABDOMINAL DISCOMFORT: ICD-10-CM

## 2022-10-29 LAB
CREAT BLD-MCNC: 0.8 MG/DL (ref 0.7–1.3)
GFR SERPL CREATININE-BSD FRML MDRD: >60 ML/MIN/1.73M2

## 2022-10-29 PROCEDURE — 82565 ASSAY OF CREATININE: CPT

## 2022-10-29 PROCEDURE — G1010 CDSM STANSON: HCPCS

## 2022-10-29 PROCEDURE — 250N000009 HC RX 250: Performed by: RADIOLOGY

## 2022-10-29 PROCEDURE — 250N000011 HC RX IP 250 OP 636: Performed by: RADIOLOGY

## 2022-10-29 RX ORDER — IOPAMIDOL 755 MG/ML
86 INJECTION, SOLUTION INTRAVASCULAR ONCE
Status: COMPLETED | OUTPATIENT
Start: 2022-10-29 | End: 2022-10-29

## 2022-10-29 RX ADMIN — SODIUM CHLORIDE 64 ML: 9 INJECTION, SOLUTION INTRAVENOUS at 13:09

## 2022-10-29 RX ADMIN — IOPAMIDOL 86 ML: 755 INJECTION, SOLUTION INTRAVENOUS at 13:09

## 2022-10-31 DIAGNOSIS — R93.5 ABNORMAL CT OF THE ABDOMEN: ICD-10-CM

## 2022-10-31 DIAGNOSIS — R10.9 ABDOMINAL DISCOMFORT: Primary | ICD-10-CM

## 2022-11-01 ENCOUNTER — TELEPHONE (OUTPATIENT)
Dept: FAMILY MEDICINE | Facility: CLINIC | Age: 72
End: 2022-11-01

## 2022-11-01 DIAGNOSIS — Z79.01 LONG TERM CURRENT USE OF ANTICOAGULANT THERAPY: ICD-10-CM

## 2022-11-01 DIAGNOSIS — Z86.718 H/O DEEP VENOUS THROMBOSIS: ICD-10-CM

## 2022-11-01 DIAGNOSIS — Z79.01 CHRONIC ANTICOAGULATION: ICD-10-CM

## 2022-11-01 DIAGNOSIS — D68.9 COAGULATION DEFECT (H): Primary | ICD-10-CM

## 2022-11-01 DIAGNOSIS — I82.4Y9 ACUTE DEEP VEIN THROMBOSIS (DVT) OF PROXIMAL VEIN OF LOWER EXTREMITY, UNSPECIFIED LATERALITY (H): ICD-10-CM

## 2022-11-01 NOTE — TELEPHONE ENCOUNTER
Reason for call:  Other   Patient called regarding (reason for call): Patient is having an endoscopy in a couple of weeks and wants instructions on how to do his warfarin medication in preparation to this procedure.  Additional comments: Please call patient with instructions    Phone number to reach patient:  Cell number on file:    Telephone Information:   Mobile [phone]       Best Time:  Anytime    Can we leave a detailed message on this number?  YES    Travel screening: Not Applicable

## 2022-11-01 NOTE — TELEPHONE ENCOUNTER
Patient is scheduled for an upper GI endoscopy on 11/15/22 with Dr. Pulido.    Routing to Clinical Pharmacist.    Justa Vaca RN, BSN  [facility] Anticoagulation [facility]  [phone]

## 2022-11-01 NOTE — TELEPHONE ENCOUNTER
"ASTRID-PROCEDURAL ANTICOAGULATION  MANAGEMENT    ASSESSMENT     Warfarin interruption plan for EGD on 11/15/2022.    Indication for Anticoagulation: DVT and Heterozygous Factor V Leiden      provoked DVT 2016 after shoulder surgery    Non-occlusive chronic DVT on US 2020    Tolerated 3 day hold for myelogram 2022 with no bridge      Astrid-Procedure Risk stratification for thromboembolism: moderate ( Chest guidelines)    VTE:  CHEST Perioperative Management guidelines suggest against bridging for patients with Hx of VTE as sole clinical indication for warfarin except in high risk stratification patients.     RECOMMENDATION       Pre-Procedure:  o Hold warfarin for 3 days, until after procedure startin2022   o Advise INR check  or 11/10 to confirm INR and add additional day hold if needed, also avoid ETOH prior to exam  o No Bridge      Post-Procedure:  o Resume warfarin dose if okay with provider doing procedure on night of procedure, 11/15/2022 PM: 15mg & 2022 15mg  o Recheck INR ~ 7 days after resuming warfarin       Plan routed to referring provider for approval  ?   Mireya Kingston [facility]    SUBJECTIVE/OBJECTIVE     Jose M POSEY Rona, a 72 year old male    Goal INR Range: 2.0-3.0     Patient bridged in past: Yes: most recently for 2021 colonoscopy    Wt Readings from Last 3 Encounters:   10/26/22 89.8 kg (198 lb)   22 86.2 kg (190 lb)   22 86.2 kg (190 lb)      Ideal body weight: 73 kg (160 lb 15 oz)  Adjusted ideal body weight: 79.7 kg (175 lb 12.2 oz)     Estimated body mass index is 28.41 kg/m  as calculated from the following:    Height as of 10/26/22: 1.778 m (5' 10\").    Weight as of 10/26/22: 89.8 kg (198 lb).    Lab Results   Component Value Date    INR 2.6 (H) 10/12/2022    INR 2.8 (H) 2022    INR 2.8 (H) 2022     No results found for: HGB, HCT, PLT  Lab Results   Component Value Date    CR 0.8 10/29/2022    CR 0.86 2022    CR 0.93 " 01/06/2017     Estimated Creatinine Clearance: 91.3 mL/min (based on SCr of 0.8 mg/dL).

## 2022-11-02 ENCOUNTER — TELEPHONE (OUTPATIENT)
Dept: FAMILY MEDICINE | Facility: CLINIC | Age: 72
End: 2022-11-02

## 2022-11-02 NOTE — TELEPHONE ENCOUNTER
Discussed with Mireya Kingston [facility] - Ok for patient to hold X 3 days prior to the procedure. Procedure is on 11/15. Pt is to take 15 mg on 11/15 and 11/16. Then resume normal maintenance dose of 7.5 mg daily and recheck INR within a week of procedure. INR appt scheduled.   Pt is to recheck INR on 11/9 as well.   Pt requests a lab order to be mailed to his house - [place] going [place] and will continue to have [facility] manage his warfarin.  Patient verbalizes understanding and agrees to plan. No further questions or concerns..  Salma Diaen RN on 11/2/2022 at 2:46 PM

## 2022-11-02 NOTE — TELEPHONE ENCOUNTER
General Call    Contacts       Type Contact Phone/Fax    11/02/2022 10:53 AM CDT Phone (Incoming) Jose M Rea (Self) [phone] (M)        Reason for Call:  Endoscopy     What are your questions or concerns:  Is having an endoscopy and wanted to know if he need to get his blood count down and/or need to chenge medicine or dose that he is taking     Date of last appointment with provider: Kalpana    Could we send this information to you in [facility] or would you prefer to receive a phone call?:   Patient would prefer a phone call   Okay to leave a detailed message?: Yes at Cell number on file:    Telephone Information:   Mobile [phone]

## 2022-11-02 NOTE — TELEPHONE ENCOUNTER
Mireya Kingston, [facility] 8 minutes ago (11:51 AM)     MJ  Looks likePCP RN called, I had a typo, restart is 2 nights post procedure 15mg, please call, also see note about outside orders.   Mireya Kingston, PharmD BCACP   Anticoagulation Clinical Pharmacist

## 2022-11-02 NOTE — TELEPHONE ENCOUNTER
The patient was notified but I am not sure about the dates to resume.  Please clarify if the patient should resume on 11/15/22 and 11/13/22? Please discuss with patient.  The patient was also asking about INR checks will in [place] in Jan, feb, and march.    Thank you    Mireya ROCK RN

## 2022-11-04 ENCOUNTER — VIRTUAL VISIT (OUTPATIENT)
Dept: FAMILY MEDICINE | Facility: CLINIC | Age: 72
End: 2022-11-04
Payer: MEDICARE

## 2022-11-04 DIAGNOSIS — Z53.9 ERRONEOUS ENCOUNTER--DISREGARD: Primary | ICD-10-CM

## 2022-11-04 NOTE — TELEPHONE ENCOUNTER
Patient contacted as he had additional questions about his CT report after we discussed it earlier this week. Reviewed findings of diverticula, hepatic cysts. Discussed advanced ASCVD. He is on simvastatin, discussed lifestyle modifications. Offered referral to cardiology but he declines at this time in favor of discussing with his PCP.  Kendy Giles, CNP

## 2022-11-09 ENCOUNTER — ANTICOAGULATION THERAPY VISIT (OUTPATIENT)
Dept: ANTICOAGULATION | Facility: CLINIC | Age: 72
End: 2022-11-09

## 2022-11-09 ENCOUNTER — LAB (OUTPATIENT)
Dept: LAB | Facility: CLINIC | Age: 72
End: 2022-11-09
Payer: MEDICARE

## 2022-11-09 DIAGNOSIS — Z79.01 LONG TERM CURRENT USE OF ANTICOAGULANT THERAPY: ICD-10-CM

## 2022-11-09 DIAGNOSIS — Z79.01 CHRONIC ANTICOAGULATION: ICD-10-CM

## 2022-11-09 DIAGNOSIS — Z86.718 H/O DEEP VENOUS THROMBOSIS: ICD-10-CM

## 2022-11-09 DIAGNOSIS — I82.4Y9 ACUTE DEEP VEIN THROMBOSIS (DVT) OF PROXIMAL VEIN OF LOWER EXTREMITY, UNSPECIFIED LATERALITY (H): ICD-10-CM

## 2022-11-09 DIAGNOSIS — D68.9 COAGULATION DEFECT (H): ICD-10-CM

## 2022-11-09 DIAGNOSIS — D68.9 COAGULATION DEFECT (H): Primary | ICD-10-CM

## 2022-11-09 LAB — INR BLD: 1.9 (ref 0.9–1.1)

## 2022-11-09 PROCEDURE — 85610 PROTHROMBIN TIME: CPT

## 2022-11-09 PROCEDURE — 36416 COLLJ CAPILLARY BLOOD SPEC: CPT

## 2022-11-09 NOTE — PROGRESS NOTES
ANTICOAGULATION MANAGEMENT     Jose M POSEY Rona 72 year old male is on warfarin with subtherapeutic INR result. (Goal INR 2.0-3.0)    Recent labs: (last 7 days)     11/09/22  0823   INR 1.9*       ASSESSMENT       Source(s): Chart Review and Patient/Caregiver Call       Warfarin doses taken: Warfarin taken as instructed    Diet: No new diet changes identified    New illness, injury, or hospitalization: No    Medication/supplement changes: None noted    Signs or symptoms of bleeding or clotting: No    Previous INR: Therapeutic last 2(+) visits    Additional findings: Upcoming surgery/procedure 11/15 - 3 day hold prior to procedure     Procedure: EGD    See TE 11/1/22     PLAN     Recommended plan for no diet, medication or health factor changes affecting INR     Dosing Instructions: Take maintenance dose. On 11/12 start 3 day hold. Resume warfarin on 11/15 (evening of the procedure if ok by provider doing procedure). Take 15 mg on 11/15 and 11/16. Then resume maintenance dose with next INR in 2 weeks         Summary  As of 11/9/2022    Full warfarin instructions:  11/12: Hold; 11/13: Hold; 11/14: Hold; 11/15: 15 mg; 11/16: 15 mg; Otherwise 7.5 mg every day; Starting 11/9/2022   Next INR check:  11/22/2022             Telephone call with Jose M who verbalizes understanding and agrees to plan    Lab visit scheduled    Education provided:     Please call back if any changes to your diet, medications or how you've been taking warfarin    Plan made per [facility] anticoagulation protocol    Salma Diane RN  Anticoagulation Clinic  11/9/2022    _______________________________________________________________________     Anticoagulation Episode Summary     Current INR goal:  2.0-3.0   TTR:  84.3 % (1 y)   Target end date:  Indefinite   Send INR reminders to:  [facility] [place]    Indications    Coagulation defect (H) [D68.9]  Acute deep vein thrombosis (DVT) of proximal vein of lower extremity (H) (Resolved) [I82.4Y9]  Long term  current use of anticoagulant therapy [Z79.01]  Acute deep vein thrombosis (DVT) of proximal vein of lower extremity  unspecified laterality (H) (Resolved) [I82.4Y9]  Chronic anticoagulation [Z79.01]  H/O deep venous thrombosis [Z86.718]  Acute deep vein thrombosis (DVT) of proximal vein of lower extremity  unspecified laterality (H) [I82.4Y9]           Comments:  okay to leave DVM         Anticoagulation Care Providers     Provider Role Specialty Phone number    Pj Quijano MD Referring Family Medicine [phone]    Rigoberto Hernandez MD Referring Family Medicine [phone]

## 2022-11-14 ENCOUNTER — ANESTHESIA EVENT (OUTPATIENT)
Dept: GASTROENTEROLOGY | Facility: CLINIC | Age: 72
End: 2022-11-14
Payer: MEDICARE

## 2022-11-14 RX ORDER — NALOXONE HYDROCHLORIDE 0.4 MG/ML
0.2 INJECTION, SOLUTION INTRAMUSCULAR; INTRAVENOUS; SUBCUTANEOUS
Status: CANCELLED | OUTPATIENT
Start: 2022-11-14

## 2022-11-14 RX ORDER — PROCHLORPERAZINE MALEATE 5 MG
5 TABLET ORAL EVERY 6 HOURS PRN
Status: CANCELLED | OUTPATIENT
Start: 2022-11-14

## 2022-11-14 RX ORDER — ONDANSETRON 2 MG/ML
4 INJECTION INTRAMUSCULAR; INTRAVENOUS EVERY 6 HOURS PRN
Status: CANCELLED | OUTPATIENT
Start: 2022-11-14

## 2022-11-14 RX ORDER — SODIUM CHLORIDE, SODIUM LACTATE, POTASSIUM CHLORIDE, CALCIUM CHLORIDE 600; 310; 30; 20 MG/100ML; MG/100ML; MG/100ML; MG/100ML
INJECTION, SOLUTION INTRAVENOUS CONTINUOUS
Status: CANCELLED | OUTPATIENT
Start: 2022-11-14

## 2022-11-14 RX ORDER — FLUMAZENIL 0.1 MG/ML
0.2 INJECTION, SOLUTION INTRAVENOUS
Status: CANCELLED | OUTPATIENT
Start: 2022-11-14 | End: 2022-11-14

## 2022-11-14 RX ORDER — ONDANSETRON 4 MG/1
4 TABLET, ORALLY DISINTEGRATING ORAL EVERY 6 HOURS PRN
Status: CANCELLED | OUTPATIENT
Start: 2022-11-14

## 2022-11-14 RX ORDER — ONDANSETRON 2 MG/ML
4 INJECTION INTRAMUSCULAR; INTRAVENOUS EVERY 30 MIN PRN
Status: CANCELLED | OUTPATIENT
Start: 2022-11-14

## 2022-11-14 RX ORDER — NALOXONE HYDROCHLORIDE 0.4 MG/ML
0.4 INJECTION, SOLUTION INTRAMUSCULAR; INTRAVENOUS; SUBCUTANEOUS
Status: CANCELLED | OUTPATIENT
Start: 2022-11-14

## 2022-11-14 RX ORDER — ONDANSETRON 4 MG/1
4 TABLET, ORALLY DISINTEGRATING ORAL EVERY 30 MIN PRN
Status: CANCELLED | OUTPATIENT
Start: 2022-11-14

## 2022-11-14 ASSESSMENT — LIFESTYLE VARIABLES: TOBACCO_USE: 1

## 2022-11-14 NOTE — ANESTHESIA PREPROCEDURE EVALUATION
Anesthesia Pre-Procedure Evaluation    Patient: Jose M Rea   MRN: [id number] : 1950        Procedure : Procedure(s):  ESOPHAGOGASTRODUODENOSCOPY (EGD)          Past Medical History:   Diagnosis Date     ED (erectile dysfunction)      Hyperlipidemia       Past Surgical History:   Procedure Laterality Date     APPENDECTOMY       COLONOSCOPY N/A 2021    Procedure: COLONOSCOPY, WITH POLYPECTOMY AND BIOPSY;  Surgeon: Mark Shoemaker DO;  Location: [facility]     ORTHOPEDIC SURGERY       PHACOEMULSIFICATION WITH STANDARD INTRAOCULAR LENS IMPLANT Left 4/3/2019    Procedure: Cataract Removal with Implant;  Surgeon: Cristofer Price MD;  Location: [facility]     PHACOEMULSIFICATION WITH STANDARD INTRAOCULAR LENS IMPLANT Right 2019    Procedure: Cataract Removal with Implant;  Surgeon: Cristofer Price MD;  Location: [facility]     SURGICAL HISTORY OF -       appendectomy in      SURGICAL HISTORY OF -       right shoulder surgery in       Allergies   Allergen Reactions     Clindamycin      Bactroban [Mupirocin]      Cephalexin Rash     Doxycycline Rash     Metal [Staples] Rash and Blisters      Social History     Tobacco Use     Smoking status: Every Day     Packs/day: 0.50     Years: 50.00     Pack years: 25.00     Types: Cigarettes     Smokeless tobacco: Never     Tobacco comments:     started at age 18, 1/2 pack daily or less   Substance Use Topics     Alcohol use: Not Currently     Comment: occasional beer      Wt Readings from Last 1 Encounters:   10/26/22 89.8 kg (198 lb)        Anesthesia Evaluation   Pt has had prior anesthetic. Type: General and MAC.    No history of anesthetic complications       ROS/MED HX  ENT/Pulmonary:     (+) allergic rhinitis, tobacco use, Current use,     Neurologic:  - neg neurologic ROS     Cardiovascular:     (+) Dyslipidemia -----Taking blood thinners     METS/Exercise Tolerance:     Hematologic:     (+) History of blood clots, pt is  anticoagulated,     Musculoskeletal:  - neg musculoskeletal ROS     GI/Hepatic: Comment: Abdominal pain      Renal/Genitourinary:  - neg Renal ROS     Endo: Comment: overweight      Psychiatric/Substance Use:  - neg psychiatric ROS     Infectious Disease:  - neg infectious disease ROS     Malignancy:  - neg malignancy ROS     Other:  - neg other ROS          Physical Exam    Airway  airway exam normal      Mallampati: II       Respiratory Devices and Support         Dental  no notable dental history         Cardiovascular   cardiovascular exam normal          Pulmonary   pulmonary exam normal                OUTSIDE LABS:  CBC: No results found for: WBC, HGB, HCT, PLT  BMP:   Lab Results   Component Value Date    POTASSIUM 4.4 01/06/2017    POTASSIUM 4.0 12/13/2016    CR 0.8 10/29/2022    CR 0.86 03/21/2022    GLC 89 03/21/2022    GLC 89 02/26/2021     COAGS:   Lab Results   Component Value Date    INR 1.9 (H) 11/09/2022     POC: No results found for: BGM, HCG, HCGS  HEPATIC:   Lab Results   Component Value Date    ALT 28 01/06/2017    AST 18 01/06/2017     OTHER: No results found for: PH, LACT, A1C, KIRILL, PHOS, MAG, LIPASE, AMYLASE, TSH, T4, T3, CRP, SED    Anesthesia Plan    ASA Status:  2   NPO Status:  NPO Appropriate    Anesthesia Type: General.     - Airway: Native airway      Maintenance: Balanced.        Consents    Anesthesia Plan(s) and associated risks, benefits, and realistic alternatives discussed. Questions answered and patient/representative(s) expressed understanding.     - Discussed: Risks, Benefits and Alternatives for BOTH SEDATION and the PROCEDURE were discussed     - Discussed with:  Patient, Spouse         Postoperative Care            Comments:                Saqib Yee CRNA, APRN LESLIE

## 2022-11-15 ENCOUNTER — DOCUMENTATION ONLY (OUTPATIENT)
Dept: ANTICOAGULATION | Facility: CLINIC | Age: 72
End: 2022-11-15

## 2022-11-15 ENCOUNTER — HOSPITAL ENCOUNTER (OUTPATIENT)
Facility: CLINIC | Age: 72
Discharge: HOME | End: 2022-11-15
Attending: SURGERY | Admitting: SURGERY
Payer: MEDICARE

## 2022-11-15 ENCOUNTER — ANESTHESIA (OUTPATIENT)
Dept: GASTROENTEROLOGY | Facility: CLINIC | Age: 72
End: 2022-11-15
Payer: MEDICARE

## 2022-11-15 VITALS
HEIGHT: 70 IN | OXYGEN SATURATION: 99 % | TEMPERATURE: 97.7 F | DIASTOLIC BLOOD PRESSURE: 82 MMHG | BODY MASS INDEX: 28.35 KG/M2 | WEIGHT: 198 LBS | SYSTOLIC BLOOD PRESSURE: 147 MMHG | HEART RATE: 67 BPM

## 2022-11-15 PROCEDURE — 258N000003 HC RX IP 258 OP 636: Performed by: SURGERY

## 2022-11-15 PROCEDURE — 250N000009 HC RX 250: Performed by: SURGERY

## 2022-11-15 PROCEDURE — 999N000141 HC STATISTIC PRE-PROCEDURE NURSING ASSESSMENT: Performed by: SURGERY

## 2022-11-15 RX ORDER — LIDOCAINE 40 MG/G
CREAM TOPICAL
Status: DISCONTINUED | OUTPATIENT
Start: 2022-11-15 | End: 2022-11-15 | Stop reason: HOSPADM

## 2022-11-15 RX ORDER — SODIUM CHLORIDE, SODIUM LACTATE, POTASSIUM CHLORIDE, CALCIUM CHLORIDE 600; 310; 30; 20 MG/100ML; MG/100ML; MG/100ML; MG/100ML
INJECTION, SOLUTION INTRAVENOUS CONTINUOUS
Status: DISCONTINUED | OUTPATIENT
Start: 2022-11-15 | End: 2022-11-15 | Stop reason: HOSPADM

## 2022-11-15 RX ADMIN — SODIUM CHLORIDE, POTASSIUM CHLORIDE, SODIUM LACTATE AND CALCIUM CHLORIDE: 600; 310; 30; 20 INJECTION, SOLUTION INTRAVENOUS at 13:03

## 2022-11-15 RX ADMIN — LIDOCAINE HYDROCHLORIDE 0.1 ML: 10 INJECTION, SOLUTION EPIDURAL; INFILTRATION; INTRACAUDAL; PERINEURAL at 13:03

## 2022-11-15 ASSESSMENT — ACTIVITIES OF DAILY LIVING (ADL)
ADLS_ACUITY_SCORE: 35
ADLS_ACUITY_SCORE: 35

## 2022-11-15 NOTE — PROGRESS NOTES
ANTICOAGULATION  MANAGEMENT: Discharge Review    Jose M Rea chart reviewed for anticoagulation continuity of care    Outpatient surgery/procedure on 11/15/22 for EGD. Procedure was cancelled.    Discharge disposition: Home    Results:    Recent labs: (last 7 days)     11/09/22  0823   INR 1.9*     Anticoagulation inpatient management:     not applicable     Anticoagulation discharge instructions:     Warfarin dosing: Patient was given perioperative warfarin hold/restart instructions by [facility]. He held warfarin 3 days prior.   Bridging: No   INR goal change: No      Medication changes affecting anticoagulation: No    Additional factors affecting anticoagulation: No     PLAN     No adjustment to anticoagulation plan needed    Recommended follow up is scheduled  Patient not contacted    No adjustment to Anticoagulation Calendar was required    Justa Vaca RN

## 2022-11-15 NOTE — DISCHARGE INSTRUCTIONS
ENDOSCOPY PRE-SEDATION H&P FOR OUTPATIENT PROCEDURES    Jose M Rea  [id number]  1950    Procedure:  EGD with possible biopsy possible dilatation with MAC sedation.     Pre-procedure diagnosis: patient thought it was for his pain in the lower abdomen.  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   Patient has only been off his coumadin for about 3 days.  I let him know that I can still do his scope but would do a testing biopsy in the stomach and if bleeds more than it should would put a clip on it and not do any more biopsies.    Patient is not sure he wants the upper scope done as he thought it was for his lower abdomen pain.    I checked his right groin and he has an obvious hernia.  I did not feel one on the left side, but would do so in the clinic to see if he has one on the left side as this is more on the left side but really close to the midline.     Past medical history:   Past Medical History:   Diagnosis Date    ED (erectile dysfunction)     Hyperlipidemia        Past surgical history:   Past Surgical History:   Procedure Laterality Date    APPENDECTOMY      COLONOSCOPY N/A 4/30/2021    Procedure: COLONOSCOPY, WITH POLYPECTOMY AND BIOPSY;  Surgeon: Mark Shoemaker DO;  Location: [facility]    ORTHOPEDIC SURGERY      PHACOEMULSIFICATION WITH STANDARD INTRAOCULAR LENS IMPLANT Left 4/3/2019    Procedure: Cataract Removal with Implant;  Surgeon: Cristofer Price MD;  Location: [facility]    PHACOEMULSIFICATION WITH STANDARD INTRAOCULAR LENS IMPLANT Right 4/24/2019    Procedure: Cataract Removal with Implant;  Surgeon: Cristofer Price MD;  Location: [facility]    SURGICAL HISTORY OF -       appendectomy in 1956    SURGICAL HISTORY OF -       right shoulder surgery in 2016       Current Facility-Administered Medications   Medication    lactated ringers infusion    lidocaine (LMX4) kit    lidocaine 1 % 0.1-1 mL    sodium chloride (PF) 0.9% PF flush 3  mL    sodium chloride (PF) 0.9% PF flush 3 mL       Allergies   Allergen Reactions    Clindamycin     Bactroban [Mupirocin]     Cephalexin Rash    Doxycycline Rash    Metal [Staples] Rash and Blisters           Assessment/Plan:  Patient is going to follow up with me in clinic to discuss his hernias.  We discussed pushing it back in and a hernia belt.     Please call [phone], for  [place] clinic or  for [facility], to schedule a follow up appointment in 2 weeks.   My assistant Lori's number is .  She is not there on Fridays.  But you can leave a message and she will get back to you the next day she is back.         Rashad Pulido MD

## 2022-11-15 NOTE — H&P
ENDOSCOPY PRE-SEDATION H&P FOR OUTPATIENT PROCEDURES    Jose M Rea  [id number]  1950    Procedure:  EGD with possible biopsy possible dilatation with MAC sedation.     Pre-procedure diagnosis: patient thought it was for his pain in the lower abdomen.  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   Patient has only been off his coumadin for about 3 days.  I let him know that I can still do his scope but would do a testing biopsy in the stomach and if bleeds more than it should would put a clip on it and not do any more biopsies.    Patient is not sure he wants the upper scope done as he thought it was for his lower abdomen pain.    I checked his right groin and he has an obvious hernia.  I did not feel one on the left side, but would do so in the clinic to see if he has one on the left side as this is more on the left side but really close to the midline.     Past medical history:   Past Medical History:   Diagnosis Date     ED (erectile dysfunction)      Hyperlipidemia        Past surgical history:   Past Surgical History:   Procedure Laterality Date     APPENDECTOMY       COLONOSCOPY N/A 4/30/2021    Procedure: COLONOSCOPY, WITH POLYPECTOMY AND BIOPSY;  Surgeon: Mark Shoemaker DO;  Location: [facility]     ORTHOPEDIC SURGERY       PHACOEMULSIFICATION WITH STANDARD INTRAOCULAR LENS IMPLANT Left 4/3/2019    Procedure: Cataract Removal with Implant;  Surgeon: Cristofer Price MD;  Location: [facility]     PHACOEMULSIFICATION WITH STANDARD INTRAOCULAR LENS IMPLANT Right 4/24/2019    Procedure: Cataract Removal with Implant;  Surgeon: Cristofer Price MD;  Location: [facility]     SURGICAL HISTORY OF -       appendectomy in 1956     SURGICAL HISTORY OF -       right shoulder surgery in 2016       Current Facility-Administered Medications   Medication     lactated ringers infusion     lidocaine (LMX4) kit     lidocaine 1 % 0.1-1 mL     sodium chloride (PF)  0.9% PF flush 3 mL     sodium chloride (PF) 0.9% PF flush 3 mL       Allergies   Allergen Reactions     Clindamycin      Bactroban [Mupirocin]      Cephalexin Rash     Doxycycline Rash     Metal [Staples] Rash and Blisters           Assessment/Plan:  Patient is going to follow up with me in clinic to discuss his hernias.  We discussed pushing it back in and a hernia belt.     Please call [phone], for  [place] clinic or  for [facility], to schedule a follow up appointment in 2 weeks.   My assistant Lori's number is .  She is not there on Fridays.  But you can leave a message and she will get back to you the next day she is back.         Rashad Pulido MD

## 2022-11-15 NOTE — PROGRESS NOTES
Procedure cancelled today. AVS printed and given to patient for follow up process. Patient and family in agreement.     JOSE ARMANDO RODRIGUEZ DISCHARGE NOTE    Patient discharged to home at 3:18 PM via ambulation. Accompanied by spouse and staff. Discharge instructions reviewed with patient and spouse, opportunity offered to ask questions. Prescriptions - None ordered for discharge. All belongings sent with patient.    Mecca Ritter RN

## 2022-11-22 ENCOUNTER — LAB (OUTPATIENT)
Dept: LAB | Facility: CLINIC | Age: 72
End: 2022-11-22
Payer: MEDICARE

## 2022-11-22 ENCOUNTER — ANTICOAGULATION THERAPY VISIT (OUTPATIENT)
Dept: ANTICOAGULATION | Facility: CLINIC | Age: 72
End: 2022-11-22

## 2022-11-22 DIAGNOSIS — Z79.01 CHRONIC ANTICOAGULATION: ICD-10-CM

## 2022-11-22 DIAGNOSIS — I82.4Y9 ACUTE DEEP VEIN THROMBOSIS (DVT) OF PROXIMAL VEIN OF LOWER EXTREMITY, UNSPECIFIED LATERALITY (H): ICD-10-CM

## 2022-11-22 DIAGNOSIS — Z86.718 H/O DEEP VENOUS THROMBOSIS: ICD-10-CM

## 2022-11-22 DIAGNOSIS — Z79.01 LONG TERM CURRENT USE OF ANTICOAGULANT THERAPY: ICD-10-CM

## 2022-11-22 DIAGNOSIS — D68.9 COAGULATION DEFECT (H): ICD-10-CM

## 2022-11-22 DIAGNOSIS — D68.9 COAGULATION DEFECT (H): Primary | ICD-10-CM

## 2022-11-22 LAB — INR BLD: 2.3 (ref 0.9–1.1)

## 2022-11-22 PROCEDURE — 36416 COLLJ CAPILLARY BLOOD SPEC: CPT

## 2022-11-22 PROCEDURE — 85610 PROTHROMBIN TIME: CPT

## 2022-11-22 NOTE — PROGRESS NOTES
ANTICOAGULATION MANAGEMENT     Jose M Rea 72 year old male is on warfarin with therapeutic INR result. (Goal INR 2.0-3.0)    Recent labs: (last 7 days)     11/22/22  0812   INR 2.3*       ASSESSMENT       Source(s): Chart Review and Patient/Caregiver Call       Warfarin doses taken: Booster dose(s) recently taken which may be affecting INR and Held for Procedure  recently which may be affecting INR    Diet: No new diet changes identified    New illness, injury, or hospitalization: No    Medication/supplement changes: None noted    Signs or symptoms of bleeding or clotting: No    Previous INR: Subtherapeutic    Additional findings: None       PLAN     Recommended plan for no diet, medication or health factor changes affecting INR     Dosing Instructions: Continue your current warfarin dose with next INR in 2 weeks       Summary  As of 11/22/2022    Full warfarin instructions:  7.5 mg every day; Starting 11/22/2022   Next INR check:  12/6/2022             Telephone call with Jose M who verbalizes understanding and agrees to plan    Lab visit scheduled    Education provided:     Please call back if any changes to your diet, medications or how you've been taking warfarin    Contact [phone]  with any changes, questions or concerns.     Plan made per [facility] anticoagulation protocol    Kb ODONNELL RN  Anticoagulation Clinic  11/22/2022    _______________________________________________________________________     Anticoagulation Episode Summary     Current INR goal:  2.0-3.0   TTR:  83.4 % (1 y)   Target end date:  Indefinite   Send INR reminders to:  [facility] [place]    Indications    Coagulation defect (H) [D68.9]  Acute deep vein thrombosis (DVT) of proximal vein of lower extremity (H) (Resolved) [I82.4Y9]  Long term current use of anticoagulant therapy [Z79.01]  Acute deep vein thrombosis (DVT) of proximal vein of lower extremity  unspecified laterality (H) (Resolved) [I82.4Y9]  Chronic anticoagulation  [Z79.01]  H/O deep venous thrombosis [Z86.718]  Acute deep vein thrombosis (DVT) of proximal vein of lower extremity  unspecified laterality (H) [I82.4Y9]           Comments:  okay to leave DVM         Anticoagulation Care Providers     Provider Role Specialty Phone number    Pj Quijano MD Referring Family Medicine [phone]    Rigoberto Hernandez MD Referring Family Medicine [phone]

## 2022-11-28 ENCOUNTER — OFFICE VISIT (OUTPATIENT)
Dept: SURGERY | Facility: CLINIC | Age: 72
End: 2022-11-28
Payer: MEDICARE

## 2022-11-28 VITALS
BODY MASS INDEX: 28.2 KG/M2 | SYSTOLIC BLOOD PRESSURE: 143 MMHG | HEART RATE: 81 BPM | WEIGHT: 197 LBS | TEMPERATURE: 98 F | HEIGHT: 70 IN | DIASTOLIC BLOOD PRESSURE: 79 MMHG

## 2022-11-28 DIAGNOSIS — K40.90 RIGHT INGUINAL HERNIA: Primary | ICD-10-CM

## 2022-11-28 PROCEDURE — 99214 OFFICE O/P EST MOD 30 MIN: CPT | Performed by: SURGERY

## 2022-11-28 NOTE — PATIENT INSTRUCTIONS
Per physician instructions      If you have questions or concerns on any instructions given to you by your provider today or if you need to schedule an appointment, you can reach us at [phone].       Assessment: right inguinal hernia    Plan to do discussed robotic but since no pain and no obvious hernia will do this open with mesh.    Risks of surgery include damage to nerves, bleeding, infection, damage to  Vessels, recurrence.  Although mesh is a better long term repair if it gets infected it must be removed.   If the patient has any bacterial infection the week before and is seen by their doctor and started on antibiotics, I can probably still do the surgery if they are vastly improved by the time of surgery, but if the infection starts closer to the surgery date it will be better to cancel and reschedule to a later date.  A cough will also be hard on the repair and uncomfortable post operative.  If the patient is a smoker I did discuss increase risk of recurrence and more pain with the cough.  If the patient is willing to quit smoking would encourage to do so and start at least a week before surgery.  However, if patient is not going to quit then must understand that his repair is more likely to fail.    Risks of surgery discussed including, but not limited to bleeding, infection, recurrence, damage to nerves and what is in the hernia sac.  Risks of anesthesia also discussed.    Discussed massaging hernia back in and using ice if becomes more painful.  If not able to reduce then go to emergency room.  Also discussed hernia belt to use until able to get in for surgery.    Things you will need to do before surgery are getting a preoperative appointment with your primary care doctor to be cleared for surgery.    You will also need a COVID test before surgery and an appointment to see me usually about 2 weeks after the procedure to make sure you are doing well.   Fortunately, when the schedulers call you they  will try to get all this set up for you at that one call.     If you have had a positive COVID test in a 90 day window that would include the date of the procedure, let us know that and will need to see proof of this.    Then you do not need a COVID test.  But if over 90 days you do.      HERNIORRHAPHY DISCHARGE INSTRUCTIONS  DR. SAMIR PADILLA    Please call [phone], for  [facility] or  for [facility], to schedule a follow up appointment in 2 weeks.       1. You may resume your regular diet when you feel you are ready to. DO NOT drink alcoholic beverages for 24 hours or while you are taking prescription medication.    2. Limit your activities for the first 48 hours. Gradually, increase them as tolerated. You may use stairs. I encourage you to walk as tolerated. No lifting greater that 20 pounds for 3-6 weeks.    3. You will have some discomfort at the incision sites. This is expected. This should improve over the next 2-3 days. Ice and pain medication will help with this pain. Use prescribed pain medication as instructed.    4. Bruising and mild swelling is normal after surgery. For males it is common to have bruising going into the penis and scrotum. The area below and around the incision(s) will be hard and elevated. This is normal. I call it the healing ridge. This will resolve slowly over the next several months. If you feel the pain is increasing and cannot explain it by increasing activity please call us at [phone].    5. The dressing will often have some blood on it. You may shower 24 hours after surgery. No baths for 2 weeks after surgery. Clean gently over incision site. If clear plastic covering or steri-strip comes off and there is still some bleeding or drainage then cover with gauze or band-aid. If no bleeding, there is no reason to cover site. The abdominal binder may be removed after 24 hours after surgery. You may continue to wear it however for  comfort. I suggest  you wear an old t-shirt under the abdominal binder for a more comfortable wear.    6. Avoid Aspirin for the first 72 hours after the procedure. This medication may increase the tendency to bleed.    7. Use the following medications (in addition to your normal meds) as shown:  a. Percocet 5 mg 1-2 every 6 hours as needed for severe pain. This contains 325 mg of Tylenol (acetaminophen) per tablet.  Please do not take more than 4 grams of Tylenol (acetaminophen) per day. For example, you may take 1 Percocet and 1 Tylenol, or 2 Percocet and no Tylenol, or 2 Tylenol and no Percocet every 6 hours.  b. Tylenol (acetaminophen) 500 mg every 6 hours as needed for mild pain. Do not take more than 1000 mg every 6 hours. (see above).  c. Motrin (ibuprofen) 200-800 mg every 6 hours as needed for mild to moderate pain. Take with food.     8. Notify Dr. Pulido's clinic at [phone], for  [facility] or  for [facility], to schedule a follow up appointment in 2 weeks.   if:  Your discomfort is not relieved by your pain medication.  You have signs of infection such as temperature above 100.5 degrees orally, chills, or increasing daily discomfort.  Incision site is becoming more red and/or there is purulent drainage.  You have questions or concerns.    9. Please call [phone], for  [facility] or  for [facility], to schedule a follow up appointment in 2 weeks.   to schedule a follow up appointment in about 2 weeks.    10. When taking narcotics (pain medication more than Tylenol [acetaminophen] and Motrin [ibuprofen]) it is important to keep your stools soft to avoid constipation and pain with straining. This is best done by drinking fluids (non-alcoholic and non-caffeinated) and taking a stool softener (i.e. Metamucil or milk of magnesia). You may be able to use non-narcotics for pain relief especially by the 3rd post- operative  day. Tylenol (acetaminophen) 500 mg every 6 hours and/or Motrin (ibuprofen) 200-800 mg every 6 hours. Please do not take more than 4 grams of Tylenol (acetaminophen) per day. Remember your Percocet does have Tylenol (acetaminophen) already in it. Please take Motrin (ibuprofen) with food to help protect the stomach. If you have a history of stomach ulcers or stomach problems, do not take Motrin (ibuprofen).     11. Do not drive or operate heavy machinery for 24 hours after surgery or when taking narcotics. You may resume driving when feel that you can safely avoid an accident and are not taking narcotics. This is usually 5 to 7 days after surgery. You should not be alone for 24 hours after surgery.    12. Have milk of magnesia available at home so that when you take the pain medications you take 1-2 tablepoons a day, to help reduce problems with constipation.      13. If you have questions after your procedure/surgery please contact Dr Pulido's primary clinic in [place]. You can call [phone], your other option is to send us a [employer] message through your [employer] portal to Dr Pulido's team. For urgent and non urgent matters these options are best. If your symptoms are emergent or cannot wait, please proceed to [facility] and let them know who you had surgery with.      Patient can receive pain medications that I have ordered and give the first dose in the recovery room as needed.

## 2022-11-28 NOTE — LETTER
11/28/2022         RE: Jose M Rea  [address] [address]        Dear Colleague,    Thank you for referring your patient, Jose M Rea, to the [facility]. Please see a copy of my visit note below.    Dear Rigoberto Maldonado  I was asked to see this patient by Rigoberto Hernandez for please see below.  I have seen Jose M Rea and as you know his chief complaint is mid suprapubic area but noticed he had a right inguinal hernia      HPI:  Patient is a 72 year old male  with complaints see above  The patient noticed the symptoms about 2 years ago.    Patient has not family history of hernia problems  nothing makes the episode better.      Review Of Systems  Respiratory: No shortness of breath, dyspnea on exertion, cough, or hemoptysis  Cardiovascular: negative  Gastrointestinal: negative  Endocrine: negative  :  negative    10 Point review of systems all others are negative.   There were no vitals taken for this visit.    Past Medical History:   Diagnosis Date     ED (erectile dysfunction)      Hyperlipidemia        Past Surgical History:   Procedure Laterality Date     APPENDECTOMY       COLONOSCOPY N/A 4/30/2021    Procedure: COLONOSCOPY, WITH POLYPECTOMY AND BIOPSY;  Surgeon: Mark Shoemaker DO;  Location: [facility]     ORTHOPEDIC SURGERY       PHACOEMULSIFICATION WITH STANDARD INTRAOCULAR LENS IMPLANT Left 4/3/2019    Procedure: Cataract Removal with Implant;  Surgeon: Cristofer Price MD;  Location: [facility]     PHACOEMULSIFICATION WITH STANDARD INTRAOCULAR LENS IMPLANT Right 4/24/2019    Procedure: Cataract Removal with Implant;  Surgeon: Cristofer Price MD;  Location: [facility]     SURGICAL HISTORY OF -       appendectomy in 1956     SURGICAL HISTORY OF -       right shoulder surgery in 2016       Social History     Socioeconomic History     Marital status:      Spouse name: Not on file     Number of children: Not on file      Years of education: Not on file     Highest education level: Not on file   Occupational History     Not on file   Tobacco Use     Smoking status: Every Day     Packs/day: 0.50     Years: 50.00     Pack years: 25.00     Types: Cigarettes     Smokeless tobacco: Never     Tobacco comments:     started at age 18, 1/2 pack daily or less   Vaping Use     Vaping Use: Never used   Substance and Sexual Activity     Alcohol use: Not Currently     Comment: occasional beer     Drug use: No     Sexual activity: Yes     Partners: Female   Other Topics Concern     Parent/sibling w/ CABG, MI or angioplasty before 65F 55M? Not Asked   Social History Narrative     Not on file     Social Determinants of Health     Financial Resource Strain: Not on file   Food Insecurity: Not on file   Transportation Needs: Not on file   Physical Activity: Not on file   Stress: Not on file   Social Connections: Not on file   Intimate Partner Violence: Not on file   Housing Stability: Not on file       Current Outpatient Medications   Medication Sig Dispense Refill     betamethasone dipropionate (DIPROSONE) 0.05 % external cream Apply twice daily as needed to affected area on leg. 45 g 2     cetirizine (ZYRTEC) 10 MG tablet Take 10 mg by mouth daily       clotrimazole (LOTRIMIN) 1 % cream as needed        Emollient (CERAVE) CREA Externally apply topically 2 times daily as needed (dry skiin) 453 g 0     fluticasone (FLONASE) 50 MCG/ACT nasal spray Spray 2 sprays into both nostrils daily as needed for rhinitis or allergies 15.8 mL 3     Multiple Vitamins-Minerals (MENS ONE DAILY PO)        simvastatin (ZOCOR) 20 MG tablet Take 1 tablet by mouth once daily 90 tablet 0     tadalafil (CIALIS) 10 MG tablet Take one tablet by mouth 30 minutes prior to sexual activity. 10 tablet 11     warfarin ANTICOAGULANT (COUMADIN) 5 MG tablet TAKE 7.5 mg daily OR AS DIRECTED BY ANTICOAGULATION CLINIC 135 tablet 1       Above was reviewed  Physical exam: There were no  vitals taken for this visit.   Patient able to get up on table without difficulty.   Patient is alert and orientated.   Head eyes, nose and mouth within normal limits.    Abdomen is abdomen is soft without significant tenderness, masses, organomegaly or guarding  bowel sounds are positive and no caput medusa noted.  Has a right inguinal hernia no left inguinal hernia noted no ventral hernias noted.  Has an open appendectomy scar.    Testicles are normal.    Skin was warm and pink  Normal Affect.  Lower extremity edema is not present.        Assessment: right inguinal hernia    Plan to do discussed robotic but since no pain and no obvious hernia will do this open with mesh.  Patient would like to see me back when he comes back from [place] to discuss this again.   So will hold off ordering at this time his right inguinal hernia repair open with mesh.     Risks of surgery include damage to nerves, bleeding, infection, damage to  Vessels, recurrence.  Although mesh is a better long term repair if it gets infected it must be removed.   If the patient has any bacterial infection the week before and is seen by their doctor and started on antibiotics, I can probably still do the surgery if they are vastly improved by the time of surgery, but if the infection starts closer to the surgery date it will be better to cancel and reschedule to a later date.  A cough will also be hard on the repair and uncomfortable post operative.  If the patient is a smoker I did discuss increase risk of recurrence and more pain with the cough.  If the patient is willing to quit smoking would encourage to do so and start at least a week before surgery.  However, if patient is not going to quit then must understand that his repair is more likely to fail.    Risks of surgery discussed including, but not limited to bleeding, infection, recurrence, damage to nerves and what is in the hernia sac.  Risks of anesthesia also discussed.    Discussed  massaging hernia back in and using ice if becomes more painful.  If not able to reduce then go to emergency room.  Also discussed hernia belt to use until able to get in for surgery.    Things you will need to do before surgery are getting a preoperative appointment with your primary care doctor to be cleared for surgery.    You will also need a COVID test before surgery and an appointment to see me usually about 2 weeks after the procedure to make sure you are doing well.   Fortunately, when the schedulers call you they will try to get all this set up for you at that one call.     If you have had a positive COVID test in a 90 day window that would include the date of the procedure, let us know that and will need to see proof of this.    Then you do not need a COVID test.  But if over 90 days you do.      Time spent with the patient with greater that 50% of the time in discussion was 30 minutes.  In discussing the plan.      Rashad Pulido MD      Study Result    Narrative & Impression   EXAM: CT ABDOMEN PELVIS W CONTRAST  LOCATION: [facility]  DATE/TIME: 10/29/2022 1:20 PM     INDICATION: Generalized abdominal pain for one month  COMPARISON: None.  TECHNIQUE: CT scan of the abdomen and pelvis was performed following injection of IV contrast. Multiplanar reformats were obtained. Dose reduction techniques were used.  CONTRAST: 86 mL Isovue 370     FINDINGS:   LOWER CHEST: Normal.     HEPATOBILIARY: Diffuse fatty infiltration of the liver. A few tiny hypodense lesions in the liver, too small to definitively characterize, but likely represent cysts.     PANCREAS: Normal.     SPLEEN: Normal.     ADRENAL GLANDS: Normal.     KIDNEYS/BLADDER: No renal calculi or hydronephrosis. A few vascular calcifications left renal hilum.     BOWEL: 3.5 x 2.0 cm fluid-filled duodenal diverticulum. Wall thickening in the region of the gastric antrum and proximal duodenum on images 45 through 54 of series 2,  with moderate gastric distention with fluid. A few diverticula scattered throughout the   colon, without evidence for diverticulitis or bowel obstruction.     LYMPH NODES: Normal.     VASCULATURE: Advanced atherosclerotic disease abdominal aorta and iliac arteries. Plaque and mild narrowing at the origins of both renal arteries..     PELVIC ORGANS: Prostatic hypertrophy.     MUSCULOSKELETAL: Degenerative disc disease lower thoracic and lumbar spine.                                                                      IMPRESSION:   1.  Wall thickening in the region of the gastric antrum, inconclusive whether this is related to incomplete distention versus gastritis or obstructing lesion. If symptoms persist consider GI consultation and endoscopy.  2.  Scattered colonic diverticula, without evidence for diverticulitis or bowel obstruction.  3.  Advanced atherosclerotic disease.  4.  Probable tiny hepatic cysts.  5.  Duodenal diverticulum.           Again, thank you for allowing me to participate in the care of your patient.        Sincerely,        Rashad Pulido MD

## 2022-11-28 NOTE — PROGRESS NOTES
Dear Rigoberto Maldonado  I was asked to see this patient by Rigoberto Hernandez for please see below.  I have seen Jose M Rea and as you know his chief complaint is mid suprapubic area but noticed he had a right inguinal hernia      HPI:  Patient is a 72 year old male  with complaints see above  The patient noticed the symptoms about 2 years ago.    Patient has not family history of hernia problems  nothing makes the episode better.      Review Of Systems  Respiratory: No shortness of breath, dyspnea on exertion, cough, or hemoptysis  Cardiovascular: negative  Gastrointestinal: negative  Endocrine: negative  :  negative    10 Point review of systems all others are negative.   There were no vitals taken for this visit.    Past Medical History:   Diagnosis Date     ED (erectile dysfunction)      Hyperlipidemia        Past Surgical History:   Procedure Laterality Date     APPENDECTOMY       COLONOSCOPY N/A 4/30/2021    Procedure: COLONOSCOPY, WITH POLYPECTOMY AND BIOPSY;  Surgeon: Mark Shoemaker DO;  Location: [facility]     ORTHOPEDIC SURGERY       PHACOEMULSIFICATION WITH STANDARD INTRAOCULAR LENS IMPLANT Left 4/3/2019    Procedure: Cataract Removal with Implant;  Surgeon: Cristofer Price MD;  Location: [facility]     PHACOEMULSIFICATION WITH STANDARD INTRAOCULAR LENS IMPLANT Right 4/24/2019    Procedure: Cataract Removal with Implant;  Surgeon: Cristofer Price MD;  Location: [facility]     SURGICAL HISTORY OF -       appendectomy in 1956     SURGICAL HISTORY OF -       right shoulder surgery in 2016       Social History     Socioeconomic History     Marital status:      Spouse name: Not on file     Number of children: Not on file     Years of education: Not on file     Highest education level: Not on file   Occupational History     Not on file   Tobacco Use     Smoking status: Every Day     Packs/day: 0.50     Years: 50.00     Pack years: 25.00     Types: Cigarettes     Smokeless  tobacco: Never     Tobacco comments:     started at age 18, 1/2 pack daily or less   Vaping Use     Vaping Use: Never used   Substance and Sexual Activity     Alcohol use: Not Currently     Comment: occasional beer     Drug use: No     Sexual activity: Yes     Partners: Female   Other Topics Concern     Parent/sibling w/ CABG, MI or angioplasty before 65F 55M? Not Asked   Social History Narrative     Not on file     Social Determinants of Health     Financial Resource Strain: Not on file   Food Insecurity: Not on file   Transportation Needs: Not on file   Physical Activity: Not on file   Stress: Not on file   Social Connections: Not on file   Intimate Partner Violence: Not on file   Housing Stability: Not on file       Current Outpatient Medications   Medication Sig Dispense Refill     betamethasone dipropionate (DIPROSONE) 0.05 % external cream Apply twice daily as needed to affected area on leg. 45 g 2     cetirizine (ZYRTEC) 10 MG tablet Take 10 mg by mouth daily       clotrimazole (LOTRIMIN) 1 % cream as needed        Emollient (CERAVE) CREA Externally apply topically 2 times daily as needed (dry skiin) 453 g 0     fluticasone (FLONASE) 50 MCG/ACT nasal spray Spray 2 sprays into both nostrils daily as needed for rhinitis or allergies 15.8 mL 3     Multiple Vitamins-Minerals (MENS ONE DAILY PO)        simvastatin (ZOCOR) 20 MG tablet Take 1 tablet by mouth once daily 90 tablet 0     tadalafil (CIALIS) 10 MG tablet Take one tablet by mouth 30 minutes prior to sexual activity. 10 tablet 11     warfarin ANTICOAGULANT (COUMADIN) 5 MG tablet TAKE 7.5 mg daily OR AS DIRECTED BY ANTICOAGULATION CLINIC 135 tablet 1       Above was reviewed  Physical exam: There were no vitals taken for this visit.   Patient able to get up on table without difficulty.   Patient is alert and orientated.   Head eyes, nose and mouth within normal limits.    Abdomen is abdomen is soft without significant tenderness, masses, organomegaly or  guarding  bowel sounds are positive and no caput medusa noted.  Has a right inguinal hernia no left inguinal hernia noted no ventral hernias noted.  Has an open appendectomy scar.    Testicles are normal.    Skin was warm and pink  Normal Affect.  Lower extremity edema is not present.        Assessment: right inguinal hernia    Plan to do discussed robotic but since no pain and no obvious hernia will do this open with mesh.  Patient would like to see me back when he comes back from [place] to discuss this again.   So will hold off ordering at this time his right inguinal hernia repair open with mesh.     Risks of surgery include damage to nerves, bleeding, infection, damage to  Vessels, recurrence.  Although mesh is a better long term repair if it gets infected it must be removed.   If the patient has any bacterial infection the week before and is seen by their doctor and started on antibiotics, I can probably still do the surgery if they are vastly improved by the time of surgery, but if the infection starts closer to the surgery date it will be better to cancel and reschedule to a later date.  A cough will also be hard on the repair and uncomfortable post operative.  If the patient is a smoker I did discuss increase risk of recurrence and more pain with the cough.  If the patient is willing to quit smoking would encourage to do so and start at least a week before surgery.  However, if patient is not going to quit then must understand that his repair is more likely to fail.    Risks of surgery discussed including, but not limited to bleeding, infection, recurrence, damage to nerves and what is in the hernia sac.  Risks of anesthesia also discussed.    Discussed massaging hernia back in and using ice if becomes more painful.  If not able to reduce then go to emergency room.  Also discussed hernia belt to use until able to get in for surgery.    Things you will need to do before surgery are getting a preoperative  appointment with your primary care doctor to be cleared for surgery.    You will also need a COVID test before surgery and an appointment to see me usually about 2 weeks after the procedure to make sure you are doing well.   Fortunately, when the schedulers call you they will try to get all this set up for you at that one call.     If you have had a positive COVID test in a 90 day window that would include the date of the procedure, let us know that and will need to see proof of this.    Then you do not need a COVID test.  But if over 90 days you do.      Time spent with the patient with greater that 50% of the time in discussion was 30 minutes.  In discussing the plan.      Rashad Pulido MD      Study Result    Narrative & Impression   EXAM: CT ABDOMEN PELVIS W CONTRAST  LOCATION: [facility]  DATE/TIME: 10/29/2022 1:20 PM     INDICATION: Generalized abdominal pain for one month  COMPARISON: None.  TECHNIQUE: CT scan of the abdomen and pelvis was performed following injection of IV contrast. Multiplanar reformats were obtained. Dose reduction techniques were used.  CONTRAST: 86 mL Isovue 370     FINDINGS:   LOWER CHEST: Normal.     HEPATOBILIARY: Diffuse fatty infiltration of the liver. A few tiny hypodense lesions in the liver, too small to definitively characterize, but likely represent cysts.     PANCREAS: Normal.     SPLEEN: Normal.     ADRENAL GLANDS: Normal.     KIDNEYS/BLADDER: No renal calculi or hydronephrosis. A few vascular calcifications left renal hilum.     BOWEL: 3.5 x 2.0 cm fluid-filled duodenal diverticulum. Wall thickening in the region of the gastric antrum and proximal duodenum on images 45 through 54 of series 2, with moderate gastric distention with fluid. A few diverticula scattered throughout the   colon, without evidence for diverticulitis or bowel obstruction.     LYMPH NODES: Normal.     VASCULATURE: Advanced atherosclerotic disease abdominal aorta and iliac  arteries. Plaque and mild narrowing at the origins of both renal arteries..     PELVIC ORGANS: Prostatic hypertrophy.     MUSCULOSKELETAL: Degenerative disc disease lower thoracic and lumbar spine.                                                                      IMPRESSION:   1.  Wall thickening in the region of the gastric antrum, inconclusive whether this is related to incomplete distention versus gastritis or obstructing lesion. If symptoms persist consider GI consultation and endoscopy.  2.  Scattered colonic diverticula, without evidence for diverticulitis or bowel obstruction.  3.  Advanced atherosclerotic disease.  4.  Probable tiny hepatic cysts.  5.  Duodenal diverticulum.

## 2022-11-28 NOTE — NURSING NOTE
"Initial BP (!) 143/79 (BP Location: Right arm, Patient Position: Sitting, Cuff Size: Adult Regular)   Pulse 81   Temp 98  F (36.7  C) (Tympanic)   Ht 1.778 m (5' 10\")   Wt 89.4 kg (197 lb)   BMI 28.27 kg/m   Estimated body mass index is 28.27 kg/m  as calculated from the following:    Height as of this encounter: 1.778 m (5' 10\").    Weight as of this encounter: 89.4 kg (197 lb). .    Marifer Powers CMA    "

## 2022-11-29 ENCOUNTER — OFFICE VISIT (OUTPATIENT)
Dept: UROLOGY | Facility: CLINIC | Age: 72
End: 2022-11-29
Attending: NURSE PRACTITIONER
Payer: MEDICARE

## 2022-11-29 VITALS
DIASTOLIC BLOOD PRESSURE: 83 MMHG | SYSTOLIC BLOOD PRESSURE: 159 MMHG | HEART RATE: 83 BPM | OXYGEN SATURATION: 97 % | TEMPERATURE: 98.4 F

## 2022-11-29 DIAGNOSIS — N52.9 ERECTILE DYSFUNCTION, UNSPECIFIED ERECTILE DYSFUNCTION TYPE: ICD-10-CM

## 2022-11-29 DIAGNOSIS — R10.32 ABDOMINAL PAIN, LEFT LOWER QUADRANT: Primary | ICD-10-CM

## 2022-11-29 LAB
ALBUMIN UR-MCNC: NEGATIVE MG/DL
APPEARANCE UR: CLEAR
BACTERIA #/AREA URNS HPF: ABNORMAL /HPF
BILIRUB UR QL STRIP: NEGATIVE
COLOR UR AUTO: YELLOW
GLUCOSE UR STRIP-MCNC: NEGATIVE MG/DL
HGB UR QL STRIP: NEGATIVE
KETONES UR STRIP-MCNC: NEGATIVE MG/DL
LEUKOCYTE ESTERASE UR QL STRIP: NEGATIVE
MUCOUS THREADS #/AREA URNS LPF: PRESENT /LPF
NITRATE UR QL: NEGATIVE
PH UR STRIP: 6 [PH] (ref 5–7)
RBC #/AREA URNS AUTO: ABNORMAL /HPF
SP GR UR STRIP: 1.02 (ref 1–1.03)
SQUAMOUS #/AREA URNS AUTO: ABNORMAL /LPF
UROBILINOGEN UR STRIP-ACNC: 0.2 E.U./DL
WBC #/AREA URNS AUTO: ABNORMAL /HPF

## 2022-11-29 PROCEDURE — 51798 US URINE CAPACITY MEASURE: CPT | Performed by: STUDENT IN AN ORGANIZED HEALTH CARE EDUCATION/TRAINING PROGRAM

## 2022-11-29 PROCEDURE — 81001 URINALYSIS AUTO W/SCOPE: CPT | Performed by: STUDENT IN AN ORGANIZED HEALTH CARE EDUCATION/TRAINING PROGRAM

## 2022-11-29 PROCEDURE — 87086 URINE CULTURE/COLONY COUNT: CPT | Performed by: STUDENT IN AN ORGANIZED HEALTH CARE EDUCATION/TRAINING PROGRAM

## 2022-11-29 PROCEDURE — 99214 OFFICE O/P EST MOD 30 MIN: CPT | Mod: 25 | Performed by: STUDENT IN AN ORGANIZED HEALTH CARE EDUCATION/TRAINING PROGRAM

## 2022-11-29 NOTE — NURSING NOTE
"Initial BP (!) 159/83 (BP Location: Left arm, Patient Position: Chair, Cuff Size: Adult Large)   Pulse 83   Temp 98.4  F (36.9  C) (Tympanic)   SpO2 97%  Estimated body mass index is 28.27 kg/m  as calculated from the following:    Height as of 11/28/22: 1.778 m (5' 10\").    Weight as of 11/28/22: 89.4 kg (197 lb). .    Syhla Reynolds MA on 11/29/2022 at 8:53 AM  "

## 2022-11-29 NOTE — PATIENT INSTRUCTIONS
CT scan showed an enlarged prostate. There were no kidney stones seen. There were outpouchings of the colon (called diverticulum) seen.     Prostate exam was normal today. PSA (screening test for prostate cancer) was normal.     Erectile dysfunction labs: testosterone, TSH (thyroid function)    Try Cialis, up to 20 mg daily.     Other options for erectile dysfunction treatment include a vacuum erection device, injections into the penis, and surgery (inflatable penile prosthesis).

## 2022-11-29 NOTE — NURSING NOTE
Active order to obtain bladder scan? Yes   Name of ordering provider:  Keri Salcido  Bladder scan preformed post void Yes.  Bladder scan reveled 11ML  Provider notified?  Yes    Shyla Reynolds CMA

## 2022-11-29 NOTE — PROGRESS NOTES
Chief Complaint:   Erectile dysfunction          History of Present Illness:   Jose M Rea is a 72 year old male with a history of HLD and DVT with complaints of erectile dysfunction for the last several years.     He primarily has difficulty maintaining an erection. He tried sildenafil 100 mg, but has not used this in several years. He was recently prescribed tadalafil, but has not tried this yet.     He reports lower abdominal pain that has been present for a while. He had a flair in symptoms about 3 days ago. The pain is intermittent and not severe. His last PSA was 0.83 on 3/21/2022. CT scan from 10/29/2022 was fairly unremarkable. He does have a right inguinal hernia and will likely have this repaired in the spring.     He denies any bothersome urinary symptoms including slow stream, urinary frequency, dysuria, nocturia, and gross hematuria. He has regular bowel movements.            Past Medical History:     Past Medical History:   Diagnosis Date     ED (erectile dysfunction)      Hyperlipidemia             Past Surgical History:     Past Surgical History:   Procedure Laterality Date     APPENDECTOMY       COLONOSCOPY N/A 4/30/2021    Procedure: COLONOSCOPY, WITH POLYPECTOMY AND BIOPSY;  Surgeon: Mark Shoemaker DO;  Location: [facility]     ORTHOPEDIC SURGERY       PHACOEMULSIFICATION WITH STANDARD INTRAOCULAR LENS IMPLANT Left 4/3/2019    Procedure: Cataract Removal with Implant;  Surgeon: Cristofer Price MD;  Location: [facility]     PHACOEMULSIFICATION WITH STANDARD INTRAOCULAR LENS IMPLANT Right 4/24/2019    Procedure: Cataract Removal with Implant;  Surgeon: Cristofer Price MD;  Location: [facility]     SURGICAL HISTORY OF -       appendectomy in 1956     SURGICAL HISTORY OF -       right shoulder surgery in 2016            Medications     Current Outpatient Medications   Medication     Emollient (CERAVE) CREA     Multiple Vitamins-Minerals (MENS ONE DAILY PO)     simvastatin  (ZOCOR) 20 MG tablet     warfarin ANTICOAGULANT (COUMADIN) 5 MG tablet     betamethasone dipropionate (DIPROSONE) 0.05 % external cream     cetirizine (ZYRTEC) 10 MG tablet     clotrimazole (LOTRIMIN) 1 % cream     tadalafil (CIALIS) 10 MG tablet     No current facility-administered medications for this visit.            Allergies:   Clindamycin, Bactroban [mupirocin], Cephalexin, Doxycycline, and Metal [staples]         Review of Systems:  From intake questionnaire   Negative 14 system review except as noted on HPI, nurse's note.         Physical Exam:   Patient is a 72 year old  male   Vitals: Blood pressure (!) 159/83, pulse 83, temperature 98.4  F (36.9  C), temperature source Tympanic, SpO2 97 %.  General Appearance Adult: Alert, no acute distress, oriented.  Lungs: Non-labored breathing.  Heart: No obvious jugular venous distension present.  Neuro: Alert, oriented, speech and mentation normal  : RENZO anodular, symmetric, non-tender to palpation      Labs and Pathology:    I personally reviewed all applicable laboratory data and went over findings with patient  Significant for:     BMP RESULTS:  Recent Labs   Lab Test 10/29/22  1253 03/21/22  0814 02/26/21  1009 02/18/20  1011 01/06/17  0000 12/13/16  0000   POTASSIUM  --   --   --   --  4.4 4.0   GLC  --  89 89 87 99 120*   CR 0.8 0.86  --   --  0.93 1.00   GFRESTIMATED >60 >90  --   --  98 74   GFRESTBLACK  --   --   --   --   --  90       PSA RESULTS  PSA   Date Value Ref Range Status   02/26/2021 0.65 0 - 4 ug/L Final     Comment:     Assay Method:  Chemiluminescence using Siemens Vista analyzer   02/18/2020 0.61 0 - 4 ug/L Final     Comment:     Assay Method:  Chemiluminescence using Siemens Vista analyzer     Prostate Specific Antigen Screen   Date Value Ref Range Status   03/21/2022 0.83 0.00 - 4.00 ug/L Final            Assessment and Plan:     Assessment: 72 year old male with a several history of erectile dysfunction.     The patient has tried  sildenafil 100 mg daily. We discussed treatment of erectile dysfunction with oral PDE-5 inhibitors, a vacuum erection device, intra cavernosal injections, and penile prosthesis surgery. The patient elected to pursue treatment with tadalafil. Side effects and proper usage were reviewed. Patient declined lab workup today.    There is not a clear urologic cause for his lower abdominal discomfort. The patient's prostate was non-tender to palpation. No urologic abnormalities on CT scan. He denies urinary symptoms consistent with BPH. Will check a UA and urine culture to rule out UTI.     Plan:  1. Try tadalafil as prescribed by PCP. Maximum daily dose is 20 mg.   2. UA and urine culture to rule out UTI. Patient ok to continue to observe abdominal pain as it is not severe.   3. Follow up with urology as needed.     LONNIE WHITE PA-C  Department of Urology

## 2022-12-01 LAB — BACTERIA UR CULT: NO GROWTH

## 2022-12-07 ENCOUNTER — ANTICOAGULATION THERAPY VISIT (OUTPATIENT)
Dept: ANTICOAGULATION | Facility: CLINIC | Age: 72
End: 2022-12-07

## 2022-12-07 ENCOUNTER — LAB (OUTPATIENT)
Dept: LAB | Facility: CLINIC | Age: 72
End: 2022-12-07
Payer: MEDICARE

## 2022-12-07 DIAGNOSIS — Z79.01 CHRONIC ANTICOAGULATION: ICD-10-CM

## 2022-12-07 DIAGNOSIS — Z86.718 H/O DEEP VENOUS THROMBOSIS: ICD-10-CM

## 2022-12-07 DIAGNOSIS — Z79.01 LONG TERM CURRENT USE OF ANTICOAGULANT THERAPY: ICD-10-CM

## 2022-12-07 DIAGNOSIS — D68.9 COAGULATION DEFECT (H): Primary | ICD-10-CM

## 2022-12-07 DIAGNOSIS — D68.9 COAGULATION DEFECT (H): ICD-10-CM

## 2022-12-07 DIAGNOSIS — I82.4Y9 ACUTE DEEP VEIN THROMBOSIS (DVT) OF PROXIMAL VEIN OF LOWER EXTREMITY, UNSPECIFIED LATERALITY (H): ICD-10-CM

## 2022-12-07 LAB — INR BLD: 2.3 (ref 0.9–1.1)

## 2022-12-07 PROCEDURE — 36416 COLLJ CAPILLARY BLOOD SPEC: CPT

## 2022-12-07 PROCEDURE — 85610 PROTHROMBIN TIME: CPT

## 2022-12-07 NOTE — PROGRESS NOTES
ANTICOAGULATION MANAGEMENT     Jose M Rea 72 year old male is on warfarin with therapeutic INR result. (Goal INR 2.0-3.0)    Recent labs: (last 7 days)     12/07/22  0823   INR 2.3*       ASSESSMENT       Source(s): Chart Review and Patient/Caregiver Call       Warfarin doses taken: Warfarin taken as instructed    Diet: No new diet changes identified    New illness, injury, or hospitalization: No    Medication/supplement changes: None noted    Signs or symptoms of bleeding or clotting: No    Previous INR: Therapeutic last visit; previously outside of goal range    Additional findings: Patient is leaving to [place] 1/1/23 for 3 months. [facility] will manage the INR.       PLAN     Recommended plan for no diet, medication or health factor changes affecting INR     Dosing Instructions: Continue your current warfarin dose with next INR in 6 weeks       Summary  As of 12/7/2022    Full warfarin instructions:  7.5 mg every day; Starting 12/7/2022   Next INR check:  1/18/2023             Telephone call with Jose M who verbalizes understanding and agrees to plan    Patient using outside lab.    Education provided:     Please call back if any changes to your diet, medications or how you've been taking warfarin    Contact [phone]  with any changes, questions or concerns.     Plan made per [facility] anticoagulation protocol    Kb ODONNELL RN  Anticoagulation Clinic  12/7/2022    _______________________________________________________________________     Anticoagulation Episode Summary     Current INR goal:  2.0-3.0   TTR:  83.4 % (1 y)   Target end date:  Indefinite   Send INR reminders to:  [facility]    Indications    Coagulation defect (H) [D68.9]  Acute deep vein thrombosis (DVT) of proximal vein of lower extremity (H) (Resolved) [I82.4Y9]  Long term current use of anticoagulant therapy [Z79.01]  Acute deep vein thrombosis (DVT) of proximal vein of lower extremity  unspecified laterality (H) (Resolved) [I82.4Y9]  Chronic  anticoagulation [Z79.01]  H/O deep venous thrombosis [Z86.718]  Acute deep vein thrombosis (DVT) of proximal vein of lower extremity  unspecified laterality (H) [I82.4Y9]           Comments:  okay to leave [facility]         Anticoagulation Care Providers     Provider Role Specialty Phone number    Pj Quijano MD Referring Family Medicine [phone]    Rigoberto Hernandez MD Referring Family Medicine [phone]

## 2022-12-08 ENCOUNTER — TELEPHONE (OUTPATIENT)
Dept: FAMILY MEDICINE | Facility: CLINIC | Age: 72
End: 2022-12-08

## 2022-12-08 DIAGNOSIS — D68.9 COAGULATION DEFECT (H): Primary | ICD-10-CM

## 2022-12-08 NOTE — TELEPHONE ENCOUNTER
"Onsite anticoag, please go under order review and \"reprint order requisition\" for standing INR lab and mail to patient. Please make sure the comments section prints. Thank you!    Joanie Yin RN, BSN, PHN    "

## 2022-12-08 NOTE — TELEPHONE ENCOUNTER
Reason for Call:  Other call back    Detailed comments: Pt has questions on the INR order he got for his trip to [place]. Please call to discuss.    Phone Number Patient can be reached at: Cell number on file:    Telephone Information:   Mobile [phone]       Best Time: any    Can we leave a detailed message on this number? YES    Call taken on 12/8/2022 at 10:26 AM by Melissa Grijalva

## 2022-12-16 ENCOUNTER — TELEPHONE (OUTPATIENT)
Dept: FAMILY MEDICINE | Facility: CLINIC | Age: 72
End: 2022-12-16

## 2022-12-16 DIAGNOSIS — Z79.01 CHRONIC ANTICOAGULATION: ICD-10-CM

## 2022-12-16 DIAGNOSIS — Z79.01 LONG TERM CURRENT USE OF ANTICOAGULANT THERAPY: ICD-10-CM

## 2022-12-16 DIAGNOSIS — D68.9 COAGULATION DEFECT (H): ICD-10-CM

## 2022-12-16 RX ORDER — WARFARIN SODIUM 5 MG/1
TABLET ORAL
Qty: 135 TABLET | Refills: 1 | Status: SHIPPED | OUTPATIENT
Start: 2022-12-16 | End: 2023-06-22

## 2022-12-16 NOTE — TELEPHONE ENCOUNTER
Reason for Call:  Other call back    Detailed comments: Pt states he is leaving at the end of next week to [place] for the net three months. He wants to make sure that his warfarin RX is going to be sent to the pharmacy. Please advise.    Phone Number Patient can be reached at: Cell number on file:    Telephone Information:   Mobile [phone]       Best Time: any    Can we leave a detailed message on this number? YES    Call taken on 12/16/2022 at 11:29 AM by Melissa Grijalva

## 2022-12-16 NOTE — TELEPHONE ENCOUNTER
ANTICOAGULATION MANAGEMENT:  Medication Refill    Anticoagulation Summary  As of 12/7/2022    Warfarin maintenance plan:  7.5 mg (5 mg x 1.5) every day; Starting 12/7/2022   Next INR check:  1/18/2023   Target end date:  Indefinite    Indications    Coagulation defect (H) [D68.9]  Acute deep vein thrombosis (DVT) of proximal vein of lower extremity (H) (Resolved) [I82.4Y9]  Long term current use of anticoagulant therapy [Z79.01]  Acute deep vein thrombosis (DVT) of proximal vein of lower extremity  unspecified laterality (H) (Resolved) [I82.4Y9]  Chronic anticoagulation [Z79.01]  H/O deep venous thrombosis [Z86.718]  Acute deep vein thrombosis (DVT) of proximal vein of lower extremity  unspecified laterality (H) [I82.4Y9]             Anticoagulation Care Providers     Provider Role Specialty Phone number    Pj Quijano MD Referring Family Medicine [phone]    Rigoberto Hernandez MD Referring Family Medicine [phone]          Visit with referring provider/group within last year: Yes    [facility] referral signed within last year: Yes    Jose M meets all criteria for refill (current [facility] referral, office visit with referring provider/group in last year, lab monitoring up to date or not exceeding 2 weeks overdue). Rx instructions and quantity match patient's current dosing plan. Warfarin 90 day supply with 1 refill granted per [facility] protocol     Joanie Yin RN  Anticoagulation Clinic

## 2022-12-21 ENCOUNTER — OFFICE VISIT (OUTPATIENT)
Dept: URGENT CARE | Facility: URGENT CARE | Age: 72
End: 2022-12-21
Payer: MEDICARE

## 2022-12-21 ENCOUNTER — TELEPHONE (OUTPATIENT)
Dept: FAMILY MEDICINE | Facility: CLINIC | Age: 72
End: 2022-12-21

## 2022-12-21 VITALS
BODY MASS INDEX: 29.13 KG/M2 | SYSTOLIC BLOOD PRESSURE: 174 MMHG | WEIGHT: 203 LBS | HEART RATE: 78 BPM | TEMPERATURE: 98 F | OXYGEN SATURATION: 99 % | DIASTOLIC BLOOD PRESSURE: 82 MMHG

## 2022-12-21 DIAGNOSIS — R03.0 ELEVATED BLOOD PRESSURE READING: ICD-10-CM

## 2022-12-21 DIAGNOSIS — L57.0 ACTINIC KERATOSIS: Primary | ICD-10-CM

## 2022-12-21 PROCEDURE — 99213 OFFICE O/P EST LOW 20 MIN: CPT

## 2022-12-21 NOTE — PATIENT INSTRUCTIONS
Diagnosis: dermatitis   Today we did:  Dermatology referral     Plan:   Schedule apt     Monitor for:   Worsening  Bleeding   Pain

## 2022-12-21 NOTE — PROGRESS NOTES
URGENT CARE  Assessment & Plan   Assessment:   Jose M Rea is a 72 year old male who's clinical presentation today is consistent with:   1. Actinic keratosis    No alarm signs or symptoms present   Differential Diagnoses for this patient's CC include   Atopic dermatitis, allergic Dermatitis, Insect bite/sting, drug reaction, impetigo, acne vulgaris,    seborrheic dermatitis/keratosis, ,    other skin malignancy: SSC, BCC, melanoma   verruca, xerosis     Plan:  Will treat patient's skin lesion at this time with supportive and symptomatic measures.  Given unsure whether etiology is actinic keratosis versus squamous cell carcinoma will refer patient to dermatology for further evaluation and treatment with a biopsy and pathology.  Educated patient to continue to monitor the site for worsening spreading of infection, bleeding, pain, increased redness.    Additionally we discussed if symptoms do not improve after starting today's treatment (or if symptoms worsen) to follow up in 10-14 days.  Additionally an isolated elevated bp was noted today, given patient has no known hypertension diagnosis or history  and is not symptomatic (but in acute pain) educated patient he  needs to follow up with his  PCP when he is not acutely ill for further evaluation and treatment of his elevated blood pressure reading, we discussed that a second reading is often needed to diagnose hypertension and to start blood pressure medications.  Also discussed with the patient today the signs and symptoms of a hypertensive emergency/ urgency, a cardiac event and/or a stroke; he states an understanding and will return to the ED should he note any of these discussed symptoms.    Patient  is  agreeable to treatment plan and state they will follow-up if symptoms do not improve and/or if symptoms worsen (see patient's AVS 'monitor for' section for specific patient instructions given and discussed regarding what to watch for and when to follow  up)    Medications ordered are listed above, please see AVS for patient's specific and personalized discharge instructions given     NICHELLE [facility]      ______________________________________________________________________        Subjective  Subjective     HPI: Jose M Rea  is a 72 year old  male who presents today for evaluation the following concerns:   Patient presents with a skin lesion noted to the anterior left aspect of his head.  Patient states his hairdresser noticed it today while he was getting a haircut.  Patient states he is concerned it is cancer.  Patient states she is unsure how long the lesion has been there.   Patient states he has never had a skin lesion like this before.  Patient describes it as red and scaly.  Patient denies any pain or pruritus.  Patient denies any redness or swelling.  Patient denies any drainage.  Patient denies any pus.  Patient states he has not had skin cancer in the past.  Patient states he did not even notice it and it is not bothersome.      Allergies   Allergen Reactions     Clindamycin      Bactroban [Mupirocin]      Cephalexin Rash     Doxycycline Rash     Metal [Staples] Rash and Blisters     Patient Active Problem List   Diagnosis     Hyperlipidemia LDL goal <100     Eczema     Chronic anticoagulation     Long term current use of anticoagulant therapy     Erectile dysfunction, unspecified erectile dysfunction type     H/O deep venous thrombosis     Coagulation defect (H)     Ear fullness, left     Screening for diabetes mellitus     Acute deep vein thrombosis (DVT) of proximal vein of lower extremity, unspecified laterality (H)       Review of Systems:  Pertinent review of systems as reflected in HPI, otherwise negative.     Objective  Objective    Physical Exam:  Vitals:    12/21/22 1108 12/21/22 1113   BP: (!) 189/88 (!) 174/82   Pulse: 78    Temp: 98  F (36.7  C)    TempSrc: Tympanic    SpO2: 99%    Weight:  92.1 kg (203 lb)       General:   alert and oriented, no acute distress, non-ill-appearing   Vital signs reviewed: afebrile, elevated blood pressure    Psy/mental status: pleasant   SKIN: anterior aspect of head, posterior left side:   erythematous, scaly macule, papule, / plaque   Which measures 1 cm   Slightly thick, adherent scale,non oily, red, pink and pearly white in color       I explained my diagnostic considerations and recommendations to the patient, who voiced understanding and agreement with the treatment plan.   All questions were answered.   We discussed potential side effects, risks and benefits of any prescribed or recommended therapies, as well as expectations for response to treatments.  Please see AVS for any patient instructions & handouts given.   Patient was advised to contact the Nurse Care Line, their Primary Care provider, Urgent Care, or the Emergency Department if there are new or worsening symptoms, or call 911 for emergencies.        ______________________________________________________________________          Patient Instructions   Diagnosis: dermatitis   Today we did:  Dermatology referral     Plan:     Schedule apt     Monitor for:     Worsening    Bleeding     Pain

## 2023-01-18 ENCOUNTER — TRANSFERRED RECORDS (OUTPATIENT)
Dept: HEALTH INFORMATION MANAGEMENT | Facility: CLINIC | Age: 73
End: 2023-01-18
Payer: MEDICARE

## 2023-01-18 ENCOUNTER — ANTICOAGULATION THERAPY VISIT (OUTPATIENT)
Dept: ANTICOAGULATION | Facility: CLINIC | Age: 73
End: 2023-01-18
Payer: MEDICARE

## 2023-01-18 ENCOUNTER — MYC MEDICAL ADVICE (OUTPATIENT)
Dept: ANTICOAGULATION | Facility: CLINIC | Age: 73
End: 2023-01-18
Payer: MEDICARE

## 2023-01-18 DIAGNOSIS — Z79.01 LONG TERM CURRENT USE OF ANTICOAGULANT THERAPY: ICD-10-CM

## 2023-01-18 DIAGNOSIS — D68.9 COAGULATION DEFECT (H): Primary | ICD-10-CM

## 2023-01-18 DIAGNOSIS — Z79.01 CHRONIC ANTICOAGULATION: ICD-10-CM

## 2023-01-18 DIAGNOSIS — I82.4Y9 ACUTE DEEP VEIN THROMBOSIS (DVT) OF PROXIMAL VEIN OF LOWER EXTREMITY, UNSPECIFIED LATERALITY (H): ICD-10-CM

## 2023-01-18 DIAGNOSIS — Z86.718 H/O DEEP VENOUS THROMBOSIS: ICD-10-CM

## 2023-01-18 LAB — INR (EXTERNAL): 2.8 (ref 0.9–1.1)

## 2023-01-18 NOTE — PROGRESS NOTES
ANTICOAGULATION MANAGEMENT     Jose M Rea 72 year old male is on warfarin with therapeutic INR result. (Goal INR 2.0-3.0)    Recent labs: (last 7 days)     01/18/23  0000   INR 2.8*       ASSESSMENT       Source(s): Chart Review    Previous INR was Therapeutic last 2(+) visits    Medication, diet, health changes since last INR chart reviewed; none identified           PLAN     Recommended plan for no diet, medication or health factor changes affecting INR     Dosing Instructions: Continue your current warfarin dose with next INR in 6 weeks       Summary  As of 1/18/2023    Full warfarin instructions:  7.5 mg every day   Next INR check:  3/1/2023             Detailed voice message left for Jose M with dosing instructions and follow up date.     Patient using outside facility for labs    Education provided:     Please call back if any changes to your diet, medications or how you've been taking warfarin    Contact [phone]  with any changes, questions or concerns.     Plan made per [facility] anticoagulation protocol    Kb ODONNELL RN  Anticoagulation Clinic  1/18/2023    _______________________________________________________________________     Anticoagulation Episode Summary     Current INR goal:  2.0-3.0   TTR:  83.4 % (1 y)   Target end date:  Indefinite   Send INR reminders to:  [facility]    Indications    Coagulation defect (H) [D68.9]  Acute deep vein thrombosis (DVT) of proximal vein of lower extremity (H) (Resolved) [I82.4Y9]  Long term current use of anticoagulant therapy [Z79.01]  Acute deep vein thrombosis (DVT) of proximal vein of lower extremity  unspecified laterality (H) (Resolved) [I82.4Y9]  Chronic anticoagulation [Z79.01]  H/O deep venous thrombosis [Z86.718]  Acute deep vein thrombosis (DVT) of proximal vein of lower extremity  unspecified laterality (H) [I82.4Y9]           Comments:  okay to leave [facility]         Anticoagulation Care Providers     Provider Role Specialty Phone number     Pj Quijano MD Referring Family Medicine [phone]    Rigoberto Hernandez MD Referring Family Medicine [phone]

## 2023-01-18 NOTE — PROGRESS NOTES
ANTICOAGULATION MANAGEMENT     Jose M POSEY Flkimberleymaximiliano 72 year old male is on warfarin with therapeutic INR result. (Goal INR 2.0-3.0)    Recent labs: (last 7 days)     01/18/23  0000   INR 2.8*       ASSESSMENT       Source(s): Chart Review and Patient/Caregiver Call       Warfarin doses taken: Warfarin taken as instructed    Diet: No new diet changes identified    New illness, injury, or hospitalization: No    Medication/supplement changes: None noted    Signs or symptoms of bleeding or clotting: No    Previous INR: Therapeutic last 2(+) visits    Additional findings: None       PLAN     Recommended plan for no diet, medication or health factor changes affecting INR     Dosing Instructions: Continue your current warfarin dose with next INR in 6 weeks       Summary  As of 1/18/2023    Full warfarin instructions:  7.5 mg every day   Next INR check:  3/1/2023             Telephone call with Jose M who verbalizes understanding and agrees to plan    Patient using outside facility for labs    Education provided:     Please call back if any changes to your diet, medications or how you've been taking warfarin    Contact [phone]  with any changes, questions or concerns.     Plan made per [facility] anticoagulation protocol    Kb ODONNELL RN  Anticoagulation Clinic  1/18/2023    _______________________________________________________________________     Anticoagulation Episode Summary     Current INR goal:  2.0-3.0   TTR:  83.4 % (1 y)   Target end date:  Indefinite   Send INR reminders to:  [facility]    Indications    Coagulation defect (H) [D68.9]  Acute deep vein thrombosis (DVT) of proximal vein of lower extremity (H) (Resolved) [I82.4Y9]  Long term current use of anticoagulant therapy [Z79.01]  Acute deep vein thrombosis (DVT) of proximal vein of lower extremity  unspecified laterality (H) (Resolved) [I82.4Y9]  Chronic anticoagulation [Z79.01]  H/O deep venous thrombosis [Z86.718]  Acute deep vein thrombosis (DVT) of  proximal vein of lower extremity  unspecified laterality (H) [I82.4Y9]           Comments:  okay to leave [facility]         Anticoagulation Care Providers     Provider Role Specialty Phone number    Pj Quijano MD Referring Family Medicine [phone]    Rigoberto Hernandez MD Referring Family Medicine [phone]

## 2023-01-18 NOTE — PROGRESS NOTES
Received a fax INR result for Jose M from [facility]    INR result dated 1/18/2023 is 2.8    Ana GUERRA RN, BSN  Anticoagulation Clinic

## 2023-03-01 ENCOUNTER — ANTICOAGULATION THERAPY VISIT (OUTPATIENT)
Dept: ANTICOAGULATION | Facility: CLINIC | Age: 73
End: 2023-03-01
Payer: MEDICARE

## 2023-03-01 ENCOUNTER — TRANSFERRED RECORDS (OUTPATIENT)
Dept: HEALTH INFORMATION MANAGEMENT | Facility: CLINIC | Age: 73
End: 2023-03-01
Payer: MEDICARE

## 2023-03-01 DIAGNOSIS — Z79.01 LONG TERM CURRENT USE OF ANTICOAGULANT THERAPY: ICD-10-CM

## 2023-03-01 DIAGNOSIS — Z86.718 H/O DEEP VENOUS THROMBOSIS: ICD-10-CM

## 2023-03-01 DIAGNOSIS — Z79.01 CHRONIC ANTICOAGULATION: ICD-10-CM

## 2023-03-01 DIAGNOSIS — I82.4Y9 ACUTE DEEP VEIN THROMBOSIS (DVT) OF PROXIMAL VEIN OF LOWER EXTREMITY, UNSPECIFIED LATERALITY (H): ICD-10-CM

## 2023-03-01 DIAGNOSIS — D68.9 COAGULATION DEFECT (H): Primary | ICD-10-CM

## 2023-03-01 LAB — INR (EXTERNAL): 2.9 (ref 0.9–1.1)

## 2023-03-01 NOTE — PROGRESS NOTES
ANTICOAGULATION MANAGEMENT     Jose M POSEY Rona 72 year old male is on warfarin with therapeutic INR result. (Goal INR 2.0-3.0)    Recent labs: (last 7 days)     03/01/23  1023   INR 2.9*       ASSESSMENT       Source(s): Chart Review and Patient/Caregiver Call       Warfarin doses taken: Warfarin taken as instructed    Diet: No new diet changes identified    New illness, injury, or hospitalization: No    Medication/supplement changes: None noted    Signs or symptoms of bleeding or clotting: No    Previous INR: Therapeutic last 2(+) visits    Additional findings: None         PLAN     Recommended plan for no diet, medication or health factor changes affecting INR     Dosing Instructions: Continue your current warfarin dose with next INR in 6 weeks       Summary  As of 3/1/2023    Full warfarin instructions:  7.5 mg every day   Next INR check:  4/12/2023             Telephone call with Jose M who verbalizes understanding and agrees to plan    Lab visit scheduled    Education provided:     Goal range and lab monitoring: goal range and significance of current result    Plan made per [facility] anticoagulation protocol    Ce Ponce RN  Anticoagulation Clinic  3/1/2023    _______________________________________________________________________     Anticoagulation Episode Summary     Current INR goal:  2.0-3.0   TTR:  83.4 % (1 y)   Target end date:  Indefinite   Send INR reminders to:  [facility]    Indications    Coagulation defect (H) [D68.9]  Acute deep vein thrombosis (DVT) of proximal vein of lower extremity (H) (Resolved) [I82.4Y9]  Long term current use of anticoagulant therapy [Z79.01]  Acute deep vein thrombosis (DVT) of proximal vein of lower extremity  unspecified laterality (H) (Resolved) [I82.4Y9]  Chronic anticoagulation [Z79.01]  H/O deep venous thrombosis [Z86.718]  Acute deep vein thrombosis (DVT) of proximal vein of lower extremity  unspecified laterality (H) [I82.4Y9]           Comments:  okay to  leave [facility]         Anticoagulation Care Providers     Provider Role Specialty Phone number    Pj Quijano MD Referring Family Medicine [phone]    Rigoberto Hernandez MD Referring Family Medicine [phone]

## 2023-03-21 NOTE — PATIENT INSTRUCTIONS
Increase rest and fluids. Tylenol and/or Ibuprofen for comfort. Cool mist vaporizer. If your symptoms worsen or do not resolve follow up with your primary care provider in 1 week and sooner if needed.      Mucinex 600-1200 mg 12 hour formula for ear, head and chest congestion.  It can also thin post nasal drip which can cause a cough and sore throat.    You can also do the ear pulls or milk the eustachian tubes as discussed    Indications for emergent return to emergency department discussed with patient, who verbalized good understanding and agreement.  Patient understands the limitations of today's evaluation.           Patient Education     Earache, No Infection (Adult)   Earaches can happen without an infection. They can occur when air and fluid build up behind the eardrum. They may cause a feeling of fullness and discomfort. They may also impair hearing. This is called otitis media with effusion (OME) or serous otitis media. It means there is fluid in the middle ear. It is not the same as acute otitis media, which is often from an infection.  OME can happen when you have a cold if congestion blocks the passage that drains the middle ear. This passage is called the eustachian tube. OME may also occur with nasal allergies or after a bacterial infection in the middle ear. Other causes are:    Trauma    Improper cleaning of wax from the ear    Bacterial infection of the mastoid bone (mastoiditis)    Tumor    Jaw pain    Changes in pressure, such as from flying or scuba diving    The pain or discomfort may come and go. You may hear clicking or popping sounds when you chew or swallow. You may feel that your balance is off. Or you may hear ringing in the ear.  It often takes from several weeks up to 3 months for the fluid to clear on its own. Oral pain relievers and ear drops help if there is pain. Decongestants and antihistamines sometimes help. Antibiotics don't help since there is no infection. Your healthcare  provider may give you a nasal spray to help reduce swelling in the nose and eustachian tube. This can allow the ear to drain.  If your OME doesn't get better after 3 months, surgery may be used to drain the fluid. A small tube may also be put in the eardrum to help with drainage.  Because the middle ear fluid can become infected, watch for signs of an infection. These may develop later. They may include increased ear pain, fever, or drainage from the ear.  Home care  These home-care tips will help you take care of yourself:    You may use over-the-counter medicine as directed by your healthcare provider to control pain, unless medicine was prescribed. If you have chronic liver or kidney disease or ever had a stomach ulcer or GI bleeding, talk with your healthcare provider before using any medicines.    Aspirin should never be used in anyone younger than age 18 who has a fever. It may cause severe liver damage.    Ask your healthcare provider if you may use over-the-counter decongestants such as phenylephrine or pseudoephedrine. Keep in mind they are not always helpful.    Talk with your healthcare provider about using nasal spray decongestants. Don't use them for more than 3 days, or as directed by your healthcare provider. Longer use can make congestion worse. Prescription nasal sprays from your healthcare provider don't often have such restrictions.    Antihistamines may help if you are also having allergy symptoms.    You may use medicines such as guaifenesin to thin mucus and help with drainage.  Follow-up care  Follow up with your healthcare provider or as advised if you are not feeling better after 3 days.  When to seek medical advice  Call your healthcare provider right away if any of these occur:    Ear pain that gets worse or that does not start to get better     Fever of 100.4 F (38 C) or higher, or as directed by your healthcare provider    Fluid or blood draining from the ear    Headache or sinus  pain    Stiff neck    Unusual drowsiness or confusion  [employer] last reviewed this educational content on 12/1/2019 2000-2021 [employer]. All rights reserved. This information is not intended as a substitute for professional medical care. Always follow your healthcare professional's instructions.            Xray Hand 2 Views, Left

## 2023-03-27 ENCOUNTER — OFFICE VISIT (OUTPATIENT)
Dept: URGENT CARE | Facility: URGENT CARE | Age: 73
End: 2023-03-27
Payer: MEDICARE

## 2023-03-27 ENCOUNTER — TELEPHONE (OUTPATIENT)
Dept: FAMILY MEDICINE | Facility: CLINIC | Age: 73
End: 2023-03-27

## 2023-03-27 VITALS
SYSTOLIC BLOOD PRESSURE: 156 MMHG | HEART RATE: 74 BPM | TEMPERATURE: 97.9 F | WEIGHT: 197 LBS | DIASTOLIC BLOOD PRESSURE: 79 MMHG | OXYGEN SATURATION: 98 % | BODY MASS INDEX: 28.27 KG/M2

## 2023-03-27 DIAGNOSIS — Z79.01 LONG TERM CURRENT USE OF ANTICOAGULANT THERAPY: ICD-10-CM

## 2023-03-27 DIAGNOSIS — J01.90 ACUTE SINUSITIS WITH SYMPTOMS > 10 DAYS: Primary | ICD-10-CM

## 2023-03-27 PROCEDURE — 99213 OFFICE O/P EST LOW 20 MIN: CPT | Performed by: PHYSICIAN ASSISTANT

## 2023-03-27 NOTE — TELEPHONE ENCOUNTER
Patient Returning Call    Reason for call:  Patient has sinus infection and is on an antibiotic and wants to know if he needs to change his coumadin dosing.    Information relayed to patient:  Nurse will call him back    Patient has additional questions:  No    What are your questions/concerns:  NA    Could we send this information to you in [facility] or would you prefer to receive a phone call?:   Patient would prefer a phone call   Okay to leave a detailed message?: Yes at Home number on file [phone] (home)

## 2023-03-27 NOTE — PROGRESS NOTES
"  Assessment & Plan     1. Acute sinusitis with symptoms > 10 days  Will treat with amoxicillin-clavulanate (AUGMENTIN) 875-125 MG tablet; Take 1 tablet by mouth 2 times daily for 7 days. Continue with supportive care. Return to clinic if symptoms worsen or do not improve; otherwise follow up as needed      2. Long term current use of anticoagulant therapy  Contact coumadin clinic to get INR checked after 3-4 days after starting antibiotic                     [facility]              Subjective      Chief Complaint   Patient presents with     URI     Nasal congestion and sinuses are stuffed up, cough and \"congestion\".  Denies chest congestion.       HPI     URI Adult    Onset of symptoms was 1.5 week(s) ago.  Course of illness is same.    Severity moderate  Current and Associated symptoms: sinus pain/pressure,   Treatment measures tried include over the counter cough/cold medication .  Predisposing factors include None.                  Review of Systems   Constitutional, HEENT, cardiovascular, pulmonary, gi and gu systems are negative, except as otherwise noted.      Objective    BP (!) 156/79   Pulse 74   Temp 97.9  F (36.6  C) (Tympanic)   Wt 89.4 kg (197 lb)   SpO2 98%   BMI 28.27 kg/m    Body mass index is 28.27 kg/m .  Physical Exam  Constitutional:       General: He is not in acute distress.     Appearance: He is well-developed.   HENT:      Head: Normocephalic and atraumatic.      Right Ear: Tympanic membrane and ear canal normal.      Left Ear: Tympanic membrane and ear canal normal.   Eyes:      Conjunctiva/sclera: Conjunctivae normal.   Cardiovascular:      Rate and Rhythm: Normal rate and regular rhythm.   Pulmonary:      Effort: Pulmonary effort is normal.      Breath sounds: Normal breath sounds.   Skin:     General: Skin is warm and dry.      Findings: No rash.   Psychiatric:         Behavior: Behavior normal.                            "

## 2023-03-30 ENCOUNTER — ANTICOAGULATION THERAPY VISIT (OUTPATIENT)
Dept: ANTICOAGULATION | Facility: CLINIC | Age: 73
End: 2023-03-30

## 2023-03-30 ENCOUNTER — LAB (OUTPATIENT)
Dept: LAB | Facility: CLINIC | Age: 73
End: 2023-03-30
Payer: MEDICARE

## 2023-03-30 DIAGNOSIS — I82.4Y9 ACUTE DEEP VEIN THROMBOSIS (DVT) OF PROXIMAL VEIN OF LOWER EXTREMITY, UNSPECIFIED LATERALITY (H): ICD-10-CM

## 2023-03-30 DIAGNOSIS — Z86.718 H/O DEEP VENOUS THROMBOSIS: ICD-10-CM

## 2023-03-30 DIAGNOSIS — D68.9 COAGULATION DEFECT (H): ICD-10-CM

## 2023-03-30 DIAGNOSIS — Z79.01 CHRONIC ANTICOAGULATION: ICD-10-CM

## 2023-03-30 DIAGNOSIS — Z79.01 LONG TERM CURRENT USE OF ANTICOAGULANT THERAPY: ICD-10-CM

## 2023-03-30 DIAGNOSIS — D68.9 COAGULATION DEFECT (H): Primary | ICD-10-CM

## 2023-03-30 LAB — INR BLD: 2.6 (ref 0.9–1.1)

## 2023-03-30 PROCEDURE — 36416 COLLJ CAPILLARY BLOOD SPEC: CPT

## 2023-03-30 PROCEDURE — 85610 PROTHROMBIN TIME: CPT

## 2023-03-30 NOTE — PROGRESS NOTES
ANTICOAGULATION MANAGEMENT     Jose M POSEY Rona 73 year old male is on warfarin with therapeutic INR result. (Goal INR 2.0-3.0)    Recent labs: (last 7 days)     03/30/23  0901   INR 2.6*       ASSESSMENT       Source(s): Chart Review and Patient/Caregiver Call       Warfarin doses taken: Warfarin taken as instructed    Diet: No new diet changes identified    New illness, injury, or hospitalization: Yes: Sinus infection    Medication/supplement changes: on Augmentin    Signs or symptoms of bleeding or clotting: No    Previous INR: Therapeutic last 2(+) visits    Additional findings: None         PLAN     Recommended plan for temporary change(s) affecting INR     Dosing Instructions: Continue your current warfarin dose with next INR in 6 weeks       Summary  As of 3/30/2023    Full warfarin instructions:  7.5 mg every day   Next INR check:  5/11/2023             Telephone call with Jose M who verbalizes understanding and agrees to plan    Lab visit scheduled    Education provided:     Goal range and lab monitoring: goal range and significance of current result    Plan made per [facility] anticoagulation protocol    Ce Ponce RN  Anticoagulation Clinic  3/30/2023    _______________________________________________________________________     Anticoagulation Episode Summary     Current INR goal:  2.0-3.0   TTR:  83.4 % (1 y)   Target end date:  Indefinite   Send INR reminders to:  [facility]    Indications    Coagulation defect (H) [D68.9]  Acute deep vein thrombosis (DVT) of proximal vein of lower extremity (H) (Resolved) [I82.4Y9]  Long term current use of anticoagulant therapy [Z79.01]  Acute deep vein thrombosis (DVT) of proximal vein of lower extremity  unspecified laterality (H) (Resolved) [I82.4Y9]  Chronic anticoagulation [Z79.01]  H/O deep venous thrombosis [Z86.718]  Acute deep vein thrombosis (DVT) of proximal vein of lower extremity  unspecified laterality (H) [I82.4Y9]           Comments:  okay to  leave [facility]         Anticoagulation Care Providers     Provider Role Specialty Phone number    Pj Quijano MD Referring Family Medicine [phone]    Rigoberto Hernandez MD Referring Family Medicine [phone]

## 2023-04-07 ENCOUNTER — LAB (OUTPATIENT)
Dept: LAB | Facility: CLINIC | Age: 73
End: 2023-04-07
Payer: MEDICARE

## 2023-04-07 DIAGNOSIS — D68.9 COAGULATION DEFECT (H): ICD-10-CM

## 2023-04-07 DIAGNOSIS — Z12.5 SCREENING FOR PROSTATE CANCER: ICD-10-CM

## 2023-04-07 DIAGNOSIS — Z79.01 LONG TERM CURRENT USE OF ANTICOAGULANT THERAPY: ICD-10-CM

## 2023-04-07 DIAGNOSIS — Z86.718 H/O DEEP VENOUS THROMBOSIS: ICD-10-CM

## 2023-04-07 DIAGNOSIS — E78.5 HYPERLIPIDEMIA LDL GOAL <100: Primary | ICD-10-CM

## 2023-04-07 DIAGNOSIS — Z13.1 SCREENING FOR DIABETES MELLITUS: ICD-10-CM

## 2023-04-07 DIAGNOSIS — E78.5 HYPERLIPIDEMIA LDL GOAL <100: ICD-10-CM

## 2023-04-07 DIAGNOSIS — I82.4Y9 ACUTE DEEP VEIN THROMBOSIS (DVT) OF PROXIMAL VEIN OF LOWER EXTREMITY, UNSPECIFIED LATERALITY (H): ICD-10-CM

## 2023-04-07 DIAGNOSIS — Z79.01 CHRONIC ANTICOAGULATION: ICD-10-CM

## 2023-04-07 LAB
CHOLEST SERPL-MCNC: 176 MG/DL
FASTING STATUS PATIENT QL REPORTED: YES
GLUCOSE SERPL-MCNC: 97 MG/DL (ref 70–99)
HDLC SERPL-MCNC: 48 MG/DL
HOLD SPECIMEN: NORMAL
LDLC SERPL CALC-MCNC: 78 MG/DL
NONHDLC SERPL-MCNC: 128 MG/DL
PSA SERPL DL<=0.01 NG/ML-MCNC: 0.74 NG/ML (ref 0–6.5)
TRIGL SERPL-MCNC: 248 MG/DL

## 2023-04-07 PROCEDURE — 80061 LIPID PANEL: CPT

## 2023-04-07 PROCEDURE — 82947 ASSAY GLUCOSE BLOOD QUANT: CPT

## 2023-04-07 PROCEDURE — G0103 PSA SCREENING: HCPCS

## 2023-04-07 PROCEDURE — 36415 COLL VENOUS BLD VENIPUNCTURE: CPT

## 2023-04-11 ENCOUNTER — OFFICE VISIT (OUTPATIENT)
Dept: DERMATOLOGY | Facility: CLINIC | Age: 73
End: 2023-04-11
Payer: MEDICARE

## 2023-04-11 DIAGNOSIS — D18.01 CHERRY ANGIOMA: ICD-10-CM

## 2023-04-11 DIAGNOSIS — L30.9 ECZEMA OF RIGHT UPPER EXTREMITY: ICD-10-CM

## 2023-04-11 DIAGNOSIS — L81.4 LENTIGO: ICD-10-CM

## 2023-04-11 DIAGNOSIS — L82.1 SEBORRHEIC KERATOSIS: Primary | ICD-10-CM

## 2023-04-11 DIAGNOSIS — L20.89 OTHER ATOPIC DERMATITIS: ICD-10-CM

## 2023-04-11 PROCEDURE — 99213 OFFICE O/P EST LOW 20 MIN: CPT | Performed by: PHYSICIAN ASSISTANT

## 2023-04-11 RX ORDER — BETAMETHASONE DIPROPIONATE 0.5 MG/G
CREAM TOPICAL
Qty: 45 G | Refills: 2 | Status: SHIPPED | OUTPATIENT
Start: 2023-04-11

## 2023-04-11 ASSESSMENT — PAIN SCALES - GENERAL: PAINLEVEL: NO PAIN (0)

## 2023-04-11 NOTE — PROGRESS NOTES
Jose M Rea is an extremely pleasant 73 year old year old male patient here today for skin check. He notes area on scalp that his hairdresser noticed months ago. He denies any pain or bleeding. Not bothersome per patient. He notes need refill on betamethasone, he notes he uses it very sparingly. He uses this for atopic dermatitis.  Patient has no other skin complaints today.  Remainder of the HPI, Meds, PMH, Allergies, FH, and SH was reviewed in chart.    Pertinent Hx:  Atopic dermatitis   Past Medical History:   Diagnosis Date     ED (erectile dysfunction)      Hyperlipidemia        Past Surgical History:   Procedure Laterality Date     APPENDECTOMY       COLONOSCOPY N/A 4/30/2021    Procedure: COLONOSCOPY, WITH POLYPECTOMY AND BIOPSY;  Surgeon: Mark Shoemaker DO;  Location: [facility]     ORTHOPEDIC SURGERY       PHACOEMULSIFICATION WITH STANDARD INTRAOCULAR LENS IMPLANT Left 4/3/2019    Procedure: Cataract Removal with Implant;  Surgeon: Cristofer Price MD;  Location: [facility]     PHACOEMULSIFICATION WITH STANDARD INTRAOCULAR LENS IMPLANT Right 4/24/2019    Procedure: Cataract Removal with Implant;  Surgeon: Cristofer Price MD;  Location: [facility]     SURGICAL HISTORY OF -       appendectomy in 1956     SURGICAL HISTORY OF -       right shoulder surgery in 2016        Family History   Problem Relation Age of Onset     Coronary Artery Disease Paternal Grandfather         MI in late 50s or early 60s     Sleep Apnea Son      Thrombosis Son         PEs in his mid 30s     Prostate Cancer No family hx of      Colon Cancer No family hx of        Social History     Socioeconomic History     Marital status:      Spouse name: Not on file     Number of children: Not on file     Years of education: Not on file     Highest education level: Not on file   Occupational History     Not on file   Tobacco Use     Smoking status: Every Day     Packs/day: 0.50     Years: 50.00     Pack years: 25.00      Types: Cigarettes     Smokeless tobacco: Never     Tobacco comments:     started at age 18, 1/2 pack daily or less   Vaping Use     Vaping status: Never Used   Substance and Sexual Activity     Alcohol use: Not Currently     Comment: occasional beer     Drug use: No     Sexual activity: Yes     Partners: Female   Other Topics Concern     Parent/sibling w/ CABG, MI or angioplasty before 65F 55M? Not Asked   Social History Narrative     Not on file     Social Determinants of Health     Financial Resource Strain: Not on file   Food Insecurity: Not on file   Transportation Needs: Not on file   Physical Activity: Not on file   Stress: Not on file   Social Connections: Not on file   Intimate Partner Violence: Not on file   Housing Stability: Not on file       Outpatient Encounter Medications as of 4/11/2023   Medication Sig Dispense Refill     betamethasone dipropionate (DIPROSONE) 0.05 % external cream Apply twice daily as needed to affected area on leg. Wait for patient call to fill prescription. 45 g 2     cetirizine (ZYRTEC) 10 MG tablet Take 10 mg by mouth daily       clotrimazole (LOTRIMIN) 1 % cream as needed        Emollient (CERAVE) CREA Externally apply topically 2 times daily as needed (dry skiin) 453 g 0     Multiple Vitamins-Minerals (MENS ONE DAILY PO)        simvastatin (ZOCOR) 20 MG tablet Take 1 tablet by mouth once daily 90 tablet 0     tadalafil (CIALIS) 10 MG tablet Take one tablet by mouth 30 minutes prior to sexual activity. 10 tablet 11     warfarin ANTICOAGULANT (COUMADIN) 5 MG tablet TAKE 7.5 mg daily OR AS DIRECTED BY ANTICOAGULATION CLINIC 135 tablet 1     [DISCONTINUED] betamethasone dipropionate (DIPROSONE) 0.05 % external cream Apply twice daily as needed to affected area on leg. 45 g 2     No facility-administered encounter medications on file as of 4/11/2023.             O:   NAD, WDWN, Alert & Oriented, Mood & Affect wnl, Vitals stable   Here today alone   There were no vitals taken for  this visit.   General appearance normal   Vitals stable   Alert, oriented and in no acute distress     Eczematous thin plaques on face   Stuck on papules and brown macules on trunk and ext   Red papules on trunk     The remainder of skin exam is normal       Eyes: Conjunctivae/lids:Normal     ENT: Lips: normal    MSK:Normal    Cardiovascular: peripheral edema none    Pulm: Breathing Normal    Neuro/Psych: Orientation:Alert and Orientedx3 ; Mood/Affect:normal   A/P:  1. Atopic dermatitis  Well controlled. Refilled betamethasone twice daily as needed.   2. Seborrheic keratosis, lentigo, angioma  It was a pleasure speaking to Jose M Rea today.  BENIGN LESIONS DISCUSSED WITH PATIENT:  I discussed the specifics of tumor, prognosis, and genetics of benign lesions.  I explained that treatment of these lesions would be purely cosmetic and not medically neccessary.  I discussed with patient different removal options including excision, cautery and /or laser.      Nature and genetics of benign skin lesions dicussed with patient.  Signs and Symptoms of skin cancer discussed with patient.  ABCDEs of melanoma reviewed with patient.  Patient encouraged to perform monthly skin exams.  UV precautions reviewed with patient.    Risks of non-melanoma skin cancer discussed with patient   Return to clinic in one year or sooner if needed.

## 2023-04-11 NOTE — NURSING NOTE
Chief Complaint   Patient presents with     Skin Check     Spot on scalp        There were no vitals filed for this visit.  Wt Readings from Last 1 Encounters:   03/27/23 89.4 kg (197 lb)       Meg Arango LPN .................4/11/2023

## 2023-04-11 NOTE — LETTER
4/11/2023         RE: Jose M Rea  [address] [address]        Dear Colleague,    Thank you for referring your patient, Jose M Rea, to the [facility]. Please see a copy of my visit note below.    Jose M Rea is an extremely pleasant 73 year old year old male patient here today for skin check. He notes area on scalp that his hairdresser noticed months ago. He denies any pain or bleeding. Not bothersome per patient. He notes need refill on betamethasone, he notes he uses it very sparingly. He uses this for atopic dermatitis.  Patient has no other skin complaints today.  Remainder of the HPI, Meds, PMH, Allergies, FH, and SH was reviewed in chart.    Pertinent Hx:  Atopic dermatitis   Past Medical History:   Diagnosis Date     ED (erectile dysfunction)      Hyperlipidemia        Past Surgical History:   Procedure Laterality Date     APPENDECTOMY       COLONOSCOPY N/A 4/30/2021    Procedure: COLONOSCOPY, WITH POLYPECTOMY AND BIOPSY;  Surgeon: Mark Shoemaker DO;  Location: [facility]     ORTHOPEDIC SURGERY       PHACOEMULSIFICATION WITH STANDARD INTRAOCULAR LENS IMPLANT Left 4/3/2019    Procedure: Cataract Removal with Implant;  Surgeon: Cristofer Price MD;  Location: [facility]     PHACOEMULSIFICATION WITH STANDARD INTRAOCULAR LENS IMPLANT Right 4/24/2019    Procedure: Cataract Removal with Implant;  Surgeon: Cristofer Price MD;  Location: [facility]     SURGICAL HISTORY OF -       appendectomy in 1956     SURGICAL HISTORY OF -       right shoulder surgery in 2016        Family History   Problem Relation Age of Onset     Coronary Artery Disease Paternal Grandfather         MI in late 50s or early 60s     Sleep Apnea Son      Thrombosis Son         PEs in his mid 30s     Prostate Cancer No family hx of      Colon Cancer No family hx of        Social History     Socioeconomic History     Marital status:      Spouse name: Not on file      Number of children: Not on file     Years of education: Not on file     Highest education level: Not on file   Occupational History     Not on file   Tobacco Use     Smoking status: Every Day     Packs/day: 0.50     Years: 50.00     Pack years: 25.00     Types: Cigarettes     Smokeless tobacco: Never     Tobacco comments:     started at age 18, 1/2 pack daily or less   Vaping Use     Vaping status: Never Used   Substance and Sexual Activity     Alcohol use: Not Currently     Comment: occasional beer     Drug use: No     Sexual activity: Yes     Partners: Female   Other Topics Concern     Parent/sibling w/ CABG, MI or angioplasty before 65F 55M? Not Asked   Social History Narrative     Not on file     Social Determinants of Health     Financial Resource Strain: Not on file   Food Insecurity: Not on file   Transportation Needs: Not on file   Physical Activity: Not on file   Stress: Not on file   Social Connections: Not on file   Intimate Partner Violence: Not on file   Housing Stability: Not on file       Outpatient Encounter Medications as of 4/11/2023   Medication Sig Dispense Refill     betamethasone dipropionate (DIPROSONE) 0.05 % external cream Apply twice daily as needed to affected area on leg. Wait for patient call to fill prescription. 45 g 2     cetirizine (ZYRTEC) 10 MG tablet Take 10 mg by mouth daily       clotrimazole (LOTRIMIN) 1 % cream as needed        Emollient (CERAVE) CREA Externally apply topically 2 times daily as needed (dry skiin) 453 g 0     Multiple Vitamins-Minerals (MENS ONE DAILY PO)        simvastatin (ZOCOR) 20 MG tablet Take 1 tablet by mouth once daily 90 tablet 0     tadalafil (CIALIS) 10 MG tablet Take one tablet by mouth 30 minutes prior to sexual activity. 10 tablet 11     warfarin ANTICOAGULANT (COUMADIN) 5 MG tablet TAKE 7.5 mg daily OR AS DIRECTED BY ANTICOAGULATION CLINIC 135 tablet 1     [DISCONTINUED] betamethasone dipropionate (DIPROSONE) 0.05 % external cream Apply  twice daily as needed to affected area on leg. 45 g 2     No facility-administered encounter medications on file as of 4/11/2023.             O:   NAD, WDWN, Alert & Oriented, Mood & Affect wnl, Vitals stable   Here today alone   There were no vitals taken for this visit.   General appearance normal   Vitals stable   Alert, oriented and in no acute distress     Eczematous thin plaques on face   Stuck on papules and brown macules on trunk and ext   Red papules on trunk     The remainder of skin exam is normal       Eyes: Conjunctivae/lids:Normal     ENT: Lips: normal    MSK:Normal    Cardiovascular: peripheral edema none    Pulm: Breathing Normal    Neuro/Psych: Orientation:Alert and Orientedx3 ; Mood/Affect:normal   A/P:  1. Atopic dermatitis  Well controlled. Refilled betamethasone twice daily as needed.   2. Seborrheic keratosis, lentigo, angioma  It was a pleasure speaking to Jose M Rea today.  BENIGN LESIONS DISCUSSED WITH PATIENT:  I discussed the specifics of tumor, prognosis, and genetics of benign lesions.  I explained that treatment of these lesions would be purely cosmetic and not medically neccessary.  I discussed with patient different removal options including excision, cautery and /or laser.      Nature and genetics of benign skin lesions dicussed with patient.  Signs and Symptoms of skin cancer discussed with patient.  ABCDEs of melanoma reviewed with patient.  Patient encouraged to perform monthly skin exams.  UV precautions reviewed with patient.    Risks of non-melanoma skin cancer discussed with patient   Return to clinic in one year or sooner if needed.         Again, thank you for allowing me to participate in the care of your patient.        Sincerely,        Marifer Hayes PA-C

## 2023-04-14 ENCOUNTER — OFFICE VISIT (OUTPATIENT)
Dept: FAMILY MEDICINE | Facility: CLINIC | Age: 73
End: 2023-04-14
Payer: MEDICARE

## 2023-04-14 VITALS
OXYGEN SATURATION: 98 % | RESPIRATION RATE: 16 BRPM | HEART RATE: 74 BPM | SYSTOLIC BLOOD PRESSURE: 132 MMHG | BODY MASS INDEX: 28.49 KG/M2 | WEIGHT: 199 LBS | TEMPERATURE: 97.6 F | HEIGHT: 70 IN | DIASTOLIC BLOOD PRESSURE: 70 MMHG

## 2023-04-14 DIAGNOSIS — D68.9 COAGULATION DEFECT (H): ICD-10-CM

## 2023-04-14 DIAGNOSIS — F17.210 CIGARETTE NICOTINE DEPENDENCE WITHOUT COMPLICATION: ICD-10-CM

## 2023-04-14 DIAGNOSIS — N52.9 ERECTILE DYSFUNCTION, UNSPECIFIED ERECTILE DYSFUNCTION TYPE: ICD-10-CM

## 2023-04-14 DIAGNOSIS — Z00.00 ENCOUNTER FOR MEDICARE ANNUAL WELLNESS EXAM: Primary | ICD-10-CM

## 2023-04-14 DIAGNOSIS — E78.5 HYPERLIPIDEMIA LDL GOAL <100: ICD-10-CM

## 2023-04-14 PROBLEM — I82.4Y9 ACUTE DEEP VEIN THROMBOSIS (DVT) OF PROXIMAL VEIN OF LOWER EXTREMITY, UNSPECIFIED LATERALITY (H): Status: RESOLVED | Noted: 2022-06-06 | Resolved: 2023-04-14

## 2023-04-14 PROCEDURE — G0439 PPPS, SUBSEQ VISIT: HCPCS | Performed by: FAMILY MEDICINE

## 2023-04-14 PROCEDURE — 99214 OFFICE O/P EST MOD 30 MIN: CPT | Mod: 25 | Performed by: FAMILY MEDICINE

## 2023-04-14 RX ORDER — TADALAFIL 10 MG/1
TABLET ORAL
Qty: 10 TABLET | Refills: 11 | Status: SHIPPED | OUTPATIENT
Start: 2023-04-14 | End: 2024-04-30

## 2023-04-14 RX ORDER — SIMVASTATIN 20 MG
20 TABLET ORAL DAILY
Qty: 90 TABLET | Refills: 3 | Status: CANCELLED | OUTPATIENT
Start: 2023-04-14

## 2023-04-14 RX ORDER — ATORVASTATIN CALCIUM 20 MG/1
20 TABLET, FILM COATED ORAL DAILY
Qty: 90 TABLET | Refills: 3 | Status: SHIPPED | OUTPATIENT
Start: 2023-04-14 | End: 2024-04-30

## 2023-04-14 ASSESSMENT — ENCOUNTER SYMPTOMS
JOINT SWELLING: 0
ARTHRALGIAS: 0
PALPITATIONS: 0
HEMATURIA: 0
PARESTHESIAS: 0
DIZZINESS: 0
ABDOMINAL PAIN: 0
FREQUENCY: 0
SHORTNESS OF BREATH: 0
MYALGIAS: 0
NERVOUS/ANXIOUS: 0
DIARRHEA: 0
FEVER: 0
CHILLS: 0
EYE PAIN: 0
DYSURIA: 0
HEMATOCHEZIA: 0
CONSTIPATION: 0
COUGH: 0
HEARTBURN: 0
SORE THROAT: 0
NAUSEA: 0
WEAKNESS: 0
HEADACHES: 0

## 2023-04-14 ASSESSMENT — ACTIVITIES OF DAILY LIVING (ADL): CURRENT_FUNCTION: NO ASSISTANCE NEEDED

## 2023-04-14 ASSESSMENT — PAIN SCALES - GENERAL: PAINLEVEL: NO PAIN (0)

## 2023-04-14 NOTE — PATIENT INSTRUCTIONS
Component      Latest Ref Rng 4/7/2023  8:24 AM   Cholesterol      <200 mg/dL 176    Triglycerides      <150 mg/dL 248 (H)    HDL Cholesterol      >=40 mg/dL 48    LDL Cholesterol Calculated      <=100 mg/dL 78    Non HDL Cholesterol      <130 mg/dL 128    Glucose      70 - 99 mg/dL 97    Patient Fasting > 8hrs? Yes    PSA Tumor Marker      0.00 - 6.50 ng/mL 0.74       Legend:  (H) High    No signs of prostate cancer.  Mild elevation of triglycerides.  No signs of diabetes.    Please be aware that there will be an additional charge during your preventative visit due to either a new diagnosis and/or chronic disease management.    Preventative visits screen for diseases prior to they occur.  They do not cover for any new diagnosis or chronic disease management which would include medication refills, labs etc.    If you have questions regarding your coverage please check with your insurance provider.  At [facility] we need to code correctly to be in compliance with all insurance companies.        Thank you for choosing [facility].  You may be receiving an email and/or telephone survey request from [facility] [employer] regarding your visit today.  Please take a few minutes to respond to the survey to let us know how we are doing.      If you have questions or concerns, please contact us via [employer] or you can contact your care team at [phone] option 2.    Our Clinic hours are:  Monday - Thursday 7am-6pm  Friday 7am-5pm    The [place] outpatient lab hours are:  Monday - Friday 7am-4:30pm    Appointments are required, call [phone]    If you have clinical questions after hours or would like to schedule an appointment,  call the clinic at [phone].  I refilled your medications.    Please get a CT scan of your lungs due to smoking history.      Thank you for choosing [facility].  You may be receiving an email and/or telephone survey request from [facility] [employer] regarding your  visit today.  Please take a few minutes to respond to the survey to let us know how we are doing.      If you have questions or concerns, please contact us via [employer] or you can contact your care team at [phone] option 2.    Our Clinic hours are:  Monday - Thursday 7am-6pm  Friday 7am-5pm    The [place] outpatient lab hours are:  Monday - Friday 7am-4:30pm    Appointments are required, call [phone]    If you have clinical questions after hours or would like to schedule an appointment,  call the clinic at [phone].            Patient Education   Personalized Prevention Plan  You are due for the preventive services outlined below.  Your care team is available to assist you in scheduling these services.  If you have already completed any of these items, please share that information with your care team to update in your medical record.  Health Maintenance Due   Topic Date Due    LUNG CANCER SCREENING  Never done    ANNUAL REVIEW OF HM ORDERS  03/25/2023    Annual Wellness Visit  03/25/2023

## 2023-04-14 NOTE — PROGRESS NOTES
"SUBJECTIVE:   Jose M is a 73 year old who presents for Preventive Visit.      4/14/2023     9:14 AM   Additional Questions   Roomed by Franny Vasques, CMA   Patient has been advised of split billing requirements and indicates understanding: Yes  Are you in the first 12 months of your Medicare coverage?  No    Healthy Habits:     In general, how would you rate your overall health?  Good    Frequency of exercise:  2-3 days/week    Duration of exercise:  30-45 minutes    Do you usually eat at least 4 servings of fruit and vegetables a day, include whole grains    & fiber and avoid regularly eating high fat or \"junk\" foods?  No    Taking medications regularly:  Yes    Medication side effects:  None    Ability to successfully perform activities of daily living:  No assistance needed    Home Safety:  No safety concerns identified    Hearing Impairment:  Feel that people are mumbling or not speaking clearly and difficulty understanding soft or whispered speech    In the past 6 months, have you been bothered by leaking of urine?  No    In general, how would you rate your overall mental or emotional health?  Good      PHQ-2 Total Score: 0    Additional concerns today:  No      Have you ever done Advance Care Planning? (For example, a Health Directive, POLST, or a discussion with a medical provider or your loved ones about your wishes): Yes, advance care planning is on file.       Fall risk  Fallen 2 or more times in the past year?: No  Any fall with injury in the past year?: No    Cognitive Screening   1) Repeat 3 items (Leader, Season, Table)    2) Clock draw: NORMAL  3) 3 item recall: Recalls 3 objects  Results: 3 items recalled: COGNITIVE IMPAIRMENT LESS LIKELY    Mini-CogTM Copyright GIANLUCA Salter. Licensed by the author for use in [facility]; reprinted with permission (ally@.[facility]). All rights reserved.      Do you have sleep apnea, excessive snoring or daytime drowsiness?: At times for daytime " drowsiness.    Reviewed and updated as needed this visit by clinical staff   Tobacco  Allergies  Meds              Reviewed and updated as needed this visit by Provider                 Social History     Tobacco Use     Smoking status: Every Day     Packs/day: 0.50     Years: 50.00     Pack years: 25.00     Types: Cigarettes     Smokeless tobacco: Never     Tobacco comments:     started at age 18, 1/2 pack daily or less   Vaping Use     Vaping status: Never Used   Substance Use Topics     Alcohol use: Not Currently     Comment: occasional beer             4/14/2023     9:07 AM   Alcohol Use   Prescreen: >3 drinks/day or >7 drinks/week? No     Do you have a current opioid prescription? No  Do you use any other controlled substances or medications that are not prescribed by a provider? None      Chief Complaint   Patient presents with     Physical     Wellness physical exam.       Lipids     Follow up on medication.  Labs were done on 4-7-23.         Hyperlipidemia Follow-Up      Are you regularly taking any medication or supplement to lower your cholesterol?   Yes- Simvastatin    Are you having muscle aches or other side effects that you think could be caused by your cholesterol lowering medication?  No  Reviewed his labs and make recommendation to change statin due to lab results.  Switch to atorvastatin medication.    Current providers sharing in care for this patient include:   Patient Care Team:  Rigoberto Hernandez MD as PCP - General (Family Medicine)  Marifer Garner, RN as Specialty Care Coordinator (Hematology & Oncology)  Rigoberto Hernandez MD as Assigned PCP  Gabriele Ward MD as Assigned Neuroscience Provider  Zacarias Jade MD as MD (Neurology)  Zacarias Jaed MD as MD (Neurology)  Zacarias Jade MD as MD (Neurology)  Mark Hall AuD as Audiologist (Audiology)  Keri Salcido PA-C as Assigned Surgical Provider  Kalie Vallejo APRN CNP as Nurse Practitioner (Nurse Practitioner Primary  Care)  Marifer Mathias PA-C as Physician Assistant (Dermatology)    The following health maintenance items are reviewed in Epic and correct as of today:  Health Maintenance   Topic Date Due     LUNG CANCER SCREENING  Never done     ANNUAL REVIEW OF HM ORDERS  03/25/2023     MEDICARE ANNUAL WELLNESS VISIT  03/25/2023     NICOTINE/TOBACCO CESSATION COUNSELING Q 1 YR  03/27/2024     FALL RISK ASSESSMENT  04/14/2024     COLORECTAL CANCER SCREENING  04/30/2024     ADVANCE CARE PLANNING  02/26/2026     DTAP/TDAP/TD IMMUNIZATION (3 - Td or Tdap) 02/13/2028     LIPID  04/07/2028     HEPATITIS C SCREENING  Completed     PHQ-2 (once per calendar year)  Completed     INFLUENZA VACCINE  Completed     Pneumococcal Vaccine: 65+ Years  Completed     ZOSTER IMMUNIZATION  Completed     AORTIC ANEURYSM SCREENING (SYSTEM ASSIGNED)  Completed     COVID-19 Vaccine  Completed     IPV IMMUNIZATION  Aged Out     MENINGITIS IMMUNIZATION  Aged Out               Review of Systems   Constitutional: Negative for chills and fever.   HENT: Positive for congestion and ear pain. Negative for hearing loss and sore throat.    Eyes: Negative for pain and visual disturbance.   Respiratory: Negative for cough and shortness of breath.    Cardiovascular: Negative for chest pain, palpitations and peripheral edema.   Gastrointestinal: Negative for abdominal pain, constipation, diarrhea, heartburn, hematochezia and nausea.   Genitourinary: Positive for impotence. Negative for dysuria, frequency, genital sores, hematuria, penile discharge and urgency.   Musculoskeletal: Negative for arthralgias, joint swelling and myalgias.   Skin: Negative for rash.   Neurological: Negative for dizziness, weakness, headaches and paresthesias.   Psychiatric/Behavioral: Negative for mood changes. The patient is not nervous/anxious.      He has a little phlegm in his throat at times.  No fever or chills.  No post nasal drip.    OBJECTIVE:   /70 (BP Location: Right  "arm, Patient Position: Chair, Cuff Size: Adult Large)   Pulse 74   Temp 97.6  F (36.4  C) (Tympanic)   Resp 16   Ht 1.772 m (5' 9.75\")   Wt 90.3 kg (199 lb)   SpO2 98%   BMI 28.76 kg/m   Estimated body mass index is 28.76 kg/m  as calculated from the following:    Height as of this encounter: 1.772 m (5' 9.75\").    Weight as of this encounter: 90.3 kg (199 lb).  Physical Exam  GENERAL: healthy, alert and no distress  EYES: Eyes grossly normal to inspection, PERRL and conjunctivae and sclerae normal  HENT: ear canals and TM's normal, nose and mouth without ulcers or lesions  NECK: no adenopathy, no asymmetry, masses, or scars and thyroid normal to palpation  RESP: lungs clear to auscultation - no rales, rhonchi or wheezes  CV: regular rate and rhythm, normal S1 S2, no S3 or S4, no murmur, click or rub, no peripheral edema and peripheral pulses strong  ABDOMEN: soft, nontender, no hepatosplenomegaly, no masses and bowel sounds normal  MS: no gross musculoskeletal defects noted, no edema  SKIN: no suspicious lesions or rashes  NEURO: Normal strength and tone, mentation intact and speech normal  PSYCH: mentation appears normal, affect normal/bright  LYMPH: anterior cervical: no adenopathy  posterior cervical: no adenopathy        ASSESSMENT / PLAN:   (Z00.00) Encounter for Medicare annual wellness exam  (primary encounter diagnosis)  Comment: Discussed healthy lifestyle and preventative cares.    Plan:     (D68.9) Coagulation defect (H)  Comment: seeing [facility] for monitoring  Plan:     (N52.9) Erectile dysfunction, unspecified erectile dysfunction type  Comment: refilled med to sync with MAWV  Plan: tadalafil (CIALIS) 10 MG tablet            (E78.5) Hyperlipidemia LDL goal <100  Comment: changed medication due to labs, going to a stronger statin medication  Plan: atorvastatin (LIPITOR) 20 MG tablet            (F17.210) Cigarette nicotine dependence without complication  Comment: recommend to quit and get lung cancer " "screening test  Plan: CT Chest Lung Cancer Scrn Low Dose wo              Patient has been advised of split billing requirements and indicates understanding: Yes      COUNSELING:  Reviewed preventive health counseling, as reflected in patient instructions       Regular exercise       Healthy diet/nutrition       Colon cancer screening       Prostate cancer screening      BMI:   Estimated body mass index is 28.76 kg/m  as calculated from the following:    Height as of this encounter: 1.772 m (5' 9.75\").    Weight as of this encounter: 90.3 kg (199 lb).         He reports that he has been smoking cigarettes. He has a 25.00 pack-year smoking history. He has never used smokeless tobacco.  Nicotine/Tobacco Cessation Plan:   Information offered: Patient not interested at this time      Appropriate preventive services were discussed with this patient, including applicable screening as appropriate for cardiovascular disease, diabetes, osteopenia/osteoporosis, and glaucoma.  As appropriate for age/gender, discussed screening for colorectal cancer, prostate cancer, breast cancer, and cervical cancer. Checklist reviewing preventive services available has been given to the patient.    Reviewed patients plan of care and provided an AVS. The Basic Care Plan (routine screening as documented in Health Maintenance) for Jose M meets the Care Plan requirement. This Care Plan has been established and reviewed with the Patient.      Rigoberto Hernandez MD  [facility]    Identified Health Risks:    "

## 2023-04-16 NOTE — PROGRESS NOTES
Patient is here to discus his right inguinal hernia .  He was complains of discomfort in the lower abdomen. Suprapubic area.   Had a ct scan but when I saw him he was complains of  The lower abdomen discomfort and had him come see me in the office as I would not see the lower abdomen with the EGD.  I saw him in clinic and has a right inguinal hernia and still does but has rare feeling of a muscle strain in the middle of the suprapubic area.   His ct scan that was done before I ws going to do his upper endoscopy did show :    IMPRESSION:   1.  Wall thickening in the region of the gastric antrum, inconclusive whether this is related to incomplete distention versus gastritis or obstructing lesion. If symptoms persist consider GI consultation and endoscopy.  2.  Scattered colonic diverticula, without evidence for diverticulitis or bowel obstruction.  3.  Advanced atherosclerotic disease.  4.  Probable tiny hepatic cysts.  5.  Duodenal diverticulum.    So here to see if he needs surgery for his right inguinal hernia.   Recommend that if gets worse or more pain then come back as is not sure he wants it done.     Risks of surgery include damage to nerves, bleeding, infection, damage to  Vessels, recurrence.  Although mesh is a better long term repair if it gets infected it must be removed.   If the patient has any bacterial infection the week before and is seen by their doctor and started on antibiotics, I can probably still do the surgery if they are vastly improved by the time of surgery, but if the infection starts closer to the surgery date it will be better to cancel and reschedule to a later date.  A cough will also be hard on the repair and uncomfortable post operative.  If the patient is a smoker I did discuss increase risk of recurrence and more pain with the cough.  If the patient is willing to quit smoking would encourage to do so and start at least a week before surgery.  However, if patient is not going to  quit then must understand that his repair is more likely to fail.     Risks of surgery discussed including, but not limited to bleeding, infection, recurrence, damage to nerves and what is in the hernia sac.  Risks of anesthesia also discussed.     Discussed massaging hernia back in and using ice if becomes more painful.  If not able to reduce then go to emergency room.  Also discussed hernia belt to use until able to get in for surgery.     Time spent with the patient with greater that 50% of the time in discussion was 40 minutes.  In discussing the plan and going over the ct scan.  .      Rashad Pulido MD                 Progress Notes  Rashad Pulido MD (Physician)     Surgery  Expand All Collapse All[]Expand All by Default  Dear Rigoberto Maldonado  I was asked to see this patient by Rigoberto Hernandez for please see below.  I have seen Jose M Rea and as you know his chief complaint is mid suprapubic area but noticed he had a right inguinal hernia       HPI:  Patient is a 72 year old male  with complaints see above  The patient noticed the symptoms about 2 years ago.    Patient has not family history of hernia problems  nothing makes the episode better.        Review Of Systems  Respiratory: No shortness of breath, dyspnea on exertion, cough, or hemoptysis  Cardiovascular: negative  Gastrointestinal: negative  Endocrine: negative  :  negative     10 Point review of systems all others are negative.   There were no vitals taken for this visit.     Past Medical History        Past Medical History:   Diagnosis Date     ED (erectile dysfunction)       Hyperlipidemia              Past Surgical History         Past Surgical History:   Procedure Laterality Date     APPENDECTOMY         COLONOSCOPY N/A 4/30/2021     Procedure: COLONOSCOPY, WITH POLYPECTOMY AND BIOPSY;  Surgeon: Mark Shoemaker DO;  Location: [facility]     ORTHOPEDIC SURGERY         PHACOEMULSIFICATION WITH STANDARD INTRAOCULAR  LENS IMPLANT Left 4/3/2019     Procedure: Cataract Removal with Implant;  Surgeon: Cristofer Price MD;  Location: [facility]     PHACOEMULSIFICATION WITH STANDARD INTRAOCULAR LENS IMPLANT Right 4/24/2019     Procedure: Cataract Removal with Implant;  Surgeon: Cristofer Price MD;  Location: [facility]     SURGICAL HISTORY OF -          appendectomy in 1956     SURGICAL HISTORY OF -          right shoulder surgery in 2016            Social History   Social History            Socioeconomic History     Marital status:        Spouse name: Not on file     Number of children: Not on file     Years of education: Not on file     Highest education level: Not on file   Occupational History     Not on file   Tobacco Use     Smoking status: Every Day       Packs/day: 0.50       Years: 50.00       Pack years: 25.00       Types: Cigarettes     Smokeless tobacco: Never     Tobacco comments:       started at age 18, 1/2 pack daily or less   Vaping Use     Vaping Use: Never used   Substance and Sexual Activity     Alcohol use: Not Currently       Comment: occasional beer     Drug use: No     Sexual activity: Yes       Partners: Female   Other Topics Concern     Parent/sibling w/ CABG, MI or angioplasty before 65F 55M? Not Asked   Social History Narrative     Not on file      Social Determinants of Health      Financial Resource Strain: Not on file   Food Insecurity: Not on file   Transportation Needs: Not on file   Physical Activity: Not on file   Stress: Not on file   Social Connections: Not on file   Intimate Partner Violence: Not on file   Housing Stability: Not on file            Current Outpatient Prescriptions          Current Outpatient Medications   Medication Sig Dispense Refill     betamethasone dipropionate (DIPROSONE) 0.05 % external cream Apply twice daily as needed to affected area on leg. 45 g 2     cetirizine (ZYRTEC) 10 MG tablet Take 10 mg by mouth daily         clotrimazole (LOTRIMIN) 1 % cream as  needed          Emollient (CERAVE) CREA Externally apply topically 2 times daily as needed (dry skiin) 453 g 0     fluticasone (FLONASE) 50 MCG/ACT nasal spray Spray 2 sprays into both nostrils daily as needed for rhinitis or allergies 15.8 mL 3     Multiple Vitamins-Minerals (MENS ONE DAILY PO)           simvastatin (ZOCOR) 20 MG tablet Take 1 tablet by mouth once daily 90 tablet 0     tadalafil (CIALIS) 10 MG tablet Take one tablet by mouth 30 minutes prior to sexual activity. 10 tablet 11     warfarin ANTICOAGULANT (COUMADIN) 5 MG tablet TAKE 7.5 mg daily OR AS DIRECTED BY ANTICOAGULATION CLINIC 135 tablet 1            Above was reviewed  Physical exam: There were no vitals taken for this visit.   Patient able to get up on table without difficulty.   Patient is alert and orientated.   Head eyes, nose and mouth within normal limits.     Abdomen is abdomen is soft without significant tenderness, masses, organomegaly or guarding  bowel sounds are positive and no caput medusa noted.  Has a right inguinal hernia no left inguinal hernia noted no ventral hernias noted.  Has an open appendectomy scar.    Testicles are normal.    Skin was warm and pink  Normal Affect.  Lower extremity edema is not present.           Assessment: right inguinal hernia    Plan to do discussed robotic but since no pain and no obvious hernia will do this open with mesh.  Patient would like to see me back when he comes back from [place] to discuss this again.   So will hold off ordering at this time his right inguinal hernia repair open with mesh.      Risks of surgery include damage to nerves, bleeding, infection, damage to  Vessels, recurrence.  Although mesh is a better long term repair if it gets infected it must be removed.   If the patient has any bacterial infection the week before and is seen by their doctor and started on antibiotics, I can probably still do the surgery if they are vastly improved by the time of surgery, but if the  infection starts closer to the surgery date it will be better to cancel and reschedule to a later date.  A cough will also be hard on the repair and uncomfortable post operative.  If the patient is a smoker I did discuss increase risk of recurrence and more pain with the cough.  If the patient is willing to quit smoking would encourage to do so and start at least a week before surgery.  However, if patient is not going to quit then must understand that his repair is more likely to fail.     Risks of surgery discussed including, but not limited to bleeding, infection, recurrence, damage to nerves and what is in the hernia sac.  Risks of anesthesia also discussed.     Discussed massaging hernia back in and using ice if becomes more painful.  If not able to reduce then go to emergency room.  Also discussed hernia belt to use until able to get in for surgery.     Things you will need to do before surgery are getting a preoperative appointment with your primary care doctor to be cleared for surgery.    You will also need a COVID test before surgery and an appointment to see me usually about 2 weeks after the procedure to make sure you are doing well.   Fortunately, when the schedulers call you they will try to get all this set up for you at that one call.     If you have had a positive COVID test in a 90 day window that would include the date of the procedure, let us know that and will need to see proof of this.    Then you do not need a COVID test.  But if over 90 days you do.       Time spent with the patient with greater that 50% of the time in discussion was 30 minutes.  In discussing the plan.        Rashad Pulido MD        Study Result     Narrative & Impression   EXAM: CT ABDOMEN PELVIS W CONTRAST  LOCATION: [facility]  DATE/TIME: 10/29/2022 1:20 PM     INDICATION: Generalized abdominal pain for one month  COMPARISON: None.  TECHNIQUE: CT scan of the abdomen and pelvis was performed  following injection of IV contrast. Multiplanar reformats were obtained. Dose reduction techniques were used.  CONTRAST: 86 mL Isovue 370     FINDINGS:   LOWER CHEST: Normal.     HEPATOBILIARY: Diffuse fatty infiltration of the liver. A few tiny hypodense lesions in the liver, too small to definitively characterize, but likely represent cysts.     PANCREAS: Normal.     SPLEEN: Normal.     ADRENAL GLANDS: Normal.     KIDNEYS/BLADDER: No renal calculi or hydronephrosis. A few vascular calcifications left renal hilum.     BOWEL: 3.5 x 2.0 cm fluid-filled duodenal diverticulum. Wall thickening in the region of the gastric antrum and proximal duodenum on images 45 through 54 of series 2, with moderate gastric distention with fluid. A few diverticula scattered throughout the   colon, without evidence for diverticulitis or bowel obstruction.     LYMPH NODES: Normal.     VASCULATURE: Advanced atherosclerotic disease abdominal aorta and iliac arteries. Plaque and mild narrowing at the origins of both renal arteries..     PELVIC ORGANS: Prostatic hypertrophy.     MUSCULOSKELETAL: Degenerative disc disease lower thoracic and lumbar spine.                                                                      IMPRESSION:   1.  Wall thickening in the region of the gastric antrum, inconclusive whether this is related to incomplete distention versus gastritis or obstructing lesion. If symptoms persist consider GI consultation and endoscopy.  2.  Scattered colonic diverticula, without evidence for diverticulitis or bowel obstruction.  3.  Advanced atherosclerotic disease.  4.  Probable tiny hepatic cysts.  5.  Duodenal diverticulum.                 Other Notes     All notes

## 2023-04-17 ENCOUNTER — OFFICE VISIT (OUTPATIENT)
Dept: SURGERY | Facility: CLINIC | Age: 73
End: 2023-04-17
Payer: MEDICARE

## 2023-04-17 VITALS
HEART RATE: 87 BPM | BODY MASS INDEX: 28.76 KG/M2 | SYSTOLIC BLOOD PRESSURE: 145 MMHG | OXYGEN SATURATION: 98 % | DIASTOLIC BLOOD PRESSURE: 86 MMHG | WEIGHT: 199 LBS

## 2023-04-17 DIAGNOSIS — K40.90 RIGHT INGUINAL HERNIA: Primary | ICD-10-CM

## 2023-04-17 PROCEDURE — 99214 OFFICE O/P EST MOD 30 MIN: CPT | Performed by: SURGERY

## 2023-04-17 NOTE — NURSING NOTE
Chief Complaint   Patient presents with     Hernia     Follow up right inguinal hernia  Per patient does not feel anything,declined any pain here to discus with surgeon.       Vitals:    04/17/23 1114   Pulse: 87   SpO2: 98%   Weight: 90.3 kg (199 lb)     Wt Readings from Last 1 Encounters:   04/17/23 90.3 kg (199 lb)       Mayra Mccoy MA

## 2023-04-17 NOTE — LETTER
4/17/2023         RE: Jose M Rea  [address] [address]        Dear Colleague,    Thank you for referring your patient, Jose M Rea, to the [facility]. Please see a copy of my visit note below.    Patient is here to discus his right inguinal hernia .  He was complains of discomfort in the lower abdomen. Suprapubic area.   Had a ct scan but when I saw him he was complains of  The lower abdomen discomfort and had him come see me in the office as I would not see the lower abdomen with the EGD.  I saw him in clinic and has a right inguinal hernia and still does but has rare feeling of a muscle strain in the middle of the suprapubic area.   His ct scan that was done before I ws going to do his upper endoscopy did show :    IMPRESSION:   1.  Wall thickening in the region of the gastric antrum, inconclusive whether this is related to incomplete distention versus gastritis or obstructing lesion. If symptoms persist consider GI consultation and endoscopy.  2.  Scattered colonic diverticula, without evidence for diverticulitis or bowel obstruction.  3.  Advanced atherosclerotic disease.  4.  Probable tiny hepatic cysts.  5.  Duodenal diverticulum.    So here to see if he needs surgery for his right inguinal hernia.   Recommend that if gets worse or more pain then come back as is not sure he wants it done.     Risks of surgery include damage to nerves, bleeding, infection, damage to  Vessels, recurrence.  Although mesh is a better long term repair if it gets infected it must be removed.   If the patient has any bacterial infection the week before and is seen by their doctor and started on antibiotics, I can probably still do the surgery if they are vastly improved by the time of surgery, but if the infection starts closer to the surgery date it will be better to cancel and reschedule to a later date.  A cough will also be hard on the repair and uncomfortable post  operative.  If the patient is a smoker I did discuss increase risk of recurrence and more pain with the cough.  If the patient is willing to quit smoking would encourage to do so and start at least a week before surgery.  However, if patient is not going to quit then must understand that his repair is more likely to fail.     Risks of surgery discussed including, but not limited to bleeding, infection, recurrence, damage to nerves and what is in the hernia sac.  Risks of anesthesia also discussed.     Discussed massaging hernia back in and using ice if becomes more painful.  If not able to reduce then go to emergency room.  Also discussed hernia belt to use until able to get in for surgery.     Time spent with the patient with greater that 50% of the time in discussion was 40 minutes.  In discussing the plan and going over the ct scan.  .      Rashad Pulido MD                 Progress Notes  Rashad Pulido MD (Physician)     Surgery  Expand All Collapse All[]Expand All by Default  Dear Rigoberto Maldonado  I was asked to see this patient by Rigoberto Hernandez for please see below.  I have seen Jose M Rea and as you know his chief complaint is mid suprapubic area but noticed he had a right inguinal hernia       HPI:  Patient is a 72 year old male  with complaints see above  The patient noticed the symptoms about 2 years ago.    Patient has not family history of hernia problems  nothing makes the episode better.        Review Of Systems  Respiratory: No shortness of breath, dyspnea on exertion, cough, or hemoptysis  Cardiovascular: negative  Gastrointestinal: negative  Endocrine: negative  :  negative     10 Point review of systems all others are negative.   There were no vitals taken for this visit.     Past Medical History        Past Medical History:   Diagnosis Date     ED (erectile dysfunction)       Hyperlipidemia              Past Surgical History         Past Surgical History:   Procedure  Laterality Date     APPENDECTOMY         COLONOSCOPY N/A 4/30/2021     Procedure: COLONOSCOPY, WITH POLYPECTOMY AND BIOPSY;  Surgeon: Mark Shoemaker DO;  Location: [facility]     ORTHOPEDIC SURGERY         PHACOEMULSIFICATION WITH STANDARD INTRAOCULAR LENS IMPLANT Left 4/3/2019     Procedure: Cataract Removal with Implant;  Surgeon: Cristofer Price MD;  Location: [facility]     PHACOEMULSIFICATION WITH STANDARD INTRAOCULAR LENS IMPLANT Right 4/24/2019     Procedure: Cataract Removal with Implant;  Surgeon: Cristofer Price MD;  Location: [facility]     SURGICAL HISTORY OF -          appendectomy in 1956     SURGICAL HISTORY OF -          right shoulder surgery in 2016            Social History   Social History            Socioeconomic History     Marital status:        Spouse name: Not on file     Number of children: Not on file     Years of education: Not on file     Highest education level: Not on file   Occupational History     Not on file   Tobacco Use     Smoking status: Every Day       Packs/day: 0.50       Years: 50.00       Pack years: 25.00       Types: Cigarettes     Smokeless tobacco: Never     Tobacco comments:       started at age 18, 1/2 pack daily or less   Vaping Use     Vaping Use: Never used   Substance and Sexual Activity     Alcohol use: Not Currently       Comment: occasional beer     Drug use: No     Sexual activity: Yes       Partners: Female   Other Topics Concern     Parent/sibling w/ CABG, MI or angioplasty before 65F 55M? Not Asked   Social History Narrative     Not on file      Social Determinants of Health      Financial Resource Strain: Not on file   Food Insecurity: Not on file   Transportation Needs: Not on file   Physical Activity: Not on file   Stress: Not on file   Social Connections: Not on file   Intimate Partner Violence: Not on file   Housing Stability: Not on file            Current Outpatient Prescriptions          Current Outpatient Medications    Medication Sig Dispense Refill     betamethasone dipropionate (DIPROSONE) 0.05 % external cream Apply twice daily as needed to affected area on leg. 45 g 2     cetirizine (ZYRTEC) 10 MG tablet Take 10 mg by mouth daily         clotrimazole (LOTRIMIN) 1 % cream as needed          Emollient (CERAVE) CREA Externally apply topically 2 times daily as needed (dry skiin) 453 g 0     fluticasone (FLONASE) 50 MCG/ACT nasal spray Spray 2 sprays into both nostrils daily as needed for rhinitis or allergies 15.8 mL 3     Multiple Vitamins-Minerals (MENS ONE DAILY PO)           simvastatin (ZOCOR) 20 MG tablet Take 1 tablet by mouth once daily 90 tablet 0     tadalafil (CIALIS) 10 MG tablet Take one tablet by mouth 30 minutes prior to sexual activity. 10 tablet 11     warfarin ANTICOAGULANT (COUMADIN) 5 MG tablet TAKE 7.5 mg daily OR AS DIRECTED BY ANTICOAGULATION CLINIC 135 tablet 1            Above was reviewed  Physical exam: There were no vitals taken for this visit.   Patient able to get up on table without difficulty.   Patient is alert and orientated.   Head eyes, nose and mouth within normal limits.     Abdomen is abdomen is soft without significant tenderness, masses, organomegaly or guarding  bowel sounds are positive and no caput medusa noted.  Has a right inguinal hernia no left inguinal hernia noted no ventral hernias noted.  Has an open appendectomy scar.    Testicles are normal.    Skin was warm and pink  Normal Affect.  Lower extremity edema is not present.           Assessment: right inguinal hernia    Plan to do discussed robotic but since no pain and no obvious hernia will do this open with mesh.  Patient would like to see me back when he comes back from [place] to discuss this again.   So will hold off ordering at this time his right inguinal hernia repair open with mesh.      Risks of surgery include damage to nerves, bleeding, infection, damage to  Vessels, recurrence.  Although mesh is a better long term  repair if it gets infected it must be removed.   If the patient has any bacterial infection the week before and is seen by their doctor and started on antibiotics, I can probably still do the surgery if they are vastly improved by the time of surgery, but if the infection starts closer to the surgery date it will be better to cancel and reschedule to a later date.  A cough will also be hard on the repair and uncomfortable post operative.  If the patient is a smoker I did discuss increase risk of recurrence and more pain with the cough.  If the patient is willing to quit smoking would encourage to do so and start at least a week before surgery.  However, if patient is not going to quit then must understand that his repair is more likely to fail.     Risks of surgery discussed including, but not limited to bleeding, infection, recurrence, damage to nerves and what is in the hernia sac.  Risks of anesthesia also discussed.     Discussed massaging hernia back in and using ice if becomes more painful.  If not able to reduce then go to emergency room.  Also discussed hernia belt to use until able to get in for surgery.     Things you will need to do before surgery are getting a preoperative appointment with your primary care doctor to be cleared for surgery.    You will also need a COVID test before surgery and an appointment to see me usually about 2 weeks after the procedure to make sure you are doing well.   Fortunately, when the schedulers call you they will try to get all this set up for you at that one call.     If you have had a positive COVID test in a 90 day window that would include the date of the procedure, let us know that and will need to see proof of this.    Then you do not need a COVID test.  But if over 90 days you do.       Time spent with the patient with greater that 50% of the time in discussion was 30 minutes.  In discussing the plan.        Rashad Pulido MD        Study Result     Narrative &  Impression   EXAM: CT ABDOMEN PELVIS W CONTRAST  LOCATION: [facility]  DATE/TIME: 10/29/2022 1:20 PM     INDICATION: Generalized abdominal pain for one month  COMPARISON: None.  TECHNIQUE: CT scan of the abdomen and pelvis was performed following injection of IV contrast. Multiplanar reformats were obtained. Dose reduction techniques were used.  CONTRAST: 86 mL Isovue 370     FINDINGS:   LOWER CHEST: Normal.     HEPATOBILIARY: Diffuse fatty infiltration of the liver. A few tiny hypodense lesions in the liver, too small to definitively characterize, but likely represent cysts.     PANCREAS: Normal.     SPLEEN: Normal.     ADRENAL GLANDS: Normal.     KIDNEYS/BLADDER: No renal calculi or hydronephrosis. A few vascular calcifications left renal hilum.     BOWEL: 3.5 x 2.0 cm fluid-filled duodenal diverticulum. Wall thickening in the region of the gastric antrum and proximal duodenum on images 45 through 54 of series 2, with moderate gastric distention with fluid. A few diverticula scattered throughout the   colon, without evidence for diverticulitis or bowel obstruction.     LYMPH NODES: Normal.     VASCULATURE: Advanced atherosclerotic disease abdominal aorta and iliac arteries. Plaque and mild narrowing at the origins of both renal arteries..     PELVIC ORGANS: Prostatic hypertrophy.     MUSCULOSKELETAL: Degenerative disc disease lower thoracic and lumbar spine.                                                                      IMPRESSION:   1.  Wall thickening in the region of the gastric antrum, inconclusive whether this is related to incomplete distention versus gastritis or obstructing lesion. If symptoms persist consider GI consultation and endoscopy.  2.  Scattered colonic diverticula, without evidence for diverticulitis or bowel obstruction.  3.  Advanced atherosclerotic disease.  4.  Probable tiny hepatic cysts.  5.  Duodenal diverticulum.                 Other Notes     All  notes        Again, thank you for allowing me to participate in the care of your patient.        Sincerely,        Rashad Pulido MD

## 2023-04-17 NOTE — PATIENT INSTRUCTIONS
Patient is here to discus his right inguinal hernia .  He was complains of discomfort in the lower abdomen. Suprapubic area.   Had a ct scan but when I saw him he was complains of  The lower abdomen discomfort and had him come see me in the office as I would not see the lower abdomen with the EGD.  I saw him in clinic and has a right inguinal hernia and still does but has rare feeling of a muscle strain in the middle of the suprapubic area.   His ct scan that was done before I ws going to do his upper endoscopy did show :    IMPRESSION:   1.  Wall thickening in the region of the gastric antrum, inconclusive whether this is related to incomplete distention versus gastritis or obstructing lesion. If symptoms persist consider GI consultation and endoscopy.  2.  Scattered colonic diverticula, without evidence for diverticulitis or bowel obstruction.  3.  Advanced atherosclerotic disease.  4.  Probable tiny hepatic cysts.  5.  Duodenal diverticulum.    So here to see if he needs surgery for his right inguinal hernia.   Recommend that if gets worse or more pain then come back as is not sure he wants it done.     Risks of surgery include damage to nerves, bleeding, infection, damage to  Vessels, recurrence.  Although mesh is a better long term repair if it gets infected it must be removed.   If the patient has any bacterial infection the week before and is seen by their doctor and started on antibiotics, I can probably still do the surgery if they are vastly improved by the time of surgery, but if the infection starts closer to the surgery date it will be better to cancel and reschedule to a later date.  A cough will also be hard on the repair and uncomfortable post operative.  If the patient is a smoker I did discuss increase risk of recurrence and more pain with the cough.  If the patient is willing to quit smoking would encourage to do so and start at least a week before surgery.  However, if patient is not going to  quit then must understand that his repair is more likely to fail.     Risks of surgery discussed including, but not limited to bleeding, infection, recurrence, damage to nerves and what is in the hernia sac.  Risks of anesthesia also discussed.     Discussed massaging hernia back in and using ice if becomes more painful.  If not able to reduce then go to emergency room.  Also discussed hernia belt to use until able to get in for surgery.

## 2023-04-19 ENCOUNTER — HOSPITAL ENCOUNTER (OUTPATIENT)
Dept: CT IMAGING | Facility: CLINIC | Age: 73
Discharge: HOME | End: 2023-04-19
Attending: FAMILY MEDICINE | Admitting: FAMILY MEDICINE
Payer: MEDICARE

## 2023-04-19 DIAGNOSIS — F17.210 CIGARETTE NICOTINE DEPENDENCE WITHOUT COMPLICATION: ICD-10-CM

## 2023-04-19 PROCEDURE — 71271 CT THORAX LUNG CANCER SCR C-: CPT

## 2023-04-24 ENCOUNTER — VIRTUAL VISIT (OUTPATIENT)
Dept: FAMILY MEDICINE | Facility: CLINIC | Age: 73
End: 2023-04-24
Payer: MEDICARE

## 2023-04-24 DIAGNOSIS — I25.10 CORONARY ARTERY CALCIFICATION: Primary | ICD-10-CM

## 2023-04-24 DIAGNOSIS — E78.5 HYPERLIPIDEMIA LDL GOAL <70: ICD-10-CM

## 2023-04-24 PROCEDURE — 99442 PR PHYSICIAN TELEPHONE EVALUATION 11-20 MIN: CPT | Mod: 95 | Performed by: FAMILY MEDICINE

## 2023-04-24 NOTE — PROGRESS NOTES
Jose M is a 73 year old who is being evaluated via a billable telephone visit.      What phone number would you like to be contacted at? [phone]  How would you like to obtain your AVS? MyChart and mail    Distant Location (provider location):  On-site    Assessment & Plan     Coronary artery calcification  Reviewed his CT scan for screen for lung cancer that shows coronary artery calcification and other atherosclerosis in aorta.  Discussed increase in statin medication.  Warning signs of chest pain pressure or tightness, needing a stress test with imaging    Hyperlipidemia LDL goal <70  Change statin and newer goal.                   Rigoberto Hernandez MD  [facility]    Subjective   Jose M is a 73 year old, presenting for the following health issues:  Results (CT)        4/24/2023     3:54 PM   Additional Questions   Roomed by Teresa Doe MA   Accompanied by Self         4/24/2023     3:54 PM   Patient Reported Additional Medications   Patient reports taking the following new medications none     HPI   Chief Complaint   Patient presents with     Results     CT             Review of Systems         Objective           Vitals:  No vitals were obtained today due to virtual visit.    Physical Exam   healthy, alert and no distress  PSYCH: Alert and oriented times 3; coherent speech, normal   rate and volume, able to articulate logical thoughts, able   to abstract reason, no tangential thoughts, no hallucinations   or delusions  His affect is normal  RESP: No cough, no audible wheezing, able to talk in full sentences  Remainder of exam unable to be completed due to telephone visits                Phone call duration: 12 minutes

## 2023-05-11 ENCOUNTER — LAB (OUTPATIENT)
Dept: LAB | Facility: CLINIC | Age: 73
End: 2023-05-11
Payer: MEDICARE

## 2023-05-11 ENCOUNTER — ANTICOAGULATION THERAPY VISIT (OUTPATIENT)
Dept: ANTICOAGULATION | Facility: CLINIC | Age: 73
End: 2023-05-11

## 2023-05-11 DIAGNOSIS — I82.4Y9 ACUTE DEEP VEIN THROMBOSIS (DVT) OF PROXIMAL VEIN OF LOWER EXTREMITY, UNSPECIFIED LATERALITY (H): ICD-10-CM

## 2023-05-11 DIAGNOSIS — Z79.01 LONG TERM CURRENT USE OF ANTICOAGULANT THERAPY: ICD-10-CM

## 2023-05-11 DIAGNOSIS — Z79.01 CHRONIC ANTICOAGULATION: ICD-10-CM

## 2023-05-11 DIAGNOSIS — D68.9 COAGULATION DEFECT (H): Primary | ICD-10-CM

## 2023-05-11 DIAGNOSIS — Z86.718 H/O DEEP VENOUS THROMBOSIS: ICD-10-CM

## 2023-05-11 DIAGNOSIS — D68.9 COAGULATION DEFECT (H): ICD-10-CM

## 2023-05-11 LAB — INR BLD: 2.4 (ref 0.9–1.1)

## 2023-05-11 PROCEDURE — 36416 COLLJ CAPILLARY BLOOD SPEC: CPT

## 2023-05-11 PROCEDURE — 85610 PROTHROMBIN TIME: CPT

## 2023-05-11 NOTE — PROGRESS NOTES
ANTICOAGULATION MANAGEMENT     Jose M Rea 73 year old male is on warfarin with therapeutic INR result. (Goal INR 2.0-3.0)    Recent labs: (last 7 days)     05/11/23  0912   INR 2.4*       ASSESSMENT       Source(s): Chart Review and Patient/Caregiver Call       Warfarin doses taken: Warfarin taken as instructed    Diet: No new diet changes identified    Medication/supplement changes: None noted    New illness, injury, or hospitalization: No    Signs or symptoms of bleeding or clotting: No    Previous result: Therapeutic last 2(+) visits    Additional findings: None         PLAN     Recommended plan for no diet, medication or health factor changes affecting INR     Dosing Instructions: Continue your current warfarin dose with next INR in 6 weeks       Summary  As of 5/11/2023    Full warfarin instructions:  7.5 mg every day   Next INR check:  6/22/2023             Telephone call with Jose M who verbalizes understanding and agrees to plan and who agrees to plan and repeated back plan correctly    Lab visit scheduled    Education provided:     Please call back if any changes to your diet, medications or how you've been taking warfarin    Plan made per [facility] anticoagulation protocol    Ralph Echevarria RN  Anticoagulation Clinic  5/11/2023    _______________________________________________________________________     Anticoagulation Episode Summary     Current INR goal:  2.0-3.0   TTR:  88.3 % (1 y)   Target end date:  Indefinite   Send INR reminders to:  [facility]    Indications    Coagulation defect (H) [D68.9]  Acute deep vein thrombosis (DVT) of proximal vein of lower extremity (H) (Resolved) [I82.4Y9]  Long term current use of anticoagulant therapy [Z79.01]  Acute deep vein thrombosis (DVT) of proximal vein of lower extremity  unspecified laterality (H) (Resolved) [I82.4Y9]  Chronic anticoagulation [Z79.01]  H/O deep venous thrombosis [Z86.718]  Acute deep vein thrombosis (DVT) of proximal vein of  lower extremity  unspecified laterality (H) (Resolved) [I82.4Y9]           Comments:  okay to leave [facility]         Anticoagulation Care Providers     Provider Role Specialty Phone number    Pj Quijano MD Referring Family Medicine [phone]    Rigoberto Hernandez MD Referring Family Medicine [phone]

## 2023-05-23 ENCOUNTER — DOCUMENTATION ONLY (OUTPATIENT)
Dept: ANTICOAGULATION | Facility: CLINIC | Age: 73
End: 2023-05-23
Payer: MEDICARE

## 2023-05-23 DIAGNOSIS — D68.9 COAGULATION DEFECT (H): Primary | ICD-10-CM

## 2023-05-23 DIAGNOSIS — Z79.01 LONG TERM CURRENT USE OF ANTICOAGULANT THERAPY: ICD-10-CM

## 2023-05-23 DIAGNOSIS — Z86.718 H/O DEEP VENOUS THROMBOSIS: ICD-10-CM

## 2023-05-23 NOTE — PROGRESS NOTES
ANTICOAGULATION CLINIC REFERRAL RENEWAL REQUEST       An annual renewal order is required for all patients referred to [facility].?  Please review and sign the pended referral order for Jose M Rea.       ANTICOAGULATION SUMMARY      Warfarin indication(s)   DVT    Mechanical heart valve present?  NO       Current goal range   INR: 2.0-3.0     Goal appropriate for indication? Goal INR 2-3, standard for indication(s) above     Time in Therapeutic Range (TTR)  (Goal > 60%) 88.3%       Office visit with referring provider's group within last year yes on 4/24/23       Kb Braun RN  [facility]

## 2023-06-22 ENCOUNTER — ANTICOAGULATION THERAPY VISIT (OUTPATIENT)
Dept: ANTICOAGULATION | Facility: CLINIC | Age: 73
End: 2023-06-22

## 2023-06-22 ENCOUNTER — LAB (OUTPATIENT)
Dept: LAB | Facility: CLINIC | Age: 73
End: 2023-06-22
Payer: MEDICARE

## 2023-06-22 DIAGNOSIS — D68.9 COAGULATION DEFECT (H): ICD-10-CM

## 2023-06-22 DIAGNOSIS — Z79.01 CHRONIC ANTICOAGULATION: ICD-10-CM

## 2023-06-22 DIAGNOSIS — Z86.718 H/O DEEP VENOUS THROMBOSIS: ICD-10-CM

## 2023-06-22 DIAGNOSIS — D68.9 COAGULATION DEFECT (H): Primary | ICD-10-CM

## 2023-06-22 DIAGNOSIS — Z79.01 LONG TERM CURRENT USE OF ANTICOAGULANT THERAPY: ICD-10-CM

## 2023-06-22 LAB — INR BLD: 2.2 (ref 0.9–1.1)

## 2023-06-22 PROCEDURE — 36416 COLLJ CAPILLARY BLOOD SPEC: CPT

## 2023-06-22 PROCEDURE — 85610 PROTHROMBIN TIME: CPT

## 2023-06-22 RX ORDER — WARFARIN SODIUM 5 MG/1
TABLET ORAL
Qty: 90 TABLET | Refills: 1 | Status: SHIPPED | OUTPATIENT
Start: 2023-06-22 | End: 2023-10-16

## 2023-06-22 NOTE — PROGRESS NOTES
ANTICOAGULATION MANAGEMENT     Jose M Rea 73 year old male is on warfarin with therapeutic INR result. (Goal INR 2.0-3.0)    Recent labs: (last 7 days)     06/22/23  0841   INR 2.2*       ASSESSMENT       Source(s): Chart Review and Patient/Caregiver Call       Warfarin doses taken: Warfarin taken as instructed    Diet: No new diet changes identified    Medication/supplement changes: None noted    New illness, injury, or hospitalization: No    Signs or symptoms of bleeding or clotting: No    Previous result: Therapeutic last 2(+) visits    Additional findings: None         PLAN     Recommended plan for no diet, medication or health factor changes affecting INR     Dosing Instructions: Continue your current warfarin dose with next INR in 6 weeks       Summary  As of 6/22/2023    Full warfarin instructions:  7.5 mg every day   Next INR check:  8/3/2023             Telephone call with Jose M who verbalizes understanding and agrees to plan    Lab visit scheduled    Education provided:     Goal range and lab monitoring: goal range and significance of current result    Plan made per [facility] anticoagulation protocol    Ce Ponce RN  Anticoagulation Clinic  6/22/2023    _______________________________________________________________________     Anticoagulation Episode Summary     Current INR goal:  2.0-3.0   TTR:  96.7 % (1 y)   Target end date:  Indefinite   Send INR reminders to:  [facility]    Indications    Coagulation defect (H) [D68.9]  Acute deep vein thrombosis (DVT) of proximal vein of lower extremity (H) (Resolved) [I82.4Y9]  Long term current use of anticoagulant therapy [Z79.01]  Acute deep vein thrombosis (DVT) of proximal vein of lower extremity  unspecified laterality (H) (Resolved) [I82.4Y9]  Chronic anticoagulation [Z79.01]  H/O deep venous thrombosis [Z86.718]  Acute deep vein thrombosis (DVT) of proximal vein of lower extremity  unspecified laterality (H) (Resolved) [I82.4Y9]            Comments:  okay to leave [facility]         Anticoagulation Care Providers     Provider Role Specialty Phone number    Pj Quijano MD Referring Family Medicine [phone]    Rigoberto Hernandez MD Referring Family Medicine [phone]

## 2023-08-03 ENCOUNTER — ANTICOAGULATION THERAPY VISIT (OUTPATIENT)
Dept: ANTICOAGULATION | Facility: CLINIC | Age: 73
End: 2023-08-03

## 2023-08-03 ENCOUNTER — LAB (OUTPATIENT)
Dept: LAB | Facility: CLINIC | Age: 73
End: 2023-08-03
Payer: MEDICARE

## 2023-08-03 DIAGNOSIS — Z79.01 LONG TERM CURRENT USE OF ANTICOAGULANT THERAPY: ICD-10-CM

## 2023-08-03 DIAGNOSIS — D68.9 COAGULATION DEFECT (H): Primary | ICD-10-CM

## 2023-08-03 DIAGNOSIS — Z86.718 H/O DEEP VENOUS THROMBOSIS: ICD-10-CM

## 2023-08-03 DIAGNOSIS — D68.9 COAGULATION DEFECT (H): ICD-10-CM

## 2023-08-03 DIAGNOSIS — Z79.01 CHRONIC ANTICOAGULATION: ICD-10-CM

## 2023-08-03 LAB — INR BLD: 2.6 (ref 0.9–1.1)

## 2023-08-03 PROCEDURE — 85610 PROTHROMBIN TIME: CPT

## 2023-08-03 PROCEDURE — 36416 COLLJ CAPILLARY BLOOD SPEC: CPT

## 2023-08-03 NOTE — PROGRESS NOTES
ANTICOAGULATION MANAGEMENT     Jose M Rea 73 year old male is on warfarin with therapeutic INR result. (Goal INR 2.0-3.0)    Recent labs: (last 7 days)     08/03/23  0858   INR 2.6*       ASSESSMENT     Source(s): Chart Review     Warfarin doses taken: Warfarin taken as instructed  Diet: No new diet changes identified  Medication/supplement changes: None noted  New illness, injury, or hospitalization: No  Signs or symptoms of bleeding or clotting: No  Previous result: Therapeutic last 2(+) visits  Additional findings: None       PLAN     Recommended plan for no diet, medication or health factor changes affecting INR     Dosing Instructions: Continue your current warfarin dose with next INR in 6 weeks       Summary  As of 8/3/2023      Full warfarin instructions:  7.5 mg every day   Next INR check:  9/14/2023               Telephone call with Jose M who verbalizes understanding and agrees to plan    Lab visit scheduled    Education provided:   Please call back if any changes to your diet, medications or how you've been taking warfarin  Contact [phone]  with any changes, questions or concerns.     Plan made per [facility] anticoagulation protocol    Joanie Yin RN  Anticoagulation Clinic  8/3/2023    _______________________________________________________________________     Anticoagulation Episode Summary       Current INR goal:  2.0-3.0   TTR:  98.0 % (1 y)   Target end date:  Indefinite   Send INR reminders to:  [facility]    Indications    Coagulation defect (H) [D68.9]  Acute deep vein thrombosis (DVT) of proximal vein of lower extremity (H) (Resolved) [I82.4Y9]  Long term current use of anticoagulant therapy [Z79.01]  Acute deep vein thrombosis (DVT) of proximal vein of lower extremity  unspecified laterality (H) (Resolved) [I82.4Y9]  Chronic anticoagulation [Z79.01]  H/O deep venous thrombosis [Z86.718]  Acute deep vein thrombosis (DVT) of proximal vein of lower extremity  unspecified laterality  (H) (Resolved) [I82.4Y9]             Comments:  okay to leave [facility]             Anticoagulation Care Providers       Provider Role Specialty Phone number    Pj Quijano MD Referring Family Medicine [phone]    Rigoberto Hernandez MD Referring Family Medicine [phone]

## 2023-09-14 ENCOUNTER — LAB (OUTPATIENT)
Dept: LAB | Facility: CLINIC | Age: 73
End: 2023-09-14
Payer: MEDICARE

## 2023-09-14 ENCOUNTER — ANTICOAGULATION THERAPY VISIT (OUTPATIENT)
Dept: ANTICOAGULATION | Facility: CLINIC | Age: 73
End: 2023-09-14

## 2023-09-14 DIAGNOSIS — D68.9 COAGULATION DEFECT (H): ICD-10-CM

## 2023-09-14 DIAGNOSIS — Z79.01 LONG TERM CURRENT USE OF ANTICOAGULANT THERAPY: ICD-10-CM

## 2023-09-14 DIAGNOSIS — Z79.01 CHRONIC ANTICOAGULATION: ICD-10-CM

## 2023-09-14 DIAGNOSIS — Z86.718 H/O DEEP VENOUS THROMBOSIS: ICD-10-CM

## 2023-09-14 DIAGNOSIS — D68.9 COAGULATION DEFECT (H): Primary | ICD-10-CM

## 2023-09-14 LAB — INR BLD: 2.6 (ref 0.9–1.1)

## 2023-09-14 PROCEDURE — 36416 COLLJ CAPILLARY BLOOD SPEC: CPT

## 2023-09-14 PROCEDURE — 85610 PROTHROMBIN TIME: CPT

## 2023-09-14 NOTE — PROGRESS NOTES
ANTICOAGULATION MANAGEMENT     Jose M Jeffersonmaximiliano 73 year old male is on warfarin with therapeutic INR result. (Goal INR 2.0-3.0)    Recent labs: (last 7 days)     09/14/23  0853   INR 2.6*       ASSESSMENT     Source(s): Chart Review and Patient/Caregiver Call     Warfarin doses taken: Warfarin taken as instructed  Diet: No new diet changes identified  Medication/supplement changes: None noted  New illness, injury, or hospitalization: No  Signs or symptoms of bleeding or clotting: No  Previous result: Therapeutic last 2(+) visits  Additional findings: None       PLAN     Recommended plan for no diet, medication or health factor changes affecting INR     Dosing Instructions: Continue your current warfarin dose with next INR in 6 weeks       Summary  As of 9/14/2023      Full warfarin instructions:  7.5 mg every day   Next INR check:  10/26/2023               Telephone call with Jose M who verbalizes understanding and agrees to plan    Lab visit scheduled    Education provided:   Goal range and lab monitoring: goal range and significance of current result    Plan made per [facility] anticoagulation protocol    Ce Ponce RN  Anticoagulation Clinic  9/14/2023    _______________________________________________________________________     Anticoagulation Episode Summary       Current INR goal:  2.0-3.0   TTR:  98.0 % (1 y)   Target end date:  Indefinite   Send INR reminders to:  [facility]    Indications    Coagulation defect (H) [D68.9]  Acute deep vein thrombosis (DVT) of proximal vein of lower extremity (H) (Resolved) [I82.4Y9]  Long term current use of anticoagulant therapy [Z79.01]  Acute deep vein thrombosis (DVT) of proximal vein of lower extremity  unspecified laterality (H) (Resolved) [I82.4Y9]  Chronic anticoagulation [Z79.01]  H/O deep venous thrombosis [Z86.718]  Acute deep vein thrombosis (DVT) of proximal vein of lower extremity  unspecified laterality (H) (Resolved) [I82.4Y9]             Comments:   okay to leave DVM             Anticoagulation Care Providers       Provider Role Specialty Phone number    Pj Quijano MD Referring Family Medicine [phone]    Rigoberto Hernandez MD Referring Family Medicine [phone]

## 2023-09-18 ENCOUNTER — OFFICE VISIT (OUTPATIENT)
Dept: PODIATRY | Facility: CLINIC | Age: 73
End: 2023-09-18
Payer: MEDICARE

## 2023-09-18 VITALS
HEIGHT: 70 IN | DIASTOLIC BLOOD PRESSURE: 83 MMHG | HEART RATE: 70 BPM | WEIGHT: 199 LBS | SYSTOLIC BLOOD PRESSURE: 164 MMHG | BODY MASS INDEX: 28.49 KG/M2

## 2023-09-18 DIAGNOSIS — M72.2 PLANTAR FASCIITIS, LEFT: Primary | ICD-10-CM

## 2023-09-18 PROCEDURE — 99203 OFFICE O/P NEW LOW 30 MIN: CPT | Performed by: PODIATRIST

## 2023-09-18 ASSESSMENT — PAIN SCALES - GENERAL: PAINLEVEL: EXTREME PAIN (8)

## 2023-09-18 NOTE — PATIENT INSTRUCTIONS

## 2023-09-18 NOTE — PROGRESS NOTES
PATIENT HISTORY:  Jose M Rea is a 73 year old male who presents to clinic as a self referral with a chief complaint of left heel pain.  The patient is seen by themselves.  The patient relates the pain is primarily located around the bottom of the heel.  He was walking and the next day started to have pain in the heel that has been going on for 1.5 month(s).  The patient has previously tried pads in his shoes with little relief.  Denies any prior history of similar issues..  The patient is retired.  Any previous notes and studies that pertain to the patient's condition were reviewed.    Pertinent medical, surgical and family history was reviewed in the [facility] chart.    Past Medical History:   Past Medical History:   Diagnosis Date    ED (erectile dysfunction)     Hyperlipidemia        Medications:   Current Outpatient Medications:     atorvastatin (LIPITOR) 20 MG tablet, Take 1 tablet (20 mg) by mouth daily Hold on file until needed., Disp: 90 tablet, Rfl: 3    Multiple Vitamins-Minerals (MENS ONE DAILY PO), , Disp: , Rfl:     warfarin ANTICOAGULANT (COUMADIN) 5 MG tablet, TAKE 7.5 mg daily OR AS DIRECTED BY ANTICOAGULATION CLINIC, Disp: 90 tablet, Rfl: 1    ASPIRIN NOT PRESCRIBED (INTENTIONAL), Please choose reason not prescribed from choices below. (Patient not taking: Reported on 9/18/2023), Disp: , Rfl:     betamethasone dipropionate (DIPROSONE) 0.05 % external cream, Apply twice daily as needed to affected area on leg. Wait for patient call to fill prescription. (Patient not taking: Reported on 9/18/2023), Disp: 45 g, Rfl: 2    cetirizine (ZYRTEC) 10 MG tablet, Take 10 mg by mouth daily (Patient not taking: Reported on 9/18/2023), Disp: , Rfl:     clotrimazole (LOTRIMIN) 1 % cream, as needed (Patient not taking: Reported on 9/18/2023), Disp: , Rfl:     Emollient (CERAVE) CREA, Externally apply topically 2 times daily as needed (dry skiin) (Patient not taking: Reported on 9/18/2023), Disp: 453 g, Rfl: 0     "tadalafil (CIALIS) 10 MG tablet, Take one tablet by mouth 30 minutes prior to sexual activity. Hold on file until needed. (Patient not taking: Reported on 9/18/2023), Disp: 10 tablet, Rfl: 11     Allergies:    Allergies   Allergen Reactions    Clindamycin     Bactroban [Mupirocin]     Cephalexin Rash    Doxycycline Rash    Metal [Staples] Rash and Blisters       Vitals: BP (!) 164/83   Pulse 70   Ht 1.772 m (5' 9.75\")   Wt 90.3 kg (199 lb)   BMI 28.76 kg/m    BMI= Body mass index is 28.76 kg/m .    LOWER EXTREMITY PHYSICAL EXAM    Dermatologic: Skin is intact to left lower extremity without significant lesions, rash or abrasion.        Vascular: DP & PT pulses are intact & regular on the left.   CFT and skin temperature is normal to the left lower extremity.     Neurologic: Lower extremity sensation is intact to light touch.  No evidence of weakness in the left lower extremity.        Musculoskeletal: Patient is ambulatory without assistive device or brace.  No gross ankle deformity noted.  No foot or ankle joint effusion is noted.  Noted pain on palpation on the plantar aspect of the left heel near the insertion point of the plantar fascia.  No surrounding erythema or edema noted.  Noted tight gastroc complex on the left.           ASSESSMENT / PLAN:     ICD-10-CM    1. Plantar fasciitis, left  M72.2           I have explained to Jose M about the conditions.  We discussed the underlying contributing factors to the condition as well as treatment options along with expected length of recovery.  At this time, the patient was educated on the importance of offloading supportive shoes and other devices.  I demonstrated to the patient calf stretches to perform every hour daily until symptoms resolve.  After symptoms resolve, the patient was advised to perform the stretches 3 times daily to prevent future recurrence.  The patient was instructed to perform warm soaks with Epson salt after which to also apply " over-the-counter Voltaren gel to deeply massage the injured tissue.  The patient was instructed to do this on a daily basis until symptoms resolve.   The patient may return in four weeks for reevaluation to determine if any further treatment will be needed.    Jose M verbalized agreement with and understanding of the rational for the diagnosis and treatment plan.  All questions were answered to best of my ability and the patient's satisfaction. The patient was advised to contact the clinic with any questions that may arise after the clinic visit.      Disclaimer: This note consists of symbols derived from keyboarding, dictation and/or voice recognition software. As a result, there may be errors in the script that have gone undetected. Please consider this when interpreting information found in this chart.       KAREEN Pace D.P.M., F.A.C.F.A.S.

## 2023-09-18 NOTE — NURSING NOTE
"Chief Complaint   Patient presents with    Consult     Left heel pain       Initial BP (!) 164/83   Pulse 70   Ht 1.772 m (5' 9.75\")   Wt 90.3 kg (199 lb)   BMI 28.76 kg/m   Estimated body mass index is 28.76 kg/m  as calculated from the following:    Height as of this encounter: 1.772 m (5' 9.75\").    Weight as of this encounter: 90.3 kg (199 lb).  Medications and allergies reviewed.      Makayla ODONNELL MA    "

## 2023-09-18 NOTE — LETTER
9/18/2023         RE: Jose M Rea  [address] [facility] [address]        Dear Colleague,    Thank you for referring your patient, Jose M Rea, to the [facility]. Please see a copy of my visit note below.    PATIENT HISTORY:  Jose M Rea is a 73 year old male who presents to clinic as a self referral with a chief complaint of left heel pain.  The patient is seen by themselves.  The patient relates the pain is primarily located around the bottom of the heel.  He was walking and the next day started to have pain in the heel that has been going on for 1.5 month(s).  The patient has previously tried pads in his shoes with little relief.  Denies any prior history of similar issues..  The patient is retired.  Any previous notes and studies that pertain to the patient's condition were reviewed.    Pertinent medical, surgical and family history was reviewed in the Epic chart.    Past Medical History:   Past Medical History:   Diagnosis Date     ED (erectile dysfunction)      Hyperlipidemia        Medications:   Current Outpatient Medications:      atorvastatin (LIPITOR) 20 MG tablet, Take 1 tablet (20 mg) by mouth daily Hold on file until needed., Disp: 90 tablet, Rfl: 3     Multiple Vitamins-Minerals (MENS ONE DAILY PO), , Disp: , Rfl:      warfarin ANTICOAGULANT (COUMADIN) 5 MG tablet, TAKE 7.5 mg daily OR AS DIRECTED BY ANTICOAGULATION CLINIC, Disp: 90 tablet, Rfl: 1     ASPIRIN NOT PRESCRIBED (INTENTIONAL), Please choose reason not prescribed from choices below. (Patient not taking: Reported on 9/18/2023), Disp: , Rfl:      betamethasone dipropionate (DIPROSONE) 0.05 % external cream, Apply twice daily as needed to affected area on leg. Wait for patient call to fill prescription. (Patient not taking: Reported on 9/18/2023), Disp: 45 g, Rfl: 2     cetirizine (ZYRTEC) 10 MG tablet, Take 10 mg by mouth daily (Patient not taking: Reported on 9/18/2023), Disp: ,  "Rfl:      clotrimazole (LOTRIMIN) 1 % cream, as needed (Patient not taking: Reported on 9/18/2023), Disp: , Rfl:      Emollient (CERAVE) CREA, Externally apply topically 2 times daily as needed (dry skiin) (Patient not taking: Reported on 9/18/2023), Disp: 453 g, Rfl: 0     tadalafil (CIALIS) 10 MG tablet, Take one tablet by mouth 30 minutes prior to sexual activity. Hold on file until needed. (Patient not taking: Reported on 9/18/2023), Disp: 10 tablet, Rfl: 11     Allergies:    Allergies   Allergen Reactions     Clindamycin      Bactroban [Mupirocin]      Cephalexin Rash     Doxycycline Rash     Metal [Staples] Rash and Blisters       Vitals: BP (!) 164/83   Pulse 70   Ht 1.772 m (5' 9.75\")   Wt 90.3 kg (199 lb)   BMI 28.76 kg/m    BMI= Body mass index is 28.76 kg/m .    LOWER EXTREMITY PHYSICAL EXAM    Dermatologic: Skin is intact to left lower extremity without significant lesions, rash or abrasion.        Vascular: DP & PT pulses are intact & regular on the left.   CFT and skin temperature is normal to the left lower extremity.     Neurologic: Lower extremity sensation is intact to light touch.  No evidence of weakness in the left lower extremity.        Musculoskeletal: Patient is ambulatory without assistive device or brace.  No gross ankle deformity noted.  No foot or ankle joint effusion is noted.  Noted pain on palpation on the plantar aspect of the left heel near the insertion point of the plantar fascia.  No surrounding erythema or edema noted.  Noted tight gastroc complex on the left.           ASSESSMENT / PLAN:     ICD-10-CM    1. Plantar fasciitis, left  M72.2           I have explained to Jose M about the conditions.  We discussed the underlying contributing factors to the condition as well as treatment options along with expected length of recovery.  At this time, the patient was educated on the importance of offloading supportive shoes and other devices.  I demonstrated to the patient calf " stretches to perform every hour daily until symptoms resolve.  After symptoms resolve, the patient was advised to perform the stretches 3 times daily to prevent future recurrence.  The patient was instructed to perform warm soaks with Epson salt after which to also apply over-the-counter Voltaren gel to deeply massage the injured tissue.  The patient was instructed to do this on a daily basis until symptoms resolve.   The patient may return in four weeks for reevaluation to determine if any further treatment will be needed.    Jose M verbalized agreement with and understanding of the rational for the diagnosis and treatment plan.  All questions were answered to best of my ability and the patient's satisfaction. The patient was advised to contact the clinic with any questions that may arise after the clinic visit.      Disclaimer: This note consists of symbols derived from keyboarding, dictation and/or voice recognition software. As a result, there may be errors in the script that have gone undetected. Please consider this when interpreting information found in this chart.       BELLE Davis.P.M., F.A.C.F.A.S.      Again, thank you for allowing me to participate in the care of your patient.        Sincerely,        Asad Pace DPM

## 2023-10-16 DIAGNOSIS — D68.9 COAGULATION DEFECT (H): ICD-10-CM

## 2023-10-16 DIAGNOSIS — Z79.01 CHRONIC ANTICOAGULATION: ICD-10-CM

## 2023-10-16 DIAGNOSIS — Z79.01 LONG TERM CURRENT USE OF ANTICOAGULANT THERAPY: ICD-10-CM

## 2023-10-16 RX ORDER — WARFARIN SODIUM 5 MG/1
TABLET ORAL
Qty: 90 TABLET | Refills: 0 | Status: SHIPPED | OUTPATIENT
Start: 2023-10-16 | End: 2023-12-18

## 2023-10-26 ENCOUNTER — LAB (OUTPATIENT)
Dept: LAB | Facility: CLINIC | Age: 73
End: 2023-10-26
Payer: MEDICARE

## 2023-10-26 ENCOUNTER — ANTICOAGULATION THERAPY VISIT (OUTPATIENT)
Dept: ANTICOAGULATION | Facility: CLINIC | Age: 73
End: 2023-10-26

## 2023-10-26 DIAGNOSIS — Z79.01 CHRONIC ANTICOAGULATION: ICD-10-CM

## 2023-10-26 DIAGNOSIS — Z79.01 LONG TERM CURRENT USE OF ANTICOAGULANT THERAPY: ICD-10-CM

## 2023-10-26 DIAGNOSIS — D68.9 COAGULATION DEFECT (H): ICD-10-CM

## 2023-10-26 DIAGNOSIS — D68.9 COAGULATION DEFECT (H): Primary | ICD-10-CM

## 2023-10-26 DIAGNOSIS — Z86.718 H/O DEEP VENOUS THROMBOSIS: ICD-10-CM

## 2023-10-26 LAB — INR BLD: 2.5 (ref 0.9–1.1)

## 2023-10-26 PROCEDURE — 85610 PROTHROMBIN TIME: CPT

## 2023-10-26 PROCEDURE — 36416 COLLJ CAPILLARY BLOOD SPEC: CPT

## 2023-10-26 NOTE — PROGRESS NOTES
ANTICOAGULATION MANAGEMENT     Jose M Rea 73 year old male is on warfarin with therapeutic INR result. (Goal INR 2.0-3.0)    Recent labs: (last 7 days)     10/26/23  0941   INR 2.5*       ASSESSMENT     Source(s): Chart Review and Patient/Caregiver Call     Warfarin doses taken: Warfarin taken as instructed  Diet: No new diet changes identified  Medication/supplement changes: None noted  New illness, injury, or hospitalization: No  Signs or symptoms of bleeding or clotting: No  Previous result: Therapeutic last 2(+) visits  Additional findings: None       PLAN     Recommended plan for no diet, medication or health factor changes affecting INR     Dosing Instructions: Continue your current warfarin dose with next INR in 6 weeks       Summary  As of 10/26/2023      Full warfarin instructions:  7.5 mg every day   Next INR check:  12/7/2023               Telephone call with Jose M who verbalizes understanding and agrees to plan    Lab visit scheduled    Education provided:   Contact [phone]  with any changes, questions or concerns.     Plan made per [facility] anticoagulation protocol    Justa Vaca RN  Anticoagulation Clinic  10/26/2023    _______________________________________________________________________     Anticoagulation Episode Summary       Current INR goal:  2.0-3.0   TTR:  98.0% (1 y)   Target end date:  Indefinite   Send INR reminders to:  [facility]    Indications    Coagulation defect (H24) [D68.9]  Acute deep vein thrombosis (DVT) of proximal vein of lower extremity (H) (Resolved) [I82.4Y9]  Long term current use of anticoagulant therapy [Z79.01]  Acute deep vein thrombosis (DVT) of proximal vein of lower extremity  unspecified laterality (H) (Resolved) [I82.4Y9]  Chronic anticoagulation [Z79.01]  H/O deep venous thrombosis [Z86.718]  Acute deep vein thrombosis (DVT) of proximal vein of lower extremity  unspecified laterality (H) (Resolved) [I82.4Y9]             Comments:  okay to  leave DVM             Anticoagulation Care Providers       Provider Role Specialty Phone number    Pj Quijano MD Referring Family Medicine [phone]    Rigoberto Hernandez MD Referring Family Medicine [phone]

## 2023-10-31 ENCOUNTER — PATIENT OUTREACH (OUTPATIENT)
Dept: GASTROENTEROLOGY | Facility: CLINIC | Age: 73
End: 2023-10-31
Payer: MEDICARE

## 2023-11-02 NOTE — PROGRESS NOTES
"Jose M Rea is a 70 year old male who is being evaluated via a billable telephone visit.      The patient has been notified of following:     \"This telephone visit will be conducted via a call between you and your physician/provider. We have found that certain health care needs can be provided without the need for a physical exam.  This service lets us provide the care you need with a short phone conversation.  If a prescription is necessary we can send it directly to your pharmacy.  If lab work is needed we can place an order for that and you can then stop by our lab to have the test done at a later time.    If during the course of the call the physician/provider feels a telephone visit is not appropriate, you will not be charged for this service.\"     Jose M Rea complains of    Chief Complaint   Patient presents with     Infection     I have reviewed and updated the patient's Past Medical History, Social History, Family History and Medication List.    ALLERGIES  Clindamycin; Cephalexin; Doxycycline; and Metal [staples]    Derm/ Arm infection       Duration: Couple weeks     Description (location/character/radiation): lower right arm. Patient states that it started as a small spot that he ended up scratching. Then it started to spread, was warm to the touch. Called and talked to Lowell Cantu and was given Bactroban. Patient states that the inital spot that was red, hard and crusty got better. Still has redness but seems to still be spreading.     Intensity:  moderate    Accompanying signs and symptoms: none    History (similar episodes/previous evaluation): None    Precipitating or alleviating factors: None    Therapies tried and outcome: Bactroban for about 2 days stopped because it seemed to make things worse, washing with warm soap and water, have been wrapping at night     Patient reports redness area is about 4 inches x 4 inches around.  The central area is more red and scabby which has improved with " See HPI See HPI the Bactroban.  However his redness is spreading slightly.  Has full range of motion of his wrist and elbow.  Not much pain associated with this.  There is some itching.    Denies any fevers, chills, nausea, vomiting, headaches.     Assessment/Plan:  1. Local infection of skin and subcutaneous tissue  Patient symptoms improved slightly with Bactroban but it appears that his redness is spreading.  Patient lacks systemic symptoms and has full range of motion of his wrist and elbow so there is a low likelihood of septic joint or more systemic serious infection at this time.  Will treat for cellulitis with Augmentin.  Considered that he is on warfarin for long-term anticoagulation.  See below.  Follow-up via phone in 5 days for reevaluation.  Warning signs and symptoms discussed.  - amoxicillin-clavulanate (AUGMENTIN) 875-125 MG tablet; Take 1 tablet by mouth 2 times daily for 7 days  Dispense: 14 tablet; Refill: 0    2. Chronic anticoagulation  Due to risk of increasing INR with amoxicillin patient will come in for an INR check on Friday which is 3 days after starting the antibiotics.  - INR; Future    Phone call duration:  15 minutes    Mark Valle PA-C     See HPI See HPI See HPI See HPI See HPI See HPI See HPI See HPI See HPI See HPI See HPI See HPI See HPI See HPI See HPI See HPI See HPI See HPI See HPI See HPI See HPI See HPI See HPI

## 2023-12-07 ENCOUNTER — ANTICOAGULATION THERAPY VISIT (OUTPATIENT)
Dept: ANTICOAGULATION | Facility: CLINIC | Age: 73
End: 2023-12-07

## 2023-12-07 ENCOUNTER — LAB (OUTPATIENT)
Dept: LAB | Facility: CLINIC | Age: 73
End: 2023-12-07
Payer: MEDICARE

## 2023-12-07 DIAGNOSIS — Z79.01 CHRONIC ANTICOAGULATION: ICD-10-CM

## 2023-12-07 DIAGNOSIS — Z79.01 LONG TERM CURRENT USE OF ANTICOAGULANT THERAPY: ICD-10-CM

## 2023-12-07 DIAGNOSIS — Z86.718 H/O DEEP VENOUS THROMBOSIS: ICD-10-CM

## 2023-12-07 DIAGNOSIS — D68.9 COAGULATION DEFECT (H): ICD-10-CM

## 2023-12-07 DIAGNOSIS — D68.9 COAGULATION DEFECT (H): Primary | ICD-10-CM

## 2023-12-07 LAB — INR BLD: 1.8 (ref 0.9–1.1)

## 2023-12-07 PROCEDURE — 36416 COLLJ CAPILLARY BLOOD SPEC: CPT

## 2023-12-07 PROCEDURE — 85610 PROTHROMBIN TIME: CPT

## 2023-12-07 NOTE — PROGRESS NOTES
ANTICOAGULATION MANAGEMENT     Jose M Rea 73 year old male is on warfarin with subtherapeutic INR result. (Goal INR 2.0-3.0)    Recent labs: (last 7 days)     12/07/23  0928   INR 1.8*       ASSESSMENT     Source(s): Chart Review and Patient/Caregiver Call     Warfarin doses taken: Warfarin taken as instructed  Diet: No new diet changes identified  Medication/supplement changes: None noted  New illness, injury, or hospitalization: No  Signs or symptoms of bleeding or clotting: No  Previous result: Therapeutic last 2(+) visits  Additional findings: None       PLAN     Recommended plan for no diet, medication or health factor changes affecting INR     Dosing Instructions: Continue your current warfarin dose with next INR in 2 weeks       Summary  As of 12/7/2023      Full warfarin instructions:  7.5 mg every day   Next INR check:  12/21/2023               Telephone call with Jose M who verbalizes understanding and agrees to plan    Lab visit scheduled    Education provided:   Goal range and lab monitoring: goal range and significance of current result    Plan made per [facility] anticoagulation protocol    Ce Ponce RN  Anticoagulation Clinic  12/7/2023    _______________________________________________________________________     Anticoagulation Episode Summary       Current INR goal:  2.0-3.0   TTR:  96.7% (1 y)   Target end date:  Indefinite   Send INR reminders to:  [facility]    Indications    Coagulation defect (H24) [D68.9]  Acute deep vein thrombosis (DVT) of proximal vein of lower extremity (H) (Resolved) [I82.4Y9]  Long term current use of anticoagulant therapy [Z79.01]  Acute deep vein thrombosis (DVT) of proximal vein of lower extremity  unspecified laterality (H) (Resolved) [I82.4Y9]  Chronic anticoagulation [Z79.01]  H/O deep venous thrombosis [Z86.718]  Acute deep vein thrombosis (DVT) of proximal vein of lower extremity  unspecified laterality (H) (Resolved) [I82.4Y9]             Comments:   okay to leave DVM             Anticoagulation Care Providers       Provider Role Specialty Phone number    Pj Quijano MD Referring Family Medicine [phone]    Rigoberto Hernandez MD Referring Family Medicine [phone]

## 2023-12-18 DIAGNOSIS — Z79.01 LONG TERM CURRENT USE OF ANTICOAGULANT THERAPY: ICD-10-CM

## 2023-12-18 DIAGNOSIS — Z79.01 CHRONIC ANTICOAGULATION: ICD-10-CM

## 2023-12-18 DIAGNOSIS — D68.9 COAGULATION DEFECT (H): ICD-10-CM

## 2023-12-18 RX ORDER — WARFARIN SODIUM 5 MG/1
TABLET ORAL
Qty: 130 TABLET | Refills: 1 | Status: SHIPPED | OUTPATIENT
Start: 2023-12-18 | End: 2024-04-30

## 2023-12-18 NOTE — TELEPHONE ENCOUNTER
Pending Prescriptions:                       Disp   Refills    warfarin ANTICOAGULANT (COUMADIN) 5 MG ta*90 tab*0            Sig: TAKE 1 & 1/2 (ONE & ONE-HALF) TABLETS BY MOUTH           ONCE DAILY OR  AS  DIRECTED  BY  ANTICOAGULATION            CLINIC

## 2023-12-18 NOTE — TELEPHONE ENCOUNTER
ANTICOAGULATION MANAGEMENT:  Medication Refill    Anticoagulation Summary  As of 12/7/2023      Warfarin maintenance plan:  7.5 mg (5 mg x 1.5) every day   Next INR check:  12/21/2023   Target end date:  Indefinite    Indications    Coagulation defect (H24) [D68.9]  Acute deep vein thrombosis (DVT) of proximal vein of lower extremity (H) (Resolved) [I82.4Y9]  Long term current use of anticoagulant therapy [Z79.01]  Acute deep vein thrombosis (DVT) of proximal vein of lower extremity  unspecified laterality (H) (Resolved) [I82.4Y9]  Chronic anticoagulation [Z79.01]  H/O deep venous thrombosis [Z86.718]  Acute deep vein thrombosis (DVT) of proximal vein of lower extremity  unspecified laterality (H) (Resolved) [I82.4Y9]                 Anticoagulation Care Providers       Provider Role Specialty Phone number    Pj Quijano MD Referring Family Medicine [phone]    Rigoberto Hernandez MD Referring Family Medicine [phone]            Refill Criteria    Visit with referring provider/group: Meets criteria: office visit within referring provider group in the last 1 year on 4/2023    [facility] referral signed last signed: 05/24/2023; within last year: Yes    Lab monitoring not exceeding 2 weeks overdue: Yes    Jose M meets all criteria for refill. Rx instructions and quantity match patient's current dosing plan. Warfarin 90 day supply with 1 refill granted per [facility] protocol     Justa Vaca RN  Anticoagulation Clinic

## 2023-12-21 ENCOUNTER — LAB (OUTPATIENT)
Dept: LAB | Facility: CLINIC | Age: 73
End: 2023-12-21
Payer: MEDICARE

## 2023-12-21 ENCOUNTER — ANTICOAGULATION THERAPY VISIT (OUTPATIENT)
Dept: ANTICOAGULATION | Facility: CLINIC | Age: 73
End: 2023-12-21

## 2023-12-21 DIAGNOSIS — Z79.01 LONG TERM CURRENT USE OF ANTICOAGULANT THERAPY: ICD-10-CM

## 2023-12-21 DIAGNOSIS — Z86.718 H/O DEEP VENOUS THROMBOSIS: ICD-10-CM

## 2023-12-21 DIAGNOSIS — D68.9 COAGULATION DEFECT (H): ICD-10-CM

## 2023-12-21 DIAGNOSIS — D68.9 COAGULATION DEFECT (H): Primary | ICD-10-CM

## 2023-12-21 DIAGNOSIS — Z79.01 CHRONIC ANTICOAGULATION: ICD-10-CM

## 2023-12-21 LAB — INR BLD: 2.1 (ref 0.9–1.1)

## 2023-12-21 PROCEDURE — 85610 PROTHROMBIN TIME: CPT

## 2023-12-21 PROCEDURE — 36416 COLLJ CAPILLARY BLOOD SPEC: CPT

## 2023-12-21 NOTE — PROGRESS NOTES
ANTICOAGULATION MANAGEMENT     Jose M Jeffersonmaximiliano 73 year old male is on warfarin with therapeutic INR result. (Goal INR 2.0-3.0)    Recent labs: (last 7 days)     12/21/23  0856   INR 2.1*       ASSESSMENT     Source(s): Chart Review and Patient/Caregiver Call     Warfarin doses taken: Warfarin taken as instructed  Diet: No new diet changes identified  Medication/supplement changes: None noted  New illness, injury, or hospitalization: No  Signs or symptoms of bleeding or clotting: No  Previous result: Subtherapeutic  Additional findings: None       PLAN     Recommended plan for no diet, medication or health factor changes affecting INR     Dosing Instructions: Continue your current warfarin dose with next INR in 3 weeks       Summary  As of 12/21/2023      Full warfarin instructions:  7.5 mg every day   Next INR check:  1/11/2024               Telephone call with Jose M who verbalizes understanding and agrees to plan    Lab visit scheduled    Education provided:   Goal range and lab monitoring: goal range and significance of current result    Plan made per [facility] anticoagulation protocol    Ce Ponce RN  Anticoagulation Clinic  12/21/2023    _______________________________________________________________________     Anticoagulation Episode Summary       Current INR goal:  2.0-3.0   TTR:  94.2% (1 y)   Target end date:  Indefinite   Send INR reminders to:  [facility]    Indications    Coagulation defect (H24) [D68.9]  Acute deep vein thrombosis (DVT) of proximal vein of lower extremity (H) (Resolved) [I82.4Y9]  Long term current use of anticoagulant therapy [Z79.01]  Acute deep vein thrombosis (DVT) of proximal vein of lower extremity  unspecified laterality (H) (Resolved) [I82.4Y9]  Chronic anticoagulation [Z79.01]  H/O deep venous thrombosis [Z86.718]  Acute deep vein thrombosis (DVT) of proximal vein of lower extremity  unspecified laterality (H) (Resolved) [I82.4Y9]             Comments:  okay to leave  DVM             Anticoagulation Care Providers       Provider Role Specialty Phone number    Pj Quijano MD Referring Family Medicine [phone]    Rigoberto Hernandez MD Referring Family Medicine [phone]

## 2024-01-11 ENCOUNTER — LAB (OUTPATIENT)
Dept: LAB | Facility: CLINIC | Age: 74
End: 2024-01-11
Payer: MEDICARE

## 2024-01-11 ENCOUNTER — TELEPHONE (OUTPATIENT)
Dept: FAMILY MEDICINE | Facility: CLINIC | Age: 74
End: 2024-01-11

## 2024-01-11 ENCOUNTER — ANTICOAGULATION THERAPY VISIT (OUTPATIENT)
Dept: ANTICOAGULATION | Facility: CLINIC | Age: 74
End: 2024-01-11

## 2024-01-11 DIAGNOSIS — Z86.718 H/O DEEP VENOUS THROMBOSIS: ICD-10-CM

## 2024-01-11 DIAGNOSIS — Z79.01 LONG TERM CURRENT USE OF ANTICOAGULANT THERAPY: ICD-10-CM

## 2024-01-11 DIAGNOSIS — D68.9 COAGULATION DEFECT (H): Primary | ICD-10-CM

## 2024-01-11 DIAGNOSIS — D68.9 COAGULATION DEFECT (H): ICD-10-CM

## 2024-01-11 DIAGNOSIS — Z79.01 CHRONIC ANTICOAGULATION: ICD-10-CM

## 2024-01-11 LAB — INR BLD: 2.6 (ref 0.9–1.1)

## 2024-01-11 PROCEDURE — 85610 PROTHROMBIN TIME: CPT

## 2024-01-11 PROCEDURE — 36416 COLLJ CAPILLARY BLOOD SPEC: CPT

## 2024-01-11 NOTE — PROGRESS NOTES
ANTICOAGULATION MANAGEMENT     Jose M Rea 73 year old male is on warfarin with therapeutic INR result. (Goal INR 2.0-3.0)    Recent labs: (last 7 days)     01/11/24  0756   INR 2.6*       ASSESSMENT     Source(s): Chart Review and Patient/Caregiver Call     Warfarin doses taken: Warfarin taken as instructed  Diet: No new diet changes identified  Medication/supplement changes: None noted  New illness, injury, or hospitalization: No  Signs or symptoms of bleeding or clotting: No  Previous result: Therapeutic last visit; previously outside of goal range  Additional findings:  regular exercise class     PLAN     Recommended plan for no diet, medication or health factor changes affecting INR     Dosing Instructions: Continue your current warfarin dose with next INR in 4 weeks   - pt prefers to return to a 6 week recheck    Summary  As of 1/11/2024      Full warfarin instructions:  7.5 mg every day   Next INR check:  2/8/2024               Telephone call with Jose M who verbalizes understanding and agrees to plan    Lab visit scheduled    Education provided:   Please call back if any changes to your diet, medications or how you've been taking warfarin    Plan made per [facility] anticoagulation protocol    Salma Diane RN  Anticoagulation Clinic  1/11/2024    _______________________________________________________________________     Anticoagulation Episode Summary       Current INR goal:  2.0-3.0   TTR:  94.2% (1 y)   Target end date:  Indefinite   Send INR reminders to:  [facility]    Indications    Coagulation defect (H24) [D68.9]  Acute deep vein thrombosis (DVT) of proximal vein of lower extremity (H) (Resolved) [I82.4Y9]  Long term current use of anticoagulant therapy [Z79.01]  Acute deep vein thrombosis (DVT) of proximal vein of lower extremity  unspecified laterality (H) (Resolved) [I82.4Y9]  Chronic anticoagulation [Z79.01]  H/O deep venous thrombosis [Z86.718]  Acute deep vein thrombosis (DVT) of  proximal vein of lower extremity  unspecified laterality (H) (Resolved) [I82.4Y9]             Comments:  okay to leave DVM             Anticoagulation Care Providers       Provider Role Specialty Phone number    Pj Quijano MD Referring Family Medicine [phone]    Rigoberto Hernandez MD Referring Family Medicine [phone]

## 2024-01-11 NOTE — TELEPHONE ENCOUNTER
Voicemail left for patient to return call to [facility]   Salma Diane RN on 1/11/2024 at 12:03 PM

## 2024-01-11 NOTE — PROGRESS NOTES
ANTICOAGULATION MANAGEMENT     Jose M Rea 73 year old male is on warfarin with therapeutic INR result. (Goal INR 2.0-3.0)    Recent labs: (last 7 days)     01/11/24  0756   INR 2.6*       ASSESSMENT     Source(s): Chart Review   Previous result: Therapeutic last visit; previously outside of goal range  Additional findings: None     PLAN     Recommended plan for no diet, medication or health factor changes affecting INR     Dosing Instructions: Continue your current warfarin dose with next INR in 4 weeks       Summary  As of 1/11/2024      Full warfarin instructions:  7.5 mg every day   Next INR check:  2/8/2024               Detailed voice message left for Jose M with dosing instructions and follow up date.     Contact [phone]  to schedule and with any changes, questions or concerns.     Education provided:   Please call back if any changes to your diet, medications or how you've been taking warfarin    Plan made per [facility] anticoagulation protocol    Salma Diane RN  Anticoagulation Clinic  1/11/2024    _______________________________________________________________________     Anticoagulation Episode Summary       Current INR goal:  2.0-3.0   TTR:  94.2% (1 y)   Target end date:  Indefinite   Send INR reminders to:  [facility]    Indications    Coagulation defect (H24) [D68.9]  Acute deep vein thrombosis (DVT) of proximal vein of lower extremity (H) (Resolved) [I82.4Y9]  Long term current use of anticoagulant therapy [Z79.01]  Acute deep vein thrombosis (DVT) of proximal vein of lower extremity  unspecified laterality (H) (Resolved) [I82.4Y9]  Chronic anticoagulation [Z79.01]  H/O deep venous thrombosis [Z86.718]  Acute deep vein thrombosis (DVT) of proximal vein of lower extremity  unspecified laterality (H) (Resolved) [I82.4Y9]             Comments:  okay to leave DVM             Anticoagulation Care Providers       Provider Role Specialty Phone number    Pj Quijano MD Referring Family  Medicine [phone]    Rigoberto Hernandez MD [facility] Medicine [phone]

## 2024-01-11 NOTE — TELEPHONE ENCOUNTER
General Call      Reason for Call:  INR question    What are your questions or concerns:  schedule    Date of last appointment with provider: 01/11/24    Could we send this information to you in [place] or would you prefer to receive a phone call?:   Patient would prefer a phone call   Okay to leave a detailed message?: Yes at Cell number on file:    Telephone Information:   Mobile [phone]

## 2024-02-23 ENCOUNTER — ANTICOAGULATION THERAPY VISIT (OUTPATIENT)
Dept: ANTICOAGULATION | Facility: CLINIC | Age: 74
End: 2024-02-23

## 2024-02-23 ENCOUNTER — LAB (OUTPATIENT)
Dept: LAB | Facility: CLINIC | Age: 74
End: 2024-02-23
Payer: MEDICARE

## 2024-02-23 DIAGNOSIS — D68.9 COAGULATION DEFECT (H): ICD-10-CM

## 2024-02-23 DIAGNOSIS — Z79.01 CHRONIC ANTICOAGULATION: ICD-10-CM

## 2024-02-23 DIAGNOSIS — D68.9 COAGULATION DEFECT (H): Primary | ICD-10-CM

## 2024-02-23 DIAGNOSIS — Z86.718 H/O DEEP VENOUS THROMBOSIS: ICD-10-CM

## 2024-02-23 DIAGNOSIS — Z79.01 LONG TERM CURRENT USE OF ANTICOAGULANT THERAPY: ICD-10-CM

## 2024-02-23 LAB — INR BLD: 2.2 (ref 0.9–1.1)

## 2024-02-23 PROCEDURE — 36416 COLLJ CAPILLARY BLOOD SPEC: CPT

## 2024-02-23 PROCEDURE — 85610 PROTHROMBIN TIME: CPT

## 2024-02-23 NOTE — PROGRESS NOTES
ANTICOAGULATION MANAGEMENT     Jose M Jeffersonmaximiliano 73 year old male is on warfarin with therapeutic INR result. (Goal INR 2.0-3.0)    Recent labs: (last 7 days)     02/23/24  0928   INR 2.2*       ASSESSMENT     Source(s): Chart Review and Patient/Caregiver Call     Warfarin doses taken: Warfarin taken as instructed  Diet: No new diet changes identified  Medication/supplement changes:  diphenhydramine - using for sleep as needed use No interaction anticipated  New illness, injury, or hospitalization: No  Signs or symptoms of bleeding or clotting: No  Previous result: Therapeutic last 2(+) visits  Additional findings: None       PLAN     Recommended plan for no diet, medication or health factor changes affecting INR     Dosing Instructions: Continue your current warfarin dose with next INR in 6 weeks       Summary  As of 2/23/2024      Full warfarin instructions:  7.5 mg every day   Next INR check:  4/5/2024               Telephone call with Jose M who verbalizes understanding and agrees to plan    Lab visit scheduled    Education provided:   Goal range and lab monitoring: goal range and significance of current result  Interaction IS NOT anticipated between warfarin and diphenhydramine    Plan made per [facility]agulation protocol    Christie Urbina RN  Anticoagulation Clinic  2/23/2024    _______________________________________________________________________     Anticoagulation Episode Summary       Current INR goal:  2.0-3.0   TTR:  94.2% (1 y)   Target end date:  Indefinite   Send INR reminders to:  [facility]    Indications    Coagulation defect (H24) [D68.9]  Acute deep vein thrombosis (DVT) of proximal vein of lower extremity (H) (Resolved) [I82.4Y9]  Long term current use of anticoagulant therapy [Z79.01]  Acute deep vein thrombosis (DVT) of proximal vein of lower extremity  unspecified laterality (H) (Resolved) [I82.4Y9]  Chronic anticoagulation [Z79.01]  H/O deep venous thrombosis [Z86.718]  Acute deep  vein thrombosis (DVT) of proximal vein of lower extremity  unspecified laterality (H) (Resolved) [I82.4Y9]             Comments:  okay to leave DVM             Anticoagulation Care Providers       Provider Role Specialty Phone number    Pj Quijano MD Referring Family Medicine [phone]    Rigoberto Hernandez MD Referring Family Medicine [phone]

## 2024-04-05 ENCOUNTER — ANTICOAGULATION THERAPY VISIT (OUTPATIENT)
Dept: ANTICOAGULATION | Facility: CLINIC | Age: 74
End: 2024-04-05

## 2024-04-05 ENCOUNTER — LAB (OUTPATIENT)
Dept: LAB | Facility: CLINIC | Age: 74
End: 2024-04-05
Payer: MEDICARE

## 2024-04-05 DIAGNOSIS — D68.9 COAGULATION DEFECT (H): Primary | ICD-10-CM

## 2024-04-05 DIAGNOSIS — Z86.718 H/O DEEP VENOUS THROMBOSIS: ICD-10-CM

## 2024-04-05 DIAGNOSIS — Z79.01 LONG TERM CURRENT USE OF ANTICOAGULANT THERAPY: ICD-10-CM

## 2024-04-05 DIAGNOSIS — Z79.01 CHRONIC ANTICOAGULATION: ICD-10-CM

## 2024-04-05 DIAGNOSIS — D68.9 COAGULATION DEFECT (H): ICD-10-CM

## 2024-04-05 LAB — INR BLD: 2.3 (ref 0.9–1.1)

## 2024-04-05 PROCEDURE — 85610 PROTHROMBIN TIME: CPT

## 2024-04-05 PROCEDURE — 36416 COLLJ CAPILLARY BLOOD SPEC: CPT

## 2024-04-05 NOTE — PROGRESS NOTES
ANTICOAGULATION MANAGEMENT     Jose M Rea 74 year old male is on warfarin with therapeutic INR result. (Goal INR 2.0-3.0)    Recent labs: (last 7 days)     04/05/24  0928   INR 2.3*       ASSESSMENT     Source(s): Chart Review and Patient/Caregiver Call     Warfarin doses taken: Warfarin taken as instructed  Diet: No new diet changes identified  Medication/supplement changes: None noted  New illness, injury, or hospitalization: No  Signs or symptoms of bleeding or clotting: No  Previous result: Therapeutic last 2(+) visits  Additional findings: None       PLAN     Recommended plan for no diet, medication or health factor changes affecting INR     Dosing Instructions: Continue your current warfarin dose with next INR in 6 weeks       Summary  As of 4/5/2024      Full warfarin instructions:  7.5 mg every day   Next INR check:  5/17/2024               Telephone call with Jose M who verbalizes understanding and agrees to plan and who agrees to plan and repeated back plan correctly    Lab visit scheduled    Education provided:   Contact [phone]  with any changes, questions or concerns.     Plan made per [facility] anticoagulation protocol    Ralph Echevarria RN  Anticoagulation Clinic  4/5/2024    _______________________________________________________________________     Anticoagulation Episode Summary       Current INR goal:  2.0-3.0   TTR:  94.2% (1 y)   Target end date:  Indefinite   Send INR reminders to:  [facility]    Indications    Coagulation defect (H24) [D68.9]  Acute deep vein thrombosis (DVT) of proximal vein of lower extremity (H) (Resolved) [I82.4Y9]  Long term current use of anticoagulant therapy [Z79.01]  Acute deep vein thrombosis (DVT) of proximal vein of lower extremity  unspecified laterality (H) (Resolved) [I82.4Y9]  Chronic anticoagulation [Z79.01]  H/O deep venous thrombosis [Z86.718]  Acute deep vein thrombosis (DVT) of proximal vein of lower extremity  unspecified laterality (H)  (Resolved) [I82.4Y9]             Comments:  okay to leave DVM             Anticoagulation Care Providers       Provider Role Specialty Phone number    Pj Quijano APRN CNP Referring Family Medicine [phone]    Rigoberto Hernandez MD Referring Family Medicine [phone]

## 2024-04-18 DIAGNOSIS — Z13.220 SCREENING FOR LIPID DISORDERS: Primary | ICD-10-CM

## 2024-04-18 DIAGNOSIS — I25.10 CORONARY ARTERY CALCIFICATION: ICD-10-CM

## 2024-04-18 NOTE — PROGRESS NOTES
Jose M Kaiser has an upcoming lab appointment with us. Can you please review and place orders for him?  Thank you,  [facility]

## 2024-04-23 ENCOUNTER — LAB (OUTPATIENT)
Dept: LAB | Facility: CLINIC | Age: 74
End: 2024-04-23
Payer: MEDICARE

## 2024-04-23 DIAGNOSIS — Z13.220 SCREENING FOR LIPID DISORDERS: ICD-10-CM

## 2024-04-23 DIAGNOSIS — I25.10 CORONARY ARTERY CALCIFICATION: ICD-10-CM

## 2024-04-23 LAB
ANION GAP SERPL CALCULATED.3IONS-SCNC: 10 MMOL/L (ref 7–15)
BUN SERPL-MCNC: 11.4 MG/DL (ref 8–23)
CALCIUM SERPL-MCNC: 9.2 MG/DL (ref 8.8–10.2)
CHLORIDE SERPL-SCNC: 104 MMOL/L (ref 98–107)
CHOLEST SERPL-MCNC: 163 MG/DL
CREAT SERPL-MCNC: 0.92 MG/DL (ref 0.67–1.17)
DEPRECATED HCO3 PLAS-SCNC: 26 MMOL/L (ref 22–29)
EGFRCR SERPLBLD CKD-EPI 2021: 86 ML/MIN/1.73M2
FASTING STATUS PATIENT QL REPORTED: YES
GLUCOSE SERPL-MCNC: 102 MG/DL (ref 70–99)
HDLC SERPL-MCNC: 49 MG/DL
LDLC SERPL CALC-MCNC: 70 MG/DL
NONHDLC SERPL-MCNC: 114 MG/DL
POTASSIUM SERPL-SCNC: 4.5 MMOL/L (ref 3.4–5.3)
SODIUM SERPL-SCNC: 140 MMOL/L (ref 135–145)
TRIGL SERPL-MCNC: 222 MG/DL

## 2024-04-23 PROCEDURE — 36415 COLL VENOUS BLD VENIPUNCTURE: CPT

## 2024-04-23 PROCEDURE — 80048 BASIC METABOLIC PNL TOTAL CA: CPT

## 2024-04-23 PROCEDURE — 80061 LIPID PANEL: CPT

## 2024-04-30 ENCOUNTER — OFFICE VISIT (OUTPATIENT)
Dept: FAMILY MEDICINE | Facility: CLINIC | Age: 74
End: 2024-04-30
Payer: MEDICARE

## 2024-04-30 VITALS
HEART RATE: 68 BPM | WEIGHT: 200.2 LBS | HEIGHT: 70 IN | RESPIRATION RATE: 16 BRPM | TEMPERATURE: 97.2 F | BODY MASS INDEX: 28.66 KG/M2 | OXYGEN SATURATION: 98 % | SYSTOLIC BLOOD PRESSURE: 134 MMHG | DIASTOLIC BLOOD PRESSURE: 82 MMHG

## 2024-04-30 DIAGNOSIS — Z87.891 PERSONAL HISTORY OF TOBACCO USE: ICD-10-CM

## 2024-04-30 DIAGNOSIS — Z00.00 ENCOUNTER FOR MEDICARE ANNUAL WELLNESS EXAM: Primary | ICD-10-CM

## 2024-04-30 DIAGNOSIS — D68.9 COAGULATION DEFECT (H): ICD-10-CM

## 2024-04-30 DIAGNOSIS — E78.5 HYPERLIPIDEMIA LDL GOAL <100: ICD-10-CM

## 2024-04-30 DIAGNOSIS — N52.9 ERECTILE DYSFUNCTION, UNSPECIFIED ERECTILE DYSFUNCTION TYPE: ICD-10-CM

## 2024-04-30 DIAGNOSIS — K40.90 REDUCIBLE RIGHT INGUINAL HERNIA: ICD-10-CM

## 2024-04-30 DIAGNOSIS — Z79.01 LONG TERM CURRENT USE OF ANTICOAGULANT THERAPY: ICD-10-CM

## 2024-04-30 DIAGNOSIS — Z79.01 CHRONIC ANTICOAGULATION: ICD-10-CM

## 2024-04-30 PROCEDURE — G0296 VISIT TO DETERM LDCT ELIG: HCPCS | Performed by: FAMILY MEDICINE

## 2024-04-30 PROCEDURE — 99214 OFFICE O/P EST MOD 30 MIN: CPT | Mod: 25 | Performed by: FAMILY MEDICINE

## 2024-04-30 PROCEDURE — G0439 PPPS, SUBSEQ VISIT: HCPCS | Performed by: FAMILY MEDICINE

## 2024-04-30 PROCEDURE — 99406 BEHAV CHNG SMOKING 3-10 MIN: CPT | Performed by: FAMILY MEDICINE

## 2024-04-30 RX ORDER — WARFARIN SODIUM 5 MG/1
TABLET ORAL
Qty: 130 TABLET | Refills: 1 | Status: SHIPPED | OUTPATIENT
Start: 2024-04-30

## 2024-04-30 RX ORDER — RESPIRATORY SYNCYTIAL VIRUS VACCINE 120MCG/0.5
0.5 KIT INTRAMUSCULAR ONCE
Qty: 1 EACH | Refills: 0 | Status: CANCELLED | OUTPATIENT
Start: 2024-04-30 | End: 2024-04-30

## 2024-04-30 RX ORDER — ATORVASTATIN CALCIUM 20 MG/1
20 TABLET, FILM COATED ORAL DAILY
Qty: 90 TABLET | Refills: 3 | Status: SHIPPED | OUTPATIENT
Start: 2024-04-30

## 2024-04-30 RX ORDER — TADALAFIL 20 MG/1
20 TABLET ORAL DAILY PRN
Qty: 10 TABLET | Refills: 3 | Status: SHIPPED | OUTPATIENT
Start: 2024-04-30

## 2024-04-30 SDOH — HEALTH STABILITY: PHYSICAL HEALTH: ON AVERAGE, HOW MANY DAYS PER WEEK DO YOU ENGAGE IN MODERATE TO STRENUOUS EXERCISE (LIKE A BRISK WALK)?: 0 DAYS

## 2024-04-30 ASSESSMENT — SOCIAL DETERMINANTS OF HEALTH (SDOH): HOW OFTEN DO YOU GET TOGETHER WITH FRIENDS OR RELATIVES?: TWICE A WEEK

## 2024-04-30 ASSESSMENT — PAIN SCALES - GENERAL: PAINLEVEL: NO PAIN (0)

## 2024-04-30 NOTE — PROGRESS NOTES
Preventive Care Visit  [facility]  Abdirashid Zendejas MD, Family Medicine  Apr 30, 2024      Assessment & Plan     Encounter for Medicare annual wellness exam      Hyperlipidemia LDL goal <100  Stable, refill  - atorvastatin (LIPITOR) 20 MG tablet; Take 1 tablet (20 mg) by mouth daily    Coagulation defect (H24)  Stable, following with anticoagulation clinic  - warfarin ANTICOAGULANT (COUMADIN) 5 MG tablet; TAKE 1 & 1/2 (ONE & ONE-HALF) TABLETS BY MOUTH ONCE DAILY OR  AS  DIRECTED  BY  ANTICOAGULATION  CLINIC    Long term current use of anticoagulant therapy  - warfarin ANTICOAGULANT (COUMADIN) 5 MG tablet; TAKE 1 & 1/2 (ONE & ONE-HALF) TABLETS BY MOUTH ONCE DAILY OR  AS  DIRECTED  BY  ANTICOAGULATION  CLINIC    Chronic anticoagulation  - warfarin ANTICOAGULANT (COUMADIN) 5 MG tablet; TAKE 1 & 1/2 (ONE & ONE-HALF) TABLETS BY MOUTH ONCE DAILY OR  AS  DIRECTED  BY  ANTICOAGULATION  CLINIC      Personal history of tobacco use  - Prof fee: Shared Decision Making for Lung Cancer Screening  - CT Chest Lung Cancer Scrn Low Dose wo; Future  - SMOKING CESSATION COUNSELING 3-10 MIN    Reducible right inguinal hernia: no pain currently  Discussed that he doesn't need it repaired but could and patient would like to watchfully wait and will contact if needed for referral.    Erectile dysfunction, unspecified erectile dysfunction type  Discussed, increase to 20mg daily.  - tadalafil (CIALIS) 20 MG tablet; Take 1 tablet (20 mg) by mouth daily as needed (intercourse) Take one tablet by mouth 30 minutes prior    Patient has been advised of split billing requirements and indicates understanding: Yes        Nicotine/Tobacco Cessation  He reports that he has been smoking cigarettes. He has a 25 pack-year smoking history. He has never used smokeless tobacco.  Nicotine/Tobacco Cessation Plan  Self help information given to patient      BMI  Estimated body mass index is 28.82 kg/m  as calculated from the  "following:    Height as of this encounter: 1.775 m (5' 9.88\").    Weight as of this encounter: 90.8 kg (200 lb 3.2 oz).       Counseling  Appropriate preventive services were discussed with this patient, including applicable screening as appropriate for fall prevention, nutrition, physical activity, Tobacco-use cessation, weight loss and cognition.  Checklist reviewing preventive services available has been given to the patient.  Reviewed patient's diet, addressing concerns and/or questions.   He is at risk for psychosocial distress and has been provided with information to reduce risk.   The patient was provided with written information regarding signs of hearing loss.       See Patient Instructions    Subjective   Jose M is a 74 year old, presenting for the following:  Physical (Not fasting)        4/30/2024     9:45 AM   Additional Questions   Roomed by Teresa Doe   Accompanied by self         4/30/2024     9:45 AM   Patient Reported Additional Medications   Patient reports taking the following new medications none         Health Care Directive  Patient has a Health Care Directive on file  Advance care planning document is on file but is outdated.  Patient encouraged to updated.    HPI  -Lung cancer screening- needs order, working on decreasing cig, down to 3 per day or less.  -Wondering about Pneumonia shot  - Questions about current hernia. Right side groin area small for years Nov 2022. The last week more discomfort, and the last 2 days not bothersome.      Hyperlipidemia Follow-Up    Are you regularly taking any medication or supplement to lower your cholesterol?   Yes- Atorvastatin  Are you having muscle aches or other side effects that you think could be caused by your cholesterol lowering medication?  No    Medication Followup of Coumadin, blood clots after surgery  and hematology testing shows coagulation defect so on warfarin long term.  Taking Medication as prescribed: yes  Side Effects:  " None  Medication Helping Symptoms:  yes    Medication Followup of Cialis 10 MG  Taking Medication as prescribed: Patient would like to discuss this medication with Dr. Zendejas.  Side Effects:  None  Medication Helping Symptoms:  yes      4/30/2024   General Health   How would you rate your overall physical health? Good   Feel stress (tense, anxious, or unable to sleep) Only a little   (!) STRESS CONCERN      4/30/2024   Nutrition   Diet: Regular (no restrictions)         4/30/2024   Exercise   Days per week of moderate/strenous exercise 0 days   (!) EXERCISE CONCERN      4/30/2024   Social Factors   Frequency of gathering with friends or relatives Twice a week   Worry food won't last until get money to buy more No   Food not last or not have enough money for food? No   Do you have housing?  Yes   Are you worried about losing your housing? No   Lack of transportation? No   Unable to get utilities (heat,electricity)? No         4/30/2024   Fall Risk   Fallen 2 or more times in the past year? No   Trouble with walking or balance? No          4/30/2024   Activities of Daily Living- Home Safety   Needs help with the following daily activites None of the above   Safety concerns in the home None of the above         4/30/2024   Dental   Dentist two times every year? Yes         4/30/2024   Hearing Screening   Hearing concerns? (!) IT'S HARD TO FOLLOW A CONVERSATION IN A NOISY RESTAURANT OR CROWDED ROOM.   Had checked a few years ago.        4/30/2024   Driving Risk Screening   Patient/family members have concerns about driving No         4/30/2024   General Alertness/Fatigue Screening   Have you been more tired than usual lately? No         4/30/2024   Urinary Incontinence Screening   Bothered by leaking urine in past 6 months No         4/30/2024   TB Screening   Were you born outside of the [place]? No         Today's PHQ-2 Score:       4/30/2024     9:39 AM   PHQ-2 ( 1999 [employer])   Q1: Little interest or pleasure in doing  things 0   Q2: Feeling down, depressed or hopeless 0   PHQ-2 Score 0   Q1: Little interest or pleasure in doing things Not at all   Q2: Feeling down, depressed or hopeless Not at all   PHQ-2 Score 0           4/30/2024   Substance Use   Alcohol more than 3/day or more than 7/wk No   Do you have a current opioid prescription? No   How severe/bad is pain from 1 to 10? 1/10   Do you use any other substances recreationally? No     Social History     Tobacco Use    Smoking status: Every Day     Current packs/day: 0.25     Average packs/day: 0.3 packs/day for 50.0 years (12.5 ttl pk-yrs)     Types: Cigarettes    Smokeless tobacco: Never    Tobacco comments:     started at age 18, 1/2 pack daily or less   Vaping Use    Vaping status: Never Used   Substance Use Topics    Alcohol use: Not Currently     Comment: occasional beer    Drug use: No       ASCVD Risk   The 10-year ASCVD risk score (Meme REES, et al., 2019) is: 28.2%    Values used to calculate the score:      Age: 74 years      Sex: Male      Is Non- : No      Diabetic: No      Tobacco smoker: Yes      Systolic Blood Pressure: 134 mmHg      Is BP treated: No      HDL Cholesterol: 49 mg/dL      Total Cholesterol: 163 mg/dL            Reviewed and updated as needed this visit by Provider           Sexual Activity          BP Readings from Last 3 Encounters:   04/30/24 134/82   09/18/23 (!) 164/83   04/17/23 (!) 145/86    Wt Readings from Last 3 Encounters:   04/30/24 90.8 kg (200 lb 3.2 oz)   09/18/23 90.3 kg (199 lb)   04/17/23 90.3 kg (199 lb)                  Current providers sharing in care for this patient include:  Patient Care Team:  Abdirashid Zendejas MD as PCP - General (Family Medicine)  Marifer Garner, RN as Specialty Care Coordinator (Hematology & Oncology)  Zacarias Jade MD as MD (Neurology)  Zacarias Jade MD as MD (Neurology)  Zacarias Jade MD as MD (Neurology)  Mark Hall AuD as Audiologist  "(Audiology)  Kalie Vallejo APRN CNP as Nurse Practitioner (Nurse Practitioner Primary Care)  Marifer Mathias PA-C as Physician Assistant (Dermatology)  Rashad Pulido MD as Assigned Surgical Provider  Rigoberto Hernandez MD as Assigned PCP    The following health maintenance items are reviewed in Epic and correct as of today:  Health Maintenance   Topic Date Due    RSV VACCINE (Pregnancy & 60+) (1 - 1-dose 60+ series) Never done    COVID-19 Vaccine (7 - 2023-24 season) 02/02/2024    ANNUAL REVIEW OF HM ORDERS  04/14/2024    LUNG CANCER SCREENING  04/19/2024    NICOTINE/TOBACCO CESSATION COUNSELING Q 1 YR  04/14/2024    MEDICARE ANNUAL WELLNESS VISIT  04/14/2024    LIPID  04/23/2025    FALL RISK ASSESSMENT  04/30/2025    GLUCOSE  04/23/2027    DTAP/TDAP/TD IMMUNIZATION (4 - Td or Tdap) 02/13/2028    ADVANCE CARE PLANNING  04/14/2028    HEPATITIS C SCREENING  Completed    PHQ-2 (once per calendar year)  Completed    INFLUENZA VACCINE  Completed    Pneumococcal Vaccine: 65+ Years  Completed    ZOSTER IMMUNIZATION  Completed    IPV IMMUNIZATION  Aged Out    HPV IMMUNIZATION  Aged Out    MENINGITIS IMMUNIZATION  Aged Out    RSV MONOCLONAL ANTIBODY  Aged Out    COLORECTAL CANCER SCREENING  Discontinued       Review of Systems  Constitutional, HEENT, cardiovascular, pulmonary, gi and gu systems are negative, except as otherwise noted.     Objective    Exam  /82 (BP Location: Right arm, Patient Position: Sitting, Cuff Size: Adult Large)   Pulse 68   Temp 97.2  F (36.2  C) (Tympanic)   Resp 16   Ht 1.775 m (5' 9.88\")   Wt 90.8 kg (200 lb 3.2 oz)   SpO2 98%   BMI 28.82 kg/m     Estimated body mass index is 28.82 kg/m  as calculated from the following:    Height as of this encounter: 1.775 m (5' 9.88\").    Weight as of this encounter: 90.8 kg (200 lb 3.2 oz).    Physical Exam  GENERAL: alert and no distress  NECK: no adenopathy, no asymmetry, masses, or scars  RESP: lungs clear to auscultation - no " rales, rhonchi or wheezes  CV: regular rate and rhythm, normal S1 S2, no S3 or S4, no murmur, click or rub, no peripheral edema  ABDOMEN: soft, nontender, no hepatosplenomegaly, no masses and bowel sounds normal  MS: no gross musculoskeletal defects noted, left lower leg edema        4/30/2024   Mini Cog   Clock Draw Score 2 Normal   3 Item Recall 3 objects recalled   Mini Cog Total Score 5            Signed Electronically by: Abdirashid Zendejas MD  Lung Cancer Screening Shared Decision Making Visit     Jose M Rea, a 74 year old male, is eligible for lung cancer screening    History   Smoking Status    Every Day    Types: Cigarettes   Smokeless Tobacco    Never       I have discussed with patient the risks and benefits of screening for lung cancer with low-dose CT.     The risks include:    radiation exposure: one low dose chest CT has as much ionizing radiation as about 15 chest x-rays, or 6 months of background radiation living in [place]      false positives: most findings/nodules are NOT cancer, but some might still require additional diagnostic evaluation, including biopsy    over-diagnosis: some slow growing cancers that might never have been clinically significant will be detected and treated unnecessarily     The benefit of early detection of lung cancer is contingent upon adherence to annual screening or more frequent follow up if indicated.     Furthermore, to benefit from screening, Jose M must be willing and able to undergo diagnostic procedures, if indicated. Although no specific guide is available for determining severity of comorbidities, it is reasonable to withhold screening in patients who have greater mortality risk from other diseases.     We did discuss that the best way to prevent lung cancer is to not smoke.    Some patients may value a numeric estimation of lung cancer risk when evaluating if lung cancer screening is right for them, here is one calculator:    ShouldIScreen

## 2024-04-30 NOTE — PATIENT INSTRUCTIONS
Health Care Directive updated info needed.    Hernia: depends on your symptoms. If symptoms return, just MyChart or call and ask for the referral to general surgery.  If any severe pain that you can't reduce then go to ER.    Your pneumonia immunizations are complete, but you could get the prevnar 20 booster for some increased coverage.    This is a preventative visit and any additional concerns or chronic disease management including medication refills addressed today could be charged additionally.    Preventative visits screen for diseases prior to they occur.  They do not cover for any new diagnosis or chronic disease management.     If you have questions regarding your coverage please check with your insurance provider.  At [place] we need to code correctly to be in compliance with all insurance companies.  Preventive Care Advice   This is general advice given by our system to help you stay healthy. However, your care team may have specific advice just for you. Please talk to your care team about your preventive care needs.  Nutrition  Eat 5 or more servings of fruits and vegetables each day.  Try wheat bread, brown rice and whole grain pasta (instead of white bread, rice, and pasta).  Get enough calcium and vitamin D. Check the label on foods and aim for 100% of the RDA (recommended daily allowance).  Lifestyle  Exercise at least 150 minutes each week   (30 minutes a day, 5 days a week).  Do muscle strengthening activities 2 days a week. These help control your weight and prevent disease.  No smoking.  Wear sunscreen to prevent skin cancer.  Have a dental exam and cleaning every 6 months.  Yearly exams  See your health care team every year to talk about:  Any changes in your health.  Any medicines your care team has prescribed.  Preventive care, family planning, and ways to prevent chronic diseases.  Shots (vaccines)   HPV shots (up to age 26), if you've never had them before.  Hepatitis B shots (up to age  59), if you've never had them before.  COVID-19 shot: Get this shot when it's due.  Flu shot: Get a flu shot every year.  Tetanus shot: Get a tetanus shot every 10 years.  Pneumococcal, hepatitis A, and RSV shots: Ask your care team if you need these based on your risk.  Shingles shot (for age 50 and up).  General health tests  Diabetes screening:  Starting at age 35, Get screened for diabetes at least every 3 years.  If you are younger than age 35, ask your care team if you should be screened for diabetes.  Cholesterol test: At age 39, start having a cholesterol test every 5 years, or more often if advised.  Bone density scan (DEXA): At age 50, ask your care team if you should have this scan for osteoporosis (brittle bones).  Hepatitis C: Get tested at least once in your life.  STIs (sexually transmitted infections)  Before age 24: Ask your care team if you should be screened for STIs.  After age 24: Get screened for STIs if you're at risk. You are at risk for STIs (including HIV) if:  You are sexually active with more than one person.  You don't use condoms every time.  You or a partner was diagnosed with a sexually transmitted infection.  If you are at risk for HIV, ask about PrEP medicine to prevent HIV.  Get tested for HIV at least once in your life, whether you are at risk for HIV or not.  Cancer screening tests  Cervical cancer screening: If you have a cervix, begin getting regular cervical cancer screening tests at age 21. Most people who have regular screenings with normal results can stop after age 65. Talk about this with your provider.  Breast cancer scan (mammogram): If you've ever had breasts, begin having regular mammograms starting at age 40. This is a scan to check for breast cancer.  Colon cancer screening: It is important to start screening for colon cancer at age 45.  Have a colonoscopy test every 10 years (or more often if you're at risk) Or, ask your provider about stool tests like a FIT test  every year or Cologuard test every 3 years.  To learn more about your testing options, visit: https://www.[employer]/771975.pdf.  For help making a decision, visit: https://bit.ly/ur69928.  Prostate cancer screening test: If you have a prostate and are age 55 to 69, ask your provider if you would benefit from a yearly prostate cancer screening test.  Lung cancer screening: If you are a current or former smoker age 50 to 80, ask your care team if ongoing lung cancer screenings are right for you.  For informational purposes only. Not to replace the advice of your health care provider. Copyright   2023 [employer]. All rights reserved. Clinically reviewed by the [employer] [facility]. [employer] 273019 - REV 01/24.    Hearing Loss: Care Instructions  Overview     Hearing loss is a sudden or slow decrease in how well you hear. It can range from slight to profound. Permanent hearing loss can occur with aging. It also can happen when you are exposed long-term to loud noise. Examples include listening to loud music, riding motorcycles, or being around other loud machines.  Hearing loss can affect your work and home life. It can make you feel lonely or depressed. You may feel that you have lost your independence. But hearing aids and other devices can help you hear better and feel connected to others.  Follow-up care is a key part of your treatment and safety. Be sure to make and go to all appointments, and call your doctor if you are having problems. It's also a good idea to know your test results and keep a list of the medicines you take.  How can you care for yourself at home?  Avoid loud noises whenever possible. This helps keep your hearing from getting worse.  Always wear hearing protection around loud noises.  Wear a hearing aid as directed.  A professional can help you pick a hearing aid that will work best for you.  You can also get hearing aids over the counter for mild to moderate  "hearing loss.  Have hearing tests as your doctor suggests. They can show whether your hearing has changed. Your hearing aid may need to be adjusted.  Use other devices as needed. These may include:  Telephone amplifiers and hearing aids that can connect to a television, stereo, radio, or microphone.  Devices that use lights or vibrations. These alert you to the doorbell, a ringing telephone, or a baby monitor.  Television closed-captioning. This shows the words at the bottom of the screen. Most new TVs can do this.  TTY (text telephone). This lets you type messages back and forth on the telephone instead of talking or listening. These devices are also called TDD. When messages are typed on the keyboard, they are sent over the phone line to a receiving TTY. The message is shown on a monitor.  Use text messaging, social media, and email if it is hard for you to communicate by telephone.  Try to learn a listening technique called speechreading. It is not lipreading. You pay attention to people's gestures, expressions, posture, and tone of voice. These clues can help you understand what a person is saying. Face the person you are talking to, and have them face you. Make sure the lighting is good. You need to see the other person's face clearly.  Think about counseling if you need help to adjust to your hearing loss.  When should you call for help?  Watch closely for changes in your health, and be sure to contact your doctor if:    You think your hearing is getting worse.     You have new symptoms, such as dizziness or nausea.   Where can you learn more?  Go to https://www.theBench.net/patiented  Enter R798 in the search box to learn more about \"Hearing Loss: Care Instructions.\"  Current as of: September 27, 2023               Content Version: 14.0    6208-0781 [employer].   Care instructions adapted under license by your healthcare professional. If you have questions about a medical condition or this " instruction, always ask your healthcare professional. Healthwise, [facility] disclaims any warranty or liability for your use of this information.      Learning About Stress  What is stress?     Stress is your body's response to a hard situation. Your body can have a physical, emotional, or mental response. Stress is a fact of life for most people, and it affects everyone differently. What causes stress for you may not be stressful for someone else.  A lot of things can cause stress. You may feel stress when you go on a job interview, take a test, or run a race. This kind of short-term stress is normal and even useful. It can help you if you need to work hard or react quickly. For example, stress can help you finish an important job on time.  Long-term stress is caused by ongoing stressful situations or events. Examples of long-term stress include long-term health problems, ongoing problems at work, or conflicts in your family. Long-term stress can harm your health.  How does stress affect your health?  When you are stressed, your body responds as though you are in danger. It makes hormones that speed up your heart, make you breathe faster, and give you a burst of energy. This is called the fight-or-flight stress response. If the stress is over quickly, your body goes back to normal and no harm is done.  But if stress happens too often or lasts too long, it can have bad effects. Long-term stress can make you more likely to get sick, and it can make symptoms of some diseases worse. If you tense up when you are stressed, you may develop neck, shoulder, or low back pain. Stress is linked to high blood pressure and heart disease.  Stress also harms your emotional health. It can make you thompson, tense, or depressed. Your relationships may suffer, and you may not do well at work or school.  What can you do to manage stress?  You can try these things to help manage stress:   Do something active. Exercise or activity can  help reduce stress. Walking is a great way to get started. Even everyday activities such as housecleaning or yard work can help.  Try yoga or napoleon chi. These techniques combine exercise and meditation. You may need some training at first to learn them.  Do something you enjoy. For example, listen to music or go to a movie. Practice your hobby or do volunteer work.  Meditate. This can help you relax, because you are not worrying about what happened before or what may happen in the future.  Do guided imagery. Imagine yourself in any setting that helps you feel calm. You can use online videos, books, or a teacher to guide you.  Do breathing exercises. For example:  From a standing position, bend forward from the waist with your knees slightly bent. Let your arms dangle close to the floor.  Breathe in slowly and deeply as you return to a standing position. Roll up slowly and lift your head last.  Hold your breath for just a few seconds in the standing position.  Breathe out slowly and bend forward from the waist.  Let your feelings out. Talk, laugh, cry, and express anger when you need to. Talking with supportive friends or family, a counselor, or a krunal leader about your feelings is a healthy way to relieve stress. Avoid discussing your feelings with people who make you feel worse.  Write. It may help to write about things that are bothering you. This helps you find out how much stress you feel and what is causing it. When you know this, you can find better ways to cope.  What can you do to prevent stress?  You might try some of these things to help prevent stress:  Manage your time. This helps you find time to do the things you want and need to do.  Get enough sleep. Your body recovers from the stresses of the day while you are sleeping.  Get support. Your family, friends, and community can make a difference in how you experience stress.  Limit your news feed. Avoid or limit time on social media or news that may make  "you feel stressed.  Do something active. Exercise or activity can help reduce stress. Walking is a great way to get started.  Where can you learn more?  Go to https://www.eeden.net/patiented  Enter N032 in the search box to learn more about \"Learning About Stress.\"  Current as of: October 24, 2023               Content Version: 14.0    2648-6927 [employer].   Care instructions adapted under license by your healthcare professional. If you have questions about a medical condition or this instruction, always ask your healthcare professional. [employer] disclaims any warranty or liability for your use of this information.      Learning About Being Physically Active  What is physical activity?     Being physically active means doing any kind of activity that gets your body moving.  The types of physical activity that can help you get fit and stay healthy include:  Aerobic or \"cardio\" activities. These make your heart beat faster and make you breathe harder, such as brisk walking, riding a bike, or running. They strengthen your heart and lungs and build up your endurance.  Strength training activities. These make your muscles work against, or \"resist,\" something. Examples include lifting weights or doing push-ups. These activities help tone and strengthen your muscles and bones.  Stretches. These let you move your joints and muscles through their full range of motion. Stretching helps you be more flexible.  Reaching a balance between these three types of physical activity is important because each one contributes to your overall fitness.  What are the benefits of being active?  Being active is one of the best things you can do for your health. It helps you to:  Feel stronger and have more energy to do all the things you like to do.  Focus better at school or work.  Feel, think, and sleep better.  Reach and stay at a healthy weight.  Lose fat and build lean muscle.  Lower your risk for " "serious health problems, including diabetes, heart attack, high blood pressure, and some cancers.  Keep your heart, lungs, bones, muscles, and joints strong and healthy.  How can you make being active part of your life?  Start slowly. Make it your long-term goal to get at least 30 minutes of exercise on most days of the week. Walking is a good choice. You also may want to do other activities, such as running, swimming, cycling, or playing tennis or team sports.  Pick activities that you like--ones that make your heart beat faster, your muscles stronger, and your muscles and joints more flexible. If you find more than one thing you like doing, do them all. You don't have to do the same thing every day.  Get your heart pumping every day. Any activity that makes your heart beat faster and keeps it at that rate for a while counts.  Here are some great ways to get your heart beating faster:  Go for a brisk walk, run, or hike.  Go for a swim or bike ride.  Take an online exercise class or dance.  Play a game of touch football, basketball, or soccer.  Play tennis, pickleball, or racquetball.  Climb stairs.  Even some household chores can be aerobic. Just do them at a faster pace. Raking or mowing the lawn, sweeping the garage, and vacuuming and cleaning your home all can help get your heart rate up.  Strengthen your muscles during the week. You don't have to lift heavy weights or grow big, bulky muscles to get stronger. Doing a few simple activities that make your muscles work against, or \"resist,\" something can help you get stronger. Aim for at least twice a week.  For example, you can:  Do push-ups or sit-ups, which use your own body weight as resistance.  Lift weights or dumbbells or use stretch bands at home or in a gym or community center.  Stretch your muscles often. Stretching will help you as you become more active. It can help you stay flexible and loosen tight muscles. It can also help improve your balance and " "posture and can be a great way to relax.  Be sure to stretch the muscles you'll be using when you work out. It's best to warm your muscles slightly before you stretch them. Walk or do some other light aerobic activity for a few minutes. Then start stretching.  When you stretch your muscles:  Do it slowly. Stretching is not about going fast or making sudden movements.  Don't push or bounce during a stretch.  Hold each stretch for at least 15 to 30 seconds, if you can. You should feel a stretch in the muscle, but not pain.  Breathe out as you do the stretch. Then breathe in as you hold the stretch. Don't hold your breath.  If you're worried about how more activity might affect your health, have a checkup before you start. Follow any special advice your doctor gives you for getting a smart start.  Where can you learn more?  Go to https://www.[employer]/patiented  Enter W332 in the search box to learn more about \"Learning About Being Physically Active.\"  Current as of: June 5, 2023               Content Version: 14.0    1444-4801 [employer].   Care instructions adapted under license by your healthcare professional. If you have questions about a medical condition or this instruction, always ask your healthcare professional. [employer] disclaims any warranty or liability for your use of this information.      Lung Cancer Screening   Frequently Asked Questions  If you are at high-risk for lung cancer, getting screened with low-dose computed tomography (LDCT) every year can help save your life. This handout offers answers to some of the most common questions about lung cancer screening. If you have other questions, please call [phone] ([phone]).     What is it?  Lung cancer screening uses special X-ray technology to create an image of your lung tissue. The exam is quick and easy and takes less than 10 seconds. We don t give you any medicine or use any needles. You can eat " before and after the exam. You don t need to change your clothes as long as the clothing on your chest doesn t contain metal. But, you do need to be able to hold your breath for at least 6 seconds during the exam.    What is the goal of lung cancer screening?  The goal of lung cancer screening is to save lives. Many times, lung cancer is not found until a person starts having physical symptoms. Lung cancer screening can help detect lung cancer in the earliest stages when it may be easier to treat.    Who should be screened for lung cancer?  We suggest lung cancer screening for anyone who is at high-risk for lung cancer. You are in the high-risk group if you:     are between the ages of 55 and 79, and   have smoked at least 1 pack of cigarettes a day for 20 or more years, and   still smoke or have quit within the past 15 years.    However, if you have a new cough or shortness of breath, you should talk to your doctor before being screened.    Why does it matter if I have symptoms?  Certain symptoms can be a sign that you have a condition in your lungs that should be checked and treated by your doctor. These symptoms include fever, chest pain, a new or changing cough, shortness of breath that you have never felt before, coughing up blood or unexplained weight loss. Having any of these symptoms can greatly affect the results of lung cancer screening.       Should all smokers get an LDCT lung cancer screening exam?  It depends. Lung cancer screening is for a very specific group of men and women who have a history of heavy smoking over a long period of time (see  Who should be screened for lung cancer  above).  I am in the high-risk group, but have been diagnosed with cancer in the past. Is LDCT lung cancer screening right for me?  In some cases, you should not have LDCT lung screening, such as when your doctor is already following your cancer with CT scan studies. Your doctor will help you decide if LDCT lung screening  is right for you.  Do I need to have a screening exam every year?  Yes. If you are in the high-risk group described earlier, you should get an LDCT lung cancer screening exam every year until you are 79, or are no longer willing or able to undergo screening and possible procedures to diagnose and treat lung cancer.  How effective is LDCT at preventing death from lung cancer?  Studies have shown that LDCT lung cancer screening can lower the risk of death from lung cancer by 20 percent in people who are at high-risk.  What are the risks?  There are some risks and limitations of LDCT lung cancer screening. We want to make sure you understand the risks and benefits, so please let us know if you have any questions. Your doctor may want to talk with you more about these risks.   Radiation exposure: As with any exam that uses radiation, there is a very small increased risk of cancer. The amount of radiation in LDCT is small--about the same amount a person would get from a mammogram. Your doctor orders the exam when he or she feels the potential benefits outweigh the risks.   False negatives: No test is perfect, including LDCT. It is possible that you may have a medical condition, including lung cancer, that is not found during your exam. This is called a false negative result.   False positives and more testing: LDCT very often finds something in the lung that could be cancer, but in fact is not. This is called a false positive result. False positive tests often cause anxiety. To make sure these findings are not cancer, you may need to have more tests. These tests will be done only if you give us permission. Sometimes patients need a treatment that can have side effects, such as a biopsy. For more information on false positives, see  What can I expect from the results?    Findings not related to lung cancer: Your LDCT exam also takes pictures of areas of your body next to your lungs. In a very small number of cases, the CT  scan will show an abnormal finding in one of these areas, such as your kidneys, adrenal glands, liver or thyroid. This finding may not be serious, but you may need more tests. Your doctor can help you decide what other tests you may need, if any.  What can I expect from the results?  About 1 out of 4 LDCT exams will find something that may need more tests. Most of the time, these findings are lung nodules. Lung nodules are very small collections of tissue in the lung. These nodules are very common, and the vast majority--more than 97 percent--are not cancer (benign). Most are normal lymph nodes or small areas of scarring from past infections.  But, if a small lung nodule is found to be cancer, the cancer can be cured more than 90 percent of the time. To know if the nodule is cancer, we may need to get more images before your next yearly screening exam. If the nodule has suspicious features (for example, it is large, has an odd shape or grows over time), we will refer you to a specialist for further testing.  Will my doctor also get the results?  Yes. Your doctor will get a copy of your results.  Is it okay to keep smoking now that there s a cancer screening exam?  No. Tobacco is one of the strongest cancer-causing agents. It causes not only lung cancer, but other cancers and cardiovascular (heart) diseases as well. The damage caused by smoking builds over time. This means that the longer you smoke, the higher your risk of disease. While it is never too late to quit, the sooner you quit, the better.  Where can I find help to quit smoking?  The best way to prevent lung cancer is to stop smoking. If you have already quit smoking, congratulations and keep it up! For help on quitting smoking, please call [facility] at 7-149-QUITNOW (1-920.338.8790) or the American [employer] Society at 1-832.824.3027 to find local resources near you.  One-on-one health coaching:  If you d prefer to work individually with a health care  provider on tobacco cessation, we offer:     Medication Therapy Management:  Our specially trained pharmacists work closely with you and your doctor to help you quit smoking.  Call [phone] or [phone] (toll free).

## 2024-05-17 ENCOUNTER — LAB (OUTPATIENT)
Dept: LAB | Facility: CLINIC | Age: 74
End: 2024-05-17
Payer: MEDICARE

## 2024-05-17 ENCOUNTER — ANTICOAGULATION THERAPY VISIT (OUTPATIENT)
Dept: ANTICOAGULATION | Facility: CLINIC | Age: 74
End: 2024-05-17

## 2024-05-17 ENCOUNTER — DOCUMENTATION ONLY (OUTPATIENT)
Dept: ANTICOAGULATION | Facility: CLINIC | Age: 74
End: 2024-05-17

## 2024-05-17 DIAGNOSIS — Z79.01 LONG TERM CURRENT USE OF ANTICOAGULANT THERAPY: ICD-10-CM

## 2024-05-17 DIAGNOSIS — Z86.718 H/O DEEP VENOUS THROMBOSIS: ICD-10-CM

## 2024-05-17 DIAGNOSIS — Z79.01 LONG TERM CURRENT USE OF ANTICOAGULANT THERAPY: Primary | ICD-10-CM

## 2024-05-17 DIAGNOSIS — Z79.01 CHRONIC ANTICOAGULATION: ICD-10-CM

## 2024-05-17 DIAGNOSIS — D68.9 COAGULATION DEFECT (H): Primary | ICD-10-CM

## 2024-05-17 DIAGNOSIS — D68.9 COAGULATION DEFECT (H): ICD-10-CM

## 2024-05-17 LAB — INR BLD: 2.2 (ref 0.9–1.1)

## 2024-05-17 PROCEDURE — 36416 COLLJ CAPILLARY BLOOD SPEC: CPT

## 2024-05-17 PROCEDURE — 85610 PROTHROMBIN TIME: CPT

## 2024-05-17 NOTE — PROGRESS NOTES
ANTICOAGULATION CLINIC REFERRAL RENEWAL REQUEST       An annual renewal order is required for all patients referred to [facility].?  Please review and sign the pended referral order for Jose M Rea.       ANTICOAGULATION SUMMARY      Warfarin indication(s)   DVT and Heterozygous Factor V Leiden    Mechanical heart valve present?  NO       Current goal range   INR: 2.0-3.0     Goal appropriate for indication? Goal INR 2-3, standard for indication(s) above     Time in Therapeutic Range (TTR)  (Goal > 60%) 94.2%       Office visit with referring provider's group within last year yes on 4-30-24       Joanie Yin RN  [facility]

## 2024-05-17 NOTE — PROGRESS NOTES
ANTICOAGULATION MANAGEMENT     Jose M Jeffersonmaximiliano 74 year old male is on warfarin with therapeutic INR result. (Goal INR 2.0-3.0)    Recent labs: (last 7 days)     05/17/24  0925   INR 2.2*       ASSESSMENT     Source(s): Chart Review and Patient/Caregiver Call     Warfarin doses taken: Warfarin taken as instructed  Diet: No new diet changes identified  Medication/supplement changes: None noted  New illness, injury, or hospitalization: No  Signs or symptoms of bleeding or clotting: No  Previous result: Therapeutic last 2(+) visits  Additional findings: None       PLAN     Recommended plan for no diet, medication or health factor changes affecting INR     Dosing Instructions: Continue your current warfarin dose with next INR in 6 weeks       Summary  As of 5/17/2024      Full warfarin instructions:  7.5 mg every day   Next INR check:  6/28/2024               Telephone call with Jose M who verbalizes understanding and agrees to plan    Lab visit scheduled    Education provided:   Please call back if any changes to your diet, medications or how you've been taking warfarin  Contact [phone]  with any changes, questions or concerns.     Plan made per [facility] anticoagulation protocol    Joanie Yin RN  Anticoagulation Clinic  5/17/2024    _______________________________________________________________________     Anticoagulation Episode Summary       Current INR goal:  2.0-3.0   TTR:  94.2% (1 y)   Target end date:  Indefinite   Send INR reminders to:  [facility]    Indications    Coagulation defect (H24) [D68.9]  Acute deep vein thrombosis (DVT) of proximal vein of lower extremity (H) (Resolved) [I82.4Y9]  Long term current use of anticoagulant therapy [Z79.01]  Acute deep vein thrombosis (DVT) of proximal vein of lower extremity  unspecified laterality (H) (Resolved) [I82.4Y9]  Chronic anticoagulation [Z79.01]  H/O deep venous thrombosis [Z86.718]  Acute deep vein thrombosis (DVT) of proximal vein of lower  extremity  unspecified laterality (H) (Resolved) [I82.4Y9]             Comments:  okay to leave [facility]             Anticoagulation Care Providers       Provider Role Specialty Phone number    Pj Quijano APRN CNP Referring Family Medicine [phone]    Rigoberto Hernandez MD Referring Family Medicine [phone]

## 2024-05-29 ENCOUNTER — HOSPITAL ENCOUNTER (OUTPATIENT)
Dept: CT IMAGING | Facility: CLINIC | Age: 74
Discharge: HOME | End: 2024-05-29
Attending: FAMILY MEDICINE | Admitting: FAMILY MEDICINE
Payer: MEDICARE

## 2024-05-29 DIAGNOSIS — Z87.891 PERSONAL HISTORY OF TOBACCO USE: ICD-10-CM

## 2024-05-29 PROCEDURE — 71271 CT THORAX LUNG CANCER SCR C-: CPT

## 2024-06-28 ENCOUNTER — LAB (OUTPATIENT)
Dept: LAB | Facility: CLINIC | Age: 74
End: 2024-06-28
Payer: MEDICARE

## 2024-06-28 ENCOUNTER — ANTICOAGULATION THERAPY VISIT (OUTPATIENT)
Dept: ANTICOAGULATION | Facility: CLINIC | Age: 74
End: 2024-06-28

## 2024-06-28 DIAGNOSIS — D68.9 COAGULATION DEFECT (H): Primary | ICD-10-CM

## 2024-06-28 DIAGNOSIS — Z79.01 LONG TERM CURRENT USE OF ANTICOAGULANT THERAPY: ICD-10-CM

## 2024-06-28 DIAGNOSIS — Z86.718 H/O DEEP VENOUS THROMBOSIS: ICD-10-CM

## 2024-06-28 DIAGNOSIS — Z79.01 CHRONIC ANTICOAGULATION: ICD-10-CM

## 2024-06-28 DIAGNOSIS — D68.9 COAGULATION DEFECT (H): ICD-10-CM

## 2024-06-28 LAB — INR BLD: 2.9 (ref 0.9–1.1)

## 2024-06-28 PROCEDURE — 85610 PROTHROMBIN TIME: CPT

## 2024-06-28 PROCEDURE — 36416 COLLJ CAPILLARY BLOOD SPEC: CPT

## 2024-06-28 NOTE — PROGRESS NOTES
ANTICOAGULATION MANAGEMENT     Jose M Rea 74 year old male is on warfarin with therapeutic INR result. (Goal INR 2.0-3.0)    Recent labs: (last 7 days)     06/28/24  0855   INR 2.9*       ASSESSMENT     Source(s): Chart Review and Patient/Caregiver Call     Warfarin doses taken: Warfarin taken as instructed  Diet: No new diet changes identified  Medication/supplement changes: None noted  New illness, injury, or hospitalization: No  Signs or symptoms of bleeding or clotting: No  Previous result: Therapeutic last 2(+) visits  Additional findings: Moving to [place] 7/15. Writer will PP 6 weeks and have [facility] follow-up before closing the encounter. Patient states they have a clinic and just need to establish a provider.       PLAN     Recommended plan for no diet, medication or health factor changes affecting INR     Dosing Instructions: Continue your current warfarin dose with next INR in 6 weeks       Summary  As of 6/28/2024      Full warfarin instructions:  7.5 mg every day   Next INR check:  8/9/2024               Telephone call with Jose M who verbalizes understanding and agrees to plan    Patient using outside facility for labs    Education provided: Contact [phone] with any changes, questions or concerns.     Plan made per [facility] anticoagulation protocol    Kb ODONNELL RN  Anticoagulation Clinic  6/28/2024    _______________________________________________________________________     Anticoagulation Episode Summary       Current INR goal:  2.0-3.0   TTR:  94.2% (1 y)   Target end date:  Indefinite   Send INR reminders to:  [facility]    Indications    Coagulation defect (H24) [D68.9]  Acute deep vein thrombosis (DVT) of proximal vein of lower extremity (H) (Resolved) [I82.4Y9]  Long term current use of anticoagulant therapy [Z79.01]  Acute deep vein thrombosis (DVT) of proximal vein of lower extremity  unspecified laterality (H) (Resolved) [I82.4Y9]  Chronic anticoagulation [Z79.01]  H/O deep venous  thrombosis [Z86.718]  Acute deep vein thrombosis (DVT) of proximal vein of lower extremity  unspecified laterality (H) (Resolved) [I82.4Y9]             Comments:  okay to leave [facility]             Anticoagulation Care Providers       Provider Role Specialty Phone number    Pj Quijano APRN CNP Referring Family Medicine [phone]    Rigoberto Hernandez MD Referring Family Medicine [phone]    Abdirashid Zendejas MD Referring Family Medicine [phone]

## 2024-07-02 ENCOUNTER — PATIENT OUTREACH (OUTPATIENT)
Dept: ONCOLOGY | Facility: CLINIC | Age: 74
End: 2024-07-02
Payer: MEDICARE

## 2024-07-02 NOTE — PROGRESS NOTES
RNMARIYA Garner removed from pt care team as he does not follow with hematology/oncology at this time.    RICHARD MCKEE RN on 7/2/2024 at 5:17 PM

## 2024-08-15 ENCOUNTER — TELEPHONE (OUTPATIENT)
Dept: ANTICOAGULATION | Facility: CLINIC | Age: 74
End: 2024-08-15
Payer: MEDICARE

## 2024-08-15 DIAGNOSIS — Z86.718 H/O DEEP VENOUS THROMBOSIS: ICD-10-CM

## 2024-08-15 DIAGNOSIS — D68.9 COAGULATION DEFECT (H): Primary | ICD-10-CM

## 2024-08-15 DIAGNOSIS — Z79.01 CHRONIC ANTICOAGULATION: ICD-10-CM

## 2024-08-15 DIAGNOSIS — Z79.01 LONG TERM CURRENT USE OF ANTICOAGULANT THERAPY: ICD-10-CM

## 2024-08-15 NOTE — TELEPHONE ENCOUNTER
ANTICOAGULATION  MANAGEMENT    Jose M Rea is being discharged from the [facility] ([facility]).    Reason for discharge: care has been transferred to Health care facility in [facility]- had INR done last week and has est care.     Anticoagulation episode resolved, [facility] referral closed, and Standing order discontinued    If patient needs warfarin management in the future, please send a new referral    Eden Sarabia RN

## 2025-06-07 ENCOUNTER — HEALTH MAINTENANCE LETTER (OUTPATIENT)
Age: 75
End: 2025-06-07

## 2025-06-10 ENCOUNTER — TELEPHONE (OUTPATIENT)
Dept: FAMILY MEDICINE | Facility: CLINIC | Age: 75
End: 2025-06-10
Payer: MEDICARE

## 2025-06-10 NOTE — TELEPHONE ENCOUNTER
FYI - Status Update    Who is Calling: patient    Update:   Pt and  called stating they are still getting notifications for appts when they have not been seen or go to the [place] clinic anymore, they transferred care to a [facility] clinic in [place]. They would like to please have all communications stop going to them as they are not pts here anymore. The would also like a call to confirm this. Home number is fine.  Does caller want a call/response back: Yes     Could we send this information to you in [employer] or would you prefer to receive a phone call?:   Patient would prefer a phone call   Okay to leave a detailed message?: N/A at Home number on file [phone] (home)

## (undated) DEVICE — SOL NACL 0.9% IRRIG 1000ML BOTTLE 07138-09

## (undated) DEVICE — SOL WATER IRRIG 1000ML BOTTLE 07139-09

## (undated) RX ORDER — PHENYLEPHRINE HYDROCHLORIDE 25 MG/ML
SOLUTION/ DROPS OPHTHALMIC
Status: DISPENSED
Start: 2019-04-24

## (undated) RX ORDER — PROPOFOL 10 MG/ML
INJECTION, EMULSION INTRAVENOUS
Status: DISPENSED
Start: 2022-11-15

## (undated) RX ORDER — TROPICAMIDE 10 MG/ML
SOLUTION/ DROPS OPHTHALMIC
Status: DISPENSED
Start: 2019-04-24

## (undated) RX ORDER — PHENYLEPHRINE HYDROCHLORIDE 25 MG/ML
SOLUTION/ DROPS OPHTHALMIC
Status: DISPENSED
Start: 2019-04-03

## (undated) RX ORDER — LIDOCAINE HYDROCHLORIDE 10 MG/ML
INJECTION, SOLUTION EPIDURAL; INFILTRATION; INTRACAUDAL; PERINEURAL
Status: DISPENSED
Start: 2022-06-20

## (undated) RX ORDER — TROPICAMIDE 10 MG/ML
SOLUTION/ DROPS OPHTHALMIC
Status: DISPENSED
Start: 2019-04-03

## (undated) RX ORDER — CYCLOPENTOLATE HYDROCHLORIDE 10 MG/ML
SOLUTION/ DROPS OPHTHALMIC
Status: DISPENSED
Start: 2019-04-03

## (undated) RX ORDER — CYCLOPENTOLATE HYDROCHLORIDE 10 MG/ML
SOLUTION/ DROPS OPHTHALMIC
Status: DISPENSED
Start: 2019-04-24